# Patient Record
Sex: MALE | Race: BLACK OR AFRICAN AMERICAN | NOT HISPANIC OR LATINO | Employment: FULL TIME | URBAN - METROPOLITAN AREA
[De-identification: names, ages, dates, MRNs, and addresses within clinical notes are randomized per-mention and may not be internally consistent; named-entity substitution may affect disease eponyms.]

---

## 2019-05-26 ENCOUNTER — HOSPITAL ENCOUNTER (EMERGENCY)
Facility: HOSPITAL | Age: 64
Discharge: HOME | End: 2019-05-27
Attending: EMERGENCY MEDICINE
Payer: COMMERCIAL

## 2019-05-26 DIAGNOSIS — N50.819 TESTICULAR PAIN: ICD-10-CM

## 2019-05-26 DIAGNOSIS — R07.9 CHEST PAIN: ICD-10-CM

## 2019-05-26 DIAGNOSIS — R06.02 SOB (SHORTNESS OF BREATH): ICD-10-CM

## 2019-05-26 PROCEDURE — 93010 EKG 12-LEAD: ICD-10-PCS | Mod: ,,, | Performed by: INTERNAL MEDICINE

## 2019-05-26 PROCEDURE — 93010 ELECTROCARDIOGRAM REPORT: CPT | Mod: ,,, | Performed by: INTERNAL MEDICINE

## 2019-05-26 PROCEDURE — 99285 EMERGENCY DEPT VISIT HI MDM: CPT | Mod: 25

## 2019-05-26 PROCEDURE — 93005 ELECTROCARDIOGRAM TRACING: CPT

## 2019-05-26 RX ORDER — ASPIRIN 325 MG
325 TABLET ORAL
Status: COMPLETED | OUTPATIENT
Start: 2019-05-27 | End: 2019-05-27

## 2019-05-27 VITALS
TEMPERATURE: 99 F | WEIGHT: 275.13 LBS | HEIGHT: 73 IN | BODY MASS INDEX: 36.46 KG/M2 | DIASTOLIC BLOOD PRESSURE: 79 MMHG | OXYGEN SATURATION: 96 % | HEART RATE: 67 BPM | SYSTOLIC BLOOD PRESSURE: 137 MMHG | RESPIRATION RATE: 14 BRPM

## 2019-05-27 LAB
ALBUMIN SERPL BCP-MCNC: 3.5 G/DL (ref 3.5–5.2)
ALP SERPL-CCNC: 48 U/L (ref 55–135)
ALT SERPL W/O P-5'-P-CCNC: 30 U/L (ref 10–44)
ANION GAP SERPL CALC-SCNC: 9 MMOL/L (ref 8–16)
AST SERPL-CCNC: 19 U/L (ref 10–40)
BASOPHILS # BLD AUTO: 0.01 K/UL (ref 0–0.2)
BASOPHILS NFR BLD: 0.2 % (ref 0–1.9)
BILIRUB SERPL-MCNC: 0.4 MG/DL (ref 0.1–1)
BILIRUB UR QL STRIP: NEGATIVE
BNP SERPL-MCNC: 47 PG/ML (ref 0–99)
BUN SERPL-MCNC: 17 MG/DL (ref 8–23)
CALCIUM SERPL-MCNC: 9.1 MG/DL (ref 8.7–10.5)
CHLORIDE SERPL-SCNC: 109 MMOL/L (ref 95–110)
CLARITY UR: CLEAR
CO2 SERPL-SCNC: 21 MMOL/L (ref 23–29)
COLOR UR: YELLOW
CREAT SERPL-MCNC: 1.6 MG/DL (ref 0.5–1.4)
DIFFERENTIAL METHOD: ABNORMAL
EOSINOPHIL # BLD AUTO: 0.2 K/UL (ref 0–0.5)
EOSINOPHIL NFR BLD: 2.9 % (ref 0–8)
ERYTHROCYTE [DISTWIDTH] IN BLOOD BY AUTOMATED COUNT: 13 % (ref 11.5–14.5)
EST. GFR  (AFRICAN AMERICAN): 53 ML/MIN/1.73 M^2
EST. GFR  (NON AFRICAN AMERICAN): 46 ML/MIN/1.73 M^2
GLUCOSE SERPL-MCNC: 94 MG/DL (ref 70–110)
GLUCOSE UR QL STRIP: NEGATIVE
HCT VFR BLD AUTO: 41.9 % (ref 40–54)
HGB BLD-MCNC: 13.8 G/DL (ref 14–18)
HGB UR QL STRIP: NEGATIVE
KETONES UR QL STRIP: NEGATIVE
LEUKOCYTE ESTERASE UR QL STRIP: NEGATIVE
LYMPHOCYTES # BLD AUTO: 0.8 K/UL (ref 1–4.8)
LYMPHOCYTES NFR BLD: 12.3 % (ref 18–48)
MCH RBC QN AUTO: 30 PG (ref 27–31)
MCHC RBC AUTO-ENTMCNC: 32.9 G/DL (ref 32–36)
MCV RBC AUTO: 91 FL (ref 82–98)
MONOCYTES # BLD AUTO: 0.8 K/UL (ref 0.3–1)
MONOCYTES NFR BLD: 13.6 % (ref 4–15)
NEUTROPHILS # BLD AUTO: 4.3 K/UL (ref 1.8–7.7)
NEUTROPHILS NFR BLD: 71 % (ref 38–73)
NITRITE UR QL STRIP: NEGATIVE
PH UR STRIP: 6 [PH] (ref 5–8)
PLATELET # BLD AUTO: 182 K/UL (ref 150–350)
PMV BLD AUTO: 10.2 FL (ref 9.2–12.9)
POTASSIUM SERPL-SCNC: 3.7 MMOL/L (ref 3.5–5.1)
PROT SERPL-MCNC: 7.3 G/DL (ref 6–8.4)
PROT UR QL STRIP: NEGATIVE
RBC # BLD AUTO: 4.6 M/UL (ref 4.6–6.2)
SODIUM SERPL-SCNC: 139 MMOL/L (ref 136–145)
SP GR UR STRIP: 1.02 (ref 1–1.03)
TROPONIN I SERPL DL<=0.01 NG/ML-MCNC: 0.01 NG/ML (ref 0–0.03)
URN SPEC COLLECT METH UR: NORMAL
UROBILINOGEN UR STRIP-ACNC: NEGATIVE EU/DL
WBC # BLD AUTO: 6.11 K/UL (ref 3.9–12.7)

## 2019-05-27 PROCEDURE — 25000003 PHARM REV CODE 250: Performed by: EMERGENCY MEDICINE

## 2019-05-27 PROCEDURE — 84484 ASSAY OF TROPONIN QUANT: CPT

## 2019-05-27 PROCEDURE — 36415 COLL VENOUS BLD VENIPUNCTURE: CPT

## 2019-05-27 PROCEDURE — 80053 COMPREHEN METABOLIC PANEL: CPT

## 2019-05-27 PROCEDURE — 83880 ASSAY OF NATRIURETIC PEPTIDE: CPT

## 2019-05-27 PROCEDURE — 81003 URINALYSIS AUTO W/O SCOPE: CPT

## 2019-05-27 PROCEDURE — 85025 COMPLETE CBC W/AUTO DIFF WBC: CPT

## 2019-05-27 RX ADMIN — ASPIRIN 325 MG ORAL TABLET 325 MG: 325 PILL ORAL at 12:05

## 2019-05-27 NOTE — ED PROVIDER NOTES
SCRIBE #1 NOTE: I, Juli Ochoa, am scribing for, and in the presence of, Sylvester Galvez MD. I have scribed the entire note.      History      Chief Complaint   Patient presents with    Shortness of Breath     x 1 wk    Testicle Pain       Review of patient's allergies indicates:  No Known Allergies     HPI   HPI    5/26/2019, 11:46 PM   History obtained from the patient      History of Present Illness: Abdirahman Rodriguez is a 63 y.o. male patient who presents to the Emergency Department for testicle pain which onset gradually a week ago. Symptoms are constant and moderate in severity. Pt also c/o shortness of breath. Denies any hx of blood clots in legs or lungs. No mitigating or exacerbating factors reported. No associated sxs. Patient denies any fever, chills, dysuria, CP, n/v/d, and all other sxs at this time. No prior Tx. No further complaints or concerns at this time.         Arrival mode: Personal vehicle      PCP: [facility]       Past Medical History:  History reviewed. No pertinent past medical history.    Past Surgical History:  Past Surgical History:   Procedure Laterality Date    None           Family History:  Family History   Problem Relation Age of Onset    Heart disease Father     Heart disease Mother        Social History:  Social History     Tobacco Use    Smoking status: Never Smoker    Smokeless tobacco: Never Used   Substance and Sexual Activity    Alcohol use: No    Drug use: No    Sexual activity: Unknown       ROS   Review of Systems   Constitutional: Negative for chills and fever.   HENT: Negative for sore throat.    Respiratory: Positive for shortness of breath.    Cardiovascular: Negative for chest pain.   Gastrointestinal: Negative for diarrhea, nausea and vomiting.   Genitourinary: Positive for testicular pain. Negative for dysuria.   Musculoskeletal: Negative for back pain.   Skin: Negative for rash.   Neurological: Negative for weakness.   Hematological: Does not  "bruise/bleed easily.   All other systems reviewed and are negative.    Physical Exam      Initial Vitals [05/26/19 2325]   BP Pulse Resp Temp SpO2   (!) 155/86 86 20 98.6 °F (37 °C) 95 %      MAP       --          Physical Exam  Nursing Notes and Vital Signs Reviewed.  Constitutional: Patient is in no acute distress. Well-developed and well-nourished.  Head: Atraumatic. Normocephalic.  Eyes: PERRL. EOM intact. Conjunctivae are not pale. No scleral icterus.  ENT: Mucous membranes are moist. Oropharynx is clear and symmetric.    Neck: Supple. Full ROM. No lymphadenopathy.  Cardiovascular: Regular rate. Regular rhythm. No murmurs, rubs, or gallops. Distal pulses are 2+ and symmetric.  Pulmonary/Chest: No respiratory distress. Clear to auscultation bilaterally. No wheezing or rales.  Abdominal: Soft and non-distended.  There is no tenderness.  No rebound, guarding, or rigidity. Good bowel sounds.  Genitourinary: No CVA tenderness.  : External inspection is normal.  Penis is circumcised. No erythema, rash, or lesions. No penile discharge. Normal bilateral testicular lie and position. Scrotum and testes appear normal with no discoloration. No scrotal, testicular, or epididymal tenderness. No masses or hernias around the scrotum, testicles, or inguinal canal.  Musculoskeletal: Moves all extremities. No obvious deformities. No edema. No calf tenderness.  Skin: Warm and dry.  Neurological:  Alert, awake, and appropriate.  Normal speech.  No acute focal neurological deficits are appreciated.  Psychiatric: Normal affect. Good eye contact. Appropriate in content.    ED Course    Procedures  ED Vital Signs:  Vitals:    05/26/19 2325 05/26/19 2359 05/27/19 0144 05/27/19 0145   BP: (!) 155/86   137/79   Pulse: 86 82  67   Resp: 20   14   Temp: 98.6 °F (37 °C)      TempSrc: Oral      SpO2: 95%  96%    Weight: 124.8 kg (275 lb 2.2 oz)      Height: 6' 1" (1.854 m)          Abnormal Lab Results:  Labs Reviewed   CBC W/ AUTO " DIFFERENTIAL - Abnormal; Notable for the following components:       Result Value    Hemoglobin 13.8 (*)     Lymph # 0.8 (*)     Lymph% 12.3 (*)     All other components within normal limits   COMPREHENSIVE METABOLIC PANEL - Abnormal; Notable for the following components:    CO2 21 (*)     Creatinine 1.6 (*)     Alkaline Phosphatase 48 (*)     eGFR if  53 (*)     eGFR if non  46 (*)     All other components within normal limits   TROPONIN I   B-TYPE NATRIURETIC PEPTIDE   URINALYSIS, REFLEX TO URINE CULTURE    Narrative:     Preferred Collection Type->Urine, Clean Catch   TROPONIN I        All Lab Results:  Results for orders placed or performed during the hospital encounter of 05/26/19   CBC auto differential   Result Value Ref Range    WBC 6.11 3.90 - 12.70 K/uL    RBC 4.60 4.60 - 6.20 M/uL    Hemoglobin 13.8 (L) 14.0 - 18.0 g/dL    Hematocrit 41.9 40.0 - 54.0 %    Mean Corpuscular Volume 91 82 - 98 fL    Mean Corpuscular Hemoglobin 30.0 27.0 - 31.0 pg    Mean Corpuscular Hemoglobin Conc 32.9 32.0 - 36.0 g/dL    RDW 13.0 11.5 - 14.5 %    Platelets 182 150 - 350 K/uL    MPV 10.2 9.2 - 12.9 fL    Gran # (ANC) 4.3 1.8 - 7.7 K/uL    Lymph # 0.8 (L) 1.0 - 4.8 K/uL    Mono # 0.8 0.3 - 1.0 K/uL    Eos # 0.2 0.0 - 0.5 K/uL    Baso # 0.01 0.00 - 0.20 K/uL    Gran% 71.0 38.0 - 73.0 %    Lymph% 12.3 (L) 18.0 - 48.0 %    Mono% 13.6 4.0 - 15.0 %    Eosinophil% 2.9 0.0 - 8.0 %    Basophil% 0.2 0.0 - 1.9 %    Differential Method Automated    Comprehensive metabolic panel   Result Value Ref Range    Sodium 139 136 - 145 mmol/L    Potassium 3.7 3.5 - 5.1 mmol/L    Chloride 109 95 - 110 mmol/L    CO2 21 (L) 23 - 29 mmol/L    Glucose 94 70 - 110 mg/dL    BUN, Bld 17 8 - 23 mg/dL    Creatinine 1.6 (H) 0.5 - 1.4 mg/dL    Calcium 9.1 8.7 - 10.5 mg/dL    Total Protein 7.3 6.0 - 8.4 g/dL    Albumin 3.5 3.5 - 5.2 g/dL    Total Bilirubin 0.4 0.1 - 1.0 mg/dL    Alkaline Phosphatase 48 (L) 55 - 135 U/L    AST  19 10 - 40 U/L    ALT 30 10 - 44 U/L    Anion Gap 9 8 - 16 mmol/L    eGFR if African American 53 (A) >60 mL/min/1.73 m^2    eGFR if non African American 46 (A) >60 mL/min/1.73 m^2   Troponin I #1   Result Value Ref Range    Troponin I 0.011 0.000 - 0.026 ng/mL   B-Type natriuretic peptide (BNP)   Result Value Ref Range    BNP 47 0 - 99 pg/mL   Urinalysis, Reflex to Urine Culture Urine, Clean Catch   Result Value Ref Range    Specimen UA Urine, Clean Catch     Color, UA Yellow Yellow, Straw, Mita    Appearance, UA Clear Clear    pH, UA 6.0 5.0 - 8.0    Specific Gravity, UA 1.020 1.005 - 1.030    Protein, UA Negative Negative    Glucose, UA Negative Negative    Ketones, UA Negative Negative    Bilirubin (UA) Negative Negative    Occult Blood UA Negative Negative    Nitrite, UA Negative Negative    Urobilinogen, UA Negative <2.0 EU/dL    Leukocytes, UA Negative Negative         Imaging Results:  Imaging Results          US Scrotum And Testicles (In process)                X-Ray Chest AP Portable (In process)                     The EKG was ordered, reviewed, and independently interpreted by the ED provider.  Interpretation time: 23:40  Rate: 79 BPM  Rhythm: normal sinus rhythm  Interpretation: Nonspecific T wave abnormality. No STEMI.      The Emergency Provider reviewed the vital signs and test results, which are outlined above.    ED Discussion     1:39 AM: Reassessed pt at this time.  Pt states his condition has improved at this time. Discussed with pt all pertinent ED information and results. Discussed pt dx and plan of tx. Gave pt all f/u and return to the ED instructions. All questions and concerns were addressed at this time. Pt expresses understanding of information and instructions, and is comfortable with plan to discharge. Pt is stable for discharge.    I discussed with patient and/or family/caretaker that evaluation in the ED does not suggest any emergent or life threatening medical conditions requiring  immediate intervention beyond what was provided in the ED, and I believe patient is safe for discharge.  Regardless, an unremarkable evaluation in the ED does not preclude the development or presence of a serious of life threatening condition. As such, patient was instructed to return immediately for any worsening or change in current symptoms.      ED Medication(s):  Medications   aspirin tablet 325 mg (325 mg Oral Given 5/27/19 0003)     Current Discharge Medication List            Follow-up Information     [facility]. Schedule an appointment as soon as possible for a visit in 3 days.    Why:  or with your primary care physician to follow-up on today's visit  Contact information:  [address] [facility] [address] [phone]             Go to  [facility].    Specialty:  Emergency Medicine  Why:  As needed, If symptoms worsen  Contact information:  [address] [facility] [address] [phone]                   Medical Decision Making    Medical Decision Making:   Clinical Tests:   Lab Tests: Ordered and Reviewed  Radiological Study: Ordered and Reviewed  Medical Tests: Ordered and Reviewed           Scribe Attestation:   Scribe #1: I performed the above scribed service and the documentation accurately describes the services I performed. I attest to the accuracy of the note.    Attending:   Physician Attestation Statement for Scribe #1: I, Sylvester Galvez MD, personally performed the services described in this documentation, as scribed by Juli Ochoa, in my presence, and it is both accurate and complete.          Clinical Impression       ICD-10-CM ICD-9-CM   1. SOB (shortness of breath) R06.02 786.05   2. Chest pain R07.9 786.50   3. Testicular pain N50.819 608.9       Disposition:   Disposition: Discharged  Condition: Stable         Sylvester Galvez MD  05/27/19 0152

## 2020-06-01 ENCOUNTER — HOSPITAL ENCOUNTER (EMERGENCY)
Facility: HOSPITAL | Age: 65
Discharge: HOME | End: 2020-06-01
Attending: FAMILY MEDICINE
Payer: COMMERCIAL

## 2020-06-01 VITALS
SYSTOLIC BLOOD PRESSURE: 170 MMHG | RESPIRATION RATE: 16 BRPM | HEIGHT: 73 IN | OXYGEN SATURATION: 97 % | WEIGHT: 274.5 LBS | DIASTOLIC BLOOD PRESSURE: 85 MMHG | HEART RATE: 78 BPM | TEMPERATURE: 99 F | BODY MASS INDEX: 36.38 KG/M2

## 2020-06-01 DIAGNOSIS — R03.0 ELEVATED BLOOD PRESSURE READING: ICD-10-CM

## 2020-06-01 DIAGNOSIS — M54.50 ACUTE MIDLINE LOW BACK PAIN WITHOUT SCIATICA: ICD-10-CM
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
## 2020-06-01 DIAGNOSIS — M54.50 ACUTE LEFT-SIDED LOW BACK PAIN WITHOUT SCIATICA: ICD-10-CM

## 2020-06-01 DIAGNOSIS — M47.819 ARTHRITIS OF LOW BACK: ICD-10-CM

## 2020-06-01 PROCEDURE — 99284 EMERGENCY DEPT VISIT MOD MDM: CPT | Mod: 25

## 2020-06-01 RX ORDER — DICLOFENAC SODIUM 50 MG/1
50 TABLET, DELAYED RELEASE ORAL 2 TIMES DAILY
Qty: 10 TABLET | Refills: 0 | Status: SHIPPED | OUTPATIENT
Start: 2020-06-01 | End: 2020-06-06

## 2020-06-01 RX ORDER — TIZANIDINE 4 MG/1
4 TABLET ORAL NIGHTLY PRN
Qty: 10 TABLET | Refills: 0 | Status: SHIPPED | OUTPATIENT
Start: 2020-06-01 | End: 2020-06-11

## 2020-06-02 NOTE — ED NOTES
Patient identifiers verified and correct for Abdirahman Rodriguez.    LOC: The patient is awake, alert and aware of environment with an appropriate affect, the patient is oriented x 3 and speaking appropriately.  APPEARANCE: Patient resting comfortably and in no acute distress, patient is clean and well groomed, patient's clothing is properly fastened.  SKIN: The skin is warm and dry, color consistent with ethnicity, patient has normal skin turgor and moist mucus membranes, skin intact, no breakdown or bruising noted.  MUSCULOSKELETAL: Patient moving all extremities spontaneously. EX back pain.   RESPIRATORY: Airway is open and patent, respirations are spontaneous.  CARDIAC: Patient has a normal rate, no periphreal edema noted, capillary refill < 3 seconds.  ABDOMEN: Soft and non tender to palpation.

## 2020-06-02 NOTE — ED PROVIDER NOTES
History      Chief Complaint   Patient presents with    Fall     fell in the store yesterday, fell backwards onto buttocks and then head, denies loss of consciousness. pain to back and headaches       Review of patient's allergies indicates:  No Known Allergies     HPI   HPI    6/1/2020, 7:44 PM   History obtained from the patient      History of Present Illness: Abdirahman Rodriguez is a 64 y.o. male patient who presents to the Emergency Department for back pain x one day.  Patient states that he fell in store. Associated symptoms include lower back pain.  Patient states that during fall he hit posterior head; denies LOC.  Denies fever, vomiting, diarrhea, chest pain, SOB, headache, dizziness, saddle anesthesia, bowel/bladder incontinence.       Arrival mode: Personal vehicle      PCP: [facility]       Past Medical History:  No past medical history on file.    Past Surgical History:  Past Surgical History:   Procedure Laterality Date    None           Family History:  Family History   Problem Relation Age of Onset    Heart disease Father     Heart disease Mother        Social History:  Social History     Tobacco Use    Smoking status: Never Smoker    Smokeless tobacco: Never Used   Substance and Sexual Activity    Alcohol use: No    Drug use: No    Sexual activity: Not on file       ROS   Review of Systems   Constitutional: Negative for chills and fever.   HENT: Negative for congestion and rhinorrhea.    Eyes: Negative for discharge and redness.   Respiratory: Negative for cough and wheezing.    Cardiovascular: Negative for chest pain and palpitations.   Gastrointestinal: Negative for diarrhea, nausea and vomiting.   Genitourinary: Negative for dysuria and frequency.   Musculoskeletal: Positive for back pain. Negative for neck pain.   Skin: Negative for rash and wound.   Neurological: Positive for headaches ( resolved). Negative for dizziness.       Physical Exam      Initial Vitals [06/01/20 1805]  "  BP Pulse Resp Temp SpO2   (!) 170/85 78 16 99.2 °F (37.3 °C) 97 %      MAP       --          Physical Exam  Nursing Notes and Vital Signs Reviewed.  Constitutional: Patient is in no apparent distress. Awake and alert. Well-developed and well-nourished.  Head: Atraumatic. Normocephalic.  Eyes: PERRL. EOM intact. Conjunctivae are not pale. No scleral icterus.  ENT: Mucous membranes are moist. Oropharynx is clear and symmetric.    Neck: Supple. Full ROM. No lymphadenopathy.  Cardiovascular: Regular rate. Regular rhythm. No murmurs, rubs, or gallops. Distal pulses are 2+ and symmetric.  Pulmonary/Chest: No respiratory distress. Clear to auscultation bilaterally. No wheezing, rales, or rhonchi.  Abdominal: Soft and non-distended.  There is no tenderness.  No rebound, guarding, or rigidity. Good bowel sounds.  Genitourinary: No CVA tenderness  Musculoskeletal: Moves all extremities. No obvious deformities. No edema. No calf tenderness.    Back:  TTP over lumbar spine.  TTP over left paraspinal musculature.  Pain with lumbar flexion and extension.    Skin: Warm and dry.  Neurological:  Alert, awake, and appropriate.  Normal speech.  No acute focal neurological deficits are appreciated.  Equal strength BLE.  Negative SLR bilaterally.  DTR 2+ BLE.  Cranial nerves II-XII intact.  GCS = 15.   Psychiatric: Normal affect. Good eye contact. Appropriate in content.    ED Course    Procedures  ED Vital Signs:  Vitals:    06/01/20 1805   BP: (!) 170/85   Pulse: 78   Resp: 16   Temp: 99.2 °F (37.3 °C)   TempSrc: Oral   SpO2: 97%   Weight: 124.5 kg (274 lb 7.6 oz)   Height: 6' 1" (1.854 m)       Abnormal Lab Results:  Labs Reviewed - No data to display     All Lab Results:  None    Imaging Results:  Imaging Results          X-Ray Lumbar Spine Complete 5 View (Final result)  Result time 06/01/20 19:00:18   Procedure changed from X-Ray Lumbar Spine Ap And Lateral     Final result by Young Benoit MD (06/01/20 19:00:18)              "    Impression:      Progressive arthritic changes.  No acute findings.      Electronically signed by: Young Benoit MD  Date:    06/01/2020  Time:    19:00             Narrative:    EXAMINATION:  XR LUMBAR SPINE COMPLETE 5 VIEW    CLINICAL HISTORY:  Low back pain, minor trauma;    TECHNIQUE:  Routine radiographs obtained.    COMPARISON:  06/10/2011    FINDINGS:  Negative for acute fracture or dislocation.    Multilevel spondylosis with significant disc space narrowing at L4-5 with gas in the disc space.  Moderate to marked disc space narrowing L5-S1.  Mild-to-moderate disc space narrowing L3-4.  Low-grade facet arthropathy lower lumbar spine L4 through S1    Transitional vertebra with partial lumbarization bilateral S1 segments.    No compression deformities.                                        The Emergency Provider reviewed the vital signs and test results, which are outlined above.    ED Discussion     8:34 PM: Reassessed pt at this time.  Pt states his condition has improved at this time. Discussed with pt all pertinent ED information and results. Discussed pt dx and plan of tx. Gave pt all f/u and return to the ED instructions. All questions and concerns were addressed at this time. Pt expresses understanding of information and instructions, and is comfortable with plan to discharge. Pt is stable for discharge.    Pre-hypertension/Hypertension: The pt has been informed that they may have pre-hypertension or hypertension based on a blood pressure reading in the ED. I recommend that the pt call the PCP listed on their discharge instructions or a physician of their choice this week to arrange f/u for further evaluation of possible pre-hypertension or hypertension.     I discussed with patient and/or family/caretaker that evaluation in the ED does not suggest any emergent or life threatening medical conditions requiring immediate intervention beyond what was provided in the ED, and I believe patient is safe for  discharge.  Regardless, an unremarkable evaluation in the ED does not preclude the development or presence of a serious of life threatening condition. As such, patient was instructed to return immediately for any worsening or change in current symptoms.      ED Medication(s):  Medications - No data to display    Discharge Medication List as of 6/1/2020  7:47 PM      START taking these medications    Details   diclofenac (VOLTAREN) 50 MG EC tablet Take 1 tablet (50 mg total) by mouth 2 (two) times daily. for 5 days, Starting Mon 6/1/2020, Until Sat 6/6/2020, Print      tiZANidine (ZANAFLEX) 4 MG tablet Take 1 tablet (4 mg total) by mouth nightly as needed (spasm)., Starting Mon 6/1/2020, Until Thu 6/11/2020, Print                   Medical Decision Making                  Clinical Impression       ICD-10-CM ICD-9-CM   1. Fall, initial encounter W19.XXXA E888.9   2. Acute midline low back pain without sciatica M54.5 724.2   3. Acute left-sided low back pain without sciatica M54.5 724.2   4. Elevated blood pressure reading R03.0 796.2   5. Arthritis of low back M47.819 721.90       Disposition:   Disposition: Discharged  Condition: Stable           Pau Pool PA-C  06/01/20 2035

## 2021-11-05 ENCOUNTER — OFFICE VISIT (OUTPATIENT)
Dept: INTERNAL MEDICINE | Facility: CLINIC | Age: 66
End: 2021-11-05
Payer: COMMERCIAL

## 2021-11-05 VITALS
OXYGEN SATURATION: 95 % | HEIGHT: 74 IN | SYSTOLIC BLOOD PRESSURE: 140 MMHG | DIASTOLIC BLOOD PRESSURE: 82 MMHG | BODY MASS INDEX: 36.36 KG/M2 | WEIGHT: 283.31 LBS | HEART RATE: 83 BPM

## 2021-11-05 DIAGNOSIS — Z12.5 SCREENING FOR PROSTATE CANCER: ICD-10-CM

## 2021-11-05 DIAGNOSIS — Z00.00 ROUTINE GENERAL MEDICAL EXAMINATION AT A HEALTH CARE FACILITY: Primary | ICD-10-CM

## 2021-11-05 DIAGNOSIS — Z12.11 SCREEN FOR COLON CANCER: ICD-10-CM

## 2021-11-05 DIAGNOSIS — I10 BENIGN HYPERTENSION: ICD-10-CM

## 2021-11-05 PROCEDURE — 99999 PR PBB SHADOW E&M-EST. PATIENT-LVL III: CPT | Mod: PBBFAC,,, | Performed by: INTERNAL MEDICINE

## 2021-11-05 PROCEDURE — 3079F PR MOST RECENT DIASTOLIC BLOOD PRESSURE 80-89 MM HG: ICD-10-PCS | Mod: CPTII,S$GLB,, | Performed by: INTERNAL MEDICINE

## 2021-11-05 PROCEDURE — 3079F DIAST BP 80-89 MM HG: CPT | Mod: CPTII,S$GLB,, | Performed by: INTERNAL MEDICINE

## 2021-11-05 PROCEDURE — 3077F PR MOST RECENT SYSTOLIC BLOOD PRESSURE >= 140 MM HG: ICD-10-PCS | Mod: CPTII,S$GLB,, | Performed by: INTERNAL MEDICINE

## 2021-11-05 PROCEDURE — 1160F RVW MEDS BY RX/DR IN RCRD: CPT | Mod: CPTII,S$GLB,, | Performed by: INTERNAL MEDICINE

## 2021-11-05 PROCEDURE — 1159F PR MEDICATION LIST DOCUMENTED IN MEDICAL RECORD: ICD-10-PCS | Mod: CPTII,S$GLB,, | Performed by: INTERNAL MEDICINE

## 2021-11-05 PROCEDURE — 99999 PR PBB SHADOW E&M-EST. PATIENT-LVL III: ICD-10-PCS | Mod: PBBFAC,,, | Performed by: INTERNAL MEDICINE

## 2021-11-05 PROCEDURE — 3077F SYST BP >= 140 MM HG: CPT | Mod: CPTII,S$GLB,, | Performed by: INTERNAL MEDICINE

## 2021-11-05 PROCEDURE — 1160F PR REVIEW ALL MEDS BY PRESCRIBER/CLIN PHARMACIST DOCUMENTED: ICD-10-PCS | Mod: CPTII,S$GLB,, | Performed by: INTERNAL MEDICINE

## 2021-11-05 PROCEDURE — 3008F PR BODY MASS INDEX (BMI) DOCUMENTED: ICD-10-PCS | Mod: CPTII,S$GLB,, | Performed by: INTERNAL MEDICINE

## 2021-11-05 PROCEDURE — 4010F PR ACE/ARB THEARPY RXD/TAKEN: ICD-10-PCS | Mod: CPTII,S$GLB,, | Performed by: INTERNAL MEDICINE

## 2021-11-05 PROCEDURE — 4010F ACE/ARB THERAPY RXD/TAKEN: CPT | Mod: CPTII,S$GLB,, | Performed by: INTERNAL MEDICINE

## 2021-11-05 PROCEDURE — 3008F BODY MASS INDEX DOCD: CPT | Mod: CPTII,S$GLB,, | Performed by: INTERNAL MEDICINE

## 2021-11-05 PROCEDURE — 99386 PR PREVENTIVE VISIT,NEW,40-64: ICD-10-PCS | Mod: S$GLB,,, | Performed by: INTERNAL MEDICINE

## 2021-11-05 PROCEDURE — 99386 PREV VISIT NEW AGE 40-64: CPT | Mod: S$GLB,,, | Performed by: INTERNAL MEDICINE

## 2021-11-05 PROCEDURE — 1159F MED LIST DOCD IN RCRD: CPT | Mod: CPTII,S$GLB,, | Performed by: INTERNAL MEDICINE

## 2021-11-05 RX ORDER — NAPROXEN SODIUM 220 MG/1
81 TABLET, FILM COATED ORAL DAILY
COMMUNITY
End: 2023-01-04

## 2021-11-05 RX ORDER — LISINOPRIL AND HYDROCHLOROTHIAZIDE 10; 12.5 MG/1; MG/1
1 TABLET ORAL DAILY
Qty: 90 TABLET | Refills: 3 | Status: SHIPPED | OUTPATIENT
Start: 2021-11-05 | End: 2021-12-15

## 2021-11-08 ENCOUNTER — LAB VISIT (OUTPATIENT)
Dept: LAB | Facility: HOSPITAL | Age: 66
End: 2021-11-08
Attending: INTERNAL MEDICINE
Payer: COMMERCIAL

## 2021-11-08 ENCOUNTER — CLINICAL SUPPORT (OUTPATIENT)
Dept: INTERNAL MEDICINE | Facility: CLINIC | Age: 66
End: 2021-11-08
Payer: COMMERCIAL

## 2021-11-08 DIAGNOSIS — Z12.5 SCREENING FOR PROSTATE CANCER: ICD-10-CM

## 2021-11-08 DIAGNOSIS — I10 BENIGN HYPERTENSION: ICD-10-CM

## 2021-11-08 DIAGNOSIS — D89.2 HYPERGAMMAGLOBULINEMIA: ICD-10-CM

## 2021-11-08 DIAGNOSIS — Z23 13-POLYVALENT PNEUMOCOCCAL CONJUGATE VACCINE ADMINISTERED: Primary | ICD-10-CM

## 2021-11-08 LAB
ALBUMIN SERPL BCP-MCNC: 3.5 G/DL (ref 3.5–5.2)
ALP SERPL-CCNC: 54 U/L (ref 55–135)
ALT SERPL W/O P-5'-P-CCNC: 44 U/L (ref 10–44)
ANION GAP SERPL CALC-SCNC: 11 MMOL/L (ref 8–16)
AST SERPL-CCNC: 38 U/L (ref 10–40)
BASOPHILS # BLD AUTO: 0.02 K/UL (ref 0–0.2)
BASOPHILS NFR BLD: 0.5 % (ref 0–1.9)
BILIRUB SERPL-MCNC: 0.6 MG/DL (ref 0.1–1)
BUN SERPL-MCNC: 17 MG/DL (ref 8–23)
CALCIUM SERPL-MCNC: 9.4 MG/DL (ref 8.7–10.5)
CHLORIDE SERPL-SCNC: 103 MMOL/L (ref 95–110)
CHOLEST SERPL-MCNC: 135 MG/DL (ref 120–199)
CHOLEST/HDLC SERPL: 4.1 {RATIO} (ref 2–5)
CO2 SERPL-SCNC: 24 MMOL/L (ref 23–29)
COMPLEXED PSA SERPL-MCNC: 0.47 NG/ML (ref 0–4)
CREAT SERPL-MCNC: 1.5 MG/DL (ref 0.5–1.4)
DIFFERENTIAL METHOD: ABNORMAL
EOSINOPHIL # BLD AUTO: 0.2 K/UL (ref 0–0.5)
EOSINOPHIL NFR BLD: 4.3 % (ref 0–8)
ERYTHROCYTE [DISTWIDTH] IN BLOOD BY AUTOMATED COUNT: 14.4 % (ref 11.5–14.5)
EST. GFR  (AFRICAN AMERICAN): 56.1 ML/MIN/1.73 M^2
EST. GFR  (NON AFRICAN AMERICAN): 48.5 ML/MIN/1.73 M^2
GLUCOSE SERPL-MCNC: 98 MG/DL (ref 70–110)
HCT VFR BLD AUTO: 47.5 % (ref 40–54)
HDLC SERPL-MCNC: 33 MG/DL (ref 40–75)
HDLC SERPL: 24.4 % (ref 20–50)
HGB BLD-MCNC: 14.8 G/DL (ref 14–18)
IMM GRANULOCYTES # BLD AUTO: 0.01 K/UL (ref 0–0.04)
IMM GRANULOCYTES NFR BLD AUTO: 0.2 % (ref 0–0.5)
LDLC SERPL CALC-MCNC: 87.8 MG/DL (ref 63–159)
LYMPHOCYTES # BLD AUTO: 0.9 K/UL (ref 1–4.8)
LYMPHOCYTES NFR BLD: 21.4 % (ref 18–48)
MCH RBC QN AUTO: 30.1 PG (ref 27–31)
MCHC RBC AUTO-ENTMCNC: 31.2 G/DL (ref 32–36)
MCV RBC AUTO: 97 FL (ref 82–98)
MONOCYTES # BLD AUTO: 0.5 K/UL (ref 0.3–1)
MONOCYTES NFR BLD: 12.4 % (ref 4–15)
NEUTROPHILS # BLD AUTO: 2.6 K/UL (ref 1.8–7.7)
NEUTROPHILS NFR BLD: 61.2 % (ref 38–73)
NONHDLC SERPL-MCNC: 102 MG/DL
NRBC BLD-RTO: 0 /100 WBC
PLATELET # BLD AUTO: 186 K/UL (ref 150–450)
PMV BLD AUTO: 11.2 FL (ref 9.2–12.9)
POTASSIUM SERPL-SCNC: 4.2 MMOL/L (ref 3.5–5.1)
PROT SERPL-MCNC: 9 G/DL (ref 6–8.4)
RBC # BLD AUTO: 4.92 M/UL (ref 4.6–6.2)
SODIUM SERPL-SCNC: 138 MMOL/L (ref 136–145)
TRIGL SERPL-MCNC: 71 MG/DL (ref 30–150)
TSH SERPL DL<=0.005 MIU/L-ACNC: 3.7 UIU/ML (ref 0.4–4)
WBC # BLD AUTO: 4.21 K/UL (ref 3.9–12.7)

## 2021-11-08 PROCEDURE — 80053 COMPREHEN METABOLIC PANEL: CPT | Performed by: INTERNAL MEDICINE

## 2021-11-08 PROCEDURE — 36415 COLL VENOUS BLD VENIPUNCTURE: CPT | Mod: PO | Performed by: INTERNAL MEDICINE

## 2021-11-08 PROCEDURE — 90471 IMMUNIZATION ADMIN: CPT | Mod: S$GLB,,, | Performed by: INTERNAL MEDICINE

## 2021-11-08 PROCEDURE — 80061 LIPID PANEL: CPT | Performed by: INTERNAL MEDICINE

## 2021-11-08 PROCEDURE — 84165 PROTEIN E-PHORESIS SERUM: CPT | Performed by: INTERNAL MEDICINE

## 2021-11-08 PROCEDURE — 84153 ASSAY OF PSA TOTAL: CPT | Performed by: INTERNAL MEDICINE

## 2021-11-08 PROCEDURE — 85025 COMPLETE CBC W/AUTO DIFF WBC: CPT | Performed by: INTERNAL MEDICINE

## 2021-11-08 PROCEDURE — 84165 PROTEIN E-PHORESIS SERUM: CPT | Mod: 26,,, | Performed by: PATHOLOGY

## 2021-11-08 PROCEDURE — 84443 ASSAY THYROID STIM HORMONE: CPT | Performed by: INTERNAL MEDICINE

## 2021-11-08 PROCEDURE — 84165 PATHOLOGIST INTERPRETATION SPE: ICD-10-PCS | Mod: 26,,, | Performed by: PATHOLOGY

## 2021-11-08 PROCEDURE — 90471 PNEUMOCOCCAL CONJUGATE VACCINE 13-VALENT LESS THAN 5YO & GREATER THAN: ICD-10-PCS | Mod: S$GLB,,, | Performed by: INTERNAL MEDICINE

## 2021-11-08 PROCEDURE — 90670 PNEUMOCOCCAL CONJUGATE VACCINE 13-VALENT LESS THAN 5YO & GREATER THAN: ICD-10-PCS | Mod: S$GLB,,, | Performed by: INTERNAL MEDICINE

## 2021-11-08 PROCEDURE — 90670 PCV13 VACCINE IM: CPT | Mod: S$GLB,,, | Performed by: INTERNAL MEDICINE

## 2021-11-09 ENCOUNTER — TELEPHONE (OUTPATIENT)
Dept: INTERNAL MEDICINE | Facility: CLINIC | Age: 66
End: 2021-11-09
Payer: COMMERCIAL

## 2021-11-09 ENCOUNTER — PATIENT MESSAGE (OUTPATIENT)
Dept: INTERNAL MEDICINE | Facility: CLINIC | Age: 66
End: 2021-11-09
Payer: COMMERCIAL

## 2021-11-09 DIAGNOSIS — D89.2 HYPERGAMMAGLOBULINEMIA: Primary | ICD-10-CM

## 2021-11-10 LAB
ALBUMIN SERPL ELPH-MCNC: 3.74 G/DL (ref 3.35–5.55)
ALPHA1 GLOB SERPL ELPH-MCNC: 0.34 G/DL (ref 0.17–0.41)
ALPHA2 GLOB SERPL ELPH-MCNC: 0.96 G/DL (ref 0.43–0.99)
B-GLOBULIN SERPL ELPH-MCNC: 1.1 G/DL (ref 0.5–1.1)
GAMMA GLOB SERPL ELPH-MCNC: 2.56 G/DL (ref 0.67–1.58)
PROT SERPL-MCNC: 8.7 G/DL (ref 6–8.4)

## 2021-11-11 ENCOUNTER — TELEPHONE (OUTPATIENT)
Dept: INTERNAL MEDICINE | Facility: CLINIC | Age: 66
End: 2021-11-11
Payer: COMMERCIAL

## 2021-11-11 LAB — PATHOLOGIST INTERPRETATION SPE: NORMAL

## 2021-11-16 DIAGNOSIS — Z01.818 PRE-OP TESTING: Primary | ICD-10-CM

## 2021-11-16 RX ORDER — SODIUM, POTASSIUM,MAG SULFATES 17.5-3.13G
1 SOLUTION, RECONSTITUTED, ORAL ORAL DAILY
Qty: 1 KIT | Refills: 0 | Status: SHIPPED | OUTPATIENT
Start: 2021-11-16 | End: 2021-11-18

## 2021-12-02 ENCOUNTER — PATIENT MESSAGE (OUTPATIENT)
Dept: PREADMISSION TESTING | Facility: HOSPITAL | Age: 66
End: 2021-12-02
Payer: COMMERCIAL

## 2021-12-03 ENCOUNTER — LAB VISIT (OUTPATIENT)
Dept: PRIMARY CARE CLINIC | Facility: CLINIC | Age: 66
End: 2021-12-03
Payer: COMMERCIAL

## 2021-12-03 DIAGNOSIS — Z01.818 PRE-OP TESTING: ICD-10-CM

## 2021-12-03 PROCEDURE — U0003 INFECTIOUS AGENT DETECTION BY NUCLEIC ACID (DNA OR RNA); SEVERE ACUTE RESPIRATORY SYNDROME CORONAVIRUS 2 (SARS-COV-2) (CORONAVIRUS DISEASE [COVID-19]), AMPLIFIED PROBE TECHNIQUE, MAKING USE OF HIGH THROUGHPUT TECHNOLOGIES AS DESCRIBED BY CMS-2020-01-R: HCPCS | Performed by: INTERNAL MEDICINE

## 2021-12-03 PROCEDURE — U0005 INFEC AGEN DETEC AMPLI PROBE: HCPCS | Performed by: INTERNAL MEDICINE

## 2021-12-04 LAB — SARS-COV-2 RNA RESP QL NAA+PROBE: NOT DETECTED

## 2021-12-06 ENCOUNTER — ANESTHESIA (OUTPATIENT)
Dept: ENDOSCOPY | Facility: HOSPITAL | Age: 66
End: 2021-12-06
Payer: COMMERCIAL

## 2021-12-06 ENCOUNTER — HOSPITAL ENCOUNTER (OUTPATIENT)
Facility: HOSPITAL | Age: 66
Discharge: HOME | End: 2021-12-06
Attending: INTERNAL MEDICINE | Admitting: INTERNAL MEDICINE
Payer: COMMERCIAL

## 2021-12-06 ENCOUNTER — ANESTHESIA EVENT (OUTPATIENT)
Dept: ENDOSCOPY | Facility: HOSPITAL | Age: 66
End: 2021-12-06
Payer: COMMERCIAL

## 2021-12-06 DIAGNOSIS — Z12.11 ENCOUNTER FOR SCREENING COLONOSCOPY: Primary | ICD-10-CM

## 2021-12-06 PROCEDURE — 45385 COLONOSCOPY W/LESION REMOVAL: CPT | Mod: 33,,, | Performed by: INTERNAL MEDICINE

## 2021-12-06 PROCEDURE — 45380 COLONOSCOPY AND BIOPSY: CPT | Performed by: INTERNAL MEDICINE

## 2021-12-06 PROCEDURE — 37000008 HC ANESTHESIA 1ST 15 MINUTES: Performed by: INTERNAL MEDICINE

## 2021-12-06 PROCEDURE — 88305 TISSUE EXAM BY PATHOLOGIST: CPT | Mod: 26,,, | Performed by: PATHOLOGY

## 2021-12-06 PROCEDURE — 45380 COLONOSCOPY AND BIOPSY: CPT | Mod: 59,,, | Performed by: INTERNAL MEDICINE

## 2021-12-06 PROCEDURE — 27201012 HC FORCEPS, HOT/COLD, DISP: Performed by: INTERNAL MEDICINE

## 2021-12-06 PROCEDURE — 45385 COLONOSCOPY W/LESION REMOVAL: CPT | Performed by: INTERNAL MEDICINE

## 2021-12-06 PROCEDURE — 45380 PR COLONOSCOPY,BIOPSY: ICD-10-PCS | Mod: 59,,, | Performed by: INTERNAL MEDICINE

## 2021-12-06 PROCEDURE — 25000003 PHARM REV CODE 250: Performed by: NURSE ANESTHETIST, CERTIFIED REGISTERED

## 2021-12-06 PROCEDURE — 45385 PR COLONOSCOPY,REMV LESN,SNARE: ICD-10-PCS | Mod: 33,,, | Performed by: INTERNAL MEDICINE

## 2021-12-06 PROCEDURE — 37000009 HC ANESTHESIA EA ADD 15 MINS: Performed by: INTERNAL MEDICINE

## 2021-12-06 PROCEDURE — 88305 TISSUE EXAM BY PATHOLOGIST: CPT | Mod: 59 | Performed by: PATHOLOGY

## 2021-12-06 PROCEDURE — 63600175 PHARM REV CODE 636 W HCPCS: Performed by: NURSE ANESTHETIST, CERTIFIED REGISTERED

## 2021-12-06 PROCEDURE — 88305 TISSUE EXAM BY PATHOLOGIST: ICD-10-PCS | Mod: 26,,, | Performed by: PATHOLOGY

## 2021-12-06 PROCEDURE — 27201089 HC SNARE, DISP (ANY): Performed by: INTERNAL MEDICINE

## 2021-12-06 RX ORDER — PROPOFOL 10 MG/ML
VIAL (ML) INTRAVENOUS
Status: DISCONTINUED | OUTPATIENT
Start: 2021-12-06 | End: 2021-12-06

## 2021-12-06 RX ORDER — SODIUM CHLORIDE, SODIUM LACTATE, POTASSIUM CHLORIDE, CALCIUM CHLORIDE 600; 310; 30; 20 MG/100ML; MG/100ML; MG/100ML; MG/100ML
INJECTION, SOLUTION INTRAVENOUS CONTINUOUS
Status: DISCONTINUED | OUTPATIENT
Start: 2021-12-06 | End: 2021-12-06 | Stop reason: HOSPADM

## 2021-12-06 RX ORDER — LIDOCAINE HYDROCHLORIDE 10 MG/ML
INJECTION, SOLUTION EPIDURAL; INFILTRATION; INTRACAUDAL; PERINEURAL
Status: DISCONTINUED | OUTPATIENT
Start: 2021-12-06 | End: 2021-12-06

## 2021-12-06 RX ADMIN — PROPOFOL 50 MG: 10 INJECTION, EMULSION INTRAVENOUS at 01:12

## 2021-12-06 RX ADMIN — PROPOFOL 100 MG: 10 INJECTION, EMULSION INTRAVENOUS at 01:12

## 2021-12-06 RX ADMIN — PROPOFOL 30 MG: 10 INJECTION, EMULSION INTRAVENOUS at 01:12

## 2021-12-06 RX ADMIN — LIDOCAINE HYDROCHLORIDE 50 MG: 10 INJECTION, SOLUTION EPIDURAL; INFILTRATION; INTRACAUDAL; PERINEURAL at 01:12

## 2021-12-06 RX ADMIN — SODIUM CHLORIDE, SODIUM LACTATE, POTASSIUM CHLORIDE, AND CALCIUM CHLORIDE: .6; .31; .03; .02 INJECTION, SOLUTION INTRAVENOUS at 01:12

## 2021-12-06 RX ADMIN — PROPOFOL 20 MG: 10 INJECTION, EMULSION INTRAVENOUS at 01:12

## 2021-12-07 VITALS
OXYGEN SATURATION: 94 % | WEIGHT: 268 LBS | BODY MASS INDEX: 35.52 KG/M2 | RESPIRATION RATE: 18 BRPM | HEART RATE: 80 BPM | TEMPERATURE: 98 F | HEIGHT: 73 IN | DIASTOLIC BLOOD PRESSURE: 86 MMHG | SYSTOLIC BLOOD PRESSURE: 142 MMHG

## 2021-12-08 LAB
FINAL PATHOLOGIC DIAGNOSIS: NORMAL
GROSS: NORMAL
Lab: NORMAL

## 2021-12-15 ENCOUNTER — OFFICE VISIT (OUTPATIENT)
Dept: INTERNAL MEDICINE | Facility: CLINIC | Age: 66
End: 2021-12-15
Payer: COMMERCIAL

## 2021-12-15 VITALS
HEIGHT: 73 IN | WEIGHT: 277.13 LBS | BODY MASS INDEX: 36.73 KG/M2 | OXYGEN SATURATION: 97 % | SYSTOLIC BLOOD PRESSURE: 130 MMHG | HEART RATE: 86 BPM | DIASTOLIC BLOOD PRESSURE: 80 MMHG

## 2021-12-15 DIAGNOSIS — Z86.010 HISTORY OF COLON POLYPS: ICD-10-CM

## 2021-12-15 DIAGNOSIS — N18.31 HYPERTENSIVE KIDNEY DISEASE WITH STAGE 3A CHRONIC KIDNEY DISEASE: ICD-10-CM

## 2021-12-15 DIAGNOSIS — I10 BENIGN HYPERTENSION: Primary | ICD-10-CM

## 2021-12-15 DIAGNOSIS — I12.9 HYPERTENSIVE KIDNEY DISEASE WITH STAGE 3A CHRONIC KIDNEY DISEASE: ICD-10-CM

## 2021-12-15 PROCEDURE — 90750 ZOSTER RECOMBINANT VACCINE: ICD-10-PCS | Mod: S$GLB,,, | Performed by: INTERNAL MEDICINE

## 2021-12-15 PROCEDURE — 99213 OFFICE O/P EST LOW 20 MIN: CPT | Mod: 25,S$GLB,, | Performed by: INTERNAL MEDICINE

## 2021-12-15 PROCEDURE — 4010F PR ACE/ARB THEARPY RXD/TAKEN: ICD-10-PCS | Mod: CPTII,S$GLB,, | Performed by: INTERNAL MEDICINE

## 2021-12-15 PROCEDURE — 4010F ACE/ARB THERAPY RXD/TAKEN: CPT | Mod: CPTII,S$GLB,, | Performed by: INTERNAL MEDICINE

## 2021-12-15 PROCEDURE — 99999 PR PBB SHADOW E&M-EST. PATIENT-LVL III: CPT | Mod: PBBFAC,,, | Performed by: INTERNAL MEDICINE

## 2021-12-15 PROCEDURE — 90471 IMMUNIZATION ADMIN: CPT | Mod: S$GLB,,, | Performed by: INTERNAL MEDICINE

## 2021-12-15 PROCEDURE — 99999 PR PBB SHADOW E&M-EST. PATIENT-LVL III: ICD-10-PCS | Mod: PBBFAC,,, | Performed by: INTERNAL MEDICINE

## 2021-12-15 PROCEDURE — 99213 PR OFFICE/OUTPT VISIT, EST, LEVL III, 20-29 MIN: ICD-10-PCS | Mod: 25,S$GLB,, | Performed by: INTERNAL MEDICINE

## 2021-12-15 PROCEDURE — 90471 ZOSTER RECOMBINANT VACCINE: ICD-10-PCS | Mod: S$GLB,,, | Performed by: INTERNAL MEDICINE

## 2021-12-15 PROCEDURE — 90750 HZV VACC RECOMBINANT IM: CPT | Mod: S$GLB,,, | Performed by: INTERNAL MEDICINE

## 2021-12-15 RX ORDER — LOSARTAN POTASSIUM AND HYDROCHLOROTHIAZIDE 25; 100 MG/1; MG/1
1 TABLET ORAL DAILY
Qty: 90 TABLET | Refills: 3 | Status: ON HOLD | OUTPATIENT
Start: 2021-12-15 | End: 2022-03-04 | Stop reason: HOSPADM

## 2021-12-16 ENCOUNTER — TELEPHONE (OUTPATIENT)
Dept: INTERNAL MEDICINE | Facility: CLINIC | Age: 66
End: 2021-12-16
Payer: COMMERCIAL

## 2021-12-16 NOTE — TELEPHONE ENCOUNTER
----- Message from Brooke Ramirez LPN sent at 12/15/2021  4:38 PM CST -----  Please place Shingles vaccine order

## 2022-03-03 ENCOUNTER — HOSPITAL ENCOUNTER (INPATIENT)
Facility: HOSPITAL | Age: 67
LOS: 1 days | Discharge: HOME | DRG: 177 | End: 2022-03-04
Attending: SPECIALIST | Admitting: INTERNAL MEDICINE
Payer: COMMERCIAL

## 2022-03-03 DIAGNOSIS — R06.02 SOB (SHORTNESS OF BREATH): ICD-10-CM

## 2022-03-03 DIAGNOSIS — N18.31 STAGE 3A CHRONIC KIDNEY DISEASE: ICD-10-CM

## 2022-03-03 DIAGNOSIS — U07.1 PNEUMONIA DUE TO COVID-19 VIRUS: Primary | ICD-10-CM

## 2022-03-03 DIAGNOSIS — J12.82 PNEUMONIA DUE TO COVID-19 VIRUS: Primary | ICD-10-CM

## 2022-03-03 PROBLEM — R73.9 HYPERGLYCEMIA: Status: ACTIVE | Noted: 2022-03-03

## 2022-03-03 PROBLEM — R41.82 ALTERED MENTAL STATUS: Status: ACTIVE | Noted: 2022-03-03

## 2022-03-03 PROBLEM — R79.89 ELEVATED TROPONIN: Status: ACTIVE | Noted: 2022-03-03

## 2022-03-03 LAB
ALBUMIN SERPL BCP-MCNC: 2.9 G/DL (ref 3.5–5.2)
ALP SERPL-CCNC: 57 U/L (ref 55–135)
ALT SERPL W/O P-5'-P-CCNC: 69 U/L (ref 10–44)
AMPHET+METHAMPHET UR QL: NEGATIVE
ANION GAP SERPL CALC-SCNC: 12 MMOL/L (ref 8–16)
APTT BLDCRRT: 36 SEC (ref 21–32)
AST SERPL-CCNC: 83 U/L (ref 10–40)
BARBITURATES UR QL SCN>200 NG/ML: NEGATIVE
BASOPHILS # BLD AUTO: 0 K/UL (ref 0–0.2)
BASOPHILS NFR BLD: 0 % (ref 0–1.9)
BENZODIAZ UR QL SCN>200 NG/ML: NEGATIVE
BILIRUB SERPL-MCNC: 0.8 MG/DL (ref 0.1–1)
BUN SERPL-MCNC: 64 MG/DL (ref 8–23)
BZE UR QL SCN: NEGATIVE
CALCIUM SERPL-MCNC: 8.5 MG/DL (ref 8.7–10.5)
CANNABINOIDS UR QL SCN: NEGATIVE
CHLORIDE SERPL-SCNC: 102 MMOL/L (ref 95–110)
CK SERPL-CCNC: 772 U/L (ref 20–200)
CO2 SERPL-SCNC: 21 MMOL/L (ref 23–29)
CREAT SERPL-MCNC: 2.9 MG/DL (ref 0.5–1.4)
CREAT UR-MCNC: 151.5 MG/DL (ref 23–375)
CRP SERPL-MCNC: 196.4 MG/L (ref 0–8.2)
CTP QC/QA: YES
D DIMER PPP IA.FEU-MCNC: 1.21 MG/L FEU
DIFFERENTIAL METHOD: ABNORMAL
EOSINOPHIL # BLD AUTO: 0 K/UL (ref 0–0.5)
EOSINOPHIL NFR BLD: 0 % (ref 0–8)
ERYTHROCYTE [DISTWIDTH] IN BLOOD BY AUTOMATED COUNT: 14.1 % (ref 11.5–14.5)
ERYTHROCYTE [SEDIMENTATION RATE] IN BLOOD BY WESTERGREN METHOD: 50 MM/HR (ref 0–10)
EST. GFR  (AFRICAN AMERICAN): 25 ML/MIN/1.73 M^2
EST. GFR  (NON AFRICAN AMERICAN): 22 ML/MIN/1.73 M^2
ESTIMATED AVG GLUCOSE: 134 MG/DL (ref 68–131)
FERRITIN SERPL-MCNC: 2177 NG/ML (ref 20–300)
GLUCOSE SERPL-MCNC: 117 MG/DL (ref 70–110)
HBA1C MFR BLD: 6.3 % (ref 4–5.6)
HCT VFR BLD AUTO: 42.8 % (ref 40–54)
HGB BLD-MCNC: 14.1 G/DL (ref 14–18)
IMM GRANULOCYTES # BLD AUTO: 0.03 K/UL (ref 0–0.04)
IMM GRANULOCYTES NFR BLD AUTO: 0.6 % (ref 0–0.5)
INR PPP: 1 (ref 0.8–1.2)
INR PPP: 1 (ref 0.8–1.2)
LACTATE SERPL-SCNC: 1.3 MMOL/L (ref 0.5–2.2)
LYMPHOCYTES # BLD AUTO: 0.4 K/UL (ref 1–4.8)
LYMPHOCYTES NFR BLD: 8.4 % (ref 18–48)
MCH RBC QN AUTO: 30.1 PG (ref 27–31)
MCHC RBC AUTO-ENTMCNC: 32.9 G/DL (ref 32–36)
MCV RBC AUTO: 92 FL (ref 82–98)
METHADONE UR QL SCN>300 NG/ML: NEGATIVE
MONOCYTES # BLD AUTO: 0.4 K/UL (ref 0.3–1)
MONOCYTES NFR BLD: 6.8 % (ref 4–15)
NEUTROPHILS # BLD AUTO: 4.3 K/UL (ref 1.8–7.7)
NEUTROPHILS NFR BLD: 84.2 % (ref 38–73)
NRBC BLD-RTO: 0 /100 WBC
OPIATES UR QL SCN: NEGATIVE
PCP UR QL SCN>25 NG/ML: NEGATIVE
PLATELET # BLD AUTO: 163 K/UL (ref 150–450)
PMV BLD AUTO: 9.7 FL (ref 9.2–12.9)
POCT GLUCOSE: 125 MG/DL (ref 70–110)
POCT GLUCOSE: 171 MG/DL (ref 70–110)
POTASSIUM SERPL-SCNC: 4.3 MMOL/L (ref 3.5–5.1)
PROT SERPL-MCNC: 8.5 G/DL (ref 6–8.4)
PROTHROMBIN TIME: 10.7 SEC (ref 9–12.5)
PROTHROMBIN TIME: 10.9 SEC (ref 9–12.5)
RBC # BLD AUTO: 4.68 M/UL (ref 4.6–6.2)
SARS-COV-2 RDRP RESP QL NAA+PROBE: POSITIVE
SODIUM SERPL-SCNC: 135 MMOL/L (ref 136–145)
TOXICOLOGY INFORMATION: NORMAL
TROPONIN I SERPL DL<=0.01 NG/ML-MCNC: 0.03 NG/ML (ref 0–0.03)
TROPONIN I SERPL DL<=0.01 NG/ML-MCNC: 0.03 NG/ML (ref 0–0.03)
WBC # BLD AUTO: 5.14 K/UL (ref 3.9–12.7)

## 2022-03-03 PROCEDURE — 93010 EKG 12-LEAD: ICD-10-PCS | Mod: ,,, | Performed by: INTERNAL MEDICINE

## 2022-03-03 PROCEDURE — 25000242 PHARM REV CODE 250 ALT 637 W/ HCPCS: Performed by: NURSE PRACTITIONER

## 2022-03-03 PROCEDURE — 83605 ASSAY OF LACTIC ACID: CPT | Performed by: SPECIALIST

## 2022-03-03 PROCEDURE — 63600175 PHARM REV CODE 636 W HCPCS: Performed by: NURSE PRACTITIONER

## 2022-03-03 PROCEDURE — 36415 COLL VENOUS BLD VENIPUNCTURE: CPT | Performed by: NURSE PRACTITIONER

## 2022-03-03 PROCEDURE — 85379 FIBRIN DEGRADATION QUANT: CPT | Performed by: SPECIALIST

## 2022-03-03 PROCEDURE — 82728 ASSAY OF FERRITIN: CPT | Performed by: SPECIALIST

## 2022-03-03 PROCEDURE — 96365 THER/PROPH/DIAG IV INF INIT: CPT

## 2022-03-03 PROCEDURE — 84484 ASSAY OF TROPONIN QUANT: CPT | Mod: 91 | Performed by: SPECIALIST

## 2022-03-03 PROCEDURE — 84484 ASSAY OF TROPONIN QUANT: CPT | Performed by: NURSE PRACTITIONER

## 2022-03-03 PROCEDURE — U0002 COVID-19 LAB TEST NON-CDC: HCPCS | Performed by: SPECIALIST

## 2022-03-03 PROCEDURE — 94640 AIRWAY INHALATION TREATMENT: CPT

## 2022-03-03 PROCEDURE — 93010 ELECTROCARDIOGRAM REPORT: CPT | Mod: ,,, | Performed by: INTERNAL MEDICINE

## 2022-03-03 PROCEDURE — 85025 COMPLETE CBC W/AUTO DIFF WBC: CPT | Performed by: SPECIALIST

## 2022-03-03 PROCEDURE — 80307 DRUG TEST PRSMV CHEM ANLYZR: CPT | Performed by: NURSE PRACTITIONER

## 2022-03-03 PROCEDURE — 93005 ELECTROCARDIOGRAM TRACING: CPT

## 2022-03-03 PROCEDURE — 27000221 HC OXYGEN, UP TO 24 HOURS

## 2022-03-03 PROCEDURE — 36415 COLL VENOUS BLD VENIPUNCTURE: CPT | Performed by: SPECIALIST

## 2022-03-03 PROCEDURE — 85651 RBC SED RATE NONAUTOMATED: CPT | Performed by: SPECIALIST

## 2022-03-03 PROCEDURE — 99900035 HC TECH TIME PER 15 MIN (STAT)

## 2022-03-03 PROCEDURE — 63600175 PHARM REV CODE 636 W HCPCS: Performed by: SPECIALIST

## 2022-03-03 PROCEDURE — 99285 EMERGENCY DEPT VISIT HI MDM: CPT | Mod: 25

## 2022-03-03 PROCEDURE — 96375 TX/PRO/DX INJ NEW DRUG ADDON: CPT

## 2022-03-03 PROCEDURE — 27000207 HC ISOLATION

## 2022-03-03 PROCEDURE — 86140 C-REACTIVE PROTEIN: CPT | Performed by: SPECIALIST

## 2022-03-03 PROCEDURE — 25000003 PHARM REV CODE 250: Performed by: NURSE PRACTITIONER

## 2022-03-03 PROCEDURE — 21400001 HC TELEMETRY ROOM

## 2022-03-03 PROCEDURE — 85610 PROTHROMBIN TIME: CPT | Mod: 91 | Performed by: NURSE PRACTITIONER

## 2022-03-03 PROCEDURE — 96372 THER/PROPH/DIAG INJ SC/IM: CPT | Performed by: NURSE PRACTITIONER

## 2022-03-03 PROCEDURE — 25000003 PHARM REV CODE 250: Performed by: SPECIALIST

## 2022-03-03 PROCEDURE — 85610 PROTHROMBIN TIME: CPT | Performed by: SPECIALIST

## 2022-03-03 PROCEDURE — 80053 COMPREHEN METABOLIC PANEL: CPT | Performed by: SPECIALIST

## 2022-03-03 PROCEDURE — 96361 HYDRATE IV INFUSION ADD-ON: CPT

## 2022-03-03 PROCEDURE — 83036 HEMOGLOBIN GLYCOSYLATED A1C: CPT | Performed by: NURSE PRACTITIONER

## 2022-03-03 PROCEDURE — 85730 THROMBOPLASTIN TIME PARTIAL: CPT | Performed by: NURSE PRACTITIONER

## 2022-03-03 PROCEDURE — 94761 N-INVAS EAR/PLS OXIMETRY MLT: CPT

## 2022-03-03 PROCEDURE — 82550 ASSAY OF CK (CPK): CPT | Performed by: NURSE PRACTITIONER

## 2022-03-03 PROCEDURE — 82962 GLUCOSE BLOOD TEST: CPT

## 2022-03-03 RX ORDER — LOSARTAN POTASSIUM 50 MG/1
100 TABLET ORAL DAILY
Status: DISCONTINUED | OUTPATIENT
Start: 2022-03-03 | End: 2022-03-04

## 2022-03-03 RX ORDER — LISINOPRIL AND HYDROCHLOROTHIAZIDE 10; 12.5 MG/1; MG/1
1 TABLET ORAL DAILY
COMMUNITY
Start: 2022-02-01 | End: 2022-03-03

## 2022-03-03 RX ORDER — ALBUTEROL SULFATE 90 UG/1
2 AEROSOL, METERED RESPIRATORY (INHALATION) 4 TIMES DAILY PRN
COMMUNITY
Start: 2022-02-23 | End: 2022-03-03

## 2022-03-03 RX ORDER — IBUPROFEN 200 MG
24 TABLET ORAL
Status: DISCONTINUED | OUTPATIENT
Start: 2022-03-03 | End: 2022-03-04 | Stop reason: HOSPADM

## 2022-03-03 RX ORDER — ASCORBIC ACID 500 MG
500 TABLET ORAL 2 TIMES DAILY
Status: DISCONTINUED | OUTPATIENT
Start: 2022-03-03 | End: 2022-03-03

## 2022-03-03 RX ORDER — GLUCAGON 1 MG
1 KIT INJECTION
Status: DISCONTINUED | OUTPATIENT
Start: 2022-03-03 | End: 2022-03-04 | Stop reason: HOSPADM

## 2022-03-03 RX ORDER — ALBUTEROL SULFATE 90 UG/1
2 AEROSOL, METERED RESPIRATORY (INHALATION) EVERY 6 HOURS
Status: DISCONTINUED | OUTPATIENT
Start: 2022-03-03 | End: 2022-03-04 | Stop reason: HOSPADM

## 2022-03-03 RX ORDER — DEXAMETHASONE SODIUM PHOSPHATE 4 MG/ML
8 INJECTION, SOLUTION INTRA-ARTICULAR; INTRALESIONAL; INTRAMUSCULAR; INTRAVENOUS; SOFT TISSUE
Status: COMPLETED | OUTPATIENT
Start: 2022-03-03 | End: 2022-03-03

## 2022-03-03 RX ORDER — SODIUM CHLORIDE 0.9 % (FLUSH) 0.9 %
10 SYRINGE (ML) INJECTION
Status: DISCONTINUED | OUTPATIENT
Start: 2022-03-03 | End: 2022-03-04 | Stop reason: HOSPADM

## 2022-03-03 RX ORDER — LOSARTAN POTASSIUM AND HYDROCHLOROTHIAZIDE 25; 100 MG/1; MG/1
1 TABLET ORAL DAILY
Status: DISCONTINUED | OUTPATIENT
Start: 2022-03-03 | End: 2022-03-03

## 2022-03-03 RX ORDER — IBUPROFEN 200 MG
16 TABLET ORAL
Status: DISCONTINUED | OUTPATIENT
Start: 2022-03-03 | End: 2022-03-04 | Stop reason: HOSPADM

## 2022-03-03 RX ORDER — LOSARTAN POTASSIUM 100 MG/1
100 TABLET ORAL DAILY
COMMUNITY
Start: 2022-01-14 | End: 2022-03-03

## 2022-03-03 RX ORDER — ENOXAPARIN SODIUM 150 MG/ML
1 INJECTION SUBCUTANEOUS 2 TIMES DAILY
Status: DISCONTINUED | OUTPATIENT
Start: 2022-03-03 | End: 2022-03-04 | Stop reason: HOSPADM

## 2022-03-03 RX ORDER — ASCORBIC ACID 500 MG
500 TABLET ORAL DAILY
Status: DISCONTINUED | OUTPATIENT
Start: 2022-03-03 | End: 2022-03-04 | Stop reason: HOSPADM

## 2022-03-03 RX ORDER — HYDROCHLOROTHIAZIDE 25 MG/1
25 TABLET ORAL DAILY
Status: DISCONTINUED | OUTPATIENT
Start: 2022-03-03 | End: 2022-03-04

## 2022-03-03 RX ADMIN — HYDROCHLOROTHIAZIDE 25 MG: 25 TABLET ORAL at 01:03

## 2022-03-03 RX ADMIN — ALBUTEROL SULFATE 2 PUFF: 90 AEROSOL, METERED RESPIRATORY (INHALATION) at 07:03

## 2022-03-03 RX ADMIN — DEXAMETHASONE SODIUM PHOSPHATE 8 MG: 4 INJECTION INTRA-ARTICULAR; INTRALESIONAL; INTRAMUSCULAR; INTRAVENOUS; SOFT TISSUE at 10:03

## 2022-03-03 RX ADMIN — OXYCODONE HYDROCHLORIDE AND ACETAMINOPHEN 500 MG: 500 TABLET ORAL at 01:03

## 2022-03-03 RX ADMIN — THERA TABS 1 TABLET: TAB at 01:03

## 2022-03-03 RX ADMIN — ENOXAPARIN SODIUM 120 MG: 150 INJECTION SUBCUTANEOUS at 09:03

## 2022-03-03 RX ADMIN — REMDESIVIR 200 MG: 100 INJECTION, POWDER, LYOPHILIZED, FOR SOLUTION INTRAVENOUS at 02:03

## 2022-03-03 RX ADMIN — SODIUM CHLORIDE 500 ML: 0.9 INJECTION, SOLUTION INTRAVENOUS at 10:03

## 2022-03-03 RX ADMIN — ENOXAPARIN SODIUM 120 MG: 150 INJECTION SUBCUTANEOUS at 01:03

## 2022-03-03 RX ADMIN — LOSARTAN POTASSIUM 100 MG: 50 TABLET, FILM COATED ORAL at 01:03

## 2022-03-03 NOTE — ASSESSMENT & PLAN NOTE
-acute on chronic CKD stage 3  -BUN 64  -Creatinine 2.9 - Baseline 1.5  -IV Bolus in ED  -Repeat BMP in AM

## 2022-03-03 NOTE — ASSESSMENT & PLAN NOTE
-reported prior to admission but resolved upon assessment  -CT of head negative for acute abnormality   -Urine drug screen pending   -UA results pending   -neuro checks

## 2022-03-03 NOTE — ED PROVIDER NOTES
SCRIBE #1 NOTE: I, Vivien Bray, am scribing for, and in the presence of, Cindy Nieves MD. I have scribed the entire note.      History      Chief Complaint   Patient presents with    Altered Mental Status     AMS began yesterday afternoon.        Review of patient's allergies indicates:  No Known Allergies     HPI   HPI    3/3/2022, 9:39 AM   History obtained from the patient and the pt's wife at bedside  HPI/ROS limited secondary to mental status change      History of Present Illness: Abdirahman Rodriguez is a 66 y.o. male patient with a PMHx of benign HTN and hypertensive kidney disease with chronic kidney disease stage III who presents to the Emergency Department for AMS which onset gradually yesterday, and worsened this morning when the pt's wife found the pt unresponsive and weak. Symptoms are constant and moderate in severity. Associated sxs include SOB, generalized weakness, and slurred speech. The pt's wife reports that the pt's SOB onset yesterday and that his slurred speech onset last night. Patient denies any calf pain, numbness, CP, dizziness, headaches, and unilateral weakness. No prior Tx reported. The pt denies having any recent falls. No further complaints or concerns at this time.           Arrival mode: Personal vehicle      PCP: Mt Noel MD       Past Medical History:  Past Medical History:   Diagnosis Date    Benign hypertension     History of colon polyps     Hypertensive kidney disease with chronic kidney disease stage III        Past Surgical History:  Past Surgical History:   Procedure Laterality Date    COLONOSCOPY N/A 12/6/2021    Procedure: COLONOSCOPY;  Surgeon: Doris Altman MD;  Location[facility];  Service: Endoscopy;  Laterality: N/A;    None           Family History:  Family History   Problem Relation Age of Onset    Heart disease Father     Hypertension Father     Heart disease Mother     Hypertension Mother        Social History:  Social History      Tobacco Use    Smoking status: Never Smoker    Smokeless tobacco: Never Used   Substance and Sexual Activity    Alcohol use: No     Comment: rarely    Drug use: No    Sexual activity: Not on file       ROS   Review of Systems   Unable to perform ROS: Mental status change   Respiratory: Positive for shortness of breath.    Cardiovascular: Negative for chest pain.   Musculoskeletal:        (-) calf pain   Neurological: Positive for speech difficulty (slurred) and weakness (generalized). Negative for dizziness, numbness and headaches.        (+) AMS  (-) unilateral weakness     Physical Exam      Initial Vitals [03/03/22 0904]   BP Pulse Resp Temp SpO2   (!) 140/60 101 (S) (!) 40 99.5 °F (37.5 °C) (S) (!) 72 %      MAP       --          Physical Exam  Nursing Notes and Vital Signs Reviewed.  Constitutional: Patient is in no acute distress. Well-developed and well-nourished.  Head: Atraumatic. Normocephalic.  Eyes: PERRL. EOM intact. Conjunctivae are not pale. No scleral icterus.  ENT: Mucous membranes are moist. Oropharynx is clear and symmetric.    Neck: Supple. Full ROM. No lymphadenopathy.  Cardiovascular: Regular rate. Regular rhythm. No murmurs, rubs, or gallops. Distal pulses are 2+ and symmetric.  Pulmonary/Chest: Tachypneic. Course breath sounds at bases.  Abdominal: Soft and non-distended.  There is no tenderness.  No rebound, guarding, or rigidity.   Musculoskeletal: Moves all extremities. No obvious deformities. No edema.  Skin: Warm and dry.  Neurological: Patient is oriented to person, place and time. Pt is drowsy, but ansers all questions appropriately. Pupils ERRL and EOM normal. Cranial nerves II-XII are intact. Strength is full bilaterally; it is equal and 5/5 in bilateral upper and lower extremities. There is no pronator drift of outstretched arms. Light touch sense is intact. There is questionable intermittent slurred speech.  Psychiatric: Normal affect. Good eye contact. Appropriate in  content.    ED Course    Procedures  ED Vital Signs:  Vitals:    03/03/22 0904 03/03/22 0912 03/03/22 0914 03/03/22 0916   BP: (!) 140/60 (!) 143/79     Pulse: 101 94 92 93   Resp: (S) (!) 40 (!) 44  (!) 40   Temp: 99.5 °F (37.5 °C)      TempSrc: Oral      SpO2: (S) (!) 72% (!) 94%  (!) 93%   Weight: 122 kg (268 lb 13.6 oz)       03/03/22 0920 03/03/22 1015 03/03/22 1032 03/03/22 1104   BP:  (!) 109/58 (!) 113/58 135/66   Pulse:  89 85 83   Resp:  (!) 32 (!) 26 (!) 38   Temp:       TempSrc:       SpO2: (!) 86% 95% 100% 96%   Weight:        03/03/22 1132 03/03/22 1209 03/03/22 1232   BP: (!) 168/67 (!) 108/58 (!) 107/59   Pulse: 75 80 82   Resp: (!) 28 (!) 30 (!) 30   Temp:   99.4 °F (37.4 °C)   TempSrc:   Oral   SpO2: (!) 93% (!) 94% 98%   Weight:          Abnormal Lab Results:  Labs Reviewed   CBC W/ AUTO DIFFERENTIAL - Abnormal; Notable for the following components:       Result Value    Immature Granulocytes 0.6 (*)     Lymph # 0.4 (*)     Gran % 84.2 (*)     Lymph % 8.4 (*)     All other components within normal limits   COMPREHENSIVE METABOLIC PANEL - Abnormal; Notable for the following components:    Sodium 135 (*)     CO2 21 (*)     Glucose 117 (*)     BUN 64 (*)     Creatinine 2.9 (*)     Calcium 8.5 (*)     Total Protein 8.5 (*)     Albumin 2.9 (*)     AST 83 (*)     ALT 69 (*)     eGFR if  25 (*)     eGFR if non  22 (*)     All other components within normal limits   TROPONIN I - Abnormal; Notable for the following components:    Troponin I 0.033 (*)     All other components within normal limits   C-REACTIVE PROTEIN - Abnormal; Notable for the following components:    .4 (*)     All other components within normal limits   FERRITIN - Abnormal; Notable for the following components:    Ferritin 2,177 (*)     All other components within normal limits   D DIMER, QUANTITATIVE - Abnormal; Notable for the following components:    D-Dimer 1.21 (*)     All other components  within normal limits   SEDIMENTATION RATE - Abnormal; Notable for the following components:    Sed Rate 50 (*)     All other components within normal limits   SARS-COV-2 RDRP GENE - Abnormal; Notable for the following components:    POC Rapid COVID Positive (*)     All other components within normal limits    Narrative:     This test utilizes isothermal nucleic acid amplification   technology to detect the SARS-CoV-2 RdRp nucleic acid segment.   The analytical sensitivity (limit of detection) is 125 genome   equivalents/mL.   A POSITIVE result implies infection with the SARS-CoV-2 virus;   the patient is presumed to be contagious.     A NEGATIVE result means that SARS-CoV-2 nucleic acids are not   present above the limit of detection. A NEGATIVE result should be   treated as presumptive. It does not rule out the possibility of   COVID-19 and should not be the sole basis for treatment decisions.   If COVID-19 is strongly suspected based on clinical and exposure   history, re-testing using an alternate molecular assay should be   considered.   This test is only for use under the Food and Drug   Administration s Emergency Use Authorization (EUA).   Commercial kits are provided by [employer].   Performance characteristics of the EUA have been independently   verified by [facility] Department of   Pathology and Laboratory Medicine.   _________________________________________________________________   The authorized Fact Sheet for Healthcare Providers and the authorized Fact   Sheet for Patients of the ID NOW COVID-19 are available on the FDA   website:     https://www.fda.gov/media/282951/download  https://www.fda.gov/media/669176/download           POCT GLUCOSE - Abnormal; Notable for the following components:    POCT Glucose 125 (*)     All other components within normal limits   PROTIME-INR   LACTIC ACID, PLASMA   C-REACTIVE PROTEIN   D DIMER, QUANTITATIVE   FERRITIN   LACTATE DEHYDROGENASE    SEDIMENTATION RATE   DRUG SCREEN PANEL, URINE EMERGENCY    Narrative:     Specimen Source->Urine   B-TYPE NATRIURETIC PEPTIDE   URINALYSIS, REFLEX TO URINE CULTURE   TROPONIN I   HEMOGLOBIN A1C   POCT GLUCOSE MONITORING CONTINUOUS        All Lab Results:  Results for orders placed or performed during the hospital encounter of 03/03/22   CBC auto differential   Result Value Ref Range    WBC 5.14 3.90 - 12.70 K/uL    RBC 4.68 4.60 - 6.20 M/uL    Hemoglobin 14.1 14.0 - 18.0 g/dL    Hematocrit 42.8 40.0 - 54.0 %    MCV 92 82 - 98 fL    MCH 30.1 27.0 - 31.0 pg    MCHC 32.9 32.0 - 36.0 g/dL    RDW 14.1 11.5 - 14.5 %    Platelets 163 150 - 450 K/uL    MPV 9.7 9.2 - 12.9 fL    Immature Granulocytes 0.6 (H) 0.0 - 0.5 %    Gran # (ANC) 4.3 1.8 - 7.7 K/uL    Immature Grans (Abs) 0.03 0.00 - 0.04 K/uL    Lymph # 0.4 (L) 1.0 - 4.8 K/uL    Mono # 0.4 0.3 - 1.0 K/uL    Eos # 0.0 0.0 - 0.5 K/uL    Baso # 0.00 0.00 - 0.20 K/uL    nRBC 0 0 /100 WBC    Gran % 84.2 (H) 38.0 - 73.0 %    Lymph % 8.4 (L) 18.0 - 48.0 %    Mono % 6.8 4.0 - 15.0 %    Eosinophil % 0.0 0.0 - 8.0 %    Basophil % 0.0 0.0 - 1.9 %    Differential Method Automated    Comprehensive metabolic panel   Result Value Ref Range    Sodium 135 (L) 136 - 145 mmol/L    Potassium 4.3 3.5 - 5.1 mmol/L    Chloride 102 95 - 110 mmol/L    CO2 21 (L) 23 - 29 mmol/L    Glucose 117 (H) 70 - 110 mg/dL    BUN 64 (H) 8 - 23 mg/dL    Creatinine 2.9 (H) 0.5 - 1.4 mg/dL    Calcium 8.5 (L) 8.7 - 10.5 mg/dL    Total Protein 8.5 (H) 6.0 - 8.4 g/dL    Albumin 2.9 (L) 3.5 - 5.2 g/dL    Total Bilirubin 0.8 0.1 - 1.0 mg/dL    Alkaline Phosphatase 57 55 - 135 U/L    AST 83 (H) 10 - 40 U/L    ALT 69 (H) 10 - 44 U/L    Anion Gap 12 8 - 16 mmol/L    eGFR if African American 25 (A) >60 mL/min/1.73 m^2    eGFR if non African American 22 (A) >60 mL/min/1.73 m^2   Troponin I   Result Value Ref Range    Troponin I 0.033 (H) 0.000 - 0.026 ng/mL   Protime-INR   Result Value Ref Range    Prothrombin Time  10.7 9.0 - 12.5 sec    INR 1.0 0.8 - 1.2   Lactic acid, plasma   Result Value Ref Range    Lactate (Lactic Acid) 1.3 0.5 - 2.2 mmol/L   C-Reactive Protein   Result Value Ref Range    .4 (H) 0.0 - 8.2 mg/L   Ferritin   Result Value Ref Range    Ferritin 2,177 (H) 20.0 - 300.0 ng/mL   D-Dimer, Quantitative   Result Value Ref Range    D-Dimer 1.21 (H) <0.50 mg/L FEU   Sedimentation rate   Result Value Ref Range    Sed Rate 50 (H) 0 - 10 mm/Hr   Drug screen panel, in-house   Result Value Ref Range    Benzodiazepines Negative Negative    Methadone metabolites Negative Negative    Cocaine (Metab.) Negative Negative    Opiate Scrn, Ur Negative Negative    Barbiturate Screen, Ur Negative Negative    Amphetamine Screen, Ur Negative Negative    THC Negative Negative    Phencyclidine Negative Negative    Creatinine, Urine 151.5 23.0 - 375.0 mg/dL    Toxicology Information SEE COMMENT    POCT COVID-19 Rapid Screening   Result Value Ref Range    POC Rapid COVID Positive (A) Negative     Acceptable Yes    POCT glucose   Result Value Ref Range    POCT Glucose 125 (H) 70 - 110 mg/dL         Imaging Results:  Imaging Results          CT Head Without Contrast (Final result)  Result time 03/03/22 12:07:05    Final result by Deonte Spence MD (03/03/22 12:07:05)                 Impression:      No acute abnormality.    All CT scans at this facility use dose modulation, iterative reconstruction, and/or weight based dosing when appropriate to reduce radiation dose to as low as reasonably achievable.      Electronically signed by: Deonte Spence  Date:    03/03/2022  Time:    12:07             Narrative:    EXAMINATION:  CT HEAD WITHOUT CONTRAST    CLINICAL HISTORY:  Mental status change, unknown cause;    TECHNIQUE:  Low dose axial CT images obtained throughout the head without intravenous contrast. Sagittal and coronal reconstructions were performed.    COMPARISON:  None.    FINDINGS:  Intracranial  compartment:    Ventricles and sulci are normal in size for age without evidence of hydrocephalus. No extra-axial blood or fluid collections.    The brain parenchyma appears normal. No parenchymal mass, hemorrhage, edema or major vascular distribution infarct.    Skull/extracranial contents (limited evaluation): No fracture. Mastoid air cells and paranasal sinuses are essentially clear.                               X-Ray Chest AP Portable (Final result)  Result time 03/03/22 10:10:55    Final result by Deonte Spence MD (03/03/22 10:10:55)                 Impression:      Basilar predominant ground-glass and patchy airspace disease suspicious for moderate to severe COVID-19 pneumonitis. Pulmonary edema could have this appearance although felt less likely. Unchanged mild cardiomegaly. No pneumothorax or pleural effusion.      Electronically signed by: Deonte Spence  Date:    03/03/2022  Time:    10:10             Narrative:    EXAMINATION:  XR CHEST AP PORTABLE    CLINICAL HISTORY:  SOB;.    TECHNIQUE:  Single frontal portable view of the chest was performed.    COMPARISON:  05/27/2019    FINDINGS:  Support devices: None    Basilar predominant ground-glass and patchy airspace disease suspicious for moderate to severe COVID-19 pneumonitis.  Pulmonary edema could have this appearance although felt less likely.  Unchanged mild cardiomegaly.  No pneumothorax or pleural effusion.    Bones are intact.                               The EKG was ordered, reviewed, and independently interpreted by the ED provider.  Interpretation time: 9:08  Rate: 94 BPM  Rhythm: Sinus rhythm with Premature atrial complexes  Interpretation: No acute ST changes. No STEMI.           The Emergency Provider reviewed the vital signs and test results, which are outlined above.    ED Discussion   10:31 AM: Discussed pt's case with Cheyenne Jean Baptiste NP (Hospital Medicine) who recommends a head CT, inflammatory markers, and d-dimer.    10:47 AM: Discussed pt's  case with Cheyenne Jean Baptiste NP (Hospital Medicine) who recommends LDH and ferritin.    12:30 PM: Discussed case with Cheyenne Jean Baptiste NP ([facility]). Dr. Hanna agrees with current care and management of pt and accepts admission.   Admitting Service: Hospital Medicine  Admitting Physician: Dr. Hanna  Admit to: Inpatient Tele    12:31 PM: Re-evaluated pt. I have discussed test results, shared treatment plan, and the need for admission with patient and family at bedside. Pt and family express understanding at this time and agree with all information. All questions answered. Pt and family have no further questions or concerns at this time. Pt is ready for admit.           ED Medication(s):  Medications   dexamethasone injection 8 mg (8 mg Intravenous Given 3/3/22 1030)   sodium chloride 0.9% bolus 500 mL (0 mLs Intravenous Stopped 3/3/22 1130)           New Prescriptions    No medications on file         Medical Decision Making    Medical Decision Making:   Clinical Tests:   Lab Tests: Ordered and Reviewed  Radiological Study: Ordered and Reviewed  Medical Tests: Ordered and Reviewed           Scribe Attestation:   Scribe #1: I performed the above scribed service and the documentation accurately describes the services I performed. I attest to the accuracy of the note.    Attending:   Physician Attestation Statement for Scribe #1: I, Cindy Nieves MD, personally performed the services described in this documentation, as scribed by Vivien Bray, in my presence, and it is both accurate and complete.          Clinical Impression       ICD-10-CM ICD-9-CM   1. SOB (shortness of breath)  R06.02 786.05       Disposition:   Disposition: Admitted  Condition: Fair         Cindy Nieves MD  03/04/22 [id number]

## 2022-03-03 NOTE — PLAN OF CARE
POC reviewed with pt and family. No questions at this time.  Patient is awake and alert but disoriented to place and situation. Patient often needs redirecting when trying to get out of bed.  NSR with PACs on cardiac monitor.  2LNC  IV remdesivir given today.   Pt remains free of falls. Sitter and avasys at the bedside.   No complaints at this time.  Hourly rounding complete.   Safety measures in place. Will continue to monitor.    .

## 2022-03-03 NOTE — H&P
[facility] Emergency Dept.  [facility] Medicine  History & Physical    Patient Name: Abdirahman Rodriguez  MRN: [id number]  Patient Class: OP- Observation  Admission Date: 3/3/2022  Attending Physician: Cindy Nieves MD   Primary Care Provider: Mt Noel MD         Patient information was obtained from relative(s) and ER records.     Subjective:     Principal Problem:<principal problem not specified>    Chief Complaint:   Chief Complaint   Patient presents with    Altered Mental Status     AMS began yesterday afternoon.         HPI: Abdirahman Michelle is a 66 y.o. -American male patient with a PMHx of benign HTN & CKD Stage III who presents to the [facility] Emergency Department with chief complaint of AMS & increased work of breathing. He is asleep, but easily aroused and oriented to self. However, his cognition and response time are delayed, so his wife, Fannie Rodriguez, served as the historian. She reports the symptoms started on yesterday after the pt arrived home from work. Reports since the symptoms continued to worsen overnight and into this morning, she decided to bring him into the ER. Associated symptoms include fatigue, generalized weakness and decreased appetite. Denies fever, H/A, congestion, cough, CP, palpitations, calf pain, numbness, dizziness, unilateral weakness, or nausea, vomiting, diarrhea, or any recent falls. Wife states he did not take any medications today. Pt admitted to hospital medicine. Pt is a FC status and his surrogate decision maker is his wife Fannie Rodriguez.       Past Medical History:   Diagnosis Date    Benign hypertension     History of colon polyps     Hypertensive kidney disease with chronic kidney disease stage III        Past Surgical History:   Procedure Laterality Date    COLONOSCOPY N/A 12/6/2021    Procedure: COLONOSCOPY;  Surgeon: Doris Altman MD;  Location: [facility];  Service: Endoscopy;  Laterality: N/A;    None         Review of patient's allergies indicates:  No  Known Allergies    No current facility-administered medications on file prior to encounter.     Current Outpatient Medications on File Prior to Encounter   Medication Sig    aspirin 81 MG Chew Take 81 mg by mouth once daily.    losartan-hydrochlorothiazide 100-25 mg (HYZAAR) 100-25 mg per tablet Take 1 tablet by mouth once daily.    [DISCONTINUED] albuterol (PROVENTIL/VENTOLIN HFA) 90 mcg/actuation inhaler Inhale 2 puffs into the lungs 4 (four) times daily as needed.    [DISCONTINUED] lisinopriL-hydrochlorothiazide (PRINZIDE,ZESTORETIC) 10-12.5 mg per tablet Take 1 tablet by mouth once daily.    [DISCONTINUED] losartan (COZAAR) 100 MG tablet Take 100 mg by mouth once daily.     Family History       Problem Relation (Age of Onset)    Heart disease Father, Mother    Hypertension Father, Mother          Tobacco Use    Smoking status: Never Smoker    Smokeless tobacco: Never Used   Substance and Sexual Activity    Alcohol use: No     Comment: rarely    Drug use: No    Sexual activity: Not on file     Review of Systems   Constitutional:  Positive for appetite change and fatigue. Negative for chills and fever.   HENT:  Negative for congestion, postnasal drip, rhinorrhea, sinus pressure, sinus pain and sore throat.    Respiratory:  Positive for shortness of breath. Negative for cough.    Cardiovascular:  Negative for chest pain, palpitations and leg swelling.   Gastrointestinal:  Negative for abdominal pain, diarrhea, nausea and vomiting.   Skin: Negative.    Neurological:  Positive for weakness. Negative for light-headedness and headaches.   Psychiatric/Behavioral:  Positive for confusion and decreased concentration.    Objective:     Vital Signs (Most Recent):  Temp: 99.5 °F (37.5 °C) (03/03/22 0904)  Pulse: 89 (03/03/22 1015)  Resp: (!) 32 (03/03/22 1015)  BP: (!) 109/58 (03/03/22 1015)  SpO2: 95 % (03/03/22 1015)   Vital Signs (24h Range):  Temp:  [99.5 °F (37.5 °C)] 99.5 °F (37.5 °C)  Pulse:  []  89  Resp:  [32-44] 32  SpO2:  [72 %-95 %] 95 %  BP: (109-143)/(58-79) 109/58     Weight: 122 kg (268 lb 13.6 oz)  Body mass index is 35.47 kg/m².    Physical Exam  Vitals and nursing note reviewed.   Constitutional:       General: He is sleeping.      Appearance: He is overweight. He is ill-appearing.   HENT:      Head: Normocephalic.   Cardiovascular:      Rate and Rhythm: Normal rate and regular rhythm.      Heart sounds: Normal heart sounds.   Pulmonary:      Effort: Tachypnea and retractions (minimal) present.      Breath sounds: Examination of the right-lower field reveals wheezing and rhonchi. Examination of the left-lower field reveals wheezing and rhonchi. Wheezing and rhonchi present.   Abdominal:      General: There is no distension.      Tenderness: There is no abdominal tenderness.   Genitourinary:     Comments: Deferred   Musculoskeletal:      Cervical back: Normal range of motion and neck supple.   Skin:     General: Skin is warm and dry.      Capillary Refill: Capillary refill takes less than 2 seconds.   Neurological:      Mental Status: He is oriented to person, place, and time and easily aroused. He is confused.      Motor: Weakness present.   Psychiatric:         Mood and Affect: Mood normal. Affect is flat.         Speech: Speech is delayed.         Behavior: Behavior normal. Behavior is cooperative.         Cognition and Memory: Cognition is impaired.           Significant Labs: All pertinent labs within the past 24 hours have been reviewed.  CBC:   Recent Labs   Lab 03/03/22  0924   WBC 5.14   HGB 14.1   HCT 42.8        CMP:   Recent Labs   Lab 03/03/22  0924   *   K 4.3      CO2 21*   *   BUN 64*   CREATININE 2.9*   CALCIUM 8.5*   PROT 8.5*   ALBUMIN 2.9*   BILITOT 0.8   ALKPHOS 57   AST 83*   ALT 69*   ANIONGAP 12   EGFRNONAA 22*     Lactic Acid:   Recent Labs   Lab 03/03/22  0925   LACTATE 1.3     POCT Glucose:   Recent Labs   Lab 03/03/22  0930   POCTGLUCOSE 125*      Troponin:   Recent Labs   Lab 03/03/22  0924   TROPONINI 0.033*       Significant Imaging: I have reviewed all pertinent imaging results/findings within the past 24 hours.    Imaging Results              CT Head Without Contrast (Final result)  Result time 03/03/22 12:07:05      Final result by Deonte Spence MD (03/03/22 12:07:05)                   Impression:      No acute abnormality.    All CT scans at this facility use dose modulation, iterative reconstruction, and/or weight based dosing when appropriate to reduce radiation dose to as low as reasonably achievable.      Electronically signed by: Deonte Spence  Date:    03/03/2022  Time:    12:07               Narrative:    EXAMINATION:  CT HEAD WITHOUT CONTRAST    CLINICAL HISTORY:  Mental status change, unknown cause;    TECHNIQUE:  Low dose axial CT images obtained throughout the head without intravenous contrast. Sagittal and coronal reconstructions were performed.    COMPARISON:  None.    FINDINGS:  Intracranial compartment:    Ventricles and sulci are normal in size for age without evidence of hydrocephalus. No extra-axial blood or fluid collections.    The brain parenchyma appears normal. No parenchymal mass, hemorrhage, edema or major vascular distribution infarct.    Skull/extracranial contents (limited evaluation): No fracture. Mastoid air cells and paranasal sinuses are essentially clear.                                       X-Ray Chest AP Portable (Final result)  Result time 03/03/22 10:10:55      Final result by Deonte Spence MD (03/03/22 10:10:55)                   Impression:      Basilar predominant ground-glass and patchy airspace disease suspicious for moderate to severe COVID-19 pneumonitis. Pulmonary edema could have this appearance although felt less likely. Unchanged mild cardiomegaly. No pneumothorax or pleural effusion.      Electronically signed by: Deonte Spence  Date:    03/03/2022  Time:    10:10               Narrative:     EXAMINATION:  XR CHEST AP PORTABLE    CLINICAL HISTORY:  SOB;.    TECHNIQUE:  Single frontal portable view of the chest was performed.    COMPARISON:  05/27/2019    FINDINGS:  Support devices: None    Basilar predominant ground-glass and patchy airspace disease suspicious for moderate to severe COVID-19 pneumonitis.  Pulmonary edema could have this appearance although felt less likely.  Unchanged mild cardiomegaly.  No pneumothorax or pleural effusion.    Bones are intact.                                       Assessment/Plan:     Pneumonia due to COVID-19 virus  -Remdesivir   -Lovenox   -O2 Support   -AST 83, ALT 69  -Will repeat BMP in AM   - COVID-19 testing Collection Date: 12/3/2021 Collection Time:   2:35 PM   - Infection Control notified     - Isolation:   - Airborne, Contact and Droplet Precautions  - Cohort patients into COVID units  - N95 mask, wear eye protection  - 20 second hand hygiene              - Limit visitors per hospital policy              - Consolidating lab draws, nursing care, provider visits, and interventions    - Diagnostics: (leukopenia, hyponatremia, hyperferritinemia, elevated troponin, elevated d-dimer, age, and comorbidities are significant predictors of poor clinical outcome)  CBC, CMP, Ferritin, CRP, LDH, Troponin, Portable CXR and UA and culture    - Management:  Supplemental O2 to maintain SpO2 >92%  Telemetry  Continuous/intermittent Pulse Ox  Albuterol treatment PRN    Advance Care Planning   Full Code              Hyperglycemia  -glucose mildly elevated   -will initiate steroids in the setting of COVID  -A1c pending   -Accuchecks       Elevated troponin  -in the setting of hypoxia   -troponin 0.033   -will follow serial results   -BNP pending       Altered mental status  -reported prior to admission but resolved upon assessment  -CT of head negative for acute abnormality   -Urine drug screen pending   -UA results pending   -neuro checks       CKD (chronic kidney disease),  stage III  -acute on chronic CKD stage 3  -BUN 64  -Creatinine 2.9 - Baseline 1.5  -IV Bolus in ED  -Repeat BMP in AM       Benign hypertension  -stable   -Continue home BP medications       VTE Risk Mitigation (From admission, onward)         Ordered     enoxaparin injection 120 mg  2 times daily         03/03/22 6392                   Ekaterina Forde NP  Department of Hospital Medicine   '[place] - Emergency Dept.

## 2022-03-03 NOTE — ASSESSMENT & PLAN NOTE
-Remdesivir   -Lovenox   -O2 Support   -AST 83, ALT 69  -Will repeat BMP in AM   - COVID-19 testing Collection Date: 12/3/2021 Collection Time:   2:35 PM   - Infection Control notified     - Isolation:   - Airborne, Contact and Droplet Precautions  - Cohort patients into COVID units  - N95 mask, wear eye protection  - 20 second hand hygiene              - Limit visitors per hospital policy              - Consolidating lab draws, nursing care, provider visits, and interventions    - Diagnostics: (leukopenia, hyponatremia, hyperferritinemia, elevated troponin, elevated d-dimer, age, and comorbidities are significant predictors of poor clinical outcome)  CBC, CMP, Ferritin, CRP, LDH, Troponin, Portable CXR and UA and culture    - Management:  Supplemental O2 to maintain SpO2 >92%  Telemetry  Continuous/intermittent Pulse Ox  Albuterol treatment PRN    Advance Care Planning   Full Code

## 2022-03-03 NOTE — PHARMACY MED REC
"Admission Medication History     The home medication history was taken by Nino Hillman.    You may go to "Admission" then "Reconcile Home Medications" tabs to review and/or act upon these items.      The home medication list has been updated by the Pharmacy department.    Please read ALL comments highlighted in yellow.    Please address this information as you see fit.     Feel free to contact us if you have any questions or require assistance.      The medications listed below were removed from the home medication list. Please reorder if appropriate:  Patient reports no longer taking the following medication(s):   PROVENTIL HFA   ZESTORETIC 10/12.5MG   LOSARTAN 1002      Medications listed below were obtained from: Patient/family: WIFE  (Not in a hospital admission)      Nino Hillman  [id number]    Current Outpatient Medications on File Prior to Encounter   Medication Sig Dispense Refill Last Dose    aspirin 81 MG Chew Take 81 mg by mouth once daily.   3/2/2022 at Unknown time    losartan-hydrochlorothiazide 100-25 mg (HYZAAR) 100-25 mg per tablet Take 1 tablet by mouth once daily. 90 tablet 3 3/2/2022 at Unknown time                             .          "

## 2022-03-03 NOTE — SUBJECTIVE & OBJECTIVE
Past Medical History:   Diagnosis Date    Benign hypertension     History of colon polyps     Hypertensive kidney disease with chronic kidney disease stage III        Past Surgical History:   Procedure Laterality Date    COLONOSCOPY N/A 12/6/2021    Procedure: COLONOSCOPY;  Surgeon: Doris Altman MD;  Location: [facility];  Service: Endoscopy;  Laterality: N/A;    None         Review of patient's allergies indicates:  No Known Allergies    No current facility-administered medications on file prior to encounter.     Current Outpatient Medications on File Prior to Encounter   Medication Sig    aspirin 81 MG Chew Take 81 mg by mouth once daily.    losartan-hydrochlorothiazide 100-25 mg (HYZAAR) 100-25 mg per tablet Take 1 tablet by mouth once daily.    [DISCONTINUED] albuterol (PROVENTIL/VENTOLIN HFA) 90 mcg/actuation inhaler Inhale 2 puffs into the lungs 4 (four) times daily as needed.    [DISCONTINUED] lisinopriL-hydrochlorothiazide (PRINZIDE,ZESTORETIC) 10-12.5 mg per tablet Take 1 tablet by mouth once daily.    [DISCONTINUED] losartan (COZAAR) 100 MG tablet Take 100 mg by mouth once daily.     Family History       Problem Relation (Age of Onset)    Heart disease Father, Mother    Hypertension Father, Mother          Tobacco Use    Smoking status: Never Smoker    Smokeless tobacco: Never Used   Substance and Sexual Activity    Alcohol use: No     Comment: rarely    Drug use: No    Sexual activity: Not on file     Review of Systems   Constitutional:  Positive for appetite change and fatigue. Negative for chills and fever.   HENT:  Negative for congestion, postnasal drip, rhinorrhea, sinus pressure, sinus pain and sore throat.    Respiratory:  Positive for shortness of breath. Negative for cough.    Cardiovascular:  Negative for chest pain, palpitations and leg swelling.   Gastrointestinal:  Negative for abdominal pain, diarrhea, nausea and vomiting.   Skin: Negative.    Neurological:  Positive for weakness.  Negative for light-headedness and headaches.   Psychiatric/Behavioral:  Positive for confusion and decreased concentration.    Objective:     Vital Signs (Most Recent):  Temp: 99.5 °F (37.5 °C) (03/03/22 0904)  Pulse: 89 (03/03/22 1015)  Resp: (!) 32 (03/03/22 1015)  BP: (!) 109/58 (03/03/22 1015)  SpO2: 95 % (03/03/22 1015)   Vital Signs (24h Range):  Temp:  [99.5 °F (37.5 °C)] 99.5 °F (37.5 °C)  Pulse:  [] 89  Resp:  [32-44] 32  SpO2:  [72 %-95 %] 95 %  BP: (109-143)/(58-79) 109/58     Weight: 122 kg (268 lb 13.6 oz)  Body mass index is 35.47 kg/m².    Physical Exam  Vitals and nursing note reviewed.   Constitutional:       General: He is sleeping.      Appearance: He is overweight. He is ill-appearing.   HENT:      Head: Normocephalic.   Cardiovascular:      Rate and Rhythm: Normal rate and regular rhythm.      Heart sounds: Normal heart sounds.   Pulmonary:      Effort: Tachypnea and retractions (minimal) present.      Breath sounds: Examination of the right-lower field reveals wheezing and rhonchi. Examination of the left-lower field reveals wheezing and rhonchi. Wheezing and rhonchi present.   Abdominal:      General: There is no distension.      Tenderness: There is no abdominal tenderness.   Genitourinary:     Comments: Deferred   Musculoskeletal:      Cervical back: Normal range of motion and neck supple.   Skin:     General: Skin is warm and dry.      Capillary Refill: Capillary refill takes less than 2 seconds.   Neurological:      Mental Status: He is oriented to person, place, and time and easily aroused. He is confused.      Motor: Weakness present.   Psychiatric:         Mood and Affect: Mood normal. Affect is flat.         Speech: Speech is delayed.         Behavior: Behavior normal. Behavior is cooperative.         Cognition and Memory: Cognition is impaired.           Significant Labs: All pertinent labs within the past 24 hours have been reviewed.  CBC:   Recent Labs   Lab 03/03/22  0936    WBC 5.14   HGB 14.1   HCT 42.8        CMP:   Recent Labs   Lab 03/03/22 0924   *   K 4.3      CO2 21*   *   BUN 64*   CREATININE 2.9*   CALCIUM 8.5*   PROT 8.5*   ALBUMIN 2.9*   BILITOT 0.8   ALKPHOS 57   AST 83*   ALT 69*   ANIONGAP 12   EGFRNONAA 22*     Lactic Acid:   Recent Labs   Lab 03/03/22  0925   LACTATE 1.3     POCT Glucose:   Recent Labs   Lab 03/03/22  0930   POCTGLUCOSE 125*     Troponin:   Recent Labs   Lab 03/03/22 0924   TROPONINI 0.033*       Significant Imaging: I have reviewed all pertinent imaging results/findings within the past 24 hours.    Imaging Results              CT Head Without Contrast (Final result)  Result time 03/03/22 12:07:05      Final result by Deonte Spence MD (03/03/22 12:07:05)                   Impression:      No acute abnormality.    All CT scans at this facility use dose modulation, iterative reconstruction, and/or weight based dosing when appropriate to reduce radiation dose to as low as reasonably achievable.      Electronically signed by: Deonte Spence  Date:    03/03/2022  Time:    12:07               Narrative:    EXAMINATION:  CT HEAD WITHOUT CONTRAST    CLINICAL HISTORY:  Mental status change, unknown cause;    TECHNIQUE:  Low dose axial CT images obtained throughout the head without intravenous contrast. Sagittal and coronal reconstructions were performed.    COMPARISON:  None.    FINDINGS:  Intracranial compartment:    Ventricles and sulci are normal in size for age without evidence of hydrocephalus. No extra-axial blood or fluid collections.    The brain parenchyma appears normal. No parenchymal mass, hemorrhage, edema or major vascular distribution infarct.    Skull/extracranial contents (limited evaluation): No fracture. Mastoid air cells and paranasal sinuses are essentially clear.                                       X-Ray Chest AP Portable (Final result)  Result time 03/03/22 10:10:55      Final result by Deonte Spence MD  (03/03/22 10:10:55)                   Impression:      Basilar predominant ground-glass and patchy airspace disease suspicious for moderate to severe COVID-19 pneumonitis. Pulmonary edema could have this appearance although felt less likely. Unchanged mild cardiomegaly. No pneumothorax or pleural effusion.      Electronically signed by: Deonte Spence  Date:    03/03/2022  Time:    10:10               Narrative:    EXAMINATION:  XR CHEST AP PORTABLE    CLINICAL HISTORY:  SOB;.    TECHNIQUE:  Single frontal portable view of the chest was performed.    COMPARISON:  05/27/2019    FINDINGS:  Support devices: None    Basilar predominant ground-glass and patchy airspace disease suspicious for moderate to severe COVID-19 pneumonitis.  Pulmonary edema could have this appearance although felt less likely.  Unchanged mild cardiomegaly.  No pneumothorax or pleural effusion.    Bones are intact.

## 2022-03-03 NOTE — HPI
Abdirahman Michelle is a 66 y.o. -American male patient with a PMHx of benign HTN & CKD Stage III who presents to the [facility] Emergency Department with chief complaint of AMS & increased work of breathing. He is asleep, but easily aroused and oriented to self. However, his cognition and response time are delayed, so his wife, Fannie Rodriguez, served as the historian. She reports the symptoms started on yesterday after the pt arrived home from work. Reports since the symptoms continued to worsen overnight and into this morning, she decided to bring him into the ER. Associated symptoms include fatigue, generalized weakness and decreased appetite. Denies fever, H/A, congestion, cough, CP, palpitations, calf pain, numbness, dizziness, unilateral weakness, or nausea, vomiting, diarrhea, or any recent falls. Wife states he did not take any medications today. Pt admitted to hospital medicine. Pt is a FC status and his surrogate decision maker is his wife Fannie Rodriguez.

## 2022-03-03 NOTE — ASSESSMENT & PLAN NOTE
-glucose mildly elevated   -will initiate steroids in the setting of COVID  -A1c pending   -Accuchecks

## 2022-03-04 VITALS
HEIGHT: 73 IN | HEART RATE: 74 BPM | RESPIRATION RATE: 19 BRPM | BODY MASS INDEX: 35.27 KG/M2 | TEMPERATURE: 97 F | SYSTOLIC BLOOD PRESSURE: 107 MMHG | DIASTOLIC BLOOD PRESSURE: 65 MMHG | WEIGHT: 266.13 LBS | OXYGEN SATURATION: 92 %

## 2022-03-04 PROBLEM — R73.9 HYPERGLYCEMIA: Status: RESOLVED | Noted: 2022-03-03 | Resolved: 2022-03-04

## 2022-03-04 PROBLEM — R41.82 ALTERED MENTAL STATUS: Status: RESOLVED | Noted: 2022-03-03 | Resolved: 2022-03-04

## 2022-03-04 PROBLEM — R79.89 ELEVATED TROPONIN: Status: RESOLVED | Noted: 2022-03-03 | Resolved: 2022-03-04

## 2022-03-04 LAB
ANION GAP SERPL CALC-SCNC: 8 MMOL/L (ref 8–16)
BASOPHILS # BLD AUTO: 0.01 K/UL (ref 0–0.2)
BASOPHILS NFR BLD: 0.2 % (ref 0–1.9)
BUN SERPL-MCNC: 56 MG/DL (ref 8–23)
CALCIUM SERPL-MCNC: 8.3 MG/DL (ref 8.7–10.5)
CHLORIDE SERPL-SCNC: 106 MMOL/L (ref 95–110)
CO2 SERPL-SCNC: 22 MMOL/L (ref 23–29)
CREAT SERPL-MCNC: 1.9 MG/DL (ref 0.5–1.4)
DIFFERENTIAL METHOD: ABNORMAL
EOSINOPHIL # BLD AUTO: 0 K/UL (ref 0–0.5)
EOSINOPHIL NFR BLD: 0 % (ref 0–8)
ERYTHROCYTE [DISTWIDTH] IN BLOOD BY AUTOMATED COUNT: 14.2 % (ref 11.5–14.5)
EST. GFR  (AFRICAN AMERICAN): 42 ML/MIN/1.73 M^2
EST. GFR  (NON AFRICAN AMERICAN): 36 ML/MIN/1.73 M^2
GLUCOSE SERPL-MCNC: 96 MG/DL (ref 70–110)
HCT VFR BLD AUTO: 41.3 % (ref 40–54)
HGB BLD-MCNC: 13.5 G/DL (ref 14–18)
IMM GRANULOCYTES # BLD AUTO: 0.02 K/UL (ref 0–0.04)
IMM GRANULOCYTES NFR BLD AUTO: 0.3 % (ref 0–0.5)
LYMPHOCYTES # BLD AUTO: 0.5 K/UL (ref 1–4.8)
LYMPHOCYTES NFR BLD: 8.2 % (ref 18–48)
MCH RBC QN AUTO: 30.5 PG (ref 27–31)
MCHC RBC AUTO-ENTMCNC: 32.7 G/DL (ref 32–36)
MCV RBC AUTO: 93 FL (ref 82–98)
MONOCYTES # BLD AUTO: 0.3 K/UL (ref 0.3–1)
MONOCYTES NFR BLD: 4.1 % (ref 4–15)
NEUTROPHILS # BLD AUTO: 5.5 K/UL (ref 1.8–7.7)
NEUTROPHILS NFR BLD: 87.2 % (ref 38–73)
NRBC BLD-RTO: 0 /100 WBC
PLATELET # BLD AUTO: 165 K/UL (ref 150–450)
PMV BLD AUTO: 9.9 FL (ref 9.2–12.9)
POTASSIUM SERPL-SCNC: 4.9 MMOL/L (ref 3.5–5.1)
RBC # BLD AUTO: 4.43 M/UL (ref 4.6–6.2)
SODIUM SERPL-SCNC: 136 MMOL/L (ref 136–145)
WBC # BLD AUTO: 6.33 K/UL (ref 3.9–12.7)

## 2022-03-04 PROCEDURE — 94640 AIRWAY INHALATION TREATMENT: CPT

## 2022-03-04 PROCEDURE — 63600175 PHARM REV CODE 636 W HCPCS: Performed by: NURSE PRACTITIONER

## 2022-03-04 PROCEDURE — 25000003 PHARM REV CODE 250: Performed by: SPECIALIST

## 2022-03-04 PROCEDURE — 27000221 HC OXYGEN, UP TO 24 HOURS

## 2022-03-04 PROCEDURE — 25000003 PHARM REV CODE 250: Performed by: NURSE PRACTITIONER

## 2022-03-04 PROCEDURE — 36415 COLL VENOUS BLD VENIPUNCTURE: CPT | Performed by: NURSE PRACTITIONER

## 2022-03-04 PROCEDURE — 85025 COMPLETE CBC W/AUTO DIFF WBC: CPT | Performed by: NURSE PRACTITIONER

## 2022-03-04 PROCEDURE — 99900035 HC TECH TIME PER 15 MIN (STAT)

## 2022-03-04 PROCEDURE — 80048 BASIC METABOLIC PNL TOTAL CA: CPT | Performed by: STUDENT IN AN ORGANIZED HEALTH CARE EDUCATION/TRAINING PROGRAM

## 2022-03-04 PROCEDURE — 36415 COLL VENOUS BLD VENIPUNCTURE: CPT | Performed by: STUDENT IN AN ORGANIZED HEALTH CARE EDUCATION/TRAINING PROGRAM

## 2022-03-04 RX ORDER — DEXAMETHASONE 6 MG/1
6 TABLET ORAL DAILY
Qty: 8 TABLET | Refills: 0 | Status: SHIPPED | OUTPATIENT
Start: 2022-03-05 | End: 2022-03-14

## 2022-03-04 RX ADMIN — HYDROCHLOROTHIAZIDE 25 MG: 25 TABLET ORAL at 10:03

## 2022-03-04 RX ADMIN — ENOXAPARIN SODIUM 120 MG: 150 INJECTION SUBCUTANEOUS at 10:03

## 2022-03-04 RX ADMIN — OXYCODONE HYDROCHLORIDE AND ACETAMINOPHEN 500 MG: 500 TABLET ORAL at 10:03

## 2022-03-04 RX ADMIN — LOSARTAN POTASSIUM 100 MG: 50 TABLET, FILM COATED ORAL at 10:03

## 2022-03-04 RX ADMIN — ALBUTEROL SULFATE 2 PUFF: 90 AEROSOL, METERED RESPIRATORY (INHALATION) at 01:03

## 2022-03-04 RX ADMIN — ALBUTEROL SULFATE 2 PUFF: 90 AEROSOL, METERED RESPIRATORY (INHALATION) at 07:03

## 2022-03-04 RX ADMIN — DEXAMETHASONE 6 MG: 4 TABLET ORAL at 10:03

## 2022-03-04 RX ADMIN — THERA TABS 1 TABLET: TAB at 10:03

## 2022-03-04 NOTE — DISCHARGE SUMMARY
[facility] ([facility])  [facility] Medicine  Discharge Summary      Patient Name: Abdirahman Rodriguez  MRN: [id number]  Patient Class: IP- Inpatient  Admission Date: 3/3/2022  Hospital Length of Stay: 1 days  Discharge Date and Time:  03/04/2022 1:42 PM  Attending Physician: Lashaun Phelps MD   Discharging Provider: Lashaun Phelps MD  Primary Care Provider: Mt Noel MD      HPI:   Abdirahman Michelle is a 66 y.o. -American male patient with a PMHx of benign HTN & CKD Stage III who presents to the [facility] Emergency Department with chief complaint of AMS & increased work of breathing. He is asleep, but easily aroused and oriented to self. However, his cognition and response time are delayed, so his wife, Fannie Rodriguez, served as the historian. She reports the symptoms started on yesterday after the pt arrived home from work. Reports since the symptoms continued to worsen overnight and into this morning, she decided to bring him into the ER. Associated symptoms include fatigue, generalized weakness and decreased appetite. Denies fever, H/A, congestion, cough, CP, palpitations, calf pain, numbness, dizziness, unilateral weakness, or nausea, vomiting, diarrhea, or any recent falls. Wife states he did not take any medications today. Pt admitted to hospital medicine. Pt is a FC status and his surrogate decision maker is his wife Fannie Rodriguez.       Hospital Course:   Admitted for COVID-19 PNA w/hypoxia. O2 sats currently stable on 3-4L NC. RA sats 85% at rest and 83% with exertion. Discussed care options with patient and family at bedside and prefers to go home with O2. Stable for discharge, complete steroid course and f/u with PCP in 1 week. Cr improving towards baseline prior to d/c       Goals of Care Treatment Preferences:  Code Status: Full Code      Consults:   Consults (From admission, onward)        Status Ordering Provider     Consult Respiratory Therapy if no hx of CHF  Once        Provider:  (Not yet assigned)     Acknowledged BEE AGEE          * Pneumonia due to COVID-19 virus  -Remdesivir   -Lovenox   -O2 Support   -AST 83, ALT 69  -Will repeat BMP in AM   - COVID-19 testing Collection Date: 12/3/2021 Collection Time:   2:35 PM   - Infection Control notified     - Isolation:   - Airborne, Contact and Droplet Precautions  - Cohort patients into COVID units  - N95 mask, wear eye protection  - 20 second hand hygiene              - Limit visitors per hospital policy              - Consolidating lab draws, nursing care, provider visits, and interventions    - Diagnostics: (leukopenia, hyponatremia, hyperferritinemia, elevated troponin, elevated d-dimer, age, and comorbidities are significant predictors of poor clinical outcome)  CBC, CMP, Ferritin, CRP, LDH, Troponin, Portable CXR and UA and culture    - Management:  Supplemental O2 to maintain SpO2 >92%  Telemetry  Continuous/intermittent Pulse Ox  Albuterol treatment PRN    Advance Care Planning   Full Code              CKD (chronic kidney disease), stage III  Cr improving to baseline      Benign hypertension  Hold ARB for FELIPA  Restart BP meds on f/u with PCP    Final Active Diagnoses:    Diagnosis Date Noted POA    PRINCIPAL PROBLEM:  Pneumonia due to COVID-19 virus [U07.1, J12.82] 03/03/2022 Yes    CKD (chronic kidney disease), stage III [N18.30]  Yes    Benign hypertension [I10]  Yes      Problems Resolved During this Admission:    Diagnosis Date Noted Date Resolved POA    Altered mental status [R41.82] 03/03/2022 03/04/2022 Unknown    Elevated troponin [R77.8] 03/03/2022 03/04/2022 Unknown    Hyperglycemia [R73.9] 03/03/2022 03/04/2022 Unknown       Discharged Condition: stable    Disposition: Home or Self Care    Follow Up:   Follow-up Information     [facility] Follow up.    Why: Please contact [facility] upon leaving the hospital to have your home concentrator delivered.  Contact information:  Phone: [phone]           Mt Noel MD Follow up in  "1 week(s).    Specialty: Internal Medicine  Contact information:  [address]   [address]  [place] [id number] [phone]                       Patient Instructions:      OXYGEN FOR HOME USE     Order Specific Question Answer Comments   Liter Flow 4    Duration Continuous    Qualifying Test Performed at: Rest    Oxygen saturation: 85    Portable mode: continuous    Route nasal cannula    Device: home concentrator with portable tanks    Length of need (in months): 3 mos    Patient condition with qualifying saturation Other - List qualifying diagnosis and code    Select a diagnosis & list the code in the comments COVID-19 [[id number]]    Height: 6' 1" (1.854 m)    Weight: 120.7 kg (266 lb 1.5 oz)    Alternative treatment measures have been tried or considered and deemed clinically ineffective. Yes      Basic metabolic panel   Standing Status: Future Standing Exp. Date: 05/03/23     Ambulatory referral/consult to [facility]   Standing Status: Future   Referral Priority: Routine Referral Type: Consultation   Referral Reason: Specialty Services Required   Number of Visits Requested: 1     Diet Cardiac     COVID-19 Surveillance Program     Order Specific Question Answer Comments   Does patient have a smartphone? Yes    Does patient have the [facility] on their smartphone? No    While in surveillance program, will patient be using home oxygen? Yes      Activity as tolerated       Significant Diagnostic Studies: Labs:   CMP   Recent Labs   Lab 03/03/22  0924 03/04/22  1128   * 136   K 4.3 4.9    106   CO2 21* 22*   * 96   BUN 64* 56*   CREATININE 2.9* 1.9*   CALCIUM 8.5* 8.3*   PROT 8.5*  --    ALBUMIN 2.9*  --    BILITOT 0.8  --    ALKPHOS 57  --    AST 83*  --    ALT 69*  --    ANIONGAP 12 8   ESTGFRAFRICA 25* 42*   EGFRNONAA 22* 36*    and CBC   Recent Labs   Lab 03/03/22  0924 03/04/22  0453   WBC 5.14 6.33   HGB 14.1 13.5*   HCT 42.8 41.3    165       Pending " Diagnostic Studies:     Procedure Component Value Units Date/Time    Brain natriuretic peptide [[id number]] Collected: 03/03/22 0924    Order Status: Sent Lab Status: In process Updated: 03/03/22 0925    Specimen: Blood          Medications:  Reconciled Home Medications:      Medication List      START taking these medications    dexAMETHasone 6 MG tablet  Commonly known as: DECADRON  Take 1 tablet (6 mg total) by mouth once daily. for 8 days  Start taking on: March 5, 2022     pulse oximeter device  Commonly known as: pulse oximeter  Use twice daily at 8 AM and 3 PM and [facility].        CONTINUE taking these medications    aspirin 81 MG Chew  Take 81 mg by mouth once daily.        STOP taking these medications    losartan-hydrochlorothiazide 100-25 mg 100-25 mg per tablet  Commonly known as: HYZAAR            Indwelling Lines/Drains at time of discharge:   Lines/Drains/Airways     None                 Time spent on the discharge of patient: 40 minutes         Lashaun Phelps MD  Department of Hospital Medicine  [facility] Tele[facility] ([facility])

## 2022-03-04 NOTE — RESPIRATORY THERAPY
Did O2 assessment with and without oxygen pt saturation without oxygen was 85% sitting on side of bed, during exercise pt walk to door and back saturation was 83% heart rate was 85. Place pt on 3lnc his saturation increase to 88% increase to 4lnc and saturation increase to 92%

## 2022-03-04 NOTE — PLAN OF CARE
Home oxygen orders faxed to [facility], fax confirmation received. Trudy with [facility] to deliver portable tank, awaiting ETA for delivery time.

## 2022-03-04 NOTE — PLAN OF CARE
Pt AAOx1. POC reviewed with pt and wife. Pt/wife verbalized understanding. Pt remains free of injuries and falls; Fall precautions in place; O2 @ 2LNC; Neuro checks q4  SR w/ PAC's on tele monitor. HR 60's  IV saline locked; flushed; intact  No c/o pain  Bed low; side rails up x2, non-slip socks in use, call light in reach; Telesitter and sitter in room; Reminded to call for assistance  Hourly rounding complete will continue to monitor

## 2022-03-04 NOTE — PLAN OF CARE
[facility] - Tele[facility] (Hospital)  Initial Discharge Assessment       Primary Care Provider: Mt Noel MD    Admission Diagnosis: SOB (shortness of breath) [R06.02]    Admission Date: 3/3/2022  Expected Discharge Date: 3/4/2022    Discharge Barriers Identified: None    Payor: [facility] / Plan: [place] PPO / Product Type: PPO /     Extended Emergency Contact Information  Primary Emergency Contact: Fannie Rodriguez   [facility]  Home Phone: [phone]  Mobile Phone: [phone]  Relation: Spouse    Discharge Plan A: Home with family         [id number] [employer] [facility] [place] [address]  [address] [address]  Phone: [phone] Fax: [phone]    [id number] [place] [place] - [address]  [address] [facility] [address]  Phone: [phone] Fax: [phone]      Initial Assessment (most recent)     Adult Discharge Assessment - 03/04/22 1103        Discharge Assessment    Assessment Type Discharge Planning Assessment     Confirmed/corrected address, phone number and insurance Yes     Confirmed Demographics Correct on Facesheet     Source of Information family     Communicated SIMONE with patient/caregiver Date not available/Unable to determine     Reason For Admission COVID PNA     Lives With spouse     Facility Arrived From: home     Do you expect to return to your current living situation? Yes     Do you have help at home or someone to help you manage your care at home? Yes     Who are your caregiver(s) and their phone number(s)? independent     Prior to hospitilization cognitive status: Alert/Oriented     Current cognitive status: Alert/Oriented     Walking or Climbing Stairs Difficulty none     Dressing/Bathing Difficulty none     Equipment Currently Used at Home none     Readmission within 30 days? No     Patient currently being followed by outpatient case management? No     Do you  Sirena Young,  His podiatrist noticed new atypical mole on his right plantar foot - they didn't want to wait on a biopsy so I went ahead and did it today - he is seeing you next week for a TBSE. Thanks! Sep[facility]h currently have service(s) that help you manage your care at home? No     Do you take prescription medications? Yes     Do you have prescription coverage? Yes     Do you have any problems affording any of your prescribed medications? No     Is the patient taking medications as prescribed? yes     How do you get to doctors appointments? car, drives self     Are you on dialysis? No     Discharge Plan A Home with family     DME Needed Upon Discharge  oxygen     Discharge Plan discussed with: Spouse/sig other     Discharge Barriers Identified None               Anticipated DC dispo: home with spouse  Prior Level of Function: independent  PCP: Dr. Noel  Comments:  CM called into room, spoke with spouse to introduce role and discuss d/c planning. Patient is independent, spouse inquired about home oxygen set up process. CM explained process and answered questions. Awaiting O2 order at this time. CM following.

## 2022-03-04 NOTE — SUBJECTIVE & OBJECTIVE
Review of Systems   Constitutional:  Positive for appetite change and fatigue. Negative for chills and fever.   HENT:  Negative for congestion, postnasal drip, rhinorrhea, sinus pressure, sinus pain and sore throat.    Respiratory:  Positive for shortness of breath. Negative for cough.    Cardiovascular:  Negative for chest pain, palpitations and leg swelling.   Gastrointestinal:  Negative for abdominal pain, diarrhea, nausea and vomiting.   Skin: Negative.    Neurological:  Positive for weakness. Negative for light-headedness and headaches.   Psychiatric/Behavioral:  Negative for confusion and decreased concentration.    Objective:     Vital Signs (Most Recent):  Temp: 97.1 °F (36.2 °C) (03/04/22 0547)  Pulse: 74 (03/04/22 1304)  Resp: 19 (03/04/22 1304)  BP: 107/65 (03/04/22 0547)  SpO2: (!) 92 % (03/04/22 1304)   Vital Signs (24h Range):  Temp:  [97.1 °F (36.2 °C)-98 °F (36.7 °C)] 97.1 °F (36.2 °C)  Pulse:  [65-85] 74  Resp:  [16-21] 19  SpO2:  [88 %-99 %] 92 %  BP: (107-112)/(62-72) 107/65     Weight: 120.7 kg (266 lb 1.5 oz)  Body mass index is 35.11 kg/m².    Intake/Output Summary (Last 24 hours) at 3/4/2022 1430  Last data filed at 3/4/2022 0547  Gross per 24 hour   Intake 248.75 ml   Output 425 ml   Net -176.25 ml      Physical Exam  Constitutional:       General: He is not in acute distress.     Appearance: Normal appearance. He is not ill-appearing.   HENT:      Head: Normocephalic and atraumatic.   Cardiovascular:      Rate and Rhythm: Normal rate and regular rhythm.   Pulmonary:      Effort: Respiratory distress present.      Breath sounds: Normal breath sounds. No wheezing or rhonchi.   Abdominal:      General: Abdomen is flat. Bowel sounds are normal. There is no distension.      Palpations: Abdomen is soft.      Tenderness: There is no abdominal tenderness. There is no guarding.   Musculoskeletal:         General: Normal range of motion.      Right lower leg: No edema.      Left lower leg: No edema.    Skin:     General: Skin is warm and dry.   Neurological:      General: No focal deficit present.      Mental Status: He is alert and oriented to person, place, and time.       Significant Labs: All pertinent labs within the past 24 hours have been reviewed.    Significant Imaging: I have reviewed all pertinent imaging results/findings within the past 24 hours.

## 2022-03-04 NOTE — ASSESSMENT & PLAN NOTE
-Remdesivir   -Lovenox   -O2 Support   -AST 83, ALT 69  -Will repeat BMP in AM   - COVID-19 testing Collection Date: 12/3/2021 Collection Time:   2:35 PM   - Infection Control notified     - Isolation:   - Airborne, Contact and Droplet Precautions  - Cohort patients into COVID units  - N95 mask, wear eye protection  - 20 second hand hygiene              - Limit visitors per hospital policy              - Consolidating lab draws, nursing care, provider visits, and interventions    - Diagnostics: (leukopenia, hyponatremia, hyperferritinemia, elevated troponin, elevated d-dimer, age, and comorbidities are significant predictors of poor clinical outcome)  CBC, CMP, Ferritin, CRP, LDH, Troponin, Portable CXR and UA and culture    - Management:  Supplemental O2 to maintain SpO2 >92%  Telemetry  Continuous/intermittent Pulse Ox  Albuterol treatment PRN   Dexamethasone x 10 days    Advance Care Planning   Full Code

## 2022-03-04 NOTE — HOSPITAL COURSE
3/4: NAEO, currently requiring 4L NC to maintain sats above 92%. While planning for d/c with home O2, pt was unable to read above 87% on pulse ox even at 10L flow-suspect poor extremity circulation.

## 2022-03-04 NOTE — PROGRESS NOTES
Home Oxygen Evaluation    Date Performed: 3/4/2022    1) Patient's Home O2 Sat on room air, while at rest: 85        If O2 sats on room air at rest are 88% or below, patient qualifies. No additional testing needed. Document N/A in steps 2 and 3. If 89% or above, complete steps 2.      2) Patient's O2 Sat on room air while exercising:         If O2 sats on room air while exercising remain 89% or above patient does not qualify, no further testing needed Document N/A in step 3. If O2 sats on room air while exercising are 88% or below, continue to step 3.      3) Patient's O2 Sat while exercising on O2: na at  naLPM         (Must show improvement from #2 for patients to qualify)    If O2 sats improve on oxygen, patient qualifies for portable oxygen. If not, the patient does not qualify.    pt placed on 4lnc and saturation improved to 92%

## 2022-03-04 NOTE — PLAN OF CARE
[facility] - Tele[facility] (Hospital)  Discharge Final Note    Primary Care Provider: Mt Noel MD    Expected Discharge Date: 3/4/2022    Final Discharge Note (most recent)     Final Note - 03/04/22 1254        Final Note    Assessment Type Final Discharge Note     Anticipated Discharge Disposition Home or Self Care     Hospital Resources/Appts/Education Provided Appointments scheduled and added to AVS        Post-Acute Status    Post-Acute Authorization [facility] Status Set-up Complete/Auth obtained                 Important Message from Medicare             Contact Info     [employer]    Phone: [phone]       Next Steps: Follow up    Instructions: Please contact [facility] upon leaving the hospital to have your home concentrator delivered.        DC dispo: home with Home O2 ([facility])  Patient has been referred to [facility] NP visit program, has PCP f/u 3/14 due to COVID pos.  Portable O2 tank delivered to nurses station.

## 2022-03-05 ENCOUNTER — NURSE TRIAGE (OUTPATIENT)
Dept: ADMINISTRATIVE | Facility: CLINIC | Age: 67
End: 2022-03-05
Payer: COMMERCIAL

## 2022-03-06 ENCOUNTER — NURSE TRIAGE (OUTPATIENT)
Dept: ADMINISTRATIVE | Facility: CLINIC | Age: 67
End: 2022-03-06
Payer: COMMERCIAL

## 2022-03-06 NOTE — TELEPHONE ENCOUNTER
2nd enrollment call - unable to make phone contact to complete enrollment process. LM  and my chart previously sent.     Reason for Disposition   Message left on unidentified voice mail.  Phone number verified.    Protocols used: NO CONTACT OR DUPLICATE CONTACT CALL-A-AH

## 2022-03-06 NOTE — TELEPHONE ENCOUNTER
1st enrollment call - unable to make phone contact -  left and my chart message sent.     Reason for Disposition   Message left on unidentified voice mail.  Phone number verified.    Protocols used: NO CONTACT OR DUPLICATE CONTACT CALL-A-SHAHIDA

## 2022-03-07 ENCOUNTER — PATIENT MESSAGE (OUTPATIENT)
Dept: ADMINISTRATIVE | Facility: OTHER | Age: 67
End: 2022-03-07
Payer: COMMERCIAL

## 2022-03-08 ENCOUNTER — NURSE TRIAGE (OUTPATIENT)
Dept: ADMINISTRATIVE | Facility: CLINIC | Age: 67
End: 2022-03-08
Payer: COMMERCIAL

## 2022-03-08 ENCOUNTER — PATIENT MESSAGE (OUTPATIENT)
Dept: ADMINISTRATIVE | Facility: CLINIC | Age: 67
End: 2022-03-08
Payer: COMMERCIAL

## 2022-03-08 NOTE — TELEPHONE ENCOUNTER
Reason for Disposition   Message left on identified voicemail    Protocols used: NO CONTACT OR DUPLICATE CONTACT CALL-A-OH

## 2022-03-08 NOTE — TELEPHONE ENCOUNTER
Called re escalation due to no response, My Chart message left, message left on VM. No answer on second number.

## 2022-03-08 NOTE — TELEPHONE ENCOUNTER
Pt called and he submitted the consent and he never put in vitals and he said that he doesn't want to participate he opted [id number] and order placed and task removed for surveillance.   Reason for Disposition   Information only question and nurse able to answer    Protocols used: INFORMATION ONLY CALL - NO TRIAGE-A-OH

## 2022-03-09 ENCOUNTER — PES CALL (OUTPATIENT)
Dept: ADMINISTRATIVE | Facility: CLINIC | Age: 67
End: 2022-03-09
Payer: COMMERCIAL

## 2022-03-10 ENCOUNTER — PES CALL (OUTPATIENT)
Dept: ADMINISTRATIVE | Facility: CLINIC | Age: 67
End: 2022-03-10
Payer: COMMERCIAL

## 2022-03-10 NOTE — PHYSICIAN QUERY
PT Name: Abdirahman Rodriguez  MR #: [id number]     DOCUMENTATION CLARIFICATION     CDS/: Rebeca Philip RN CDIS              Contact information: [email or web address]    This form is a permanent document in the medical record.     Query Date: March 10, 2022    By submitting this query, we are merely seeking further clarification of documentation.  Please utilize your independent clinical judgment when addressing the question(s) below.  The Medical Record contains the following   Indicators   Supporting Clinical Findings Location in Medical Record   x SOB, FENG, Wheezing, Productive Cough, Use of Accessory Muscles, etc. Respiratory: Positive for shortness of breath.      Pulmonary/Chest: Tachypneic. Course breath sounds at bases.    Pulmonary:      Effort: Tachypnea and retractions (minimal) present.      Breath sounds: Examination of the right-lower field reveals wheezing and rhonchi. Examination of the left-lower field reveals wheezing and rhonchi. Wheezing and rhonchi present. ED provider note       ED provider note       H&P   x RR         ABGs         O2 sat Initial Vitals [03/03/22 0904]   BP Pulse Resp Temp SpO2   (!) 140/60 101 (S) (!) 40 99.5 °F (37.5 °C) (S) (!) 72 %   Vital Signs (24h Range):  Resp:  [32-44] 32  SpO2:  [72 %-95 %] 95 % ED provider note           H&P   x Hypoxia/Hypercapnia Admitted for COVID-19 PNA w/hypoxia. O2 sats currently stable on 3-4L NC. RA sats 85% at rest and 83% with exertion. Discussed care options with patient and family at bedside and prefers to go home with O2 Discharge summary    BiPAP/Intubation/Mechanical Ventilation      Supplemental O2      Home O2, Oxygen Dependence      Respiratory distress or failure     x Radiology findings Basilar predominant ground-glass and patchy airspace disease suspicious for moderate to severe COVID-19 pneumonitis. Pulmonary edema could have this appearance although felt less likely. Unchanged mild cardiomegaly. No pneumothorax or pleural  effusion. CXR - ED provider note    x Acute/Chronic Illness   Pneumonia due to COVID-19 virus   H&P     Treatment      Other         The noted clinical guidelines are only system guidelines and do not replace the providers clinical judgment.    Provider, please specify the diagnosis or diagnoses associated with above clinical findings.   [   X ] Acute Respiratory Failure with Hypoxia - ABG pO2 < 60 mmHg or O2 sat of <91% on room air and respiratory symptoms documented   [    ] Acute Respiratory Distress - Generally describes less severe respiratory symptoms (tachypnea, in respiratory distress, increased work of breathing, unable to speak in complete sentences, labored breathing, use of accessory muscles, RR> 24, cyanosis, dyspnea, wheezing, stridor, lethargy) without sufficient measurements (pO2, SpO2, pH, and pCO2) to meet criteria for respiratory failure   [    ] Hypoxia Only   [    ] Other Respiratory Diagnosis (please specify): _________________   [   ] Clinically Undetermined       Please document in your progress notes daily for the duration of treatment until resolved and include in your discharge summary.     Reference:    TIM New MD. (2020, March 13). Acute respiratory distress syndrome: Clinical features, diagnosis, and complications in adults ([id number] ANITA Wilder MD & [id number] GRACIE Martines MD, Eds.). Retrieved November 13, 2020, from https://www.[employer].com/contents/acute-respiratory-distress-syndrome-clinical-features-diagnosis-and-complications-in-adults?search=ards&source=search_result&selectedTitle=1~150&usage_type=default&display_rank=1  Form No. 40819

## 2022-03-11 ENCOUNTER — PES CALL (OUTPATIENT)
Dept: ADMINISTRATIVE | Facility: CLINIC | Age: 67
End: 2022-03-11
Payer: COMMERCIAL

## 2022-03-14 ENCOUNTER — HOSPITAL ENCOUNTER (OUTPATIENT)
Dept: RADIOLOGY | Facility: HOSPITAL | Age: 67
Discharge: HOME | End: 2022-03-14
Attending: FAMILY MEDICINE
Payer: COMMERCIAL

## 2022-03-14 ENCOUNTER — TELEPHONE (OUTPATIENT)
Dept: INTERNAL MEDICINE | Facility: CLINIC | Age: 67
End: 2022-03-14
Payer: COMMERCIAL

## 2022-03-14 ENCOUNTER — OFFICE VISIT (OUTPATIENT)
Dept: INTERNAL MEDICINE | Facility: CLINIC | Age: 67
End: 2022-03-14
Payer: COMMERCIAL

## 2022-03-14 VITALS
TEMPERATURE: 99 F | DIASTOLIC BLOOD PRESSURE: 68 MMHG | BODY MASS INDEX: 35.15 KG/M2 | WEIGHT: 265.19 LBS | HEART RATE: 105 BPM | SYSTOLIC BLOOD PRESSURE: 118 MMHG | OXYGEN SATURATION: 92 % | HEIGHT: 73 IN

## 2022-03-14 DIAGNOSIS — U07.1 PNEUMONIA DUE TO COVID-19 VIRUS: ICD-10-CM

## 2022-03-14 DIAGNOSIS — J12.82 PNEUMONIA DUE TO COVID-19 VIRUS: ICD-10-CM

## 2022-03-14 DIAGNOSIS — Z09 HOSPITAL DISCHARGE FOLLOW-UP: ICD-10-CM

## 2022-03-14 DIAGNOSIS — U07.1 COVID-19: ICD-10-CM

## 2022-03-14 DIAGNOSIS — Z09 HOSPITAL DISCHARGE FOLLOW-UP: Primary | ICD-10-CM

## 2022-03-14 PROCEDURE — 3074F PR MOST RECENT SYSTOLIC BLOOD PRESSURE < 130 MM HG: ICD-10-PCS | Mod: CPTII,S$GLB,, | Performed by: FAMILY MEDICINE

## 2022-03-14 PROCEDURE — 3078F PR MOST RECENT DIASTOLIC BLOOD PRESSURE < 80 MM HG: ICD-10-PCS | Mod: CPTII,S$GLB,, | Performed by: FAMILY MEDICINE

## 2022-03-14 PROCEDURE — 3008F PR BODY MASS INDEX (BMI) DOCUMENTED: ICD-10-PCS | Mod: CPTII,S$GLB,, | Performed by: FAMILY MEDICINE

## 2022-03-14 PROCEDURE — 3044F HG A1C LEVEL LT 7.0%: CPT | Mod: CPTII,S$GLB,, | Performed by: FAMILY MEDICINE

## 2022-03-14 PROCEDURE — 3074F SYST BP LT 130 MM HG: CPT | Mod: CPTII,S$GLB,, | Performed by: FAMILY MEDICINE

## 2022-03-14 PROCEDURE — 99999 PR PBB SHADOW E&M-EST. PATIENT-LVL IV: ICD-10-PCS | Mod: PBBFAC,,, | Performed by: FAMILY MEDICINE

## 2022-03-14 PROCEDURE — 1159F PR MEDICATION LIST DOCUMENTED IN MEDICAL RECORD: ICD-10-PCS | Mod: CPTII,S$GLB,, | Performed by: FAMILY MEDICINE

## 2022-03-14 PROCEDURE — 3008F BODY MASS INDEX DOCD: CPT | Mod: CPTII,S$GLB,, | Performed by: FAMILY MEDICINE

## 2022-03-14 PROCEDURE — 99214 OFFICE O/P EST MOD 30 MIN: CPT | Mod: S$GLB,,, | Performed by: FAMILY MEDICINE

## 2022-03-14 PROCEDURE — 71046 XR CHEST PA AND LATERAL: ICD-10-PCS | Mod: 26,,, | Performed by: RADIOLOGY

## 2022-03-14 PROCEDURE — 99999 PR PBB SHADOW E&M-EST. PATIENT-LVL IV: CPT | Mod: PBBFAC,,, | Performed by: FAMILY MEDICINE

## 2022-03-14 PROCEDURE — 4010F ACE/ARB THERAPY RXD/TAKEN: CPT | Mod: CPTII,S$GLB,, | Performed by: FAMILY MEDICINE

## 2022-03-14 PROCEDURE — 71046 X-RAY EXAM CHEST 2 VIEWS: CPT | Mod: TC,PO

## 2022-03-14 PROCEDURE — 1159F MED LIST DOCD IN RCRD: CPT | Mod: CPTII,S$GLB,, | Performed by: FAMILY MEDICINE

## 2022-03-14 PROCEDURE — 3044F PR MOST RECENT HEMOGLOBIN A1C LEVEL <7.0%: ICD-10-PCS | Mod: CPTII,S$GLB,, | Performed by: FAMILY MEDICINE

## 2022-03-14 PROCEDURE — 99214 PR OFFICE/OUTPT VISIT, EST, LEVL IV, 30-39 MIN: ICD-10-PCS | Mod: S$GLB,,, | Performed by: FAMILY MEDICINE

## 2022-03-14 PROCEDURE — 4010F PR ACE/ARB THEARPY RXD/TAKEN: ICD-10-PCS | Mod: CPTII,S$GLB,, | Performed by: FAMILY MEDICINE

## 2022-03-14 PROCEDURE — 3078F DIAST BP <80 MM HG: CPT | Mod: CPTII,S$GLB,, | Performed by: FAMILY MEDICINE

## 2022-03-14 PROCEDURE — 71046 X-RAY EXAM CHEST 2 VIEWS: CPT | Mod: 26,,, | Performed by: RADIOLOGY

## 2022-03-14 RX ORDER — LOSARTAN POTASSIUM AND HYDROCHLOROTHIAZIDE 25; 100 MG/1; MG/1
1 TABLET ORAL DAILY
Qty: 90 TABLET | Refills: 3
Start: 2022-03-14 | End: 2022-06-22 | Stop reason: SDUPTHER

## 2022-03-14 NOTE — TELEPHONE ENCOUNTER
Patient notified of results. Appointment has been scheduled for 2 weeks to recheck lungs.  Verbally understands

## 2022-03-14 NOTE — TELEPHONE ENCOUNTER
----- Message from Viridiana Martins MD sent at 3/14/2022  2:11 PM CDT -----  Please notify his chest xray is improved but still showing fluid in both lung bases - we do not need to start any antibiotics it is from the covid virus. I want him to keep doing what he is doing, use his O2 and we need to recheck in about 2 weeks.   If he is feeling up to returning to work we can get him letter but I would give it at least another week.

## 2022-03-14 NOTE — PROGRESS NOTES
"Subjective:      Patient ID: Abdirahman Rodriguez is a 66 y.o. male.    Chief Complaint: Follow-up (Hospital follow up)      Patient here for hospital discharge follow up. Was inpatient from 3/3-3/4 for covid pneumonia. He reports that he is slowly improving, does have home O2 which he is using only when needed.     Review of Systems   Constitutional: Positive for activity change, appetite change and fatigue. Negative for fever.   HENT: Negative for congestion.    Respiratory: Positive for cough and shortness of breath.      Past Medical History:   Diagnosis Date    Benign hypertension     History of colon polyps     Hypertensive kidney disease with chronic kidney disease stage III           Past Surgical History:   Procedure Laterality Date    COLONOSCOPY N/A 12/6/2021    Procedure: COLONOSCOPY;  Surgeon: Doris Altman MD;  Location: [facility];  Service: Endoscopy;  Laterality: N/A;    None       Family History   Problem Relation Age of Onset    Heart disease Father     Hypertension Father     Heart disease Mother     Hypertension Mother      Social History     Socioeconomic History    Marital status:    Tobacco Use    Smoking status: Never Smoker    Smokeless tobacco: Never Used   Substance and Sexual Activity    Alcohol use: No     Comment: rarely    Drug use: No     Review of patient's allergies indicates:  No Known Allergies    Objective:       /68   Pulse 105   Temp 99.1 °F (37.3 °C) (Temporal)   Ht 6' 1" (1.854 m)   Wt 120.3 kg (265 lb 3.4 oz)   SpO2 (!) 92%   BMI 34.99 kg/m²   Physical Exam  Constitutional:       General: He is not in acute distress.     Appearance: Normal appearance. He is well-developed. He is not ill-appearing or diaphoretic.   Cardiovascular:      Rate and Rhythm: Normal rate and regular rhythm.      Heart sounds: Normal heart sounds.   Pulmonary:      Effort: Pulmonary effort is normal.      Breath sounds: Normal breath sounds.      Comments: Decreased " breath sounds both bases  Neurological:      General: No focal deficit present.      Mental Status: He is alert and oriented to person, place, and time.   Psychiatric:         Mood and Affect: Mood normal.         Behavior: Behavior normal.         Thought Content: Thought content normal.         Judgment: Judgment normal.       Assessment:     1. Hospital discharge follow-up    2. COVID-19    3. Pneumonia due to COVID-19 virus      Plan:   Hospital discharge follow-up  -     X-Ray Chest PA And Lateral; Future; Expected date: 03/14/2022  -     CBC Auto Differential; Future; Expected date: 03/14/2022  -     Comprehensive Metabolic Panel; Future; Expected date: 03/14/2022    COVID-19  -     X-Ray Chest PA And Lateral; Future; Expected date: 03/14/2022  -     CBC Auto Differential; Future; Expected date: 03/14/2022  -     Comprehensive Metabolic Panel; Future; Expected date: 03/14/2022    Pneumonia due to COVID-19 virus  -     X-Ray Chest PA And Lateral; Future; Expected date: 03/14/2022  -     CBC Auto Differential; Future; Expected date: 03/14/2022  -     Comprehensive Metabolic Panel; Future; Expected date: 03/14/2022  -     X-Ray Chest PA And Lateral; Future; Expected date: 03/14/2022    Other orders  -     losartan-hydrochlorothiazide 100-25 mg (HYZAAR) 100-25 mg per tablet; Take 1 tablet by mouth once daily.  Dispense: 90 tablet; Refill: 3    chest xray today does show opacities in both lower lungs - will plan to repeat in 2 weeks.   Medication List with Changes/Refills   Current Medications    ASPIRIN 81 MG CHEW    Take 81 mg by mouth once daily.    PULSE OXIMETER (PULSE OXIMETER) DEVICE    Use twice daily at 8 AM and 3 PM and [facility] the [email or web address] as directed.   Changed and/or Refilled Medications    Modified Medication Previous Medication    LOSARTAN-HYDROCHLOROTHIAZIDE 100-25 MG (HYZAAR) 100-25 MG PER TABLET losartan-hydrochlorothiazide 100-25 mg (HYZAAR) 100-25 mg per tablet       Take 1 tablet by  mouth once daily.    Take 1 tablet by mouth once daily.   Discontinued Medications    DEXAMETHASONE (DECADRON) 6 MG TABLET    Take 1 tablet (6 mg total) by mouth once daily. for 8 days

## 2022-03-28 ENCOUNTER — HOSPITAL ENCOUNTER (OUTPATIENT)
Dept: RADIOLOGY | Facility: HOSPITAL | Age: 67
Discharge: HOME | End: 2022-03-28
Attending: FAMILY MEDICINE
Payer: COMMERCIAL

## 2022-03-28 DIAGNOSIS — J12.82 PNEUMONIA DUE TO COVID-19 VIRUS: ICD-10-CM

## 2022-03-28 DIAGNOSIS — U07.1 PNEUMONIA DUE TO COVID-19 VIRUS: ICD-10-CM

## 2022-03-28 DIAGNOSIS — U07.1 COVID-19: Primary | ICD-10-CM

## 2022-03-28 PROCEDURE — 71046 X-RAY EXAM CHEST 2 VIEWS: CPT | Mod: 26,,, | Performed by: RADIOLOGY

## 2022-03-28 PROCEDURE — 71046 X-RAY EXAM CHEST 2 VIEWS: CPT | Mod: TC,PO

## 2022-03-28 PROCEDURE — 71046 XR CHEST PA AND LATERAL: ICD-10-PCS | Mod: 26,,, | Performed by: RADIOLOGY

## 2022-03-29 ENCOUNTER — TELEPHONE (OUTPATIENT)
Dept: INTERNAL MEDICINE | Facility: CLINIC | Age: 67
End: 2022-03-29
Payer: COMMERCIAL

## 2022-03-29 NOTE — TELEPHONE ENCOUNTER
----- Message from Viridiana Martins MD sent at 3/15/2022  7:56 AM CDT -----  Please notify lab results are improved, kidney function almost back to normal

## 2022-04-21 ENCOUNTER — OFFICE VISIT (OUTPATIENT)
Dept: PULMONOLOGY | Facility: CLINIC | Age: 67
End: 2022-04-21
Payer: COMMERCIAL

## 2022-04-21 VITALS
HEIGHT: 73 IN | HEART RATE: 92 BPM | OXYGEN SATURATION: 91 % | DIASTOLIC BLOOD PRESSURE: 70 MMHG | BODY MASS INDEX: 35.58 KG/M2 | SYSTOLIC BLOOD PRESSURE: 100 MMHG | RESPIRATION RATE: 14 BRPM | WEIGHT: 268.44 LBS

## 2022-04-21 DIAGNOSIS — Z01.818 PRE-OP TESTING: ICD-10-CM

## 2022-04-21 DIAGNOSIS — U07.1 COVID-19: ICD-10-CM

## 2022-04-21 DIAGNOSIS — J12.82 PNEUMONIA DUE TO COVID-19 VIRUS: Primary | ICD-10-CM

## 2022-04-21 DIAGNOSIS — I12.9 HYPERTENSIVE KIDNEY DISEASE WITH STAGE 3A CHRONIC KIDNEY DISEASE: ICD-10-CM

## 2022-04-21 DIAGNOSIS — N18.31 HYPERTENSIVE KIDNEY DISEASE WITH STAGE 3A CHRONIC KIDNEY DISEASE: ICD-10-CM

## 2022-04-21 DIAGNOSIS — U07.1 PNEUMONIA DUE TO COVID-19 VIRUS: Primary | ICD-10-CM

## 2022-04-21 DIAGNOSIS — R06.02 SOB (SHORTNESS OF BREATH): ICD-10-CM

## 2022-04-21 PROCEDURE — 3044F HG A1C LEVEL LT 7.0%: CPT | Mod: CPTII,S$GLB,, | Performed by: PHYSICIAN ASSISTANT

## 2022-04-21 PROCEDURE — 3074F PR MOST RECENT SYSTOLIC BLOOD PRESSURE < 130 MM HG: ICD-10-PCS | Mod: CPTII,S$GLB,, | Performed by: PHYSICIAN ASSISTANT

## 2022-04-21 PROCEDURE — 1159F PR MEDICATION LIST DOCUMENTED IN MEDICAL RECORD: ICD-10-PCS | Mod: CPTII,S$GLB,, | Performed by: PHYSICIAN ASSISTANT

## 2022-04-21 PROCEDURE — 3008F PR BODY MASS INDEX (BMI) DOCUMENTED: ICD-10-PCS | Mod: CPTII,S$GLB,, | Performed by: PHYSICIAN ASSISTANT

## 2022-04-21 PROCEDURE — 4010F ACE/ARB THERAPY RXD/TAKEN: CPT | Mod: CPTII,S$GLB,, | Performed by: PHYSICIAN ASSISTANT

## 2022-04-21 PROCEDURE — 3078F PR MOST RECENT DIASTOLIC BLOOD PRESSURE < 80 MM HG: ICD-10-PCS | Mod: CPTII,S$GLB,, | Performed by: PHYSICIAN ASSISTANT

## 2022-04-21 PROCEDURE — 3078F DIAST BP <80 MM HG: CPT | Mod: CPTII,S$GLB,, | Performed by: PHYSICIAN ASSISTANT

## 2022-04-21 PROCEDURE — 99999 PR PBB SHADOW E&M-EST. PATIENT-LVL IV: ICD-10-PCS | Mod: PBBFAC,,, | Performed by: PHYSICIAN ASSISTANT

## 2022-04-21 PROCEDURE — 3008F BODY MASS INDEX DOCD: CPT | Mod: CPTII,S$GLB,, | Performed by: PHYSICIAN ASSISTANT

## 2022-04-21 PROCEDURE — 99204 OFFICE O/P NEW MOD 45 MIN: CPT | Mod: S$GLB,,, | Performed by: PHYSICIAN ASSISTANT

## 2022-04-21 PROCEDURE — 99204 PR OFFICE/OUTPT VISIT, NEW, LEVL IV, 45-59 MIN: ICD-10-PCS | Mod: S$GLB,,, | Performed by: PHYSICIAN ASSISTANT

## 2022-04-21 PROCEDURE — 3044F PR MOST RECENT HEMOGLOBIN A1C LEVEL <7.0%: ICD-10-PCS | Mod: CPTII,S$GLB,, | Performed by: PHYSICIAN ASSISTANT

## 2022-04-21 PROCEDURE — 4010F PR ACE/ARB THEARPY RXD/TAKEN: ICD-10-PCS | Mod: CPTII,S$GLB,, | Performed by: PHYSICIAN ASSISTANT

## 2022-04-21 PROCEDURE — 1160F PR REVIEW ALL MEDS BY PRESCRIBER/CLIN PHARMACIST DOCUMENTED: ICD-10-PCS | Mod: CPTII,S$GLB,, | Performed by: PHYSICIAN ASSISTANT

## 2022-04-21 PROCEDURE — 99999 PR PBB SHADOW E&M-EST. PATIENT-LVL IV: CPT | Mod: PBBFAC,,, | Performed by: PHYSICIAN ASSISTANT

## 2022-04-21 PROCEDURE — 3074F SYST BP LT 130 MM HG: CPT | Mod: CPTII,S$GLB,, | Performed by: PHYSICIAN ASSISTANT

## 2022-04-21 PROCEDURE — 1160F RVW MEDS BY RX/DR IN RCRD: CPT | Mod: CPTII,S$GLB,, | Performed by: PHYSICIAN ASSISTANT

## 2022-04-21 PROCEDURE — 1159F MED LIST DOCD IN RCRD: CPT | Mod: CPTII,S$GLB,, | Performed by: PHYSICIAN ASSISTANT

## 2022-04-21 RX ORDER — LOSARTAN POTASSIUM 100 MG/1
100 TABLET ORAL DAILY
COMMUNITY
Start: 2022-04-18 | End: 2022-06-22

## 2022-04-21 RX ORDER — IPRATROPIUM BROMIDE AND ALBUTEROL SULFATE 2.5; .5 MG/3ML; MG/3ML
3 SOLUTION RESPIRATORY (INHALATION) EVERY 6 HOURS PRN
Qty: 90 ML | Refills: 11 | Status: SHIPPED | OUTPATIENT
Start: 2022-04-21 | End: 2023-01-04

## 2022-04-21 RX ORDER — ALBUTEROL SULFATE 90 UG/1
2 AEROSOL, METERED RESPIRATORY (INHALATION) EVERY 6 HOURS PRN
Qty: 18 G | Refills: 0 | Status: SHIPPED | OUTPATIENT
Start: 2022-04-21 | End: 2023-01-04

## 2022-04-21 NOTE — ASSESSMENT & PLAN NOTE
Gradual improvement per patient  Will repeat Chest X Ray in 8 weeks to be 3 months out from covid diagnosis  PFT and walk in 8 weeks  Duo neb BID  Albuterol prn  Keep oxygen >88% sat

## 2022-04-21 NOTE — PROGRESS NOTES
Subjective:       Patient ID: Abdirahman Rodriguez is a 66 y.o. male.    Chief Complaint: Pneumonia      65yo male referred by Viridiana Martins MD for covid pneumonia  Hospital follow up for covid pneumonia  Admitted 3/3-3/4/2022, given remdesiver, discharged on 4L supplemental oxygen  Has daily SOB on exertion and a dry cough, SOB with walking up stairs, no SOB with walking a few 100ft  Is not using inhalers, stopped using oxygen a few weeks ago, has pulse ox at home and monitors  No chest pain or fever, has some more fatigue than usual  Reports some gradual improvement since hospital stay  History of HTN, chronic kidney disease  No other cardiac history  Never smoker  Worked as a   No prior history of lung disease  No symptoms of SOB or cough prior to COVID    Patient Name: Abdirahman Rodriguez  MRN: [id number]  Patient Class: IP- Inpatient  Admission Date: 3/3/2022  Hospital Length of Stay: 1 days  Discharge Date and Time:  03/04/2022 1:42 PM  Attending Physician: Lashaun Phelps MD   Discharging Provider: Lashaun Phelps MD  Primary Care Provider: Mt Noel MD        HPI:   Abdirahman Michelle is a 66 y.o. -American male patient with a PMHx of benign HTN & CKD Stage III who presents to the [facility] Emergency Department with chief complaint of AMS & increased work of breathing. He is asleep, but easily aroused and oriented to self. However, his cognition and response time are delayed, so his wife, Fannie Rodriguez, served as the historian. She reports the symptoms started on yesterday after the pt arrived home from work. Reports since the symptoms continued to worsen overnight and into this morning, she decided to bring him into the ER. Associated symptoms include fatigue, generalized weakness and decreased appetite. Denies fever, H/A, congestion, cough, CP, palpitations, calf pain, numbness, dizziness, unilateral weakness, or nausea, vomiting, diarrhea, or any recent falls. Wife states he did not take any  medications today. Pt admitted to hospital medicine. Pt is a FC status and his surrogate decision maker is his wife Fannie Rodriguez.         Hospital Course:   Admitted for COVID-19 PNA w/hypoxia. O2 sats currently stable on 3-4L NC. RA sats 85% at rest and 83% with exertion. Discussed care options with patient and family at bedside and prefers to go home with O2. Stable for discharge, complete steroid course and f/u with PCP in 1 week. Cr improving towards baseline prior to d/c       Immunization History   Administered Date(s) Administered    COVID-19, MRNA, LN-S, PF (Pfizer) (Purple Cap) 04/16/2021    Pneumococcal Conjugate - 13 Valent 11/08/2021    Zoster Recombinant 12/15/2021      Tobacco Use: Low Risk     Smoking Tobacco Use: Never Smoker    Smokeless Tobacco Use: Never Used      Past Medical History:   Diagnosis Date    Benign hypertension     History of colon polyps     Hypertensive kidney disease with chronic kidney disease stage III       Current Outpatient Medications on File Prior to Visit   Medication Sig Dispense Refill    aspirin 81 MG Chew Take 81 mg by mouth once daily.      losartan (COZAAR) 100 MG tablet Take 100 mg by mouth once daily.      losartan-hydrochlorothiazide 100-25 mg (HYZAAR) 100-25 mg per tablet Take 1 tablet by mouth once daily. 90 tablet 3    pulse oximeter (PULSE OXIMETER) device Use twice daily at 8 AM and 3 PM and record the value in [email or web address] as directed. 1 each 0     No current facility-administered medications on file prior to visit.        Review of Systems   Constitutional: Positive for fatigue. Negative for fever, weight loss, appetite change and weakness.   HENT: Negative for postnasal drip, rhinorrhea, sinus pressure, trouble swallowing and congestion.    Respiratory: Positive for cough and dyspnea on extertion. Negative for sputum production, choking, chest tightness, shortness of breath and wheezing.    Cardiovascular: Negative for chest pain and leg swelling.  "  Musculoskeletal: Negative for arthralgias, gait problem and joint swelling.   Gastrointestinal: Negative for nausea, vomiting and abdominal pain.   Neurological: Negative for dizziness, weakness and headaches.   All other systems reviewed and are negative.      Objective:       Vitals:    04/21/22 1247   BP: 100/70   Pulse: 92   Resp: 14   SpO2: (!) 91%   Weight: 121.7 kg (268 lb 6.6 oz)   Height: 6' 1" (1.854 m)       Physical Exam   Constitutional: He is oriented to person, place, and time. He appears well-developed and well-nourished. No distress.   HENT:   Head: Normocephalic.   Nose: Nose normal.   Mouth/Throat: Oropharynx is clear and moist.   Cardiovascular: Normal rate and regular rhythm.   Pulmonary/Chest: Effort normal. No respiratory distress. He has no wheezes. He has no rhonchi. He has no rales.   Musculoskeletal:         General: No edema.      Cervical back: Normal range of motion and neck supple.   Lymphadenopathy: No supraclavicular adenopathy is present.     He has no cervical adenopathy.   Neurological: He is alert and oriented to person, place, and time. Gait normal.   Skin: Skin is warm and dry.   Psychiatric: He has a normal mood and affect.   Vitals reviewed.    Personal Diagnostic Review    X-Ray Chest PA And Lateral  Narrative: EXAMINATION:  XR CHEST PA AND LATERAL    CLINICAL HISTORY:  COVID-19    TECHNIQUE:  PA and lateral views of the chest were performed.    COMPARISON:  Prior radiographs    FINDINGS:  Cardiac silhouette and mediastinal contours are unchanged.  Lungs are similar in appearance with persistent bilateral lower lung opacities.  Osseous structures are intact.  Impression: No significant change.    Electronically signed by: Sylvester Durbin MD  Date:    03/28/2022  Time:    13:40        Assessment/Plan:       Problem List Items Addressed This Visit        Pulmonary    Pneumonia due to COVID-19 virus - Primary     Gradual improvement per patient  Will repeat Chest X Ray in 8 " weeks to be 3 months out from covid diagnosis  PFT and walk in 8 weeks  Duo neb BID  Albuterol prn  Keep oxygen >88% sat           Relevant Medications    albuterol-ipratropium (DUO-NEB) 2.5 mg-0.5 mg/3 mL nebulizer solution    albuterol (VENTOLIN HFA) 90 mcg/actuation inhaler    Other Relevant Orders    NEBULIZER KIT (SUPPLIES) FOR HOME USE    NEBULIZER FOR HOME USE    X-Ray Chest PA And Lateral       Renal/    Hypertensive kidney disease with chronic kidney disease stage III     F/u regularly with PCP               Other Visit Diagnoses     COVID-19        SOB (shortness of breath)        Relevant Orders    Complete PFT with bronchodilator    Stress test, pulmonary    X-Ray Chest PA And Lateral        Follow up in 8 weeks with repeat chest xray, PFT and walk. Home nebulizer given today.    Discussed diagnosis, its evaluation, treatment and usual course. All questions answered.    Patient verbalized understanding of plan and left in no acute distress    Thank you for the courtesy of participating in the care of this patient    GARRETT IbarraC

## 2022-06-06 ENCOUNTER — TELEPHONE (OUTPATIENT)
Dept: PULMONOLOGY | Facility: CLINIC | Age: 67
End: 2022-06-06
Payer: COMMERCIAL

## 2022-06-06 NOTE — TELEPHONE ENCOUNTER
Spoke with pt wife and was advised that her  has to do a covid 72 hour prior to testing on 6/16. Pt wife verbally understood.

## 2022-06-08 ENCOUNTER — PATIENT MESSAGE (OUTPATIENT)
Dept: RESEARCH | Facility: HOSPITAL | Age: 67
End: 2022-06-08
Payer: COMMERCIAL

## 2022-06-13 ENCOUNTER — LAB VISIT (OUTPATIENT)
Dept: PRIMARY CARE CLINIC | Facility: CLINIC | Age: 67
End: 2022-06-13
Payer: COMMERCIAL

## 2022-06-13 DIAGNOSIS — Z01.818 PRE-OP TESTING: ICD-10-CM

## 2022-06-13 PROCEDURE — U0003 INFECTIOUS AGENT DETECTION BY NUCLEIC ACID (DNA OR RNA); SEVERE ACUTE RESPIRATORY SYNDROME CORONAVIRUS 2 (SARS-COV-2) (CORONAVIRUS DISEASE [COVID-19]), AMPLIFIED PROBE TECHNIQUE, MAKING USE OF HIGH THROUGHPUT TECHNOLOGIES AS DESCRIBED BY CMS-2020-01-R: HCPCS | Performed by: PHYSICIAN ASSISTANT

## 2022-06-13 PROCEDURE — U0005 INFEC AGEN DETEC AMPLI PROBE: HCPCS | Performed by: PHYSICIAN ASSISTANT

## 2022-06-14 LAB
SARS-COV-2 RNA RESP QL NAA+PROBE: NOT DETECTED
SARS-COV-2- CYCLE NUMBER: NORMAL

## 2022-06-15 ENCOUNTER — LAB VISIT (OUTPATIENT)
Dept: LAB | Facility: HOSPITAL | Age: 67
End: 2022-06-15
Attending: INTERNAL MEDICINE
Payer: COMMERCIAL

## 2022-06-15 DIAGNOSIS — N18.31 HYPERTENSIVE KIDNEY DISEASE WITH STAGE 3A CHRONIC KIDNEY DISEASE: ICD-10-CM

## 2022-06-15 DIAGNOSIS — I12.9 HYPERTENSIVE KIDNEY DISEASE WITH STAGE 3A CHRONIC KIDNEY DISEASE: ICD-10-CM

## 2022-06-15 LAB
ANION GAP SERPL CALC-SCNC: 11 MMOL/L (ref 8–16)
BUN SERPL-MCNC: 22 MG/DL (ref 8–23)
CALCIUM SERPL-MCNC: 8.8 MG/DL (ref 8.7–10.5)
CHLORIDE SERPL-SCNC: 105 MMOL/L (ref 95–110)
CO2 SERPL-SCNC: 24 MMOL/L (ref 23–29)
CREAT SERPL-MCNC: 1.7 MG/DL (ref 0.5–1.4)
EST. GFR  (AFRICAN AMERICAN): 48.2 ML/MIN/1.73 M^2
EST. GFR  (NON AFRICAN AMERICAN): 41.7 ML/MIN/1.73 M^2
GLUCOSE SERPL-MCNC: 106 MG/DL (ref 70–110)
POTASSIUM SERPL-SCNC: 4.4 MMOL/L (ref 3.5–5.1)
SODIUM SERPL-SCNC: 140 MMOL/L (ref 136–145)

## 2022-06-15 PROCEDURE — 36415 COLL VENOUS BLD VENIPUNCTURE: CPT | Mod: PO | Performed by: INTERNAL MEDICINE

## 2022-06-15 PROCEDURE — 80048 BASIC METABOLIC PNL TOTAL CA: CPT | Performed by: INTERNAL MEDICINE

## 2022-06-16 ENCOUNTER — HOSPITAL ENCOUNTER (OUTPATIENT)
Dept: RADIOLOGY | Facility: HOSPITAL | Age: 67
Discharge: HOME | End: 2022-06-16
Attending: PHYSICIAN ASSISTANT
Payer: COMMERCIAL

## 2022-06-16 ENCOUNTER — CLINICAL SUPPORT (OUTPATIENT)
Dept: PULMONOLOGY | Facility: CLINIC | Age: 67
End: 2022-06-16
Payer: COMMERCIAL

## 2022-06-16 ENCOUNTER — OFFICE VISIT (OUTPATIENT)
Dept: PULMONOLOGY | Facility: CLINIC | Age: 67
End: 2022-06-16
Payer: COMMERCIAL

## 2022-06-16 VITALS
WEIGHT: 265 LBS | RESPIRATION RATE: 18 BRPM | HEIGHT: 73 IN | HEIGHT: 73 IN | BODY MASS INDEX: 35.12 KG/M2 | DIASTOLIC BLOOD PRESSURE: 80 MMHG | OXYGEN SATURATION: 95 % | HEART RATE: 86 BPM | SYSTOLIC BLOOD PRESSURE: 124 MMHG | WEIGHT: 265 LBS | BODY MASS INDEX: 35.12 KG/M2

## 2022-06-16 DIAGNOSIS — R06.02 SOB (SHORTNESS OF BREATH): Primary | ICD-10-CM

## 2022-06-16 DIAGNOSIS — N18.31 HYPERTENSIVE KIDNEY DISEASE WITH STAGE 3A CHRONIC KIDNEY DISEASE: ICD-10-CM

## 2022-06-16 DIAGNOSIS — R06.02 SOB (SHORTNESS OF BREATH): ICD-10-CM

## 2022-06-16 DIAGNOSIS — J12.82 PNEUMONIA DUE TO COVID-19 VIRUS: ICD-10-CM

## 2022-06-16 DIAGNOSIS — U07.1 PNEUMONIA DUE TO COVID-19 VIRUS: ICD-10-CM

## 2022-06-16 DIAGNOSIS — J96.11 CHRONIC RESPIRATORY FAILURE WITH HYPOXIA: ICD-10-CM

## 2022-06-16 DIAGNOSIS — I12.9 HYPERTENSIVE KIDNEY DISEASE WITH STAGE 3A CHRONIC KIDNEY DISEASE: ICD-10-CM

## 2022-06-16 LAB
BRPFT: ABNORMAL
DLCO ADJ PRE: 15.25 ML/(MIN*MMHG) (ref 23.83–37.68)
DLCO SINGLE BREATH LLN: 23.83
DLCO SINGLE BREATH PRE REF: 49.6 %
DLCO SINGLE BREATH REF: 30.75
DLCOC SBVA LLN: 2.91
DLCOC SBVA PRE REF: 83.2 %
DLCOC SBVA REF: 3.99
DLCOC SINGLE BREATH LLN: 23.83
DLCOC SINGLE BREATH PRE REF: 49.6 %
DLCOC SINGLE BREATH REF: 30.75
DLCOVA LLN: 2.91
DLCOVA PRE REF: 83.2 %
DLCOVA PRE: 3.32 ML/(MIN*MMHG*L) (ref 2.91–5.08)
DLCOVA REF: 3.99
DLVAADJ PRE: 3.32 ML/(MIN*MMHG*L) (ref 2.91–5.08)
ERV LLN: -16448.79
ERV PRE REF: 60.2 %
ERV REF: 1.21
FEF 25 75 CHG: 4.8 %
FEF 25 75 LLN: 0.99
FEF 25 75 POST REF: 135.1 %
FEF 25 75 PRE REF: 129 %
FEF 25 75 REF: 2.54
FET100 CHG: -6.4 %
FEV1 CHG: -2.2 %
FEV1 FVC CHG: 5.2 %
FEV1 FVC LLN: 65
FEV1 FVC POST REF: 124.8 %
FEV1 FVC PRE REF: 118.7 %
FEV1 FVC REF: 77
FEV1 LLN: 2.25
FEV1 POST REF: 87.5 %
FEV1 PRE REF: 89.5 %
FEV1 REF: 3.18
FRCPLETH LLN: 2.83
FRCPLETH PREREF: 57.1 %
FRCPLETH REF: 3.81
FVC CHG: -7 %
FVC LLN: 3.04
FVC POST REF: 69.9 %
FVC PRE REF: 75.2 %
FVC REF: 4.15
IVC PRE: 3.05 L (ref 3.04–5.25)
IVC SINGLE BREATH LLN: 3.04
IVC SINGLE BREATH PRE REF: 73.7 %
IVC SINGLE BREATH REF: 4.15
MVV LLN: 123
MVV PRE REF: 97 %
MVV REF: 144
PEF CHG: -16.6 %
PEF LLN: 6.09
PEF POST REF: 47.4 %
PEF PRE REF: 56.9 %
PEF REF: 8.97
POST FEF 25 75: 3.43 L/S (ref 0.99–4.08)
POST FET 100: 6.94 SEC
POST FEV1 FVC: 96 % (ref 64.75–89.03)
POST FEV1: 2.78 L (ref 2.25–4.11)
POST FVC: 2.9 L (ref 3.04–5.25)
POST PEF: 4.25 L/S (ref 6.09–11.84)
PRE DLCO: 15.25 ML/(MIN*MMHG) (ref 23.83–37.68)
PRE ERV: 0.73 L (ref -16448.79–16451.21)
PRE FEF 25 75: 3.27 L/S (ref 0.99–4.08)
PRE FET 100: 7.42 SEC
PRE FEV1 FVC: 91.28 % (ref 64.75–89.03)
PRE FEV1: 2.85 L (ref 2.25–4.11)
PRE FRC PL: 2.18 L
PRE FVC: 3.12 L (ref 3.04–5.25)
PRE MVV: 140 L/MIN (ref 122.71–166.01)
PRE PEF: 5.1 L/S (ref 6.09–11.84)
PRE RV: 1.24 L (ref 1.93–3.28)
PRE TLC: 4.65 L (ref 6.55–8.85)
RAW LLN: 3.06
RAW PRE REF: 121.3 %
RAW PRE: 3.71 CMH2O*S/L (ref 3.06–3.06)
RAW REF: 3.06
RV LLN: 1.93
RV PRE REF: 47.5 %
RV REF: 2.6
RVTLC LLN: 30
RVTLC PRE REF: 68.3 %
RVTLC PRE: 26.59 % (ref 29.94–47.9)
RVTLC REF: 39
TLC LLN: 6.55
TLC PRE REF: 60.4 %
TLC REF: 7.7
VA PRE: 4.59 L (ref 7.55–7.55)
VA SINGLE BREATH LLN: 7.55
VA SINGLE BREATH PRE REF: 60.8 %
VA SINGLE BREATH REF: 7.55
VC LLN: 3.04
VC PRE REF: 82.3 %
VC PRE: 3.41 L (ref 3.04–5.25)
VC REF: 4.15
VTGRAWPRE: 2.8 L

## 2022-06-16 PROCEDURE — 99999 PR PBB SHADOW E&M-EST. PATIENT-LVL I: ICD-10-PCS | Mod: PBBFAC,,,

## 2022-06-16 PROCEDURE — 4010F PR ACE/ARB THEARPY RXD/TAKEN: ICD-10-PCS | Mod: CPTII,S$GLB,, | Performed by: PHYSICIAN ASSISTANT

## 2022-06-16 PROCEDURE — 94060 PR EVAL OF BRONCHOSPASM: ICD-10-PCS | Mod: S$GLB,,, | Performed by: INTERNAL MEDICINE

## 2022-06-16 PROCEDURE — 4010F ACE/ARB THERAPY RXD/TAKEN: CPT | Mod: CPTII,S$GLB,, | Performed by: PHYSICIAN ASSISTANT

## 2022-06-16 PROCEDURE — 1160F RVW MEDS BY RX/DR IN RCRD: CPT | Mod: CPTII,S$GLB,, | Performed by: PHYSICIAN ASSISTANT

## 2022-06-16 PROCEDURE — 94618 PULMONARY STRESS TESTING: ICD-10-PCS | Mod: S$GLB,,, | Performed by: INTERNAL MEDICINE

## 2022-06-16 PROCEDURE — 99999 PR PBB SHADOW E&M-EST. PATIENT-LVL IV: CPT | Mod: PBBFAC,,, | Performed by: PHYSICIAN ASSISTANT

## 2022-06-16 PROCEDURE — 71046 X-RAY EXAM CHEST 2 VIEWS: CPT | Mod: 26,,, | Performed by: RADIOLOGY

## 2022-06-16 PROCEDURE — 94618 PULMONARY STRESS TESTING: CPT | Mod: S$GLB,,, | Performed by: INTERNAL MEDICINE

## 2022-06-16 PROCEDURE — 71046 X-RAY EXAM CHEST 2 VIEWS: CPT | Mod: TC

## 2022-06-16 PROCEDURE — 3079F PR MOST RECENT DIASTOLIC BLOOD PRESSURE 80-89 MM HG: ICD-10-PCS | Mod: CPTII,S$GLB,, | Performed by: PHYSICIAN ASSISTANT

## 2022-06-16 PROCEDURE — 99999 PR PBB SHADOW E&M-EST. PATIENT-LVL IV: ICD-10-PCS | Mod: PBBFAC,,, | Performed by: PHYSICIAN ASSISTANT

## 2022-06-16 PROCEDURE — 99214 PR OFFICE/OUTPT VISIT, EST, LEVL IV, 30-39 MIN: ICD-10-PCS | Mod: S$GLB,,, | Performed by: PHYSICIAN ASSISTANT

## 2022-06-16 PROCEDURE — 71046 XR CHEST PA AND LATERAL: ICD-10-PCS | Mod: 26,,, | Performed by: RADIOLOGY

## 2022-06-16 PROCEDURE — 94060 EVALUATION OF WHEEZING: CPT | Mod: S$GLB,,, | Performed by: INTERNAL MEDICINE

## 2022-06-16 PROCEDURE — 3044F PR MOST RECENT HEMOGLOBIN A1C LEVEL <7.0%: ICD-10-PCS | Mod: CPTII,S$GLB,, | Performed by: PHYSICIAN ASSISTANT

## 2022-06-16 PROCEDURE — 94729 PR C02/MEMBANE DIFFUSE CAPACITY: ICD-10-PCS | Mod: S$GLB,,, | Performed by: INTERNAL MEDICINE

## 2022-06-16 PROCEDURE — 94729 DIFFUSING CAPACITY: CPT | Mod: S$GLB,,, | Performed by: INTERNAL MEDICINE

## 2022-06-16 PROCEDURE — 1160F PR REVIEW ALL MEDS BY PRESCRIBER/CLIN PHARMACIST DOCUMENTED: ICD-10-PCS | Mod: CPTII,S$GLB,, | Performed by: PHYSICIAN ASSISTANT

## 2022-06-16 PROCEDURE — 1159F PR MEDICATION LIST DOCUMENTED IN MEDICAL RECORD: ICD-10-PCS | Mod: CPTII,S$GLB,, | Performed by: PHYSICIAN ASSISTANT

## 2022-06-16 PROCEDURE — 3079F DIAST BP 80-89 MM HG: CPT | Mod: CPTII,S$GLB,, | Performed by: PHYSICIAN ASSISTANT

## 2022-06-16 PROCEDURE — 3074F SYST BP LT 130 MM HG: CPT | Mod: CPTII,S$GLB,, | Performed by: PHYSICIAN ASSISTANT

## 2022-06-16 PROCEDURE — 99214 OFFICE O/P EST MOD 30 MIN: CPT | Mod: S$GLB,,, | Performed by: PHYSICIAN ASSISTANT

## 2022-06-16 PROCEDURE — 3044F HG A1C LEVEL LT 7.0%: CPT | Mod: CPTII,S$GLB,, | Performed by: PHYSICIAN ASSISTANT

## 2022-06-16 PROCEDURE — 3008F BODY MASS INDEX DOCD: CPT | Mod: CPTII,S$GLB,, | Performed by: PHYSICIAN ASSISTANT

## 2022-06-16 PROCEDURE — 1159F MED LIST DOCD IN RCRD: CPT | Mod: CPTII,S$GLB,, | Performed by: PHYSICIAN ASSISTANT

## 2022-06-16 PROCEDURE — 94726 PULM FUNCT TST PLETHYSMOGRAP: ICD-10-PCS | Mod: S$GLB,,, | Performed by: INTERNAL MEDICINE

## 2022-06-16 PROCEDURE — 3074F PR MOST RECENT SYSTOLIC BLOOD PRESSURE < 130 MM HG: ICD-10-PCS | Mod: CPTII,S$GLB,, | Performed by: PHYSICIAN ASSISTANT

## 2022-06-16 PROCEDURE — 3008F PR BODY MASS INDEX (BMI) DOCUMENTED: ICD-10-PCS | Mod: CPTII,S$GLB,, | Performed by: PHYSICIAN ASSISTANT

## 2022-06-16 PROCEDURE — 94726 PLETHYSMOGRAPHY LUNG VOLUMES: CPT | Mod: S$GLB,,, | Performed by: INTERNAL MEDICINE

## 2022-06-16 PROCEDURE — 99999 PR PBB SHADOW E&M-EST. PATIENT-LVL I: CPT | Mod: PBBFAC,,,

## 2022-06-16 RX ORDER — FLUTICASONE FUROATE AND VILANTEROL TRIFENATATE 100; 25 UG/1; UG/1
1 POWDER RESPIRATORY (INHALATION) DAILY
Qty: 60 EACH | Refills: 6 | Status: SHIPPED | OUTPATIENT
Start: 2022-06-16 | End: 2022-06-17

## 2022-06-16 RX ORDER — LISINOPRIL AND HYDROCHLOROTHIAZIDE 10; 12.5 MG/1; MG/1
1 TABLET ORAL DAILY
COMMUNITY
Start: 2022-06-06 | End: 2022-06-22

## 2022-06-16 RX ORDER — ACETAMINOPHEN AND CODEINE PHOSPHATE 300; 30 MG/1; MG/1
1 TABLET ORAL
COMMUNITY
Start: 2022-05-26 | End: 2022-06-22

## 2022-06-16 NOTE — ASSESSMENT & PLAN NOTE
SOB post covid has improved  Home oxygen order placed for 3L with activity, qualifies on walk testing today.  Coughing during PFT and unable to improve technique  Will get alicia with DLCO in 6 months  Will add BREO for cough, maybe asthma component, stop nebulizer BID and use prn  Still with exercise induced hypoxia post covid  Follow up in 6 months with alicia, FeNO, and Chest X Ray

## 2022-06-16 NOTE — PROGRESS NOTES
Subjective:       Patient ID: Abdirahman Rodriguez is a 66 y.o. male.    Chief Complaint: No chief complaint on file.      6/16/22  Here for follow up of covid pneumonia  cxr improving  Uses duo neb BID  PFT and walk today  He denies SOB, says SOB has improved  Denies cough, sputum production or wheezing  Wife says he coughs in his sleep and in the mornings  Coughing during PFT today  Requesting cardiology referral for general workup    4/21/22  65yo male referred by Viridiana Martins MD for covid pneumonia  Hospital follow up for covid pneumonia  Admitted 3/3-3/4/2022, given remdesiver, discharged on 4L supplemental oxygen  Has daily SOB on exertion and a dry cough, SOB with walking up stairs, no SOB with walking a few 100ft  Is not using inhalers, stopped using oxygen a few weeks ago, has pulse ox at home and monitors  No chest pain or fever, has some more fatigue than usual  Reports some gradual improvement since hospital stay  History of HTN, chronic kidney disease  No other cardiac history  Never smoker  Worked as a   No prior history of lung disease  No symptoms of SOB or cough prior to COVID    Patient Name: Abdirahman Rodriguez  MRN: [id number]  Patient Class: IP- Inpatient  Admission Date: 3/3/2022  Hospital Length of Stay: 1 days  Discharge Date and Time:  03/04/2022 1:42 PM  Attending Physician: Lashaun Phelps MD   Discharging Provider: Lashaun Phelps MD  Primary Care Provider: Mt Noel MD        HPI:   Abdirahman Michelle is a 66 y.o. -American male patient with a PMHx of benign HTN & CKD Stage III who presents to the [facility] Emergency Department with chief complaint of AMS & increased work of breathing. He is asleep, but easily aroused and oriented to self. However, his cognition and response time are delayed, so his wife, Fannie Rodriguez, served as the historian. She reports the symptoms started on yesterday after the pt arrived home from work. Reports since the symptoms continued to worsen  overnight and into this morning, she decided to bring him into the ER. Associated symptoms include fatigue, generalized weakness and decreased appetite. Denies fever, H/A, congestion, cough, CP, palpitations, calf pain, numbness, dizziness, unilateral weakness, or nausea, vomiting, diarrhea, or any recent falls. Wife states he did not take any medications today. Pt admitted to hospital medicine. Pt is a FC status and his surrogate decision maker is his wife Fannie Rodriguez.         Hospital Course:   Admitted for COVID-19 PNA w/hypoxia. O2 sats currently stable on 3-4L NC. RA sats 85% at rest and 83% with exertion. Discussed care options with patient and family at bedside and prefers to go home with O2. Stable for discharge, complete steroid course and f/u with PCP in 1 week. Cr improving towards baseline prior to d/c       Immunization History   Administered Date(s) Administered    COVID-19, MRNA, LN-S, PF (Pfizer) (Purple Cap) 04/16/2021    Pneumococcal Conjugate - 13 Valent 11/08/2021    Zoster Recombinant 12/15/2021      Tobacco Use: Low Risk     Smoking Tobacco Use: Never Smoker    Smokeless Tobacco Use: Never Used      Past Medical History:   Diagnosis Date    Benign hypertension     History of colon polyps     Hypertensive kidney disease with chronic kidney disease stage III       Current Outpatient Medications on File Prior to Visit   Medication Sig Dispense Refill    acetaminophen-codeine 300-30mg (TYLENOL #3) 300-30 mg Tab Take 1 tablet by mouth.      albuterol (VENTOLIN HFA) 90 mcg/actuation inhaler Inhale 2 puffs into the lungs every 6 (six) hours as needed for Wheezing. Rescue 18 g 0    albuterol-ipratropium (DUO-NEB) 2.5 mg-0.5 mg/3 mL nebulizer solution Take 3 mLs by nebulization every 6 (six) hours as needed for Wheezing or Shortness of Breath. Rescue 90 mL 11    aspirin 81 MG Chew Take 81 mg by mouth once daily.      lisinopriL-hydrochlorothiazide (PRINZIDE,ZESTORETIC) 10-12.5 mg per tablet  "Take 1 tablet by mouth once daily.      losartan (COZAAR) 100 MG tablet Take 100 mg by mouth once daily.      losartan-hydrochlorothiazide 100-25 mg (HYZAAR) 100-25 mg per tablet Take 1 tablet by mouth once daily. 90 tablet 3    pulse oximeter (PULSE OXIMETER) device Use twice daily at 8 AM and 3 PM and record the value in MyChart as directed. 1 each 0     No current facility-administered medications on file prior to visit.        Review of Systems   Constitutional: Negative for fever, weight loss, appetite change, fatigue and weakness.   HENT: Negative for postnasal drip, rhinorrhea, sinus pressure, trouble swallowing and congestion.    Respiratory: Positive for cough and dyspnea on extertion. Negative for sputum production, choking, chest tightness, shortness of breath and wheezing.    Cardiovascular: Negative for chest pain and leg swelling.   Musculoskeletal: Negative for arthralgias, gait problem and joint swelling.   Gastrointestinal: Negative for nausea, vomiting and abdominal pain.   Neurological: Negative for dizziness, weakness and headaches.   All other systems reviewed and are negative.      Objective:       Vitals:    06/16/22 1453   BP: 124/80   Pulse: 86   Resp: 18   SpO2: 95%   Weight: 120.2 kg (264 lb 15.9 oz)   Height: 6' 1" (1.854 m)       Physical Exam   Constitutional: He is oriented to person, place, and time. He appears well-developed and well-nourished. No distress.   HENT:   Head: Normocephalic.   Nose: Nose normal.   Mouth/Throat: Oropharynx is clear and moist.   Cardiovascular: Normal rate and regular rhythm.   Pulmonary/Chest: Effort normal. No respiratory distress. He has wheezes (faint expiratory wheezes). He has no rhonchi. He has no rales.   Musculoskeletal:         General: No edema.      Cervical back: Normal range of motion and neck supple.   Lymphadenopathy: No supraclavicular adenopathy is present.     He has no cervical adenopathy.   Neurological: He is alert and oriented to " person, place, and time. Gait normal.   Skin: Skin is warm and dry.   Psychiatric: He has a normal mood and affect.   Vitals reviewed.    Personal Diagnostic Review    X-Ray Chest PA And Lateral  Narrative: EXAMINATION:  XR CHEST PA AND LATERAL    CLINICAL HISTORY:  COVID-19    TECHNIQUE:  PA and lateral views of the chest were performed.    COMPARISON:  Chest radiographs dating back to March 3, 2022    FINDINGS:  Minimal residual interstitial opacities are seen at the lung bases which have significantly improved in comparison to prior chest radiographs from March 2022.  No acute opacity identified within either lung.  No pneumothorax or pleural effusion.  Heart is at the upper limits of normal for size.  No acute osseous abnormality.  Impression: As above.    Electronically signed by: Enrrique Obrien  Date:    06/16/2022  Time:    13:42          Oxygen Assessment Supplemental O2 Heart   Rate Blood Pressure Davi Dyspnea Scale Rating    Resting 95 % Room Air 86 bpm 124/80 0   Exercise             Minute             1 90 % Room Air 101 bpm       2 87 % (Placed on 2L NC; Spo2 92%) Room Air 96 bpm       3 87 % (Increased to 3L; Spo2 92%) 2 L/M 105 bpm       4 93 % 3 L/M 104 bpm       5 92 % 3 L/M 107 bpm       6  92 % 3 L/M 96 bpm 140/75 1   Recovery             Minute             1 94 % 3 L/M 89 bpm       2 98 % 3 L/M 81 bpm       3 98 % 3 L/M 90 bpm       4 98 % 3 L/M 87 bpm 132/70 0      Six Minute Walk Summary  6MWT Status: completed without stopping  Patient Reported: Dyspnea         Interpretation:  Did the patient stop or pause?: No  Total Time Walked (Calculated): 360 seconds  Final Partial Lap Distance (feet): 50 feet  Total Distance Meters (Calculated): 259.08 meters  Predicted Distance Meters (Calculated): 561.64 meters  Percentage of Predicted (Calculated): 46.13  Peak VO2 (Calculated): 11.75  Mets: 3.36  Has The Patient Had a Previous Six Minute Walk Test?: No     Previous 6MWT Results  Has The Patient Had  a Previous Six Minute Walk Test?: No      Assessment/Plan:       Problem List Items Addressed This Visit        Pulmonary    SOB (shortness of breath) - Primary     SOB post covid has improved  Home oxygen order placed for 3L with activity, qualifies on walk testing today.  Coughing during PFT and unable to improve technique  Will get alicia with DLCO in 6 months  Will add BREO for cough, maybe asthma component, stop nebulizer BID and use prn  Still with exercise induced hypoxia post covid  Follow up in 6 months with alicia, FeNO, and Chest X Ray           Relevant Medications    fluticasone furoate-vilanteroL (BREO ELLIPTA) 100-25 mcg/dose diskus inhaler    Other Relevant Orders    OXYGEN FOR HOME USE    X-Ray Chest PA And Lateral    Spirometry with/without bronchodilator & DLCO    Fraction of  Nitric Oxide    Ambulatory referral/consult to Cardiology    Chronic respiratory failure with hypoxia     Home oxygen order placed for 3L with activity, qualifies on walk testing today.           Relevant Medications    fluticasone furoate-vilanteroL (BREO ELLIPTA) 100-25 mcg/dose diskus inhaler    Other Relevant Orders    OXYGEN FOR HOME USE    X-Ray Chest PA And Lateral       Renal/    Hypertensive kidney disease with chronic kidney disease stage III     F/u regularly with PCP                 Home oxygen order placed for 3L with activity, qualifies on walk testing today.  Coughing during PFT and unable to improve technique  Will get alicia with DLCO in 6 months  Will add BREO for cough, maybe asthma component, stop nebulizer BID and use prn  Still with exercise induced hypoxia post covid  Follow up in 6 months with alicia, FeNO, and Chest X Ray    Discussed diagnosis, its evaluation, treatment and usual course. All questions answered.    Patient verbalized understanding of plan and left in no acute distress    Thank you for the courtesy of participating in the care of this patient    Anneliese Hardy PA-C

## 2022-06-16 NOTE — PROCEDURES
"[facility] - Pulmonary Function  Six Minute Walk     SUMMARY     Ordering Provider: Orlin HARDY   Interpreting Provider:   Performing nurse/tech/RT: Jalyn RRT  Diagnosis: Shortness of Breath  Height: 6' 1" (185.4 cm)  Weight: 120.2 kg (265 lb)  BMI (Calculated): 35   Patient Race:             Phase Oxygen Assessment Supplemental O2 Heart   Rate Blood Pressure Davi Dyspnea Scale Rating   Resting 95 % Room Air 86 bpm 124/80 0   Exercise        Minute        1 90 % Room Air 101 bpm     2 87 % (Placed on 2L NC; Spo2 92%) Room Air 96 bpm     3 87 % (Increased to 3L; Spo2 92%) 2 L/M 105 bpm     4 93 % 3 L/M 104 bpm     5 92 % 3 L/M 107 bpm     6  92 % 3 L/M 96 bpm 140/75 1   Recovery        Minute        1 94 % 3 L/M 89 bpm     2 98 % 3 L/M 81 bpm     3 98 % 3 L/M 90 bpm     4 98 % 3 L/M 87 bpm 132/70 0     Six Minute Walk Summary  6MWT Status: completed without stopping  Patient Reported: Dyspnea     Interpretation:  Did the patient stop or pause?: No                                         Total Time Walked (Calculated): 360 seconds  Final Partial Lap Distance (feet): 50 feet  Total Distance Meters (Calculated): 259.08 meters  Predicted Distance Meters (Calculated): 561.64 meters  Percentage of Predicted (Calculated): 46.13  Peak VO2 (Calculated): 11.75  Mets: 3.36  Has The Patient Had a Previous Six Minute Walk Test?: No       Previous 6MWT Results  Has The Patient Had a Previous Six Minute Walk Test?: No      Interpretation:  Total distance walked in six minutes is moderately reduced indicating an moderate reduction in functional capacity.  There was significant severe oxygen desaturation to 87% with exercise on room air (oxygen saturation less than 89%). Clinical correlation suggested.    Patient met criteria for oxygen prescription.    [] Mild exercise-induced hypoxemia described as an arterial oxygen saturation of 93-95% (or 3-4% less than at rest),  [] Moderate exercise-induced hypoxemia as " 89-93%  [x] Severe exercise induced hypoxemia as < 89% O2 saturation.  Medicare Criteria for oxygen prescription comments:   When arterial oxygen saturation is at or below 88% during exercise on room air (severe exercise induced hypoxemia) then the patient falls under Medicare Group 1 criteria for supplemental oxygen prescription.  Details about Medicare Group Criteria coverage can be found at http://www.cms.[facility].gov/manuals/downloads/       Colin Way MD

## 2022-06-22 ENCOUNTER — OFFICE VISIT (OUTPATIENT)
Dept: INTERNAL MEDICINE | Facility: CLINIC | Age: 67
End: 2022-06-22
Payer: COMMERCIAL

## 2022-06-22 VITALS
HEART RATE: 80 BPM | OXYGEN SATURATION: 95 % | WEIGHT: 275.38 LBS | BODY MASS INDEX: 35.34 KG/M2 | DIASTOLIC BLOOD PRESSURE: 82 MMHG | HEIGHT: 74 IN | SYSTOLIC BLOOD PRESSURE: 126 MMHG

## 2022-06-22 DIAGNOSIS — N18.31 HYPERTENSIVE KIDNEY DISEASE WITH STAGE 3A CHRONIC KIDNEY DISEASE: ICD-10-CM

## 2022-06-22 DIAGNOSIS — I10 BENIGN HYPERTENSION: Primary | ICD-10-CM

## 2022-06-22 DIAGNOSIS — Z12.5 PROSTATE CANCER SCREENING: ICD-10-CM

## 2022-06-22 DIAGNOSIS — J96.11 CHRONIC RESPIRATORY FAILURE WITH HYPOXIA: ICD-10-CM

## 2022-06-22 DIAGNOSIS — N18.31 STAGE 3A CHRONIC KIDNEY DISEASE: ICD-10-CM

## 2022-06-22 DIAGNOSIS — I12.9 HYPERTENSIVE KIDNEY DISEASE WITH STAGE 3A CHRONIC KIDNEY DISEASE: ICD-10-CM

## 2022-06-22 PROCEDURE — 1159F PR MEDICATION LIST DOCUMENTED IN MEDICAL RECORD: ICD-10-PCS | Mod: CPTII,S$GLB,, | Performed by: INTERNAL MEDICINE

## 2022-06-22 PROCEDURE — 99214 OFFICE O/P EST MOD 30 MIN: CPT | Mod: S$GLB,,, | Performed by: INTERNAL MEDICINE

## 2022-06-22 PROCEDURE — 4010F ACE/ARB THERAPY RXD/TAKEN: CPT | Mod: CPTII,S$GLB,, | Performed by: INTERNAL MEDICINE

## 2022-06-22 PROCEDURE — 3079F DIAST BP 80-89 MM HG: CPT | Mod: CPTII,S$GLB,, | Performed by: INTERNAL MEDICINE

## 2022-06-22 PROCEDURE — 3079F PR MOST RECENT DIASTOLIC BLOOD PRESSURE 80-89 MM HG: ICD-10-PCS | Mod: CPTII,S$GLB,, | Performed by: INTERNAL MEDICINE

## 2022-06-22 PROCEDURE — 4010F PR ACE/ARB THEARPY RXD/TAKEN: ICD-10-PCS | Mod: CPTII,S$GLB,, | Performed by: INTERNAL MEDICINE

## 2022-06-22 PROCEDURE — 3074F SYST BP LT 130 MM HG: CPT | Mod: CPTII,S$GLB,, | Performed by: INTERNAL MEDICINE

## 2022-06-22 PROCEDURE — 3074F PR MOST RECENT SYSTOLIC BLOOD PRESSURE < 130 MM HG: ICD-10-PCS | Mod: CPTII,S$GLB,, | Performed by: INTERNAL MEDICINE

## 2022-06-22 PROCEDURE — 99999 PR PBB SHADOW E&M-EST. PATIENT-LVL IV: CPT | Mod: PBBFAC,,, | Performed by: INTERNAL MEDICINE

## 2022-06-22 PROCEDURE — 3008F BODY MASS INDEX DOCD: CPT | Mod: CPTII,S$GLB,, | Performed by: INTERNAL MEDICINE

## 2022-06-22 PROCEDURE — 99999 PR PBB SHADOW E&M-EST. PATIENT-LVL IV: ICD-10-PCS | Mod: PBBFAC,,, | Performed by: INTERNAL MEDICINE

## 2022-06-22 PROCEDURE — 3044F HG A1C LEVEL LT 7.0%: CPT | Mod: CPTII,S$GLB,, | Performed by: INTERNAL MEDICINE

## 2022-06-22 PROCEDURE — 3008F PR BODY MASS INDEX (BMI) DOCUMENTED: ICD-10-PCS | Mod: CPTII,S$GLB,, | Performed by: INTERNAL MEDICINE

## 2022-06-22 PROCEDURE — 1159F MED LIST DOCD IN RCRD: CPT | Mod: CPTII,S$GLB,, | Performed by: INTERNAL MEDICINE

## 2022-06-22 PROCEDURE — 3044F PR MOST RECENT HEMOGLOBIN A1C LEVEL <7.0%: ICD-10-PCS | Mod: CPTII,S$GLB,, | Performed by: INTERNAL MEDICINE

## 2022-06-22 PROCEDURE — 99214 PR OFFICE/OUTPT VISIT, EST, LEVL IV, 30-39 MIN: ICD-10-PCS | Mod: S$GLB,,, | Performed by: INTERNAL MEDICINE

## 2022-06-22 RX ORDER — LOSARTAN POTASSIUM AND HYDROCHLOROTHIAZIDE 25; 100 MG/1; MG/1
1 TABLET ORAL DAILY
Qty: 90 TABLET | Refills: 3 | Status: SHIPPED | OUTPATIENT
Start: 2022-06-22 | End: 2022-10-13

## 2022-06-22 NOTE — PROGRESS NOTES
"HPI:  Patient is a 66-year-old man comes in today for follow-up of his hypertension and chronic kidney disease.  patient has recovered from his COVID-19 pneumonia.  He is back to his baseline level of function.  He is off the oxygen.  Recent chest x-ray shows only minimal residual infiltrate.    Current meds have been verified and updated per the EMR  Exam:/82 (BP Location: Left arm)   Pulse 80   Ht 6' 2" (1.88 m)   Wt 124.9 kg (275 lb 5.7 oz)   SpO2 95%   BMI 35.35 kg/m²   Chest clear  CVS RR&R w/o mgr    Lab Results   Component Value Date    WBC 5.04 03/14/2022    HGB 13.7 (L) 03/14/2022    HCT 43.7 03/14/2022     03/14/2022    CHOL 135 11/08/2021    TRIG 71 11/08/2021    HDL 33 (L) 11/08/2021    ALT 61 (H) 03/14/2022    AST 30 03/14/2022     06/15/2022    K 4.4 06/15/2022     06/15/2022    CREATININE 1.7 (H) 06/15/2022    BUN 22 06/15/2022    CO2 24 06/15/2022    TSH 3.699 11/08/2021    PSA 0.47 11/08/2021    INR 1.0 03/03/2022    HGBA1C 6.3 (H) 03/03/2022       Impression:  Stable problems below  Patient Active Problem List   Diagnosis    Benign hypertension    CKD (chronic kidney disease), stage III    Hypertensive kidney disease with chronic kidney disease stage III    History of colon polyps    Chronic respiratory failure with hypoxia       Plan:  Orders Placed This Encounter    Lipid Panel    Comprehensive Metabolic Panel    TSH    CBC Auto Differential    PSA, Screening    losartan-hydrochlorothiazide 100-25 mg (HYZAAR) 100-25 mg per tablet     meds the same, see me in six mths with above labs    This note is generated with speech recognition software and is subject to transcription error and sound alike phrases that may be missed by proofreading.      "

## 2022-10-12 ENCOUNTER — OFFICE VISIT (OUTPATIENT)
Dept: INTERNAL MEDICINE | Facility: CLINIC | Age: 67
End: 2022-10-12
Payer: COMMERCIAL

## 2022-10-12 VITALS
SYSTOLIC BLOOD PRESSURE: 144 MMHG | HEIGHT: 74 IN | WEIGHT: 276.44 LBS | HEART RATE: 87 BPM | BODY MASS INDEX: 35.48 KG/M2 | TEMPERATURE: 97 F | OXYGEN SATURATION: 94 % | DIASTOLIC BLOOD PRESSURE: 60 MMHG

## 2022-10-12 DIAGNOSIS — R05.9 COUGH, UNSPECIFIED TYPE: Primary | ICD-10-CM

## 2022-10-12 DIAGNOSIS — N52.9 ERECTILE DYSFUNCTION, UNSPECIFIED ERECTILE DYSFUNCTION TYPE: ICD-10-CM

## 2022-10-12 DIAGNOSIS — J06.9 VIRAL URI WITH COUGH: ICD-10-CM

## 2022-10-12 PROCEDURE — 96372 THER/PROPH/DIAG INJ SC/IM: CPT | Mod: S$GLB,,, | Performed by: INTERNAL MEDICINE

## 2022-10-12 PROCEDURE — 3077F SYST BP >= 140 MM HG: CPT | Mod: CPTII,S$GLB,, | Performed by: INTERNAL MEDICINE

## 2022-10-12 PROCEDURE — 3078F DIAST BP <80 MM HG: CPT | Mod: CPTII,S$GLB,, | Performed by: INTERNAL MEDICINE

## 2022-10-12 PROCEDURE — 3044F HG A1C LEVEL LT 7.0%: CPT | Mod: CPTII,S$GLB,, | Performed by: INTERNAL MEDICINE

## 2022-10-12 PROCEDURE — 1159F MED LIST DOCD IN RCRD: CPT | Mod: CPTII,S$GLB,, | Performed by: INTERNAL MEDICINE

## 2022-10-12 PROCEDURE — 99213 PR OFFICE/OUTPT VISIT, EST, LEVL III, 20-29 MIN: ICD-10-PCS | Mod: 25,S$GLB,, | Performed by: INTERNAL MEDICINE

## 2022-10-12 PROCEDURE — 99999 PR PBB SHADOW E&M-EST. PATIENT-LVL III: ICD-10-PCS | Mod: PBBFAC,,, | Performed by: INTERNAL MEDICINE

## 2022-10-12 PROCEDURE — 3078F PR MOST RECENT DIASTOLIC BLOOD PRESSURE < 80 MM HG: ICD-10-PCS | Mod: CPTII,S$GLB,, | Performed by: INTERNAL MEDICINE

## 2022-10-12 PROCEDURE — 4010F ACE/ARB THERAPY RXD/TAKEN: CPT | Mod: CPTII,S$GLB,, | Performed by: INTERNAL MEDICINE

## 2022-10-12 PROCEDURE — 3008F BODY MASS INDEX DOCD: CPT | Mod: CPTII,S$GLB,, | Performed by: INTERNAL MEDICINE

## 2022-10-12 PROCEDURE — 99213 OFFICE O/P EST LOW 20 MIN: CPT | Mod: 25,S$GLB,, | Performed by: INTERNAL MEDICINE

## 2022-10-12 PROCEDURE — 3077F PR MOST RECENT SYSTOLIC BLOOD PRESSURE >= 140 MM HG: ICD-10-PCS | Mod: CPTII,S$GLB,, | Performed by: INTERNAL MEDICINE

## 2022-10-12 PROCEDURE — 3044F PR MOST RECENT HEMOGLOBIN A1C LEVEL <7.0%: ICD-10-PCS | Mod: CPTII,S$GLB,, | Performed by: INTERNAL MEDICINE

## 2022-10-12 PROCEDURE — 3008F PR BODY MASS INDEX (BMI) DOCUMENTED: ICD-10-PCS | Mod: CPTII,S$GLB,, | Performed by: INTERNAL MEDICINE

## 2022-10-12 PROCEDURE — 99999 PR PBB SHADOW E&M-EST. PATIENT-LVL III: CPT | Mod: PBBFAC,,, | Performed by: INTERNAL MEDICINE

## 2022-10-12 PROCEDURE — 1159F PR MEDICATION LIST DOCUMENTED IN MEDICAL RECORD: ICD-10-PCS | Mod: CPTII,S$GLB,, | Performed by: INTERNAL MEDICINE

## 2022-10-12 PROCEDURE — 96372 PR INJECTION,THERAP/PROPH/DIAG2ST, IM OR SUBCUT: ICD-10-PCS | Mod: S$GLB,,, | Performed by: INTERNAL MEDICINE

## 2022-10-12 PROCEDURE — 4010F PR ACE/ARB THEARPY RXD/TAKEN: ICD-10-PCS | Mod: CPTII,S$GLB,, | Performed by: INTERNAL MEDICINE

## 2022-10-12 RX ORDER — METHYLPREDNISOLONE ACETATE 80 MG/ML
80 INJECTION, SUSPENSION INTRA-ARTICULAR; INTRALESIONAL; INTRAMUSCULAR; SOFT TISSUE
Status: COMPLETED | OUTPATIENT
Start: 2022-10-12 | End: 2022-10-12

## 2022-10-12 RX ORDER — CYCLOBENZAPRINE HCL 10 MG
10 TABLET ORAL 3 TIMES DAILY PRN
Qty: 60 TABLET | Refills: 1 | Status: SHIPPED | OUTPATIENT
Start: 2022-10-12 | End: 2022-10-22

## 2022-10-12 RX ORDER — SILDENAFIL CITRATE 20 MG/1
TABLET ORAL
Qty: 50 TABLET | Refills: 11 | Status: SHIPPED | OUTPATIENT
Start: 2022-10-12 | End: 2023-01-04

## 2022-10-12 RX ORDER — PROMETHAZINE HYDROCHLORIDE AND DEXTROMETHORPHAN HYDROBROMIDE 6.25; 15 MG/5ML; MG/5ML
5 SYRUP ORAL EVERY 4 HOURS PRN
Qty: 240 ML | Refills: 1 | Status: SHIPPED | OUTPATIENT
Start: 2022-10-12 | End: 2022-10-22

## 2022-10-12 RX ADMIN — METHYLPREDNISOLONE ACETATE 80 MG: 80 INJECTION, SUSPENSION INTRA-ARTICULAR; INTRALESIONAL; INTRAMUSCULAR; SOFT TISSUE at 03:10

## 2022-10-12 NOTE — PROGRESS NOTES
"HPI:  Patient is a 66-year-old man who comes today with complaints of both head and chest congestion for last 2 days.  He denies any fever.  He has had some intermittent productive cough.  He denies any wheezing.  He has been using his Advair inhaler consistently.    Patient also complaining of erectile dysfunction would like to take something for it.    Current meds have been verified and updated per the EMR  Exam:BP (!) 144/60 (BP Location: Left arm, Patient Position: Sitting, BP Method: Large (Manual))   Pulse 87   Temp 97.1 °F (36.2 °C) (Temporal)   Ht 6' 2" (1.88 m)   Wt 125.4 kg (276 lb 7.3 oz)   SpO2 (!) 94%   BMI 35.49 kg/m²   Throat shows some mild erythema but no exudate.  Neck is supple without adenopathy  Chest clear    Point of care testing for COVID-19 was negative    Lab Results   Component Value Date    WBC 5.04 03/14/2022    HGB 13.7 (L) 03/14/2022    HCT 43.7 03/14/2022     03/14/2022    CHOL 135 11/08/2021    TRIG 71 11/08/2021    HDL 33 (L) 11/08/2021    ALT 61 (H) 03/14/2022    AST 30 03/14/2022     06/15/2022    K 4.4 06/15/2022     06/15/2022    CREATININE 1.7 (H) 06/15/2022    BUN 22 06/15/2022    CO2 24 06/15/2022    TSH 3.699 11/08/2021    PSA 0.47 11/08/2021    INR 1.0 03/03/2022    HGBA1C 6.3 (H) 03/03/2022       Impression:  Viral upper respiratory illness  Erectile dysfunction  Patient Active Problem List   Diagnosis    Benign hypertension    CKD (chronic kidney disease), stage III    Hypertensive kidney disease with chronic kidney disease stage III    History of colon polyps    Chronic respiratory failure with hypoxia       Plan:  Orders Placed This Encounter    POCT COVID-19 Rapid Screening    cyclobenzaprine (FLEXERIL) 10 MG tablet    sildenafil (REVATIO) 20 mg Tab    promethazine-dextromethorphan (PROMETHAZINE-DM) 6.25-15 mg/5 mL Syrp    methylPREDNISolone acetate injection 80 mg   He was given promethazine DM cough syrup and 1 cc Depo-Medrol 80.  He should " force fluids.      This note is generated with speech recognition software and is subject to transcription error and sound alike phrases that may be missed by proofreading.

## 2022-10-12 NOTE — PATIENT INSTRUCTIONS
Administered Methylprednisolone 80mg     into  RVG   . Patient tolerated well, no adverse reaction noted. Advise 15 min wait.

## 2022-12-20 DIAGNOSIS — Z12.31 OTHER SCREENING MAMMOGRAM: ICD-10-CM

## 2022-12-28 ENCOUNTER — LAB VISIT (OUTPATIENT)
Dept: LAB | Facility: HOSPITAL | Age: 67
End: 2022-12-28
Attending: INTERNAL MEDICINE
Payer: COMMERCIAL

## 2022-12-28 DIAGNOSIS — Z12.5 PROSTATE CANCER SCREENING: ICD-10-CM

## 2022-12-28 DIAGNOSIS — I10 BENIGN HYPERTENSION: ICD-10-CM

## 2022-12-28 LAB
ALBUMIN SERPL BCP-MCNC: 3.3 G/DL (ref 3.5–5.2)
ALP SERPL-CCNC: 53 U/L (ref 55–135)
ALT SERPL W/O P-5'-P-CCNC: 24 U/L (ref 10–44)
ANION GAP SERPL CALC-SCNC: 8 MMOL/L (ref 8–16)
AST SERPL-CCNC: 25 U/L (ref 10–40)
BASOPHILS # BLD AUTO: 0.01 K/UL (ref 0–0.2)
BASOPHILS NFR BLD: 0.3 % (ref 0–1.9)
BILIRUB SERPL-MCNC: 0.6 MG/DL (ref 0.1–1)
BUN SERPL-MCNC: 23 MG/DL (ref 8–23)
CALCIUM SERPL-MCNC: 9.1 MG/DL (ref 8.7–10.5)
CHLORIDE SERPL-SCNC: 104 MMOL/L (ref 95–110)
CHOLEST SERPL-MCNC: 147 MG/DL (ref 120–199)
CHOLEST/HDLC SERPL: 4.7 {RATIO} (ref 2–5)
CO2 SERPL-SCNC: 25 MMOL/L (ref 23–29)
COMPLEXED PSA SERPL-MCNC: 0.68 NG/ML (ref 0–4)
CREAT SERPL-MCNC: 1.5 MG/DL (ref 0.5–1.4)
DIFFERENTIAL METHOD: ABNORMAL
EOSINOPHIL # BLD AUTO: 0.2 K/UL (ref 0–0.5)
EOSINOPHIL NFR BLD: 4.8 % (ref 0–8)
ERYTHROCYTE [DISTWIDTH] IN BLOOD BY AUTOMATED COUNT: 13.2 % (ref 11.5–14.5)
EST. GFR  (NO RACE VARIABLE): 51.3 ML/MIN/1.73 M^2
GLUCOSE SERPL-MCNC: 104 MG/DL (ref 70–110)
HCT VFR BLD AUTO: 45.8 % (ref 40–54)
HDLC SERPL-MCNC: 31 MG/DL (ref 40–75)
HDLC SERPL: 21.1 % (ref 20–50)
HGB BLD-MCNC: 14.6 G/DL (ref 14–18)
IMM GRANULOCYTES # BLD AUTO: 0 K/UL (ref 0–0.04)
IMM GRANULOCYTES NFR BLD AUTO: 0 % (ref 0–0.5)
LDLC SERPL CALC-MCNC: 102.2 MG/DL (ref 63–159)
LYMPHOCYTES # BLD AUTO: 0.8 K/UL (ref 1–4.8)
LYMPHOCYTES NFR BLD: 19 % (ref 18–48)
MCH RBC QN AUTO: 30.4 PG (ref 27–31)
MCHC RBC AUTO-ENTMCNC: 31.9 G/DL (ref 32–36)
MCV RBC AUTO: 95 FL (ref 82–98)
MONOCYTES # BLD AUTO: 0.5 K/UL (ref 0.3–1)
MONOCYTES NFR BLD: 12.7 % (ref 4–15)
NEUTROPHILS # BLD AUTO: 2.5 K/UL (ref 1.8–7.7)
NEUTROPHILS NFR BLD: 63.2 % (ref 38–73)
NONHDLC SERPL-MCNC: 116 MG/DL
NRBC BLD-RTO: 0 /100 WBC
PLATELET # BLD AUTO: 178 K/UL (ref 150–450)
PMV BLD AUTO: 10.7 FL (ref 9.2–12.9)
POTASSIUM SERPL-SCNC: 4.3 MMOL/L (ref 3.5–5.1)
PROT SERPL-MCNC: 8.6 G/DL (ref 6–8.4)
RBC # BLD AUTO: 4.81 M/UL (ref 4.6–6.2)
SODIUM SERPL-SCNC: 137 MMOL/L (ref 136–145)
T4 FREE SERPL-MCNC: 0.82 NG/DL (ref 0.71–1.51)
TRIGL SERPL-MCNC: 69 MG/DL (ref 30–150)
TSH SERPL DL<=0.005 MIU/L-ACNC: 4.14 UIU/ML (ref 0.4–4)
WBC # BLD AUTO: 3.95 K/UL (ref 3.9–12.7)

## 2022-12-28 PROCEDURE — 85025 COMPLETE CBC W/AUTO DIFF WBC: CPT | Performed by: INTERNAL MEDICINE

## 2022-12-28 PROCEDURE — 84153 ASSAY OF PSA TOTAL: CPT | Performed by: INTERNAL MEDICINE

## 2022-12-28 PROCEDURE — 80061 LIPID PANEL: CPT | Performed by: INTERNAL MEDICINE

## 2022-12-28 PROCEDURE — 84443 ASSAY THYROID STIM HORMONE: CPT | Performed by: INTERNAL MEDICINE

## 2022-12-28 PROCEDURE — 36415 COLL VENOUS BLD VENIPUNCTURE: CPT | Mod: PO | Performed by: INTERNAL MEDICINE

## 2022-12-28 PROCEDURE — 80053 COMPREHEN METABOLIC PANEL: CPT | Performed by: INTERNAL MEDICINE

## 2022-12-28 PROCEDURE — 84439 ASSAY OF FREE THYROXINE: CPT | Performed by: INTERNAL MEDICINE

## 2022-12-29 ENCOUNTER — PATIENT MESSAGE (OUTPATIENT)
Dept: INTERNAL MEDICINE | Facility: CLINIC | Age: 67
End: 2022-12-29
Payer: COMMERCIAL

## 2023-01-04 ENCOUNTER — OFFICE VISIT (OUTPATIENT)
Dept: INTERNAL MEDICINE | Facility: CLINIC | Age: 68
End: 2023-01-04
Payer: COMMERCIAL

## 2023-01-04 ENCOUNTER — LAB VISIT (OUTPATIENT)
Dept: LAB | Facility: HOSPITAL | Age: 68
End: 2023-01-04
Attending: INTERNAL MEDICINE
Payer: COMMERCIAL

## 2023-01-04 VITALS
BODY MASS INDEX: 35.88 KG/M2 | DIASTOLIC BLOOD PRESSURE: 86 MMHG | WEIGHT: 279.56 LBS | SYSTOLIC BLOOD PRESSURE: 122 MMHG | OXYGEN SATURATION: 95 % | HEIGHT: 74 IN | HEART RATE: 78 BPM

## 2023-01-04 DIAGNOSIS — Z00.00 ROUTINE GENERAL MEDICAL EXAMINATION AT A HEALTH CARE FACILITY: Primary | ICD-10-CM

## 2023-01-04 DIAGNOSIS — D89.2 HYPERGAMMAGLOBULINEMIA: ICD-10-CM

## 2023-01-04 DIAGNOSIS — N18.31 STAGE 3A CHRONIC KIDNEY DISEASE: ICD-10-CM

## 2023-01-04 DIAGNOSIS — I12.9 HYPERTENSIVE KIDNEY DISEASE WITH STAGE 3A CHRONIC KIDNEY DISEASE: ICD-10-CM

## 2023-01-04 DIAGNOSIS — I10 BENIGN HYPERTENSION: ICD-10-CM

## 2023-01-04 DIAGNOSIS — Z86.010 HISTORY OF COLON POLYPS: ICD-10-CM

## 2023-01-04 DIAGNOSIS — J96.11 CHRONIC RESPIRATORY FAILURE WITH HYPOXIA: ICD-10-CM

## 2023-01-04 DIAGNOSIS — N18.31 HYPERTENSIVE KIDNEY DISEASE WITH STAGE 3A CHRONIC KIDNEY DISEASE: ICD-10-CM

## 2023-01-04 LAB
CRP SERPL-MCNC: 11.2 MG/L (ref 0–8.2)
ERYTHROCYTE [SEDIMENTATION RATE] IN BLOOD BY PHOTOMETRIC METHOD: 18 MM/HR (ref 0–23)
IGA SERPL-MCNC: 532 MG/DL (ref 40–350)
IGG SERPL-MCNC: 2648 MG/DL (ref 650–1600)
IGM SERPL-MCNC: 150 MG/DL (ref 50–300)

## 2023-01-04 PROCEDURE — 90694 VACC AIIV4 NO PRSRV 0.5ML IM: CPT | Mod: S$GLB,,, | Performed by: INTERNAL MEDICINE

## 2023-01-04 PROCEDURE — 86140 C-REACTIVE PROTEIN: CPT | Performed by: INTERNAL MEDICINE

## 2023-01-04 PROCEDURE — 99999 PR PBB SHADOW E&M-EST. PATIENT-LVL III: ICD-10-PCS | Mod: PBBFAC,,, | Performed by: INTERNAL MEDICINE

## 2023-01-04 PROCEDURE — 36415 COLL VENOUS BLD VENIPUNCTURE: CPT | Mod: PO | Performed by: INTERNAL MEDICINE

## 2023-01-04 PROCEDURE — 3079F PR MOST RECENT DIASTOLIC BLOOD PRESSURE 80-89 MM HG: ICD-10-PCS | Mod: CPTII,S$GLB,, | Performed by: INTERNAL MEDICINE

## 2023-01-04 PROCEDURE — 99999 PR PBB SHADOW E&M-EST. PATIENT-LVL III: CPT | Mod: PBBFAC,,, | Performed by: INTERNAL MEDICINE

## 2023-01-04 PROCEDURE — 1126F AMNT PAIN NOTED NONE PRSNT: CPT | Mod: CPTII,S$GLB,, | Performed by: INTERNAL MEDICINE

## 2023-01-04 PROCEDURE — 99397 PER PM REEVAL EST PAT 65+ YR: CPT | Mod: 25,S$GLB,, | Performed by: INTERNAL MEDICINE

## 2023-01-04 PROCEDURE — 82784 ASSAY IGA/IGD/IGG/IGM EACH: CPT | Performed by: INTERNAL MEDICINE

## 2023-01-04 PROCEDURE — 3288F FALL RISK ASSESSMENT DOCD: CPT | Mod: CPTII,S$GLB,, | Performed by: INTERNAL MEDICINE

## 2023-01-04 PROCEDURE — 1126F PR PAIN SEVERITY QUANTIFIED, NO PAIN PRESENT: ICD-10-PCS | Mod: CPTII,S$GLB,, | Performed by: INTERNAL MEDICINE

## 2023-01-04 PROCEDURE — 90694 FLU VACCINE - QUADRIVALENT - ADJUVANTED: ICD-10-PCS | Mod: S$GLB,,, | Performed by: INTERNAL MEDICINE

## 2023-01-04 PROCEDURE — 90471 FLU VACCINE - QUADRIVALENT - ADJUVANTED: ICD-10-PCS | Mod: S$GLB,,, | Performed by: INTERNAL MEDICINE

## 2023-01-04 PROCEDURE — 90471 IMMUNIZATION ADMIN: CPT | Mod: S$GLB,,, | Performed by: INTERNAL MEDICINE

## 2023-01-04 PROCEDURE — 3288F PR FALLS RISK ASSESSMENT DOCUMENTED: ICD-10-PCS | Mod: CPTII,S$GLB,, | Performed by: INTERNAL MEDICINE

## 2023-01-04 PROCEDURE — 1159F MED LIST DOCD IN RCRD: CPT | Mod: CPTII,S$GLB,, | Performed by: INTERNAL MEDICINE

## 2023-01-04 PROCEDURE — 3079F DIAST BP 80-89 MM HG: CPT | Mod: CPTII,S$GLB,, | Performed by: INTERNAL MEDICINE

## 2023-01-04 PROCEDURE — 84165 PATHOLOGIST INTERPRETATION SPE: ICD-10-PCS | Mod: 26,,, | Performed by: PATHOLOGY

## 2023-01-04 PROCEDURE — 85652 RBC SED RATE AUTOMATED: CPT | Performed by: INTERNAL MEDICINE

## 2023-01-04 PROCEDURE — 3074F PR MOST RECENT SYSTOLIC BLOOD PRESSURE < 130 MM HG: ICD-10-PCS | Mod: CPTII,S$GLB,, | Performed by: INTERNAL MEDICINE

## 2023-01-04 PROCEDURE — 1101F PT FALLS ASSESS-DOCD LE1/YR: CPT | Mod: CPTII,S$GLB,, | Performed by: INTERNAL MEDICINE

## 2023-01-04 PROCEDURE — 3008F BODY MASS INDEX DOCD: CPT | Mod: CPTII,S$GLB,, | Performed by: INTERNAL MEDICINE

## 2023-01-04 PROCEDURE — 3074F SYST BP LT 130 MM HG: CPT | Mod: CPTII,S$GLB,, | Performed by: INTERNAL MEDICINE

## 2023-01-04 PROCEDURE — 84165 PROTEIN E-PHORESIS SERUM: CPT | Mod: 26,,, | Performed by: PATHOLOGY

## 2023-01-04 PROCEDURE — 1101F PR PT FALLS ASSESS DOC 0-1 FALLS W/OUT INJ PAST YR: ICD-10-PCS | Mod: CPTII,S$GLB,, | Performed by: INTERNAL MEDICINE

## 2023-01-04 PROCEDURE — 1159F PR MEDICATION LIST DOCUMENTED IN MEDICAL RECORD: ICD-10-PCS | Mod: CPTII,S$GLB,, | Performed by: INTERNAL MEDICINE

## 2023-01-04 PROCEDURE — 99397 PR PREVENTIVE VISIT,EST,65 & OVER: ICD-10-PCS | Mod: 25,S$GLB,, | Performed by: INTERNAL MEDICINE

## 2023-01-04 PROCEDURE — 84165 PROTEIN E-PHORESIS SERUM: CPT | Performed by: INTERNAL MEDICINE

## 2023-01-04 PROCEDURE — 3008F PR BODY MASS INDEX (BMI) DOCUMENTED: ICD-10-PCS | Mod: CPTII,S$GLB,, | Performed by: INTERNAL MEDICINE

## 2023-01-04 NOTE — PROGRESS NOTES
"HPI:  Patient is a 67-year-old man who comes today for his yearly physical.  He has been doing well.  He has no problems or complaints.  He is not using his oxygen hardly at all.  His blood pressure has been well controlled at home.      Current MEDS: medcard review, verified and update  Allergies: Per the electronic medical record    Past Medical History:   Diagnosis Date    Benign hypertension     History of colon polyps     Hypergammaglobulinemia     Hypertensive kidney disease with chronic kidney disease stage III        Past Surgical History:   Procedure Laterality Date    COLONOSCOPY N/A 12/6/2021    Procedure: COLONOSCOPY;  Surgeon: Doris Altman MD;  Location: [facility];  Service: Endoscopy;  Laterality: N/A;    None         SHx: per the electronic medical record    FHx: recorded in the electronic medical record    ROS:    denies any chest pains or shortness of breath. Denies any nausea, vomiting or diarrhea. Denies any fever, chills or sweats. Denies any change in weight, voice, stool, skin or hair. Denies any dysuria, dyspepsia or dysphagia. Denies any change in vision, hearing or headaches. Denies any swollen lymph nodes or loss of memory.    PE:  /86 (BP Location: Left arm)   Pulse 78   Ht 6' 2" (1.88 m)   Wt 126.8 kg (279 lb 8.7 oz)   SpO2 95%   BMI 35.89 kg/m²   Gen: Well-developed, well-nourished, male, in no acute distress, oriented x3  HEENT: neck is supple, no adenopathy, carotids 2+ equal without bruits, thyroid exam normal size without nodules.  CHEST: clear to auscultation and percussion  CVS: regular rate and rhythm without significant murmur, gallop, or rubs  ABD: soft, benign, no rebound no guarding, no distention.  Bowel sounds are normal.     nontender.  No palpable masses.  No organomegaly and no audible bruits.  RECTAL:  Deferred.  EXT: no clubbing, cyanosis, or edema  LYMPH: no cervical, inguinal, or axillary adenopathy  FEET: no loss of sensation.  No ulcers or pressure " sores.  NEURO: gait normal.  Cranial nerves II- XII intact. No nystagmus.  Speech normal.   Gross motor and sensory unremarkable.    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  Lab work done 3 years ago was normal  Hypertension with chronic kidney disease, stable  Patient Active Problem List   Diagnosis    Benign hypertension    CKD (chronic kidney disease), stage III    Hypertensive kidney disease with chronic kidney disease stage III    History of colon polyps    Chronic respiratory failure with hypoxia    Hypergammaglobulinemia       Plan:   Orders Placed This Encounter    Influenza - Quadrivalent (Adjuvanted)    PROTEIN ELECTROPHORESIS, SERUM    IMMUNOGLOBULINS (IGG, IGA, IGM) QUANTITATIVE    C-Reactive Protein    Sedimentation rate     Patient will have the above lab work done today.  Patient will see me again in 6 months.  He was given high-dose flu shot today.  Medications remain the same.  This note is generated with speech recognition software and is subject to transcription error and sound alike phrases that may be missed by proofreading.

## 2023-01-05 LAB
ALBUMIN SERPL ELPH-MCNC: 3.72 G/DL (ref 3.35–5.55)
ALPHA1 GLOB SERPL ELPH-MCNC: 0.32 G/DL (ref 0.17–0.41)
ALPHA2 GLOB SERPL ELPH-MCNC: 0.85 G/DL (ref 0.43–0.99)
B-GLOBULIN SERPL ELPH-MCNC: 1.11 G/DL (ref 0.5–1.1)
GAMMA GLOB SERPL ELPH-MCNC: 2.49 G/DL (ref 0.67–1.58)
PROT SERPL-MCNC: 8.5 G/DL (ref 6–8.4)

## 2023-01-06 ENCOUNTER — PATIENT MESSAGE (OUTPATIENT)
Dept: INTERNAL MEDICINE | Facility: CLINIC | Age: 68
End: 2023-01-06
Payer: COMMERCIAL

## 2023-01-06 LAB — PATHOLOGIST INTERPRETATION SPE: NORMAL

## 2023-01-13 ENCOUNTER — TELEPHONE (OUTPATIENT)
Dept: INTERNAL MEDICINE | Facility: CLINIC | Age: 68
End: 2023-01-13
Payer: COMMERCIAL

## 2023-02-27 PROBLEM — R73.03 PREDIABETES: Status: ACTIVE | Noted: 2018-01-31

## 2023-02-27 PROBLEM — E78.2 MIXED HYPERLIPIDEMIA: Status: ACTIVE | Noted: 2018-01-31

## 2023-02-27 PROBLEM — R94.2 DIFFUSION CAPACITY OF LUNG (DL), DECREASED: Status: ACTIVE | Noted: 2023-02-27

## 2023-02-27 PROBLEM — J98.4 RESTRICTIVE LUNG DISEASE: Status: ACTIVE | Noted: 2023-02-27

## 2023-05-19 DIAGNOSIS — Z76.89 ESTABLISHING CARE WITH NEW DOCTOR, ENCOUNTER FOR: Primary | ICD-10-CM

## 2023-05-25 ENCOUNTER — OFFICE VISIT (OUTPATIENT)
Dept: CARDIOLOGY | Facility: CLINIC | Age: 68
End: 2023-05-25
Payer: COMMERCIAL

## 2023-05-25 ENCOUNTER — HOSPITAL ENCOUNTER (OUTPATIENT)
Dept: RADIOLOGY | Facility: HOSPITAL | Age: 68
Discharge: HOME | End: 2023-05-25
Attending: INTERNAL MEDICINE
Payer: COMMERCIAL

## 2023-05-25 ENCOUNTER — TELEPHONE (OUTPATIENT)
Dept: CARDIOLOGY | Facility: CLINIC | Age: 68
End: 2023-05-25

## 2023-05-25 ENCOUNTER — HOSPITAL ENCOUNTER (OUTPATIENT)
Dept: CARDIOLOGY | Facility: HOSPITAL | Age: 68
Discharge: HOME | End: 2023-05-25
Attending: INTERNAL MEDICINE
Payer: COMMERCIAL

## 2023-05-25 VITALS
OXYGEN SATURATION: 91 % | BODY MASS INDEX: 34.32 KG/M2 | HEART RATE: 84 BPM | WEIGHT: 267.44 LBS | HEIGHT: 74 IN | SYSTOLIC BLOOD PRESSURE: 126 MMHG | DIASTOLIC BLOOD PRESSURE: 82 MMHG

## 2023-05-25 DIAGNOSIS — J96.11 CHRONIC RESPIRATORY FAILURE WITH HYPOXIA: ICD-10-CM

## 2023-05-25 DIAGNOSIS — I12.9 HYPERTENSIVE KIDNEY DISEASE WITH STAGE 3A CHRONIC KIDNEY DISEASE: ICD-10-CM

## 2023-05-25 DIAGNOSIS — N18.31 HYPERTENSIVE KIDNEY DISEASE WITH STAGE 3A CHRONIC KIDNEY DISEASE: ICD-10-CM

## 2023-05-25 DIAGNOSIS — R94.2 DIFFUSION CAPACITY OF LUNG (DL), DECREASED: ICD-10-CM

## 2023-05-25 DIAGNOSIS — N18.31 STAGE 3A CHRONIC KIDNEY DISEASE: ICD-10-CM

## 2023-05-25 DIAGNOSIS — R73.03 PREDIABETES: ICD-10-CM

## 2023-05-25 DIAGNOSIS — R06.02 SHORT OF BREATH ON EXERTION: ICD-10-CM

## 2023-05-25 DIAGNOSIS — E66.9 CLASS 1 OBESITY WITH SERIOUS COMORBIDITY AND BODY MASS INDEX (BMI) OF 34.0 TO 34.9 IN ADULT, UNSPECIFIED OBESITY TYPE: ICD-10-CM

## 2023-05-25 DIAGNOSIS — J98.4 RESTRICTIVE LUNG DISEASE: ICD-10-CM

## 2023-05-25 DIAGNOSIS — E78.2 MIXED HYPERLIPIDEMIA: ICD-10-CM

## 2023-05-25 DIAGNOSIS — R06.02 SHORT OF BREATH ON EXERTION: Primary | ICD-10-CM

## 2023-05-25 DIAGNOSIS — Z76.89 ESTABLISHING CARE WITH NEW DOCTOR, ENCOUNTER FOR: ICD-10-CM

## 2023-05-25 DIAGNOSIS — I10 BENIGN HYPERTENSION: ICD-10-CM

## 2023-05-25 DIAGNOSIS — R94.31 ABNORMAL EKG: ICD-10-CM

## 2023-05-25 PROBLEM — E66.811 CLASS 1 OBESITY IN ADULT: Status: ACTIVE | Noted: 2023-05-25

## 2023-05-25 PROCEDURE — 71046 X-RAY EXAM CHEST 2 VIEWS: CPT | Mod: TC

## 2023-05-25 PROCEDURE — 99999 PR PBB SHADOW E&M-EST. PATIENT-LVL IV: CPT | Mod: PBBFAC,,, | Performed by: INTERNAL MEDICINE

## 2023-05-25 PROCEDURE — 93010 EKG 12-LEAD: ICD-10-PCS | Mod: ,,, | Performed by: INTERNAL MEDICINE

## 2023-05-25 PROCEDURE — 3079F PR MOST RECENT DIASTOLIC BLOOD PRESSURE 80-89 MM HG: ICD-10-PCS | Mod: CPTII,S$GLB,, | Performed by: INTERNAL MEDICINE

## 2023-05-25 PROCEDURE — 3074F SYST BP LT 130 MM HG: CPT | Mod: CPTII,S$GLB,, | Performed by: INTERNAL MEDICINE

## 2023-05-25 PROCEDURE — 1126F AMNT PAIN NOTED NONE PRSNT: CPT | Mod: CPTII,S$GLB,, | Performed by: INTERNAL MEDICINE

## 2023-05-25 PROCEDURE — 1101F PR PT FALLS ASSESS DOC 0-1 FALLS W/OUT INJ PAST YR: ICD-10-PCS | Mod: CPTII,S$GLB,, | Performed by: INTERNAL MEDICINE

## 2023-05-25 PROCEDURE — 3288F PR FALLS RISK ASSESSMENT DOCUMENTED: ICD-10-PCS | Mod: CPTII,S$GLB,, | Performed by: INTERNAL MEDICINE

## 2023-05-25 PROCEDURE — 3079F DIAST BP 80-89 MM HG: CPT | Mod: CPTII,S$GLB,, | Performed by: INTERNAL MEDICINE

## 2023-05-25 PROCEDURE — 1159F PR MEDICATION LIST DOCUMENTED IN MEDICAL RECORD: ICD-10-PCS | Mod: CPTII,S$GLB,, | Performed by: INTERNAL MEDICINE

## 2023-05-25 PROCEDURE — 3288F FALL RISK ASSESSMENT DOCD: CPT | Mod: CPTII,S$GLB,, | Performed by: INTERNAL MEDICINE

## 2023-05-25 PROCEDURE — 99205 OFFICE O/P NEW HI 60 MIN: CPT | Mod: S$GLB,,, | Performed by: INTERNAL MEDICINE

## 2023-05-25 PROCEDURE — 1160F PR REVIEW ALL MEDS BY PRESCRIBER/CLIN PHARMACIST DOCUMENTED: ICD-10-PCS | Mod: CPTII,S$GLB,, | Performed by: INTERNAL MEDICINE

## 2023-05-25 PROCEDURE — 71046 XR CHEST PA AND LATERAL: ICD-10-PCS | Mod: 26,,, | Performed by: RADIOLOGY

## 2023-05-25 PROCEDURE — 3008F PR BODY MASS INDEX (BMI) DOCUMENTED: ICD-10-PCS | Mod: CPTII,S$GLB,, | Performed by: INTERNAL MEDICINE

## 2023-05-25 PROCEDURE — 1160F RVW MEDS BY RX/DR IN RCRD: CPT | Mod: CPTII,S$GLB,, | Performed by: INTERNAL MEDICINE

## 2023-05-25 PROCEDURE — 1101F PT FALLS ASSESS-DOCD LE1/YR: CPT | Mod: CPTII,S$GLB,, | Performed by: INTERNAL MEDICINE

## 2023-05-25 PROCEDURE — 1159F MED LIST DOCD IN RCRD: CPT | Mod: CPTII,S$GLB,, | Performed by: INTERNAL MEDICINE

## 2023-05-25 PROCEDURE — 3008F BODY MASS INDEX DOCD: CPT | Mod: CPTII,S$GLB,, | Performed by: INTERNAL MEDICINE

## 2023-05-25 PROCEDURE — 1126F PR PAIN SEVERITY QUANTIFIED, NO PAIN PRESENT: ICD-10-PCS | Mod: CPTII,S$GLB,, | Performed by: INTERNAL MEDICINE

## 2023-05-25 PROCEDURE — 99999 PR PBB SHADOW E&M-EST. PATIENT-LVL IV: ICD-10-PCS | Mod: PBBFAC,,, | Performed by: INTERNAL MEDICINE

## 2023-05-25 PROCEDURE — 99205 PR OFFICE/OUTPT VISIT, NEW, LEVL V, 60-74 MIN: ICD-10-PCS | Mod: S$GLB,,, | Performed by: INTERNAL MEDICINE

## 2023-05-25 PROCEDURE — 93010 ELECTROCARDIOGRAM REPORT: CPT | Mod: ,,, | Performed by: INTERNAL MEDICINE

## 2023-05-25 PROCEDURE — 93005 ELECTROCARDIOGRAM TRACING: CPT

## 2023-05-25 PROCEDURE — 71046 X-RAY EXAM CHEST 2 VIEWS: CPT | Mod: 26,,, | Performed by: RADIOLOGY

## 2023-05-25 PROCEDURE — 3074F PR MOST RECENT SYSTOLIC BLOOD PRESSURE < 130 MM HG: ICD-10-PCS | Mod: CPTII,S$GLB,, | Performed by: INTERNAL MEDICINE

## 2023-05-25 NOTE — TELEPHONE ENCOUNTER
Patient was notified of results. All questions were answered. Pt verbalized understanding. Pt will call back with any other questions or concerns.     ----- Message from Vahid Juarez MD sent at 5/25/2023  1:35 PM CDT -----  CXR STILL ABNORMAL NEEDS TO SEE PULMONARY

## 2023-06-12 ENCOUNTER — TELEPHONE (OUTPATIENT)
Dept: CARDIOLOGY | Facility: CLINIC | Age: 68
End: 2023-06-12
Payer: COMMERCIAL

## 2023-06-12 NOTE — TELEPHONE ENCOUNTER
Patient called to find out if he could eat before his stress test. Informed him no that the tech will let him know during the test when he can eat and drink. Patient verbalized an understanding.----- Message from Sara Hudson sent at 6/12/2023 12:58 PM CDT -----  Regarding: Pt called to speak to the nurse regarding his 6/16/23 appts and pt would like a call back today  Patient Advice:    Pt called to speak to the nurse regarding his 6/16/23 appts and pt would like a call back today    Pt can be reached at [phone]

## 2023-06-15 ENCOUNTER — TELEPHONE (OUTPATIENT)
Dept: CARDIOLOGY | Facility: HOSPITAL | Age: 68
End: 2023-06-15
Payer: COMMERCIAL

## 2023-06-15 NOTE — TELEPHONE ENCOUNTER
Spoke to patient's wife and she wants to r/s nuclear due to cost of testing. Informed her I will send message to  to call her to r/s ----- Message from Kitty Potts sent at 6/15/2023 11:51 AM CDT -----  Patient needs to speak with a nurse regarding stress test and some other appts. Please call back [phone]

## 2023-07-10 ENCOUNTER — OFFICE VISIT (OUTPATIENT)
Dept: INTERNAL MEDICINE | Facility: CLINIC | Age: 68
End: 2023-07-10
Payer: COMMERCIAL

## 2023-07-10 ENCOUNTER — TELEPHONE (OUTPATIENT)
Dept: INTERNAL MEDICINE | Facility: CLINIC | Age: 68
End: 2023-07-10

## 2023-07-10 ENCOUNTER — LAB VISIT (OUTPATIENT)
Dept: LAB | Facility: HOSPITAL | Age: 68
End: 2023-07-10
Attending: INTERNAL MEDICINE
Payer: COMMERCIAL

## 2023-07-10 VITALS
SYSTOLIC BLOOD PRESSURE: 118 MMHG | WEIGHT: 258.63 LBS | HEIGHT: 74 IN | OXYGEN SATURATION: 94 % | DIASTOLIC BLOOD PRESSURE: 74 MMHG | TEMPERATURE: 95 F | BODY MASS INDEX: 33.19 KG/M2 | HEART RATE: 81 BPM

## 2023-07-10 DIAGNOSIS — I10 BENIGN HYPERTENSION: Primary | ICD-10-CM

## 2023-07-10 DIAGNOSIS — R94.2 DIFFUSION CAPACITY OF LUNG (DL), DECREASED: ICD-10-CM

## 2023-07-10 DIAGNOSIS — I10 BENIGN HYPERTENSION: ICD-10-CM

## 2023-07-10 DIAGNOSIS — E78.2 MIXED HYPERLIPIDEMIA: ICD-10-CM

## 2023-07-10 DIAGNOSIS — N18.31 HYPERTENSIVE KIDNEY DISEASE WITH STAGE 3A CHRONIC KIDNEY DISEASE: ICD-10-CM

## 2023-07-10 DIAGNOSIS — J96.11 CHRONIC RESPIRATORY FAILURE WITH HYPOXIA: ICD-10-CM

## 2023-07-10 DIAGNOSIS — Z86.010 HISTORY OF COLON POLYPS: ICD-10-CM

## 2023-07-10 DIAGNOSIS — R73.03 PREDIABETES: ICD-10-CM

## 2023-07-10 DIAGNOSIS — J98.4 RESTRICTIVE LUNG DISEASE: ICD-10-CM

## 2023-07-10 DIAGNOSIS — I12.9 HYPERTENSIVE KIDNEY DISEASE WITH STAGE 3A CHRONIC KIDNEY DISEASE: ICD-10-CM

## 2023-07-10 DIAGNOSIS — B07.0 PLANTAR WART, LEFT FOOT: ICD-10-CM

## 2023-07-10 DIAGNOSIS — N18.31 STAGE 3A CHRONIC KIDNEY DISEASE: ICD-10-CM

## 2023-07-10 DIAGNOSIS — D89.2 HYPERGAMMAGLOBULINEMIA: ICD-10-CM

## 2023-07-10 DIAGNOSIS — N52.9 ERECTILE DYSFUNCTION, UNSPECIFIED ERECTILE DYSFUNCTION TYPE: ICD-10-CM

## 2023-07-10 LAB
ALBUMIN SERPL BCP-MCNC: 3.2 G/DL (ref 3.5–5.2)
ALP SERPL-CCNC: 50 U/L (ref 55–135)
ALT SERPL W/O P-5'-P-CCNC: 27 U/L (ref 10–44)
ANION GAP SERPL CALC-SCNC: 10 MMOL/L (ref 8–16)
AST SERPL-CCNC: 25 U/L (ref 10–40)
BASOPHILS # BLD AUTO: 0.01 K/UL (ref 0–0.2)
BASOPHILS NFR BLD: 0.3 % (ref 0–1.9)
BILIRUB SERPL-MCNC: 0.5 MG/DL (ref 0.1–1)
BUN SERPL-MCNC: 25 MG/DL (ref 8–23)
CALCIUM SERPL-MCNC: 8.8 MG/DL (ref 8.7–10.5)
CHLORIDE SERPL-SCNC: 103 MMOL/L (ref 95–110)
CHOLEST SERPL-MCNC: 124 MG/DL (ref 120–199)
CHOLEST/HDLC SERPL: 4.6 {RATIO} (ref 2–5)
CO2 SERPL-SCNC: 24 MMOL/L (ref 23–29)
CREAT SERPL-MCNC: 1.9 MG/DL (ref 0.5–1.4)
DIFFERENTIAL METHOD: ABNORMAL
EOSINOPHIL # BLD AUTO: 0.2 K/UL (ref 0–0.5)
EOSINOPHIL NFR BLD: 6 % (ref 0–8)
ERYTHROCYTE [DISTWIDTH] IN BLOOD BY AUTOMATED COUNT: 13.6 % (ref 11.5–14.5)
EST. GFR  (NO RACE VARIABLE): 38.7 ML/MIN/1.73 M^2
ESTIMATED AVG GLUCOSE: 126 MG/DL (ref 68–131)
GLUCOSE SERPL-MCNC: 101 MG/DL (ref 70–110)
HBA1C MFR BLD: 6 % (ref 4–5.6)
HCT VFR BLD AUTO: 42.5 % (ref 40–54)
HDLC SERPL-MCNC: 27 MG/DL (ref 40–75)
HDLC SERPL: 21.8 % (ref 20–50)
HGB BLD-MCNC: 13.8 G/DL (ref 14–18)
IMM GRANULOCYTES # BLD AUTO: 0.01 K/UL (ref 0–0.04)
IMM GRANULOCYTES NFR BLD AUTO: 0.3 % (ref 0–0.5)
LDLC SERPL CALC-MCNC: 83.8 MG/DL (ref 63–159)
LYMPHOCYTES # BLD AUTO: 0.7 K/UL (ref 1–4.8)
LYMPHOCYTES NFR BLD: 21.8 % (ref 18–48)
MCH RBC QN AUTO: 30.6 PG (ref 27–31)
MCHC RBC AUTO-ENTMCNC: 32.5 G/DL (ref 32–36)
MCV RBC AUTO: 94 FL (ref 82–98)
MONOCYTES # BLD AUTO: 0.4 K/UL (ref 0.3–1)
MONOCYTES NFR BLD: 12.7 % (ref 4–15)
NEUTROPHILS # BLD AUTO: 2 K/UL (ref 1.8–7.7)
NEUTROPHILS NFR BLD: 58.9 % (ref 38–73)
NONHDLC SERPL-MCNC: 97 MG/DL
NRBC BLD-RTO: 0 /100 WBC
PLATELET # BLD AUTO: 158 K/UL (ref 150–450)
PMV BLD AUTO: 11 FL (ref 9.2–12.9)
POTASSIUM SERPL-SCNC: 4 MMOL/L (ref 3.5–5.1)
PROT SERPL-MCNC: 8.7 G/DL (ref 6–8.4)
RBC # BLD AUTO: 4.51 M/UL (ref 4.6–6.2)
SODIUM SERPL-SCNC: 137 MMOL/L (ref 136–145)
T4 FREE SERPL-MCNC: 0.81 NG/DL (ref 0.71–1.51)
TRIGL SERPL-MCNC: 66 MG/DL (ref 30–150)
TSH SERPL DL<=0.005 MIU/L-ACNC: 6.29 UIU/ML (ref 0.4–4)
WBC # BLD AUTO: 3.31 K/UL (ref 3.9–12.7)

## 2023-07-10 PROCEDURE — 36415 COLL VENOUS BLD VENIPUNCTURE: CPT | Mod: PO | Performed by: INTERNAL MEDICINE

## 2023-07-10 PROCEDURE — 3078F DIAST BP <80 MM HG: CPT | Mod: CPTII,S$GLB,, | Performed by: INTERNAL MEDICINE

## 2023-07-10 PROCEDURE — 1101F PR PT FALLS ASSESS DOC 0-1 FALLS W/OUT INJ PAST YR: ICD-10-PCS | Mod: CPTII,S$GLB,, | Performed by: INTERNAL MEDICINE

## 2023-07-10 PROCEDURE — 3074F PR MOST RECENT SYSTOLIC BLOOD PRESSURE < 130 MM HG: ICD-10-PCS | Mod: CPTII,S$GLB,, | Performed by: INTERNAL MEDICINE

## 2023-07-10 PROCEDURE — 1159F MED LIST DOCD IN RCRD: CPT | Mod: CPTII,S$GLB,, | Performed by: INTERNAL MEDICINE

## 2023-07-10 PROCEDURE — 1101F PT FALLS ASSESS-DOCD LE1/YR: CPT | Mod: CPTII,S$GLB,, | Performed by: INTERNAL MEDICINE

## 2023-07-10 PROCEDURE — 1159F PR MEDICATION LIST DOCUMENTED IN MEDICAL RECORD: ICD-10-PCS | Mod: CPTII,S$GLB,, | Performed by: INTERNAL MEDICINE

## 2023-07-10 PROCEDURE — 84439 ASSAY OF FREE THYROXINE: CPT | Performed by: INTERNAL MEDICINE

## 2023-07-10 PROCEDURE — 3008F PR BODY MASS INDEX (BMI) DOCUMENTED: ICD-10-PCS | Mod: CPTII,S$GLB,, | Performed by: INTERNAL MEDICINE

## 2023-07-10 PROCEDURE — 84443 ASSAY THYROID STIM HORMONE: CPT | Performed by: INTERNAL MEDICINE

## 2023-07-10 PROCEDURE — 1160F PR REVIEW ALL MEDS BY PRESCRIBER/CLIN PHARMACIST DOCUMENTED: ICD-10-PCS | Mod: CPTII,S$GLB,, | Performed by: INTERNAL MEDICINE

## 2023-07-10 PROCEDURE — 80061 LIPID PANEL: CPT | Performed by: INTERNAL MEDICINE

## 2023-07-10 PROCEDURE — 3288F FALL RISK ASSESSMENT DOCD: CPT | Mod: CPTII,S$GLB,, | Performed by: INTERNAL MEDICINE

## 2023-07-10 PROCEDURE — 1160F RVW MEDS BY RX/DR IN RCRD: CPT | Mod: CPTII,S$GLB,, | Performed by: INTERNAL MEDICINE

## 2023-07-10 PROCEDURE — 99999 PR PBB SHADOW E&M-EST. PATIENT-LVL IV: ICD-10-PCS | Mod: PBBFAC,,, | Performed by: INTERNAL MEDICINE

## 2023-07-10 PROCEDURE — 85025 COMPLETE CBC W/AUTO DIFF WBC: CPT | Performed by: INTERNAL MEDICINE

## 2023-07-10 PROCEDURE — 1126F AMNT PAIN NOTED NONE PRSNT: CPT | Mod: CPTII,S$GLB,, | Performed by: INTERNAL MEDICINE

## 2023-07-10 PROCEDURE — 3078F PR MOST RECENT DIASTOLIC BLOOD PRESSURE < 80 MM HG: ICD-10-PCS | Mod: CPTII,S$GLB,, | Performed by: INTERNAL MEDICINE

## 2023-07-10 PROCEDURE — 83036 HEMOGLOBIN GLYCOSYLATED A1C: CPT | Performed by: INTERNAL MEDICINE

## 2023-07-10 PROCEDURE — 3288F PR FALLS RISK ASSESSMENT DOCUMENTED: ICD-10-PCS | Mod: CPTII,S$GLB,, | Performed by: INTERNAL MEDICINE

## 2023-07-10 PROCEDURE — 1126F PR PAIN SEVERITY QUANTIFIED, NO PAIN PRESENT: ICD-10-PCS | Mod: CPTII,S$GLB,, | Performed by: INTERNAL MEDICINE

## 2023-07-10 PROCEDURE — 99214 PR OFFICE/OUTPT VISIT, EST, LEVL IV, 30-39 MIN: ICD-10-PCS | Mod: S$GLB,,, | Performed by: INTERNAL MEDICINE

## 2023-07-10 PROCEDURE — 99214 OFFICE O/P EST MOD 30 MIN: CPT | Mod: S$GLB,,, | Performed by: INTERNAL MEDICINE

## 2023-07-10 PROCEDURE — 3008F BODY MASS INDEX DOCD: CPT | Mod: CPTII,S$GLB,, | Performed by: INTERNAL MEDICINE

## 2023-07-10 PROCEDURE — 99999 PR PBB SHADOW E&M-EST. PATIENT-LVL IV: CPT | Mod: PBBFAC,,, | Performed by: INTERNAL MEDICINE

## 2023-07-10 PROCEDURE — 80053 COMPREHEN METABOLIC PANEL: CPT | Performed by: INTERNAL MEDICINE

## 2023-07-10 PROCEDURE — 3074F SYST BP LT 130 MM HG: CPT | Mod: CPTII,S$GLB,, | Performed by: INTERNAL MEDICINE

## 2023-07-10 RX ORDER — ASPIRIN 81 MG/1
81 TABLET ORAL DAILY
COMMUNITY

## 2023-07-10 RX ORDER — SILDENAFIL CITRATE 20 MG/1
TABLET ORAL
Qty: 90 TABLET | Refills: 5 | Status: SHIPPED | OUTPATIENT
Start: 2023-07-10

## 2023-07-10 NOTE — PROGRESS NOTES
"HPI:  Patient is a 67-year-old man who comes today for follow-up of his hypertension, lipids, and prediabetes.  Patient this time only complains of pain at the distal tip of his left 3rd toe for the last several weeks.  He denies any trauma or injury.  He states he rarely has to use his oxygen anymore.  Only when he is doing some heavy exertional activity does not use it.  He has been able to lose 10 lb of weight in the last 6 months.  He is just cutting back in eating.  There have been no other new problems or complaints.    Current meds have been verified and updated per the EMR  Exam:/74 (BP Location: Left arm, Patient Position: Sitting, BP Method: Large (Manual))   Pulse 81   Temp (!) 95 °F (35 °C) (Tympanic)   Ht 6' 2" (1.88 m)   Wt 117.3 kg (258 lb 9.6 oz)   SpO2 (!) 94%   BMI 33.20 kg/m²   Carotids 2+ equal without bruits  Chest clear  Cardiovascular regular rate and rhythm without murmur gallop or rub  Extremities without edema  He has a plantar wart on the distal 3rd toe.  He has onychomycosis of most of the toenails    Lab Results   Component Value Date    WBC 3.95 12/28/2022    HGB 14.6 12/28/2022    HCT 45.8 12/28/2022     12/28/2022    CHOL 147 12/28/2022    TRIG 69 12/28/2022    HDL 31 (L) 12/28/2022    ALT 24 12/28/2022    AST 25 12/28/2022     12/28/2022    K 4.3 12/28/2022     12/28/2022    CREATININE 1.5 (H) 12/28/2022    BUN 23 12/28/2022    CO2 25 12/28/2022    TSH 4.143 (H) 12/28/2022    PSA 0.68 12/28/2022    INR 1.0 03/03/2022    HGBA1C 6.3 (H) 03/03/2022       Impression:  Plantar wart causing pain in the distal 3rd toe of the left foot  Hypertension, chronic kidney disease, prediabetes, lipids all stable  Chronic respiratory disease secondary to COVID-19, stable  Patient Active Problem List   Diagnosis    Benign hypertension    CKD (chronic kidney disease), stage III    Hypertensive kidney disease with chronic kidney disease stage III    History of colon polyps "    Chronic respiratory failure with hypoxia    Hypergammaglobulinemia    Mixed hyperlipidemia    Prediabetes    Restrictive lung disease    Diffusion capacity of lung (dl), decreased       Plan:  Orders Placed This Encounter    CBC Auto Differential    Comprehensive Metabolic Panel    Lipid Panel    TSH    Hemoglobin A1C    Ambulatory referral/consult to Podiatry    sildenafil (REVATIO) 20 mg Tab   Patient was referred to see Podiatry.  He was also given Viagra for erectile dysfunction.  Patient will be seen again in 6 months.  He will have above lab work done today.      This note is generated with speech recognition software and is subject to transcription error and sound alike phrases that may be missed by proofreading.

## 2023-07-10 NOTE — TELEPHONE ENCOUNTER
Patient questioning podiatry appt , confirmed scheduled patient stated ginger gutierres did not see that

## 2023-07-11 ENCOUNTER — PATIENT MESSAGE (OUTPATIENT)
Dept: INTERNAL MEDICINE | Facility: CLINIC | Age: 68
End: 2023-07-11
Payer: COMMERCIAL

## 2023-07-11 ENCOUNTER — TELEPHONE (OUTPATIENT)
Dept: INTERNAL MEDICINE | Facility: CLINIC | Age: 68
End: 2023-07-11
Payer: COMMERCIAL

## 2023-07-11 DIAGNOSIS — E03.9 ACQUIRED HYPOTHYROIDISM: Primary | ICD-10-CM

## 2023-07-11 DIAGNOSIS — D89.2 HYPERGAMMAGLOBULINEMIA: ICD-10-CM

## 2023-07-11 RX ORDER — LEVOTHYROXINE SODIUM 50 UG/1
50 TABLET ORAL
Qty: 30 TABLET | Refills: 11 | Status: SHIPPED | OUTPATIENT
Start: 2023-07-11 | End: 2024-07-10

## 2023-07-11 NOTE — TELEPHONE ENCOUNTER
----- Message from Fannie Quinones sent at 7/11/2023  8:22 AM CDT -----  Contact: pt  Type:  Patient Returning Call    Who Called:pt  Who Left Message for Patient: nurse  Does the patient know what this is regarding?: results  Would the patient rather a call back or a response via [facility]?  phone  Best Call Back Number: [phone]  Additional Information:        
Called patient educated on thyroid and medication patient stated okay labs scheduled   
[Time Spent: ___ minutes] : I have spent [unfilled] minutes of time on the encounter.

## 2023-07-11 NOTE — TELEPHONE ENCOUNTER
Your lab work was all in acceptable ranges except that your thyroid gland is underactive and enough so that I want to start you on a thyroid supplement to take every day. I have sent in the prescription to your pharmacy. I need to repeat your thyroid levels in two mths and my nurse will be calling you to set up the lab appt.

## 2023-07-18 ENCOUNTER — OFFICE VISIT (OUTPATIENT)
Dept: PODIATRY | Facility: CLINIC | Age: 68
End: 2023-07-18
Payer: COMMERCIAL

## 2023-07-18 VITALS — BODY MASS INDEX: 33.11 KG/M2 | HEIGHT: 74 IN | WEIGHT: 258 LBS

## 2023-07-18 DIAGNOSIS — M20.42 HAMMER TOE OF LEFT FOOT: Primary | ICD-10-CM

## 2023-07-18 DIAGNOSIS — M79.675 PAIN IN LEFT TOE(S): ICD-10-CM

## 2023-07-18 PROCEDURE — 99203 PR OFFICE/OUTPT VISIT, NEW, LEVL III, 30-44 MIN: ICD-10-PCS | Mod: S$GLB,,, | Performed by: PODIATRIST

## 2023-07-18 PROCEDURE — 1101F PT FALLS ASSESS-DOCD LE1/YR: CPT | Mod: CPTII,S$GLB,, | Performed by: PODIATRIST

## 2023-07-18 PROCEDURE — 1125F AMNT PAIN NOTED PAIN PRSNT: CPT | Mod: CPTII,S$GLB,, | Performed by: PODIATRIST

## 2023-07-18 PROCEDURE — 1101F PR PT FALLS ASSESS DOC 0-1 FALLS W/OUT INJ PAST YR: ICD-10-PCS | Mod: CPTII,S$GLB,, | Performed by: PODIATRIST

## 2023-07-18 PROCEDURE — 3008F BODY MASS INDEX DOCD: CPT | Mod: CPTII,S$GLB,, | Performed by: PODIATRIST

## 2023-07-18 PROCEDURE — 3008F PR BODY MASS INDEX (BMI) DOCUMENTED: ICD-10-PCS | Mod: CPTII,S$GLB,, | Performed by: PODIATRIST

## 2023-07-18 PROCEDURE — 3044F PR MOST RECENT HEMOGLOBIN A1C LEVEL <7.0%: ICD-10-PCS | Mod: CPTII,S$GLB,, | Performed by: PODIATRIST

## 2023-07-18 PROCEDURE — 1159F PR MEDICATION LIST DOCUMENTED IN MEDICAL RECORD: ICD-10-PCS | Mod: CPTII,S$GLB,, | Performed by: PODIATRIST

## 2023-07-18 PROCEDURE — 1125F PR PAIN SEVERITY QUANTIFIED, PAIN PRESENT: ICD-10-PCS | Mod: CPTII,S$GLB,, | Performed by: PODIATRIST

## 2023-07-18 PROCEDURE — 1160F RVW MEDS BY RX/DR IN RCRD: CPT | Mod: CPTII,S$GLB,, | Performed by: PODIATRIST

## 2023-07-18 PROCEDURE — 1160F PR REVIEW ALL MEDS BY PRESCRIBER/CLIN PHARMACIST DOCUMENTED: ICD-10-PCS | Mod: CPTII,S$GLB,, | Performed by: PODIATRIST

## 2023-07-18 PROCEDURE — 3288F PR FALLS RISK ASSESSMENT DOCUMENTED: ICD-10-PCS | Mod: CPTII,S$GLB,, | Performed by: PODIATRIST

## 2023-07-18 PROCEDURE — 99999 PR PBB SHADOW E&M-EST. PATIENT-LVL III: ICD-10-PCS | Mod: PBBFAC,,, | Performed by: PODIATRIST

## 2023-07-18 PROCEDURE — 3044F HG A1C LEVEL LT 7.0%: CPT | Mod: CPTII,S$GLB,, | Performed by: PODIATRIST

## 2023-07-18 PROCEDURE — 1159F MED LIST DOCD IN RCRD: CPT | Mod: CPTII,S$GLB,, | Performed by: PODIATRIST

## 2023-07-18 PROCEDURE — 99999 PR PBB SHADOW E&M-EST. PATIENT-LVL III: CPT | Mod: PBBFAC,,, | Performed by: PODIATRIST

## 2023-07-18 PROCEDURE — 99203 OFFICE O/P NEW LOW 30 MIN: CPT | Mod: S$GLB,,, | Performed by: PODIATRIST

## 2023-07-18 PROCEDURE — 3288F FALL RISK ASSESSMENT DOCD: CPT | Mod: CPTII,S$GLB,, | Performed by: PODIATRIST

## 2023-07-18 NOTE — PROGRESS NOTES
Subjective:       Patient ID: Abdirahman Rodriguez is a 67 y.o. male.    Chief Complaint: Plantar Warts (C/o possible wart on left foot, rate pain 9/10, non diabetic, pt is wearing tennis shoes and socks, pt was last seen on 7/10/23 by PCP Mt Noel MD)      HPI: Abdirahman Rodriguez presents to the office today, with complaints of pains to the left foot. The pains are stated as moderate to severe at the 3rd/4th toe. Patient states limping with gait at times due to the pains. Pains are exacerbated by walking and standing. Sitting and limited WB does alleviate and/or decrease the pains. The patient does have hammer toe contractures. States alternation of shoe gear has not been helpful. Patient's Primary Care Provider is Mt Noel MD.     Review of patient's allergies indicates:  No Known Allergies    Past Medical History:   Diagnosis Date    Benign hypertension     Class 1 obesity in adult 05/25/2023    History of colon polyps     Hypergammaglobulinemia     Hypertensive kidney disease with chronic kidney disease stage III     Hypothyroidism, unspecified        Family History   Problem Relation Age of Onset    Heart disease Father     Hypertension Father     Heart disease Mother     Hypertension Mother        Social History     Socioeconomic History    Marital status:    Tobacco Use    Smoking status: Never    Smokeless tobacco: Never   Substance and Sexual Activity    Alcohol use: No     Comment: rarely    Drug use: No       Past Surgical History:   Procedure Laterality Date    COLONOSCOPY N/A 12/6/2021    Procedure: COLONOSCOPY;  Surgeon: Doris Altman MD;  Location: [facility];  Service: Endoscopy;  Laterality: N/A;    None         Review of Systems   Constitutional:  Negative for chills, fatigue and fever.   HENT:  Negative for hearing loss.    Eyes:  Negative for photophobia and visual disturbance.   Respiratory:  Negative for cough, chest tightness, shortness of breath and wheezing.    Cardiovascular:   "Negative for chest pain and palpitations.   Gastrointestinal:  Negative for constipation, diarrhea, nausea and vomiting.   Endocrine: Negative for cold intolerance and heat intolerance.   Genitourinary:  Negative for flank pain.   Musculoskeletal:  Positive for gait problem. Negative for neck pain and neck stiffness.   Neurological:  Negative for light-headedness and headaches.   Psychiatric/Behavioral:  Negative for sleep disturbance.        Objective:   Ht 6' 2" (1.88 m)   Wt 117 kg (258 lb)   BMI 33.13 kg/m²     Physical Exam    LOWER EXTREMITY PHYSICAL EXAMINATION    DERMATOLOGY: Skin is supple, dry and intact. Callus formation, distal LLE 3rd/4th toe.    ORTHOPEDIC: Manual Muscle Testing is 5/5 in all planes on the left foot, without pains, with and without resistance. Gait pattern is antalgic. There is moderate to severe semi-rigid hammer toe contracture to the left foot at the 3rd toe and 4th toe. Associated metatarsalgia is noted.     Assessment:     1. Hammer toe of left foot    2. Pain in left toe(s)          Plan:     Hammer toe of left foot    Pain in left toe(s)  -     Ambulatory referral/consult to Podiatry        This patient does have hammertoe (digital) contractures. I did advise the patient to ambulate with shoe gear that is high in the tox box to allow for extra room and depth in the sagittal plane, in order to alleviate and lessen the potential for dorsal digital break down at the IPJs. I do also recommended shoe gear that is soft and supple in the foot bed as to lessen the potential for plantar distal digital break down at the contracted digits. If the patient does not feel the aforementioned is necessary, he or she may also purchase OTC padding devices to be worn across the MTPJ, at the distal aspects of the digits, and/or at the dorsal aspects of the IPJs. The patient does acknowledge understanding and is said to be amenable to compliance.         Future Appointments   Date Time Provider " Department Center   8/4/2023 10:00 AM [facility] [place]   8/4/2023 11:00 AM [facility] [place]   8/4/2023  1:00 PM [address]   8/4/2023  1:30 PM ECHO, INPATIENT [place] IP ECHO [place]   8/18/2023 10:30 AM Scarlet Mcfadden NP [facility]   9/12/2023  9:00 AM LABORATORY, [facility]   1/10/2024  8:00 AM Mt Noel MD [facility]

## 2023-08-04 ENCOUNTER — HOSPITAL ENCOUNTER (OUTPATIENT)
Dept: RADIOLOGY | Facility: HOSPITAL | Age: 68
Discharge: HOME | End: 2023-08-04
Attending: INTERNAL MEDICINE
Payer: COMMERCIAL

## 2023-08-04 ENCOUNTER — HOSPITAL ENCOUNTER (OUTPATIENT)
Dept: PULMONOLOGY | Facility: HOSPITAL | Age: 68
Discharge: HOME | End: 2023-08-04
Attending: INTERNAL MEDICINE
Payer: COMMERCIAL

## 2023-08-04 ENCOUNTER — HOSPITAL ENCOUNTER (OUTPATIENT)
Dept: CARDIOLOGY | Facility: HOSPITAL | Age: 68
Discharge: HOME | End: 2023-08-04
Attending: INTERNAL MEDICINE
Payer: COMMERCIAL

## 2023-08-04 VITALS
HEIGHT: 74 IN | HEIGHT: 74 IN | BODY MASS INDEX: 33.11 KG/M2 | HEART RATE: 67 BPM | WEIGHT: 258 LBS | BODY MASS INDEX: 33.11 KG/M2 | SYSTOLIC BLOOD PRESSURE: 129 MMHG | DIASTOLIC BLOOD PRESSURE: 89 MMHG | WEIGHT: 258 LBS

## 2023-08-04 DIAGNOSIS — R94.2 DIFFUSION CAPACITY OF LUNG (DL), DECREASED: ICD-10-CM

## 2023-08-04 DIAGNOSIS — I12.9 HYPERTENSIVE KIDNEY DISEASE WITH STAGE 3A CHRONIC KIDNEY DISEASE: ICD-10-CM

## 2023-08-04 DIAGNOSIS — N18.31 HYPERTENSIVE KIDNEY DISEASE WITH STAGE 3A CHRONIC KIDNEY DISEASE: ICD-10-CM

## 2023-08-04 DIAGNOSIS — J98.4 RESTRICTIVE LUNG DISEASE: ICD-10-CM

## 2023-08-04 DIAGNOSIS — R06.02 SHORT OF BREATH ON EXERTION: ICD-10-CM

## 2023-08-04 DIAGNOSIS — R73.03 PREDIABETES: ICD-10-CM

## 2023-08-04 LAB
AORTIC ROOT ANNULUS: 3.44 CM
ASCENDING AORTA: 3.74 CM
AV INDEX (PROSTH): 0.77
AV MEAN GRADIENT: 3 MMHG
AV PEAK GRADIENT: 4 MMHG
AV VALVE AREA BY VELOCITY RATIO: 2.64 CM²
AV VALVE AREA: 2.88 CM²
AV VELOCITY RATIO: 0.7
BSA FOR ECHO PROCEDURE: 2.47 M2
CV ECHO LV RWT: 0.41 CM
CV STRESS BASE HR: 79 BPM
DIASTOLIC BLOOD PRESSURE: 89 MMHG
DOP CALC AO PEAK VEL: 1.04 M/S
DOP CALC AO VTI: 21.8 CM
DOP CALC LVOT AREA: 3.8 CM2
DOP CALC LVOT DIAMETER: 2.19 CM
DOP CALC LVOT PEAK VEL: 0.73 M/S
DOP CALC LVOT STROKE VOLUME: 62.87 CM3
DOP CALC RVOT PEAK VEL: 0.56 M/S
DOP CALC RVOT VTI: 12.8 CM
DOP CALCLVOT PEAK VEL VTI: 16.7 CM
E WAVE DECELERATION TIME: 244.86 MSEC
E/A RATIO: 0.95
E/E' RATIO: 5.89 M/S
ECHO LV POSTERIOR WALL: 1.11 CM (ref 0.6–1.1)
EJECTION FRACTION- HIGH: 73 %
END DIASTOLIC INDEX-HIGH: 165 ML/M2
END DIASTOLIC INDEX-LOW: 101 ML/M2
END SYSTOLIC INDEX-HIGH: 64 ML/M2
END SYSTOLIC INDEX-LOW: 28 ML/M2
FRACTIONAL SHORTENING: 33 % (ref 28–44)
INTERVENTRICULAR SEPTUM: 1.07 CM (ref 0.6–1.1)
IVC DIAMETER: 1.63 CM
IVRT: 95.15 MSEC
LA MAJOR: 5.31 CM
LA MINOR: 4.51 CM
LA WIDTH: 3.6 CM
LEFT ATRIUM SIZE: 3.81 CM
LEFT ATRIUM VOLUME INDEX: 23.5 ML/M2
LEFT ATRIUM VOLUME: 56.86 CM3
LEFT INTERNAL DIMENSION IN SYSTOLE: 3.69 CM (ref 2.1–4)
LEFT VENTRICLE DIASTOLIC VOLUME INDEX: 60.05 ML/M2
LEFT VENTRICLE DIASTOLIC VOLUME: 145.32 ML
LEFT VENTRICLE MASS INDEX: 98 G/M2
LEFT VENTRICLE SYSTOLIC VOLUME INDEX: 23.9 ML/M2
LEFT VENTRICLE SYSTOLIC VOLUME: 57.91 ML
LEFT VENTRICULAR INTERNAL DIMENSION IN DIASTOLE: 5.47 CM (ref 3.5–6)
LEFT VENTRICULAR MASS: 236.91 G
LV LATERAL E/E' RATIO: 6.22 M/S
LV SEPTAL E/E' RATIO: 5.6 M/S
LVOT MG: 1.43 MMHG
LVOT MV: 0.58 CM/S
MV PEAK A VEL: 0.59 M/S
MV PEAK E VEL: 0.56 M/S
NUC REST EJECTION FRACTION: 67
NUC STRESS EJECTION FRACTION: 55 %
OHS CV CPX 85 PERCENT MAX PREDICTED HEART RATE MALE: 132
OHS CV CPX MAX PREDICTED HEART RATE: 155
OHS CV CPX PATIENT IS FEMALE: 0
OHS CV CPX PATIENT IS MALE: 1
OHS CV CPX PEAK DIASTOLIC BLOOD PRESSURE: 72 MMHG
OHS CV CPX PEAK HEAR RATE: 131 BPM
OHS CV CPX PEAK RATE PRESSURE PRODUCT: NORMAL
OHS CV CPX PEAK SYSTOLIC BLOOD PRESSURE: 170 MMHG
OHS CV CPX PERCENT MAX PREDICTED HEART RATE ACHIEVED: 85
OHS CV CPX RATE PRESSURE PRODUCT PRESENTING: NORMAL
PISA TR MAX VEL: 1.44 M/S
PV MEAN GRADIENT: 1 MMHG
RA MAJOR: 4.59 CM
RA PRESSURE ESTIMATED: 3 MMHG
RA WIDTH: 4.1 CM
RETIRED EF AND QEF - SEE NOTES: 59 %
RV TB RVSP: 4 MMHG
STJ: 3.74 CM
STRESS ST DEPRESSION: 1.1 MM
SYSTOLIC BLOOD PRESSURE: 129 MMHG
TDI LATERAL: 0.09 M/S
TDI SEPTAL: 0.1 M/S
TDI: 0.1 M/S
TR MAX PG: 8 MMHG
TRICUSPID ANNULAR PLANE SYSTOLIC EXCURSION: 2.2 CM
TV REST PULMONARY ARTERY PRESSURE: 11 MMHG
Z-SCORE OF LEFT VENTRICULAR DIMENSION IN END DIASTOLE: -7.38
Z-SCORE OF LEFT VENTRICULAR DIMENSION IN END SYSTOLE: -4.88

## 2023-08-04 PROCEDURE — 78452 HT MUSCLE IMAGE SPECT MULT: CPT | Mod: 26,,, | Performed by: INTERNAL MEDICINE

## 2023-08-04 PROCEDURE — 93016 NUCLEAR STRESS - CARDIOLOGY INTERPRETED (CUPID ONLY): ICD-10-PCS | Mod: ,,, | Performed by: INTERNAL MEDICINE

## 2023-08-04 PROCEDURE — 93306 ECHO (CUPID ONLY): ICD-10-PCS | Mod: 26,,, | Performed by: INTERNAL MEDICINE

## 2023-08-04 PROCEDURE — 93018 CV STRESS TEST I&R ONLY: CPT | Mod: ,,, | Performed by: INTERNAL MEDICINE

## 2023-08-04 PROCEDURE — 93306 TTE W/DOPPLER COMPLETE: CPT

## 2023-08-04 PROCEDURE — 93017 CV STRESS TEST TRACING ONLY: CPT

## 2023-08-04 PROCEDURE — 93306 TTE W/DOPPLER COMPLETE: CPT | Mod: 26,,, | Performed by: INTERNAL MEDICINE

## 2023-08-04 PROCEDURE — 93016 CV STRESS TEST SUPVJ ONLY: CPT | Mod: ,,, | Performed by: INTERNAL MEDICINE

## 2023-08-04 PROCEDURE — 78452 NUCLEAR STRESS - CARDIOLOGY INTERPRETED (CUPID ONLY): ICD-10-PCS | Mod: 26,,, | Performed by: INTERNAL MEDICINE

## 2023-08-04 PROCEDURE — A9502 TC99M TETROFOSMIN: HCPCS

## 2023-08-04 PROCEDURE — 93018 NUCLEAR STRESS - CARDIOLOGY INTERPRETED (CUPID ONLY): ICD-10-PCS | Mod: ,,, | Performed by: INTERNAL MEDICINE

## 2023-08-07 ENCOUNTER — TELEPHONE (OUTPATIENT)
Dept: CARDIOLOGY | Facility: CLINIC | Age: 68
End: 2023-08-07
Payer: COMMERCIAL

## 2023-08-07 DIAGNOSIS — R94.39 ABNORMAL NUCLEAR STRESS TEST: Primary | ICD-10-CM

## 2023-08-07 DIAGNOSIS — R06.02 SHORT OF BREATH ON EXERTION: ICD-10-CM

## 2023-08-07 DIAGNOSIS — R07.9 CHEST PAIN, UNSPECIFIED TYPE: ICD-10-CM

## 2023-08-07 NOTE — TELEPHONE ENCOUNTER
Patient was notified of results. All questions were answered. Pt verbalized understanding. Pt will call back with any other questions or concerns.    Will set up and call patient back  ----- Message from Vahid Juarez MD sent at 8/4/2023  5:04 PM CDT -----  Stress test is abnormal he needs a heart cath

## 2023-08-07 NOTE — TELEPHONE ENCOUNTER
.LVM for patent to call the office regarding results.      ---- Message from Vahid Juarez MD sent at 8/4/2023  5:04 PM CDT -----  Stress test is abnormal he needs a heart cath

## 2023-08-07 NOTE — TELEPHONE ENCOUNTER
Patient was notified of results. All questions were answered. Pt verbalized understanding. Pt will call back with any other questions or concerns.         Will set up procedure and call patient back      ----- Message from Vahid Juarez MD sent at 8/7/2023  4:34 PM CDT -----  I am ok if he is ok./  ----- Message -----  From: Luly Jenkins LPN  Sent: 8/7/2023   3:33 PM CDT  To: Vahid Juarez MD    Do you want to do him next week? I just talked to him and he is ok with scheduling  ----- Message -----  From: Vahid Juarez MD  Sent: 8/4/2023   5:04 PM CDT  To: Larry FAJARDO Staff    Stress test is abnormal he needs a heart cath

## 2023-08-08 ENCOUNTER — LAB VISIT (OUTPATIENT)
Dept: LAB | Facility: HOSPITAL | Age: 68
End: 2023-08-08
Attending: INTERNAL MEDICINE
Payer: COMMERCIAL

## 2023-08-08 DIAGNOSIS — R06.02 SHORT OF BREATH ON EXERTION: ICD-10-CM

## 2023-08-08 DIAGNOSIS — R07.9 CHEST PAIN, UNSPECIFIED TYPE: ICD-10-CM

## 2023-08-08 DIAGNOSIS — R94.39 ABNORMAL NUCLEAR STRESS TEST: ICD-10-CM

## 2023-08-08 LAB
ANION GAP SERPL CALC-SCNC: 10 MMOL/L (ref 8–16)
APTT PPP: 30.5 SEC (ref 21–32)
BASOPHILS # BLD AUTO: 0.01 K/UL (ref 0–0.2)
BASOPHILS NFR BLD: 0.3 % (ref 0–1.9)
BUN SERPL-MCNC: 22 MG/DL (ref 8–23)
CALCIUM SERPL-MCNC: 9.3 MG/DL (ref 8.7–10.5)
CHLORIDE SERPL-SCNC: 102 MMOL/L (ref 95–110)
CO2 SERPL-SCNC: 24 MMOL/L (ref 23–29)
CREAT SERPL-MCNC: 1.7 MG/DL (ref 0.5–1.4)
DIFFERENTIAL METHOD: ABNORMAL
EOSINOPHIL # BLD AUTO: 0.2 K/UL (ref 0–0.5)
EOSINOPHIL NFR BLD: 5.8 % (ref 0–8)
ERYTHROCYTE [DISTWIDTH] IN BLOOD BY AUTOMATED COUNT: 13.7 % (ref 11.5–14.5)
EST. GFR  (NO RACE VARIABLE): 44.2 ML/MIN/1.73 M^2
GLUCOSE SERPL-MCNC: 100 MG/DL (ref 70–110)
HCT VFR BLD AUTO: 42 % (ref 40–54)
HGB BLD-MCNC: 13.4 G/DL (ref 14–18)
IMM GRANULOCYTES # BLD AUTO: 0.01 K/UL (ref 0–0.04)
IMM GRANULOCYTES NFR BLD AUTO: 0.3 % (ref 0–0.5)
INR PPP: 1.1 (ref 0.8–1.2)
LYMPHOCYTES # BLD AUTO: 1 K/UL (ref 1–4.8)
LYMPHOCYTES NFR BLD: 25.8 % (ref 18–48)
MCH RBC QN AUTO: 30 PG (ref 27–31)
MCHC RBC AUTO-ENTMCNC: 31.9 G/DL (ref 32–36)
MCV RBC AUTO: 94 FL (ref 82–98)
MONOCYTES # BLD AUTO: 0.6 K/UL (ref 0.3–1)
MONOCYTES NFR BLD: 14.3 % (ref 4–15)
NEUTROPHILS # BLD AUTO: 2.2 K/UL (ref 1.8–7.7)
NEUTROPHILS NFR BLD: 53.5 % (ref 38–73)
NRBC BLD-RTO: 0 /100 WBC
PLATELET # BLD AUTO: 174 K/UL (ref 150–450)
PMV BLD AUTO: 11 FL (ref 9.2–12.9)
POTASSIUM SERPL-SCNC: 4.5 MMOL/L (ref 3.5–5.1)
PROTHROMBIN TIME: 11.3 SEC (ref 9–12.5)
RBC # BLD AUTO: 4.46 M/UL (ref 4.6–6.2)
SODIUM SERPL-SCNC: 136 MMOL/L (ref 136–145)
WBC # BLD AUTO: 4 K/UL (ref 3.9–12.7)

## 2023-08-08 PROCEDURE — 85730 THROMBOPLASTIN TIME PARTIAL: CPT | Performed by: INTERNAL MEDICINE

## 2023-08-08 PROCEDURE — 80048 BASIC METABOLIC PNL TOTAL CA: CPT | Performed by: INTERNAL MEDICINE

## 2023-08-08 PROCEDURE — 36415 COLL VENOUS BLD VENIPUNCTURE: CPT | Performed by: INTERNAL MEDICINE

## 2023-08-08 PROCEDURE — 85610 PROTHROMBIN TIME: CPT | Performed by: INTERNAL MEDICINE

## 2023-08-08 PROCEDURE — 85025 COMPLETE CBC W/AUTO DIFF WBC: CPT | Performed by: INTERNAL MEDICINE

## 2023-08-13 PROBLEM — R94.39 ABNORMAL NUCLEAR STRESS TEST: Status: ACTIVE | Noted: 2023-08-13

## 2023-08-14 ENCOUNTER — HOSPITAL ENCOUNTER (OUTPATIENT)
Facility: HOSPITAL | Age: 68
Discharge: HOME | End: 2023-08-14
Attending: INTERNAL MEDICINE | Admitting: INTERNAL MEDICINE
Payer: COMMERCIAL

## 2023-08-14 VITALS
DIASTOLIC BLOOD PRESSURE: 80 MMHG | BODY MASS INDEX: 32.85 KG/M2 | WEIGHT: 256 LBS | HEIGHT: 74 IN | SYSTOLIC BLOOD PRESSURE: 124 MMHG | TEMPERATURE: 98 F | HEART RATE: 70 BPM | OXYGEN SATURATION: 95 % | RESPIRATION RATE: 20 BRPM

## 2023-08-14 DIAGNOSIS — R94.31 ABNORMAL EKG: ICD-10-CM

## 2023-08-14 DIAGNOSIS — N18.31 HYPERTENSIVE KIDNEY DISEASE WITH STAGE 3A CHRONIC KIDNEY DISEASE: ICD-10-CM

## 2023-08-14 DIAGNOSIS — I20.9 NEW-ONSET ANGINA: ICD-10-CM

## 2023-08-14 DIAGNOSIS — R06.02 SOB (SHORTNESS OF BREATH) ON EXERTION: ICD-10-CM

## 2023-08-14 DIAGNOSIS — I12.9 HYPERTENSIVE KIDNEY DISEASE WITH STAGE 3A CHRONIC KIDNEY DISEASE: ICD-10-CM

## 2023-08-14 DIAGNOSIS — R94.39 ABNORMAL STRESS TEST: ICD-10-CM

## 2023-08-14 DIAGNOSIS — R94.39 ABNORMAL NUCLEAR STRESS TEST: Primary | ICD-10-CM

## 2023-08-14 LAB — CATH EF QUANTITATIVE: 55 %

## 2023-08-14 PROCEDURE — C1894 INTRO/SHEATH, NON-LASER: HCPCS | Performed by: INTERNAL MEDICINE

## 2023-08-14 PROCEDURE — 99152 MOD SED SAME PHYS/QHP 5/>YRS: CPT | Mod: ,,, | Performed by: INTERNAL MEDICINE

## 2023-08-14 PROCEDURE — 93458 L HRT ARTERY/VENTRICLE ANGIO: CPT | Mod: 26,,, | Performed by: INTERNAL MEDICINE

## 2023-08-14 PROCEDURE — 93458 PR CATH PLACE/CORON ANGIO, IMG SUPER/INTERP,W LEFT HEART VENTRICULOGRAPHY: ICD-10-PCS | Mod: 26,,, | Performed by: INTERNAL MEDICINE

## 2023-08-14 PROCEDURE — 99152 PR MOD CONSCIOUS SEDATION, SAME PHYS, 5+ YRS, FIRST 15 MIN: ICD-10-PCS | Mod: ,,, | Performed by: INTERNAL MEDICINE

## 2023-08-14 PROCEDURE — C1769 GUIDE WIRE: HCPCS | Performed by: INTERNAL MEDICINE

## 2023-08-14 PROCEDURE — 99152 MOD SED SAME PHYS/QHP 5/>YRS: CPT | Performed by: INTERNAL MEDICINE

## 2023-08-14 PROCEDURE — 25500020 PHARM REV CODE 255: Performed by: INTERNAL MEDICINE

## 2023-08-14 PROCEDURE — 63600175 PHARM REV CODE 636 W HCPCS: Performed by: INTERNAL MEDICINE

## 2023-08-14 PROCEDURE — 93458 L HRT ARTERY/VENTRICLE ANGIO: CPT | Performed by: INTERNAL MEDICINE

## 2023-08-14 PROCEDURE — 25000003 PHARM REV CODE 250: Performed by: INTERNAL MEDICINE

## 2023-08-14 RX ORDER — ACETAMINOPHEN 325 MG/1
650 TABLET ORAL EVERY 4 HOURS PRN
Status: DISCONTINUED | OUTPATIENT
Start: 2023-08-14 | End: 2023-08-14 | Stop reason: HOSPADM

## 2023-08-14 RX ORDER — NAPROXEN SODIUM 220 MG/1
81 TABLET, FILM COATED ORAL ONCE
Status: COMPLETED | OUTPATIENT
Start: 2023-08-14 | End: 2023-08-14

## 2023-08-14 RX ORDER — ONDANSETRON 8 MG/1
8 TABLET, ORALLY DISINTEGRATING ORAL EVERY 8 HOURS PRN
Status: DISCONTINUED | OUTPATIENT
Start: 2023-08-14 | End: 2023-08-14 | Stop reason: HOSPADM

## 2023-08-14 RX ORDER — DIPHENHYDRAMINE HCL 50 MG
50 CAPSULE ORAL ONCE
Status: COMPLETED | OUTPATIENT
Start: 2023-08-14 | End: 2023-08-14

## 2023-08-14 RX ORDER — HEPARIN SODIUM 1000 [USP'U]/ML
INJECTION, SOLUTION INTRAVENOUS; SUBCUTANEOUS
Status: DISCONTINUED | OUTPATIENT
Start: 2023-08-14 | End: 2023-08-14 | Stop reason: HOSPADM

## 2023-08-14 RX ORDER — NITROGLYCERIN 5 MG/ML
INJECTION, SOLUTION INTRAVENOUS
Status: DISCONTINUED | OUTPATIENT
Start: 2023-08-14 | End: 2023-08-14 | Stop reason: HOSPADM

## 2023-08-14 RX ORDER — MIDAZOLAM HYDROCHLORIDE 1 MG/ML
INJECTION, SOLUTION INTRAMUSCULAR; INTRAVENOUS
Status: DISCONTINUED | OUTPATIENT
Start: 2023-08-14 | End: 2023-08-14 | Stop reason: HOSPADM

## 2023-08-14 RX ORDER — VERAPAMIL HYDROCHLORIDE 2.5 MG/ML
INJECTION, SOLUTION INTRAVENOUS
Status: DISCONTINUED | OUTPATIENT
Start: 2023-08-14 | End: 2023-08-14 | Stop reason: HOSPADM

## 2023-08-14 RX ORDER — FENTANYL CITRATE 50 UG/ML
INJECTION, SOLUTION INTRAMUSCULAR; INTRAVENOUS
Status: DISCONTINUED | OUTPATIENT
Start: 2023-08-14 | End: 2023-08-14 | Stop reason: HOSPADM

## 2023-08-14 RX ORDER — LIDOCAINE HYDROCHLORIDE 20 MG/ML
INJECTION, SOLUTION EPIDURAL; INFILTRATION; INTRACAUDAL; PERINEURAL
Status: DISCONTINUED | OUTPATIENT
Start: 2023-08-14 | End: 2023-08-14 | Stop reason: HOSPADM

## 2023-08-14 RX ORDER — CEFAZOLIN SODIUM 1 G/3ML
INJECTION, POWDER, FOR SOLUTION INTRAMUSCULAR; INTRAVENOUS
Status: DISCONTINUED | OUTPATIENT
Start: 2023-08-14 | End: 2023-08-14 | Stop reason: HOSPADM

## 2023-08-14 RX ORDER — DIAZEPAM 5 MG/1
5 TABLET ORAL
Status: DISCONTINUED | OUTPATIENT
Start: 2023-08-14 | End: 2023-08-14 | Stop reason: HOSPADM

## 2023-08-14 RX ORDER — SODIUM CHLORIDE 9 MG/ML
INJECTION, SOLUTION INTRAVENOUS CONTINUOUS
Status: ACTIVE | OUTPATIENT
Start: 2023-08-14 | End: 2023-08-14

## 2023-08-14 RX ORDER — SODIUM CHLORIDE 0.9 % (FLUSH) 0.9 %
10 SYRINGE (ML) INJECTION
Status: DISCONTINUED | OUTPATIENT
Start: 2023-08-14 | End: 2023-08-14 | Stop reason: HOSPADM

## 2023-08-14 RX ORDER — METOPROLOL TARTRATE 25 MG/1
25 TABLET, FILM COATED ORAL 2 TIMES DAILY
Qty: 60 TABLET | Refills: 6 | Status: SHIPPED | OUTPATIENT
Start: 2023-08-14 | End: 2023-09-19 | Stop reason: SDUPTHER

## 2023-08-14 RX ADMIN — DIPHENHYDRAMINE HYDROCHLORIDE 50 MG: 50 CAPSULE ORAL at 08:08

## 2023-08-14 RX ADMIN — DIAZEPAM 5 MG: 5 TABLET ORAL at 08:08

## 2023-08-14 RX ADMIN — ASPIRIN 81 MG CHEWABLE TABLET 81 MG: 81 TABLET CHEWABLE at 08:08

## 2023-08-14 RX ADMIN — SODIUM CHLORIDE: 9 INJECTION, SOLUTION INTRAVENOUS at 08:08

## 2023-08-14 NOTE — OP NOTE
INPATIENT Operative Note         SUMMARY     Surgery Date: 8/14/2023     Surgeon(s) and Role:     * Vahid Juarez MD - Primary    ASSISTANT:none    Pre-op Diagnosis:  Abnormal stress test [R94.39]  New-onset angina [I20.9]  SOB (shortness of breath) on exertion [R06.02]  Hypertensive kidney disease with stage 3a chronic kidney disease [I12.9, N18.31]  Abnormal EKG [R94.31]      Post-op Diagnosis:  Abnormal stress test [R94.39]  New-onset angina [I20.9]  SOB (shortness of breath) on exertion [R06.02]  Hypertensive kidney disease with stage 3a chronic kidney disease [I12.9, N18.31]  Abnormal EKG [R94.31]    Procedure(s) (LRB):  Left heart cath (Left)    COMPLICATION:none    Anesthesia: RN IV Sedation    Findings/Key Components:  Severe lad disease with duistal timi1 flow with r to lo collaterals.   Non obs rca and lcx disease.  Codominant circulation   Apical hypokinesis    Estimated Blood Loss: < 50 ML.         SPECIMEN: NONE    Devices/Prostetics: None    PLAN: consult cardiac sugery

## 2023-08-14 NOTE — H&P
Admit Note  Cardiology      SUBJECTIVE:     History of Present Illness:  Patient is a 67 y.o. male presents with h/o covid with continued chest pain and shortness of breath that is exertional had echo showing low normal ef and cardiolite positive for ischemia is referred for lhc/ptca    Past Medical History:   Diagnosis Date    Abnormal nuclear stress test 8/13/2023    Benign hypertension     Class 1 obesity in adult 05/25/2023    History of colon polyps     Hypergammaglobulinemia     Hypertensive kidney disease with chronic kidney disease stage III     Hypothyroidism, unspecified      Past Surgical History:   Procedure Laterality Date    COLONOSCOPY N/A 12/6/2021    Procedure: COLONOSCOPY;  Surgeon: Doris Altman MD;  Location: [facility];  Service: Endoscopy;  Laterality: N/A;    None       Family History   Problem Relation Age of Onset    Heart disease Father     Hypertension Father     Heart disease Mother     Hypertension Mother      Social History     Tobacco Use    Smoking status: Never    Smokeless tobacco: Never   Substance Use Topics    Alcohol use: No     Comment: rarely    Drug use: No        Review of patient's allergies indicates:  No Known Allergies    Current Facility-Administered Medications   Medication    0.9%  NaCl infusion    diazePAM tablet 5 mg    sodium chloride 0.9% flush 10 mL     No current facility-administered medications on file prior to encounter.     Current Outpatient Medications on File Prior to Encounter   Medication Sig    aspirin (ECOTRIN) 81 MG EC tablet Take 81 mg by mouth once daily.    levothyroxine (SYNTHROID) 50 MCG tablet Take 1 tablet (50 mcg total) by mouth before breakfast.    losartan-hydrochlorothiazide 100-25 mg (HYZAAR) 100-25 mg per tablet TAKE 1 TABLET BY MOUTH EVERY DAY    sildenafil (REVATIO) 20 mg Tab Take 2-5  tablets one hour prior to intercourse         Review of Systems:  Constitutional: negative  Eyes: negative  Ears, nose, mouth, throat, and face:  negative  Respiratory: shortness of breath   Cardiovascular: dyspnea on exertion chest pain  Gastrointestinal: negative  Genitourinary:negative  Hematologic/lymphatic: negative  Musculoskeletal:negative,   Neurological: negative  Behavioral/Psych: negative  Endocrine: negative  Allergic/Immunologic: negative    OBJECTIVE:     Vital Signs (Most Recent)  Temp: 99 °F (37.2 °C) (08/14/23 0826)  Pulse: 80 (08/14/23 0826)  Resp: 18 (08/14/23 0826)  BP: (!) 151/89 (08/14/23 0826)  SpO2: 97 % (08/14/23 0826)    Vital Signs Range (Last 24H):  Temp:  [99 °F (37.2 °C)]   Pulse:  [80]   Resp:  [18]   BP: (151)/(89)   SpO2:  [97 %]     Physical Exam:  General appearance: alert, appears stated age and cooperative  Head: Normocephalic, without obvious abnormality, atraumatic  Eyes:  conjunctivae/corneas clear. PERRL, EOM's intact. Fundi benign.  Nose: no discharge  Throat: normal findings: tongue midline and normal  Neck: no adenopathy, no carotid bruit, no JVD, supple, symmetrical, trachea midline and thyroid not enlarged, symmetric, no tenderness/mass/nodules  Back:  no skin lesions, erythema, or scars  Lungs:  clear to auscultation bilaterally  Chest wall: no tenderness  Heart: regular rate and rhythm, S1, S2 normal, no murmur, click, rub or gallop  Abdomen: soft, non-tender; bowel sounds normal; no masses,  no organomegaly  Extremities: extremities normal, atraumatic, no cyanosis or edema  Pulses: 2+ and symmetric  Skin: Skin color, texture, turgor normal. No rashes or lesions  Neurologic: Grossly normal    Laboratory:  Chemistry:   Lab Results   Component Value Date     08/08/2023    K 4.5 08/08/2023     08/08/2023    CO2 24 08/08/2023    BUN 22 08/08/2023    CREATININE 1.7 (H) 08/08/2023    CALCIUM 9.3 08/08/2023     Cardiac Markers:   Lab Results   Component Value Date    TROPONINI 0.027 (H) 03/03/2022     Cardiac Markers (Last 3):   Lab Results   Component Value Date    TROPONINI 0.027 (H) 03/03/2022     TROPONINI 0.033 (H) 03/03/2022    TROPONINI 0.011 05/27/2019     CBC:   Lab Results   Component Value Date    WBC 4.00 08/08/2023    HGB 13.4 (L) 08/08/2023    HCT 42.0 08/08/2023    MCV 94 08/08/2023     08/08/2023     Lipids:   Lab Results   Component Value Date    CHOL 124 07/10/2023    TRIG 66 07/10/2023    HDL 27 (L) 07/10/2023     Coagulation:   Lab Results   Component Value Date    INR 1.1 08/08/2023    APTT 30.5 08/08/2023       Diagnostic Results:  Summary         Left Ventricle: The left ventricle is normal in size. Normal wall thickness. Normal wall motion. There is normal systolic function with a visually estimated ejection fraction of 55 - 70%. There is normal diastolic function.    Right Ventricle: Normal right ventricular cavity size. Wall thickness is normal. Right ventricle wall motion  is normal. Systolic function is normal.    Tricuspid Valve: There is mild regurgitation.    IVC/SVC: Normal venous pressure at 3 mmHg.       Conclusion         Abnormal myocardial perfusion scan.    There is a mild to moderate intensity, moderate sized, reversible perfusion abnormality that is consistent with ischemia in the basal to mid inferior wall(s).    There are no other significant perfusion abnormalities.    The gated perfusion images showed an ejection fraction of 67% at rest. The gated perfusion images showed an ejection fraction of 55% post stress. Normal ejection fraction is greater than 59%.    The ECG portion of the study is suspicious for ischemia.  ASSESSMENT/PLAN:     Patient Active Problem List   Diagnosis    Benign hypertension    CKD (chronic kidney disease), stage III    Hypertensive kidney disease with chronic kidney disease stage III    History of colon polyps    Chronic respiratory failure with hypoxia    Hypergammaglobulinemia    Mixed hyperlipidemia    Prediabetes    Restrictive lung disease    Diffusion capacity of lung (dl), decreased    Hypothyroidism, unspecified    Abnormal  nuclear stress test    Abnormal cardiolite with continued symptoms of angina for lhc/ptca    Plan: .    I have explained the risks, benefits , and alternatives of the procedure in detail.the patient voices understanding and all questions have been answered.the patient agrees to proceed as planned.

## 2023-08-14 NOTE — DISCHARGE INSTRUCTIONS
"Post-op Heart Catheterization    1. DIET: It is advisable for you to follow a diet that limits the intake of salt, sugar, saturated fats and cholesterol.     2. DRIVING: Due to sedation you received during your procedure, DO NOT drive or operate machinery for 24 hours. Avoid making critical decisions or signing legal documents until tomorrow.    3. ACTIVITY: AVOID activities that require bending of the affected arm/wrist for 3 days and submerging the site in water for 3 days.   REMOVE the dressing the day after the procedure, and shower.    Wash with soap and water in hand; do not use wash cloth.  Apply a bandaid to site after shower.  But: No ointments, lotions, powders, colognes, ... for 3 days.  WEAR wrist immobilizer until Wednesday morning.  No pushing, pulling, or lifting more than 10 pounds for 3 days  No riding motorcycles, riding lawn mowers, using push mowers or weed eaters... for 5 days.  You may RESUME your normal activities or prescribed exercise program as instructed by your physician after 5 days.                                                                                                       4. WOUND CARE: It is not unusual to have a small amount of bruising to appear at or near the puncture site. It is also common to have a tender "knot" develop beneath the skin at the puncture site of the wrist/arm. This is usually scar tissue and is not a cause for concern or alarm. This tender knot may take several weeks to fully resolve. The bruise will usually spread over several days. However, if the lump gets bigger, call your doctor immediately.    5. DISCOMFORT: For general discomfort at the puncture site, you may take 1 or 2 Acetaminophen (Tylenol) tablets every 4 - 6 hours as needed. (Do not take more than 4000 mg a day)    6. SIGNS AND SYMPTOMS TO REPORT:  Call your physician immediately if any of the following occur:                                            1. Loss of feeling, warmth or color to " "the affected arm/wrist                                                                                                            2. Mild bleeding from the site                 3. Pain that is sudden, sharp or persistent in the affected arm/wrist                 4. Swelling or a change in "lump" size, increased redness or drainage at the puncture site                                                                               5. High fever (101 degrees or higher)    7. GO TO  THE EMERGENCY ROOM OR CALL 911 IF YOU HAVE: Chest pains or discomforts not relieved with 3 nitroglycerin doses (sublingual tablets or spray), numbness or severe pain of limb, if your limb becomes cold or discolored or if you develop uncontrolled bleeding from the puncture site (quickly apply firm, direct pressure above the site).   "

## 2023-08-14 NOTE — Clinical Note
120 ml of contrast were injected throughout the case. 30 mL of contrast was the total wasted during the case. 150 mL was the total amount used during the case.

## 2023-08-14 NOTE — PLAN OF CARE
Right hand saline lock discontinued with canula intact; tolerated well.  Left wrist dressing remains c/d/i and wnl at time of discharge.  Pt able to eat and drink while in CVRU without issue; remains AAOx3 at time of discharge.   Pt ambulated in bay area without issue; site remained c/d/i after ambulation.   Discharge instructions, home medication list, and post discharge follow up appointments discussed with pt and wife.  Discharged to wife, via wheelchair, in no apparent distress. All personal belongings taken with pt. Encouraged continued compliance with MD directives.

## 2023-08-14 NOTE — Clinical Note
The catheter was inserted into the Left subclavian artery. An angiography was performed of the Left subclavian artery.  To visualize LIMA. Catheter removed after angio.

## 2023-08-15 NOTE — DISCHARGE SUMMARY
[facility] - Cath Lab (Hospital)  Discharge Note  Short Stay    Procedure(s) (LRB):  Left heart cath (Left)  ARTERIOGRAM, SUBCLAVIAN (Left)      OUTCOME: Patient tolerated treatment/procedure well without complication and is now ready for discharge.    DISPOSITION: Home or Self Care    FINAL DIAGNOSIS:  Abnormal nuclear stress test    FOLLOWUP: In clinic    DISCHARGE INSTRUCTIONS:    Discharge Procedure Orders   Diet general     Call MD for:  temperature >100.4     Call MD for:  persistent nausea and vomiting     Call MD for:  severe uncontrolled pain     Call MD for:  difficulty breathing, headache or visual disturbances     Call MD for:  redness, tenderness, or signs of infection (pain, swelling, redness, odor or green/yellow discharge around incision site)     Call MD for:  hives     Call MD for:  persistent dizziness or light-headedness     Call MD for:  extreme fatigue        TIME SPENT ON DISCHARGE: 35 minutes

## 2023-08-17 ENCOUNTER — OFFICE VISIT (OUTPATIENT)
Dept: CARDIOTHORACIC SURGERY | Facility: CLINIC | Age: 68
End: 2023-08-17
Payer: COMMERCIAL

## 2023-08-17 ENCOUNTER — TELEPHONE (OUTPATIENT)
Dept: CARDIOTHORACIC SURGERY | Facility: CLINIC | Age: 68
End: 2023-08-17

## 2023-08-17 VITALS
OXYGEN SATURATION: 98 % | DIASTOLIC BLOOD PRESSURE: 84 MMHG | WEIGHT: 258.38 LBS | HEIGHT: 74 IN | HEART RATE: 73 BPM | BODY MASS INDEX: 33.16 KG/M2 | SYSTOLIC BLOOD PRESSURE: 124 MMHG

## 2023-08-17 DIAGNOSIS — E02 SUBCLINICAL IODINE-DEFICIENCY HYPOTHYROIDISM: ICD-10-CM

## 2023-08-17 DIAGNOSIS — J98.4 RESTRICTIVE LUNG DISEASE: ICD-10-CM

## 2023-08-17 DIAGNOSIS — E78.2 MIXED HYPERLIPIDEMIA: ICD-10-CM

## 2023-08-17 DIAGNOSIS — I12.9 HYPERTENSIVE KIDNEY DISEASE WITH STAGE 3 CHRONIC KIDNEY DISEASE, UNSPECIFIED WHETHER STAGE 3A OR 3B CKD: ICD-10-CM

## 2023-08-17 DIAGNOSIS — N18.30 HYPERTENSIVE KIDNEY DISEASE WITH STAGE 3 CHRONIC KIDNEY DISEASE, UNSPECIFIED WHETHER STAGE 3A OR 3B CKD: ICD-10-CM

## 2023-08-17 DIAGNOSIS — N18.30 STAGE 3 CHRONIC KIDNEY DISEASE, UNSPECIFIED WHETHER STAGE 3A OR 3B CKD: ICD-10-CM

## 2023-08-17 DIAGNOSIS — R73.03 PREDIABETES: Primary | ICD-10-CM

## 2023-08-17 DIAGNOSIS — I10 BENIGN HYPERTENSION: ICD-10-CM

## 2023-08-17 PROCEDURE — 3079F PR MOST RECENT DIASTOLIC BLOOD PRESSURE 80-89 MM HG: ICD-10-PCS | Mod: CPTII,S$GLB,, | Performed by: THORACIC SURGERY (CARDIOTHORACIC VASCULAR SURGERY)

## 2023-08-17 PROCEDURE — 3074F PR MOST RECENT SYSTOLIC BLOOD PRESSURE < 130 MM HG: ICD-10-PCS | Mod: CPTII,S$GLB,, | Performed by: THORACIC SURGERY (CARDIOTHORACIC VASCULAR SURGERY)

## 2023-08-17 PROCEDURE — 3288F FALL RISK ASSESSMENT DOCD: CPT | Mod: CPTII,S$GLB,, | Performed by: THORACIC SURGERY (CARDIOTHORACIC VASCULAR SURGERY)

## 2023-08-17 PROCEDURE — 1126F AMNT PAIN NOTED NONE PRSNT: CPT | Mod: CPTII,S$GLB,, | Performed by: THORACIC SURGERY (CARDIOTHORACIC VASCULAR SURGERY)

## 2023-08-17 PROCEDURE — 99205 OFFICE O/P NEW HI 60 MIN: CPT | Mod: S$GLB,,, | Performed by: THORACIC SURGERY (CARDIOTHORACIC VASCULAR SURGERY)

## 2023-08-17 PROCEDURE — 1159F PR MEDICATION LIST DOCUMENTED IN MEDICAL RECORD: ICD-10-PCS | Mod: CPTII,S$GLB,, | Performed by: THORACIC SURGERY (CARDIOTHORACIC VASCULAR SURGERY)

## 2023-08-17 PROCEDURE — 1101F PT FALLS ASSESS-DOCD LE1/YR: CPT | Mod: CPTII,S$GLB,, | Performed by: THORACIC SURGERY (CARDIOTHORACIC VASCULAR SURGERY)

## 2023-08-17 PROCEDURE — 3008F BODY MASS INDEX DOCD: CPT | Mod: CPTII,S$GLB,, | Performed by: THORACIC SURGERY (CARDIOTHORACIC VASCULAR SURGERY)

## 2023-08-17 PROCEDURE — 1126F PR PAIN SEVERITY QUANTIFIED, NO PAIN PRESENT: ICD-10-PCS | Mod: CPTII,S$GLB,, | Performed by: THORACIC SURGERY (CARDIOTHORACIC VASCULAR SURGERY)

## 2023-08-17 PROCEDURE — 99205 PR OFFICE/OUTPT VISIT, NEW, LEVL V, 60-74 MIN: ICD-10-PCS | Mod: S$GLB,,, | Performed by: THORACIC SURGERY (CARDIOTHORACIC VASCULAR SURGERY)

## 2023-08-17 PROCEDURE — 3044F PR MOST RECENT HEMOGLOBIN A1C LEVEL <7.0%: ICD-10-PCS | Mod: CPTII,S$GLB,, | Performed by: THORACIC SURGERY (CARDIOTHORACIC VASCULAR SURGERY)

## 2023-08-17 PROCEDURE — 99999 PR PBB SHADOW E&M-EST. PATIENT-LVL V: CPT | Mod: PBBFAC,,, | Performed by: THORACIC SURGERY (CARDIOTHORACIC VASCULAR SURGERY)

## 2023-08-17 PROCEDURE — 1101F PR PT FALLS ASSESS DOC 0-1 FALLS W/OUT INJ PAST YR: ICD-10-PCS | Mod: CPTII,S$GLB,, | Performed by: THORACIC SURGERY (CARDIOTHORACIC VASCULAR SURGERY)

## 2023-08-17 PROCEDURE — 3079F DIAST BP 80-89 MM HG: CPT | Mod: CPTII,S$GLB,, | Performed by: THORACIC SURGERY (CARDIOTHORACIC VASCULAR SURGERY)

## 2023-08-17 PROCEDURE — 3008F PR BODY MASS INDEX (BMI) DOCUMENTED: ICD-10-PCS | Mod: CPTII,S$GLB,, | Performed by: THORACIC SURGERY (CARDIOTHORACIC VASCULAR SURGERY)

## 2023-08-17 PROCEDURE — 3288F PR FALLS RISK ASSESSMENT DOCUMENTED: ICD-10-PCS | Mod: CPTII,S$GLB,, | Performed by: THORACIC SURGERY (CARDIOTHORACIC VASCULAR SURGERY)

## 2023-08-17 PROCEDURE — 3044F HG A1C LEVEL LT 7.0%: CPT | Mod: CPTII,S$GLB,, | Performed by: THORACIC SURGERY (CARDIOTHORACIC VASCULAR SURGERY)

## 2023-08-17 PROCEDURE — 1159F MED LIST DOCD IN RCRD: CPT | Mod: CPTII,S$GLB,, | Performed by: THORACIC SURGERY (CARDIOTHORACIC VASCULAR SURGERY)

## 2023-08-17 PROCEDURE — 3074F SYST BP LT 130 MM HG: CPT | Mod: CPTII,S$GLB,, | Performed by: THORACIC SURGERY (CARDIOTHORACIC VASCULAR SURGERY)

## 2023-08-17 PROCEDURE — 99999 PR PBB SHADOW E&M-EST. PATIENT-LVL V: ICD-10-PCS | Mod: PBBFAC,,, | Performed by: THORACIC SURGERY (CARDIOTHORACIC VASCULAR SURGERY)

## 2023-08-17 RX ORDER — HYDROCODONE BITARTRATE AND ACETAMINOPHEN 500; 5 MG/1; MG/1
TABLET ORAL
Status: ACTIVE | OUTPATIENT
Start: 2023-08-17

## 2023-08-17 RX ORDER — CHLORHEXIDINE GLUCONATE ORAL RINSE 1.2 MG/ML
10 SOLUTION DENTAL
Status: CANCELLED | OUTPATIENT
Start: 2023-08-17

## 2023-08-17 RX ORDER — AMIODARONE HYDROCHLORIDE 200 MG/1
200 TABLET ORAL ONCE
Status: CANCELLED | OUTPATIENT
Start: 2023-08-17 | End: 2023-08-17

## 2023-08-17 RX ORDER — METOPROLOL TARTRATE 25 MG/1
25 TABLET ORAL
Status: CANCELLED | OUTPATIENT
Start: 2023-08-17

## 2023-08-17 NOTE — PROGRESS NOTES
Subjective:      Patient ID: Abdirahman Rodriguez is a 67 y.o. male.    Chief Complaint: No chief complaint on file.    HPI:  67-year-old male with a history of longstanding hypertension strong family history of coronary artery disease had abnormal stress test and had a left heart catheterization which revealed disease involving the proximal left anterior descending artery as well as diagonal 1 with a critical 80% stenosis.  Patient does not have any active chest pain at this time.  He recovered from COVID last year.    Review of patient's allergies indicates:  No Known Allergies    Past Medical History:   Diagnosis Date    Abnormal nuclear stress test 8/13/2023    Benign hypertension     Class 1 obesity in adult 05/25/2023    History of colon polyps     Hypergammaglobulinemia     Hypertensive kidney disease with chronic kidney disease stage III     Hypothyroidism, unspecified        Family History   Problem Relation Age of Onset    Heart disease Father     Hypertension Father     Heart disease Mother     Hypertension Mother        Social History     Socioeconomic History    Marital status:    Tobacco Use    Smoking status: Never    Smokeless tobacco: Never   Substance and Sexual Activity    Alcohol use: No     Comment: rarely    Drug use: No       Past Surgical History:   Procedure Laterality Date    ARTERIOGRAPHY OF SUBCLAVIAN ARTERY Left 8/14/2023    Procedure: ARTERIOGRAM, SUBCLAVIAN;  Surgeon: Vahid Juarez MD;  Location: [facility] CATH LAB;  Service: Cardiology;  Laterality: Left;    COLONOSCOPY N/A 12/6/2021    Procedure: COLONOSCOPY;  Surgeon: Doris Altman MD;  Location: [facility] ENDO;  Service: Endoscopy;  Laterality: N/A;    LEFT HEART CATHETERIZATION Left 8/14/2023    Procedure: Left heart cath;  Surgeon: Vahid Juarez MD;  Location: [facility] CATH LAB;  Service: Cardiology;  Laterality: Left;  pt instructed 930-10am start/    None         Review of Systems   Constitutional:  Negative for activity change  "and appetite change.   HENT:  Negative for dental problem, nosebleeds and sore throat.    Eyes:  Negative for discharge and visual disturbance.   Respiratory:  Positive for shortness of breath. Negative for cough, chest tightness and stridor.    Cardiovascular:  Negative for leg swelling.   Gastrointestinal:  Negative for abdominal distention and abdominal pain.   Genitourinary:  Negative for difficulty urinating and dysuria.   Musculoskeletal:  Negative for arthralgias, back pain and joint swelling.   Allergic/Immunologic: Negative for environmental allergies.   Neurological:  Negative for dizziness, syncope and headaches.   Hematological:  Does not bruise/bleed easily.   Psychiatric/Behavioral:  Negative for behavioral problems.           Objective:   /84   Pulse 73   Ht 6' 2" (1.88 m)   Wt 117.2 kg (258 lb 6.1 oz)   SpO2 98%   BMI 33.17 kg/m²     Cardiac catheterization    The Prox LAD lesion was 70% stenosed.    The Mid LAD lesion was 99% stenosed.    The ejection fraction was calculated to be 55%.    The pre-procedure left ventricular end diastolic pressure was 10.    The post-procedure left ventricular end diastolic pressure was 13.    The estimated blood loss was none.    There was single vessel coronary artery disease.    The procedure log was documented by Documenter: Jose Martin Tomlinson RN and   verified by Augustin Juarez MD.    Date: 8/14/2023  Time: 10:00 AM         Physical Exam  Vitals and nursing note reviewed.   Constitutional:       Appearance: He is obese.   HENT:      Head: Normocephalic and atraumatic.      Mouth/Throat:      Mouth: Mucous membranes are moist.   Eyes:      Extraocular Movements: Extraocular movements intact.      Pupils: Pupils are equal, round, and reactive to light.   Cardiovascular:      Rate and Rhythm: Normal rate and regular rhythm.      Pulses: Normal pulses.      Heart sounds: Normal heart sounds.   Pulmonary:      Effort: Pulmonary effort is normal.      Breath " sounds: Normal breath sounds.   Abdominal:      Palpations: Abdomen is soft.   Musculoskeletal:         General: Normal range of motion.      Cervical back: Normal range of motion and neck supple.   Skin:     General: Skin is warm.      Capillary Refill: Capillary refill takes less than 2 seconds.   Neurological:      General: No focal deficit present.      Mental Status: He is alert and oriented to person, place, and time.   Psychiatric:         Mood and Affect: Mood normal.     Heart catheterization shows 80% stenosis of the mid LAD and 80% stenosis of the proximal diagonal  Carotid shows 50-69% stenosis of the left internal carotid  Renal functions    Assessment:     1. Benign hypertension    2. Mixed hyperlipidemia    3. Stage 3 chronic kidney disease, unspecified whether stage 3a or 3b CKD    4. Hypertensive kidney disease with stage 3 chronic kidney disease, unspecified whether stage 3a or 3b CKD    5. Prediabetes    6. Restrictive lung disease    7. Subclinical iodine-deficiency hypothyroidism        Plan   I discussed the importance of coronary artery bypass grafting x2 the risk factors including myocardial infarction stroke renal insufficiency need for dialysis with the patient and agreed to proceed.  This surgery has been scheduled for August 23. 2023            Emma Arzola MD,   [facility]  [place]

## 2023-08-17 NOTE — H&P (VIEW-ONLY)
Subjective:      Patient ID: Abdirahman Rodriguez is a 67 y.o. male.    Chief Complaint: No chief complaint on file.    HPI:  67-year-old male with a history of longstanding hypertension strong family history of coronary artery disease had abnormal stress test and had a left heart catheterization which revealed disease involving the proximal left anterior descending artery as well as diagonal 1 with a critical 80% stenosis.  Patient does not have any active chest pain at this time.  He recovered from COVID last year.    Review of patient's allergies indicates:  No Known Allergies    Past Medical History:   Diagnosis Date    Abnormal nuclear stress test 8/13/2023    Benign hypertension     Class 1 obesity in adult 05/25/2023    History of colon polyps     Hypergammaglobulinemia     Hypertensive kidney disease with chronic kidney disease stage III     Hypothyroidism, unspecified        Family History   Problem Relation Age of Onset    Heart disease Father     Hypertension Father     Heart disease Mother     Hypertension Mother        Social History     Socioeconomic History    Marital status:    Tobacco Use    Smoking status: Never    Smokeless tobacco: Never   Substance and Sexual Activity    Alcohol use: No     Comment: rarely    Drug use: No       Past Surgical History:   Procedure Laterality Date    ARTERIOGRAPHY OF SUBCLAVIAN ARTERY Left 8/14/2023    Procedure: ARTERIOGRAM, SUBCLAVIAN;  Surgeon: Vahid Juarez MD;  Location: [facility] CATH LAB;  Service: Cardiology;  Laterality: Left;    COLONOSCOPY N/A 12/6/2021    Procedure: COLONOSCOPY;  Surgeon: Doris Altman MD;  Location: [facility] ENDO;  Service: Endoscopy;  Laterality: N/A;    LEFT HEART CATHETERIZATION Left 8/14/2023    Procedure: Left heart cath;  Surgeon: Vahid Juarez MD;  Location: [facility] CATH LAB;  Service: Cardiology;  Laterality: Left;  pt instructed 930-10am start/    None         Review of Systems   Constitutional:  Negative for activity change  "and appetite change.   HENT:  Negative for dental problem, nosebleeds and sore throat.    Eyes:  Negative for discharge and visual disturbance.   Respiratory:  Positive for shortness of breath. Negative for cough, chest tightness and stridor.    Cardiovascular:  Negative for leg swelling.   Gastrointestinal:  Negative for abdominal distention and abdominal pain.   Genitourinary:  Negative for difficulty urinating and dysuria.   Musculoskeletal:  Negative for arthralgias, back pain and joint swelling.   Allergic/Immunologic: Negative for environmental allergies.   Neurological:  Negative for dizziness, syncope and headaches.   Hematological:  Does not bruise/bleed easily.   Psychiatric/Behavioral:  Negative for behavioral problems.           Objective:   /84   Pulse 73   Ht 6' 2" (1.88 m)   Wt 117.2 kg (258 lb 6.1 oz)   SpO2 98%   BMI 33.17 kg/m²     Cardiac catheterization    The Prox LAD lesion was 70% stenosed.    The Mid LAD lesion was 99% stenosed.    The ejection fraction was calculated to be 55%.    The pre-procedure left ventricular end diastolic pressure was 10.    The post-procedure left ventricular end diastolic pressure was 13.    The estimated blood loss was none.    There was single vessel coronary artery disease.    The procedure log was documented by Documenter: Jose Martin Tomlinson RN and   verified by Augustin Juarez MD.    Date: 8/14/2023  Time: 10:00 AM         Physical Exam  Vitals and nursing note reviewed.   Constitutional:       Appearance: He is obese.   HENT:      Head: Normocephalic and atraumatic.      Mouth/Throat:      Mouth: Mucous membranes are moist.   Eyes:      Extraocular Movements: Extraocular movements intact.      Pupils: Pupils are equal, round, and reactive to light.   Cardiovascular:      Rate and Rhythm: Normal rate and regular rhythm.      Pulses: Normal pulses.      Heart sounds: Normal heart sounds.   Pulmonary:      Effort: Pulmonary effort is normal.      Breath " sounds: Normal breath sounds.   Abdominal:      Palpations: Abdomen is soft.   Musculoskeletal:         General: Normal range of motion.      Cervical back: Normal range of motion and neck supple.   Skin:     General: Skin is warm.      Capillary Refill: Capillary refill takes less than 2 seconds.   Neurological:      General: No focal deficit present.      Mental Status: He is alert and oriented to person, place, and time.   Psychiatric:         Mood and Affect: Mood normal.     Heart catheterization shows 80% stenosis of the mid LAD and 80% stenosis of the proximal diagonal  Carotid shows 50-69% stenosis of the left internal carotid  Renal functions    Assessment:     1. Benign hypertension    2. Mixed hyperlipidemia    3. Stage 3 chronic kidney disease, unspecified whether stage 3a or 3b CKD    4. Hypertensive kidney disease with stage 3 chronic kidney disease, unspecified whether stage 3a or 3b CKD    5. Prediabetes    6. Restrictive lung disease    7. Subclinical iodine-deficiency hypothyroidism        Plan   I discussed the importance of coronary artery bypass grafting x2 the risk factors including myocardial infarction stroke renal insufficiency need for dialysis with the patient and agreed to proceed.  This surgery has been scheduled for August 23. 2023            Emma Arzola MD,   [facility]  [place]

## 2023-08-17 NOTE — TELEPHONE ENCOUNTER
Patient contacted and was advised that he has appointments set up. The patient confirmed the appointment time and location.    Pt would like to verify that he should arrive at 11:00 today. Call the pt back to confirm at [phone]. Thx. EL

## 2023-08-18 ENCOUNTER — HOSPITAL ENCOUNTER (OUTPATIENT)
Dept: RADIOLOGY | Facility: HOSPITAL | Age: 68
Discharge: HOME | End: 2023-08-18
Attending: THORACIC SURGERY (CARDIOTHORACIC VASCULAR SURGERY)
Payer: COMMERCIAL

## 2023-08-18 ENCOUNTER — OFFICE VISIT (OUTPATIENT)
Dept: CARDIOLOGY | Facility: CLINIC | Age: 68
End: 2023-08-18
Payer: COMMERCIAL

## 2023-08-18 ENCOUNTER — HOSPITAL ENCOUNTER (OUTPATIENT)
Dept: CARDIOLOGY | Facility: HOSPITAL | Age: 68
Discharge: HOME | End: 2023-08-18
Attending: THORACIC SURGERY (CARDIOTHORACIC VASCULAR SURGERY)
Payer: COMMERCIAL

## 2023-08-18 VITALS
HEIGHT: 74 IN | DIASTOLIC BLOOD PRESSURE: 70 MMHG | BODY MASS INDEX: 33.11 KG/M2 | SYSTOLIC BLOOD PRESSURE: 135 MMHG | WEIGHT: 258 LBS

## 2023-08-18 VITALS
HEIGHT: 74 IN | BODY MASS INDEX: 33.16 KG/M2 | OXYGEN SATURATION: 97 % | SYSTOLIC BLOOD PRESSURE: 112 MMHG | DIASTOLIC BLOOD PRESSURE: 74 MMHG | WEIGHT: 258.38 LBS | HEART RATE: 74 BPM

## 2023-08-18 DIAGNOSIS — R73.03 PREDIABETES: ICD-10-CM

## 2023-08-18 DIAGNOSIS — I10 BENIGN HYPERTENSION: Primary | ICD-10-CM

## 2023-08-18 DIAGNOSIS — R94.39 ABNORMAL NUCLEAR STRESS TEST: ICD-10-CM

## 2023-08-18 DIAGNOSIS — E78.2 MIXED HYPERLIPIDEMIA: ICD-10-CM

## 2023-08-18 DIAGNOSIS — Z01.818 PRE-OP TESTING: Primary | ICD-10-CM

## 2023-08-18 LAB
LEFT ABI: 1.16
LEFT ARM BP: 111 MMHG
LEFT DORSALIS PEDIS: 149 MMHG
LEFT POSTERIOR TIBIAL: 156 MMHG
LEFT TBI: 0.92
LEFT TOE PRESSURE: 124 MMHG
RIGHT ABI: 1.18
RIGHT ARM BP: 135 MMHG
RIGHT DORSALIS PEDIS: 152 MMHG
RIGHT POSTERIOR TIBIAL: 159 MMHG
RIGHT TBI: 0.79
RIGHT TOE PRESSURE: 107 MMHG

## 2023-08-18 PROCEDURE — 1159F PR MEDICATION LIST DOCUMENTED IN MEDICAL RECORD: ICD-10-PCS | Mod: CPTII,S$GLB,,

## 2023-08-18 PROCEDURE — 99214 OFFICE O/P EST MOD 30 MIN: CPT | Mod: S$GLB,,,

## 2023-08-18 PROCEDURE — 3078F DIAST BP <80 MM HG: CPT | Mod: CPTII,S$GLB,,

## 2023-08-18 PROCEDURE — 71046 X-RAY EXAM CHEST 2 VIEWS: CPT | Mod: TC

## 2023-08-18 PROCEDURE — 93880 EXTRACRANIAL BILAT STUDY: CPT | Mod: TC

## 2023-08-18 PROCEDURE — 93922 ANKLE BRACHIAL INDICES (ABI): ICD-10-PCS | Mod: 26,,, | Performed by: INTERNAL MEDICINE

## 2023-08-18 PROCEDURE — 93922 UPR/L XTREMITY ART 2 LEVELS: CPT

## 2023-08-18 PROCEDURE — 1125F PR PAIN SEVERITY QUANTIFIED, PAIN PRESENT: ICD-10-PCS | Mod: CPTII,S$GLB,,

## 2023-08-18 PROCEDURE — 3044F PR MOST RECENT HEMOGLOBIN A1C LEVEL <7.0%: ICD-10-PCS | Mod: CPTII,S$GLB,,

## 2023-08-18 PROCEDURE — 1101F PR PT FALLS ASSESS DOC 0-1 FALLS W/OUT INJ PAST YR: ICD-10-PCS | Mod: CPTII,S$GLB,,

## 2023-08-18 PROCEDURE — 3074F SYST BP LT 130 MM HG: CPT | Mod: CPTII,S$GLB,,

## 2023-08-18 PROCEDURE — 3008F BODY MASS INDEX DOCD: CPT | Mod: CPTII,S$GLB,,

## 2023-08-18 PROCEDURE — 3288F FALL RISK ASSESSMENT DOCD: CPT | Mod: CPTII,S$GLB,,

## 2023-08-18 PROCEDURE — 71046 X-RAY EXAM CHEST 2 VIEWS: CPT | Mod: 26,,, | Performed by: RADIOLOGY

## 2023-08-18 PROCEDURE — 3078F PR MOST RECENT DIASTOLIC BLOOD PRESSURE < 80 MM HG: ICD-10-PCS | Mod: CPTII,S$GLB,,

## 2023-08-18 PROCEDURE — 93880 EXTRACRANIAL BILAT STUDY: CPT | Mod: 26,,, | Performed by: RADIOLOGY

## 2023-08-18 PROCEDURE — 99999 PR PBB SHADOW E&M-EST. PATIENT-LVL III: CPT | Mod: PBBFAC,,,

## 2023-08-18 PROCEDURE — 93880 US CAROTID BILATERAL: ICD-10-PCS | Mod: 26,,, | Performed by: RADIOLOGY

## 2023-08-18 PROCEDURE — 3288F PR FALLS RISK ASSESSMENT DOCUMENTED: ICD-10-PCS | Mod: CPTII,S$GLB,,

## 2023-08-18 PROCEDURE — 93922 UPR/L XTREMITY ART 2 LEVELS: CPT | Mod: 26,,, | Performed by: INTERNAL MEDICINE

## 2023-08-18 PROCEDURE — 99999 PR PBB SHADOW E&M-EST. PATIENT-LVL III: ICD-10-PCS | Mod: PBBFAC,,,

## 2023-08-18 PROCEDURE — 3044F HG A1C LEVEL LT 7.0%: CPT | Mod: CPTII,S$GLB,,

## 2023-08-18 PROCEDURE — 1160F PR REVIEW ALL MEDS BY PRESCRIBER/CLIN PHARMACIST DOCUMENTED: ICD-10-PCS | Mod: CPTII,S$GLB,,

## 2023-08-18 PROCEDURE — 1160F RVW MEDS BY RX/DR IN RCRD: CPT | Mod: CPTII,S$GLB,,

## 2023-08-18 PROCEDURE — 99214 PR OFFICE/OUTPT VISIT, EST, LEVL IV, 30-39 MIN: ICD-10-PCS | Mod: S$GLB,,,

## 2023-08-18 PROCEDURE — 1125F AMNT PAIN NOTED PAIN PRSNT: CPT | Mod: CPTII,S$GLB,,

## 2023-08-18 PROCEDURE — 1159F MED LIST DOCD IN RCRD: CPT | Mod: CPTII,S$GLB,,

## 2023-08-18 PROCEDURE — 3074F PR MOST RECENT SYSTOLIC BLOOD PRESSURE < 130 MM HG: ICD-10-PCS | Mod: CPTII,S$GLB,,

## 2023-08-18 PROCEDURE — 3008F PR BODY MASS INDEX (BMI) DOCUMENTED: ICD-10-PCS | Mod: CPTII,S$GLB,,

## 2023-08-18 PROCEDURE — 1101F PT FALLS ASSESS-DOCD LE1/YR: CPT | Mod: CPTII,S$GLB,,

## 2023-08-18 PROCEDURE — 71046 XR CHEST PA AND LATERAL: ICD-10-PCS | Mod: 26,,, | Performed by: RADIOLOGY

## 2023-08-18 NOTE — PROGRESS NOTES
Subjective:   Patient ID:  Abdirahman Rodriguez is a 67 y.o. male who presents for evaluation of No chief complaint on file.      HPI67-year-old male with a history of longstanding hypertension strong family history of coronary artery disease had abnormal stress test and had a left heart catheterization which revealed disease involving the proximal left anterior descending artery as well as diagonal 1 with a critical 80% stenosis.  Here today for HFU, s/p C. [facility] site C/D/I. Patient does not have any active chest pain at this time. He does report some dizziness after unloading groceries yesterday    Past Medical History:   Diagnosis Date    Abnormal nuclear stress test 8/13/2023    Benign hypertension     Class 1 obesity in adult 05/25/2023    History of colon polyps     Hypergammaglobulinemia     Hypertensive kidney disease with chronic kidney disease stage III     Hypothyroidism, unspecified        Past Surgical History:   Procedure Laterality Date    ARTERIOGRAPHY OF SUBCLAVIAN ARTERY Left 8/14/2023    Procedure: ARTERIOGRAM, SUBCLAVIAN;  Surgeon: Vahid Juarez MD;  Location: [facility] CATH LAB;  Service: Cardiology;  Laterality: Left;    COLONOSCOPY N/A 12/6/2021    Procedure: COLONOSCOPY;  Surgeon: Doris Altman MD;  Location: [facility] ENDO;  Service: Endoscopy;  Laterality: N/A;    LEFT HEART CATHETERIZATION Left 8/14/2023    Procedure: Left heart cath;  Surgeon: Vahid Juarez MD;  Location: [facility] CATH LAB;  Service: Cardiology;  Laterality: Left;  pt instructed 930-10am start/    None         Social History     Tobacco Use    Smoking status: Never    Smokeless tobacco: Never   Substance Use Topics    Alcohol use: No     Comment: rarely    Drug use: No       Family History   Problem Relation Age of Onset    Heart disease Father     Hypertension Father     Heart disease Mother     Hypertension Mother        Current Outpatient Medications on File Prior to Visit   Medication Sig Dispense Refill    aspirin (ECOTRIN) 81  MG EC tablet Take 81 mg by mouth once daily.      levothyroxine (SYNTHROID) 50 MCG tablet Take 1 tablet (50 mcg total) by mouth before breakfast. 30 tablet 11    losartan-hydrochlorothiazide 100-25 mg (HYZAAR) 100-25 mg per tablet TAKE 1 TABLET BY MOUTH EVERY DAY 90 tablet 3    metoprolol tartrate (LOPRESSOR) 25 MG tablet Take 1 tablet (25 mg total) by mouth 2 (two) times daily. 60 tablet 6    sildenafil (REVATIO) 20 mg Tab Take 2-5  tablets one hour prior to intercourse (Patient not taking: Reported on 8/18/2023) 90 tablet 5     Current Facility-Administered Medications on File Prior to Visit   Medication Dose Route Frequency Provider Last Rate Last Admin    0.9%  NaCl infusion (for blood administration)   Intravenous Q24H PRN Emma Arzola MD          Wt Readings from Last 3 Encounters:   08/18/23 117.2 kg (258 lb 6.1 oz)   08/17/23 117.2 kg (258 lb 6.1 oz)   08/14/23 116.1 kg (256 lb)     Temp Readings from Last 3 Encounters:   08/14/23 98.1 °F (36.7 °C) (Temporal)   07/10/23 (!) 95 °F (35 °C) (Tympanic)   10/12/22 97.1 °F (36.2 °C) (Temporal)     BP Readings from Last 3 Encounters:   08/18/23 112/74   08/17/23 124/84   08/14/23 124/80     Pulse Readings from Last 3 Encounters:   08/18/23 74   08/17/23 73   08/14/23 70        Review of Systems   Constitutional: Negative.   HENT: Negative.     Eyes: Negative.    Cardiovascular: Negative.    Respiratory: Negative.     Skin: Negative.    Musculoskeletal: Negative.    Gastrointestinal: Negative.    Genitourinary: Negative.    Neurological:  Positive for dizziness.   Psychiatric/Behavioral: Negative.         Objective:   Physical Exam  Vitals and nursing note reviewed.   Constitutional:       Appearance: Normal appearance.   HENT:      Head: Normocephalic and atraumatic.   Eyes:      General:         Right eye: No discharge.         Left eye: No discharge.      Pupils: Pupils are equal, round, and reactive to light.   Cardiovascular:      Rate and Rhythm: Normal  rate and regular rhythm.      Heart sounds: S1 normal and S2 normal. No murmur heard.     No friction rub.   Pulmonary:      Effort: Pulmonary effort is normal. No respiratory distress.      Breath sounds: Normal breath sounds. No rales.   Abdominal:      Palpations: Abdomen is soft.      Tenderness: There is no abdominal tenderness.   Musculoskeletal:      Cervical back: Neck supple.      Right lower leg: No edema.      Left lower leg: No edema.   Skin:     General: Skin is warm and dry.   Neurological:      General: No focal deficit present.      Mental Status: He is alert and oriented to person, place, and time.   Psychiatric:         Mood and Affect: Mood normal.         Behavior: Behavior normal.         Thought Content: Thought content normal.         Lab Results   Component Value Date    CHOL 124 07/10/2023    CHOL 147 12/28/2022    CHOL 135 11/08/2021     Lab Results   Component Value Date    HDL 27 (L) 07/10/2023    HDL 31 (L) 12/28/2022    HDL 33 (L) 11/08/2021     Lab Results   Component Value Date    LDLCALC 83.8 07/10/2023    LDLCALC 102.2 12/28/2022    LDLCALC 87.8 11/08/2021     Lab Results   Component Value Date    TRIG 66 07/10/2023    TRIG 69 12/28/2022    TRIG 71 11/08/2021     Lab Results   Component Value Date    CHOLHDL 21.8 07/10/2023    CHOLHDL 21.1 12/28/2022    CHOLHDL 24.4 11/08/2021       Chemistry        Component Value Date/Time     08/08/2023 1033    K 4.5 08/08/2023 1033     08/08/2023 1033    CO2 24 08/08/2023 1033    BUN 22 08/08/2023 1033    CREATININE 1.7 (H) 08/08/2023 1033     08/08/2023 1033        Component Value Date/Time    CALCIUM 9.3 08/08/2023 1033    ALKPHOS 50 (L) 07/10/2023 0808    AST 25 07/10/2023 0808    ALT 27 07/10/2023 0808    BILITOT 0.5 07/10/2023 0808    ESTGFRAFRICA 48.2 (A) 06/15/2022 0749    EGFRNONAA 41.7 (A) 06/15/2022 0749          Lab Results   Component Value Date    TSH 6.295 (H) 07/10/2023     Lab Results   Component Value Date     INR 1.1 08/08/2023    INR 1.0 03/03/2022    INR 1.0 03/03/2022     @RESUFAST(WBC,HGB,HCT,MCV,PLT)  @LABRCNTIP(BNP,BNPTRIAGEBLO)@  CrCl cannot be calculated (Patient's most recent lab result is older than the maximum 7 days allowed.).     Results for orders placed during the hospital encounter of 08/04/23    Echo    Interpretation Summary    Left Ventricle: The left ventricle is normal in size. Normal wall thickness. Normal wall motion. There is normal systolic function with a visually estimated ejection fraction of 55 - 70%. There is normal diastolic function.    Right Ventricle: Normal right ventricular cavity size. Wall thickness is normal. Right ventricle wall motion  is normal. Systolic function is normal.    Tricuspid Valve: There is mild regurgitation.    IVC/SVC: Normal venous pressure at 3 mmHg.     Results for orders placed during the hospital encounter of 08/04/23    Nuclear Stress - Cardiology Interpreted    Interpretation Summary    Abnormal myocardial perfusion scan.    There is a mild to moderate intensity, moderate sized, reversible perfusion abnormality that is consistent with ischemia in the basal to mid inferior wall(s).    There are no other significant perfusion abnormalities.    The gated perfusion images showed an ejection fraction of 67% at rest. The gated perfusion images showed an ejection fraction of 55% post stress. Normal ejection fraction is greater than 59%.    The ECG portion of the study is suspicious for ischemia.     Assessment:      1. Benign hypertension    2. Mixed hyperlipidemia    3. Abnormal nuclear stress test        Plan:   Benign hypertension    Mixed hyperlipidemia    Abnormal nuclear stress test      Cont current medications  Emphasized pt needs to rest, no strenuous activity. Recommended going to ED if having any CP  RF modification, Low Na, Low fat diet    RTC as scheduled    Courtney Guillot, FNP-C [facility], Cardiology

## 2023-08-20 ENCOUNTER — ANESTHESIA EVENT (OUTPATIENT)
Dept: SURGERY | Facility: HOSPITAL | Age: 68
DRG: 236 | End: 2023-08-20
Payer: COMMERCIAL

## 2023-08-21 ENCOUNTER — OFFICE VISIT (OUTPATIENT)
Dept: INTERNAL MEDICINE | Facility: CLINIC | Age: 68
End: 2023-08-21
Payer: COMMERCIAL

## 2023-08-21 ENCOUNTER — TELEPHONE (OUTPATIENT)
Dept: CARDIOTHORACIC SURGERY | Facility: CLINIC | Age: 68
End: 2023-08-21
Payer: COMMERCIAL

## 2023-08-21 VITALS
HEART RATE: 74 BPM | OXYGEN SATURATION: 95 % | DIASTOLIC BLOOD PRESSURE: 83 MMHG | SYSTOLIC BLOOD PRESSURE: 128 MMHG | TEMPERATURE: 98 F

## 2023-08-21 DIAGNOSIS — N18.31 STAGE 3A CHRONIC KIDNEY DISEASE: ICD-10-CM

## 2023-08-21 DIAGNOSIS — I25.10 CORONARY ARTERY DISEASE, UNSPECIFIED VESSEL OR LESION TYPE, UNSPECIFIED WHETHER ANGINA PRESENT, UNSPECIFIED WHETHER NATIVE OR TRANSPLANTED HEART: ICD-10-CM

## 2023-08-21 DIAGNOSIS — E02 SUBCLINICAL IODINE-DEFICIENCY HYPOTHYROIDISM: ICD-10-CM

## 2023-08-21 NOTE — PROGRESS NOTES
Preoperative History and Physical                                                              Hospital Medicine                                                                      Chief Complaint: Preoperative evaluation     History of Present Illness:      Abdirahman Rodriguez is a 67 y.o. male who presents to the office today for a preoperative consultation at the request of Emma Arzola MD who plans on performing CORONARY ARTERY BYPASS GRAFT (CABG) on August 23.     Functional Status:      The patient is able to climb a flight of stairs. The patient is able to ambulate without difficulty. The patient's functional status is not affected by the surgical problem. The patient's functional status is not affected by shortness of breath, chest pain, dyspnea on exertion and fatigue.    MET score greater than 4    Past Medical History:      Past Medical History:   Diagnosis Date    Abnormal nuclear stress test 08/13/2023    Benign hypertension     Class 1 obesity in adult 05/25/2023    Coronary artery disease     History of colon polyps     Hypergammaglobulinemia     Hypertensive kidney disease with chronic kidney disease stage III     Hypothyroidism, unspecified         Past Surgical History:      Past Surgical History:   Procedure Laterality Date    ARTERIOGRAPHY OF SUBCLAVIAN ARTERY Left 8/14/2023    Procedure: ARTERIOGRAM, SUBCLAVIAN;  Surgeon: Vahid Juarez MD;  Location: [facility] CATH LAB;  Service: Cardiology;  Laterality: Left;    COLONOSCOPY N/A 12/6/2021    Procedure: COLONOSCOPY;  Surgeon: Doris Altman MD;  Location: [facility] ENDO;  Service: Endoscopy;  Laterality: N/A;    LEFT HEART CATHETERIZATION Left 8/14/2023    Procedure: Left heart cath;  Surgeon: Vahid Juarez MD;  Location: [facility] CATH LAB;  Service: Cardiology;  Laterality: Left;  pt instructed 930-10am start/    None          Social History:      Social History     Socioeconomic History    Marital status:     Tobacco Use    Smoking status: Never    Smokeless tobacco: Never   Substance and Sexual Activity    Alcohol use: No     Comment: rarely    Drug use: No        Family History:      Family History   Problem Relation Age of Onset    Heart disease Mother     Hypertension Mother     Heart disease Father     Hypertension Father        Allergies:      Review of patient's allergies indicates:  No Known Allergies    Medications:      Current Outpatient Medications   Medication Sig    aspirin (ECOTRIN) 81 MG EC tablet Take 81 mg by mouth once daily.    levothyroxine (SYNTHROID) 50 MCG tablet Take 1 tablet (50 mcg total) by mouth before breakfast.    losartan-hydrochlorothiazide 100-25 mg (HYZAAR) 100-25 mg per tablet TAKE 1 TABLET BY MOUTH EVERY DAY    metoprolol tartrate (LOPRESSOR) 25 MG tablet Take 1 tablet (25 mg total) by mouth 2 (two) times daily.    sildenafil (REVATIO) 20 mg Tab Take 2-5  tablets one hour prior to intercourse (Patient not taking: Reported on 8/18/2023)     Current Facility-Administered Medications   Medication    0.9%  NaCl infusion (for blood administration)       Vitals:      Vitals:    08/21/23 1106   BP: 128/83   Pulse: 74   Temp: 98 °F (36.7 °C)       Review of Systems:        Constitutional: Negative for fever, chills, weight loss, malaise/fatigue and diaphoresis.   HENT: Negative for hearing loss, ear pain, nosebleeds, congestion, sore throat, neck pain, tinnitus and ear discharge.    Eyes: Negative for blurred vision, double vision, photophobia, pain, discharge and redness.   Respiratory: Negative for cough, hemoptysis, sputum production, shortness of breath, wheezing and stridor.    Cardiovascular: Negative for chest pain, palpitations, orthopnea, claudication, leg swelling and PND.   Gastrointestinal: Negative for heartburn, nausea, vomiting, abdominal pain, diarrhea, constipation, blood in stool and melena.   Genitourinary: Negative for dysuria, urgency, frequency, hematuria and  flank pain.   Musculoskeletal: Negative for myalgias, back pain, joint pain and falls.   Skin: Negative for itching and rash.   Neurological: Negative for dizziness, tingling, tremors, sensory change, speech change, focal weakness, seizures, loss of consciousness, weakness and headaches.   Endo/Heme/Allergies: Negative for environmental allergies and polydipsia. Does not bruise/bleed easily.   Psychiatric/Behavioral: Negative for depression, suicidal ideas, hallucinations, memory loss and substance abuse. The patient is not nervous/anxious and does not have insomnia.    All 14 systems reviewed and negative except as noted above.    Physical Exam:      Constitutional: Appears well-developed, well-nourished and in no acute distress.  Patient is oriented to person, place, and time.   Head: Normocephalic and atraumatic. Mucous membranes moist.  Neck: Neck supple no mass.   Cardiovascular: Normal rate and regular rhythm.  S1 S2 appreciated by ascultation.  Pulmonary/Chest: Effort normal and clear to auscultation bilaterally. No respiratory distress.   Abdomen: Soft. Non-tender and non-distended. Bowel sounds are normal.   Neurological: Patient is alert and oriented to person, place and time. Moves all extremities.  Skin: Warm and dry. No lesions.  Extremities: No clubbing, cyanosis or edema.    Laboratory data:      Reviewed and noted in plan where applicable. Please see chart for full laboratory data.    @MJQEQPWCG88(cpk,cpkmb,troponini,mb)@ @YETMHICFP28(poctglucose)@     Lab Results   Component Value Date    INR 1.0 08/18/2023    INR 1.1 08/08/2023    INR 1.0 03/03/2022       Lab Results   Component Value Date    WBC 4.02 08/18/2023    HGB 13.7 (L) 08/18/2023    HCT 42.8 08/18/2023    MCV 94 08/18/2023     08/18/2023       @EAISZFLUH29(GLU,NA,K,Cl,CO2,BUN,Creatinine,Calcium,MG)@    Predictors of intubation difficulty:       Morbid obesity? no   Anatomically abnormal facies? no   Prominent incisors? no   Receding  mandible? no   Short, thick neck? no   Neck range of motion: normal   Dentition:  Top and Bottom Dentures  Based on the Modified Mallampati, patient is a mallampati score: II (hard and soft palate, upper portion of tonsils anduvula visible)    Cardiographics:      ECG: Normal sinus rhythm   Septal infarct ,age undetermined   Abnormal ECG   When compared with ECG of 03-MAR-2022 09:08,   Premature atrial complexes are no longer Present   Septal infarct is now Present   Nonspecific T wave abnormality now evident in Anterior-lateral leads   Confirmed by ANAND ROGERS MD ([id number]) on 5/25/2023 10:52:52 AM     Echocardiogram:  EF 55-70%    Heart Cath: 8/14/23  The Prox LAD lesion was 70% stenosed.  The Mid LAD lesion was 99% stenosed.  The ejection fraction was calculated to be 55%.    Imaging:      Chest x-ray:  Stable chronic findings    Assessment and Plan:      Coronary artery disease  Planned for CABG with Emma Arzola MD on 8/23/23    Known risk factors for perioperative complications: Coronary disease  Renal dysfunction    Difficulty with intubation is not anticipated.    Cardiac Risk Estimation: Based on the Revised Cardiac Risk index, patient is a Class 3 risk with a 10.1% risk of a major cardiac event.    1.) Preoperative workup as follows: ECG, hemoglobin, hematocrit, electrolytes, creatinine, liver function studies.  2.) Change in medication regimen before surgery: discontinue ASA, NSAIDs 7 days before surgery, hold Metformin 24 hours prior to surgery.  3.) Prophylaxis for cardiac events with perioperative beta-blockers: Continue Metoprolol as prescribed.  4.) Invasive hemodynamic monitoring perioperatively: at the discretion of anesthesiologist.  5.) Deep vein thrombosis prophylaxis postoperatively: intermittent pneumatic compression boots and regimen to be chosen by surgical team.  6.) Surveillance for postoperative MI with ECG immediately postoperatively and on postoperati ve days 1 and 2 AND troponin  levels 24 hours postoperatively and on day 4 or hospital discharge (whichever comes first): at the discretion of anesthesiologist.  7.) Current medications which may produce withdrawal symptoms if withheld perioperatively: None  8.) Other measures: None     --Morning of Procedure: metoprolol, levothyroxine  --Resume all medications post-operatively at discretion of surgeon  --Hold NSAIDs and all vitamins/supplements 7 days prior to procedure    CKD (chronic kidney disease), stage III  Chronic, stable  Baseline Creatinine: 1.6-1.9 over past year    --Avoid nephrotoxic medications    Hypothyroidism, unspecified  Last TSH: 6.295, initiated on levothyroxine    --See medication recommendations as above            Electronically signed by Felicitas Lubin NP on 8/22/2023 at 11:14 AM.

## 2023-08-21 NOTE — PRE-PROCEDURE INSTRUCTIONS
Pre op instructions reviewed with patient & spouse during Clinic Visit with Provider.    To confirm, Surgery is scheduled on 8/23/23. Please arrive at 0530 am. We will call you the day before surgery to confirm time.    *Please report to the [facility] Lobby (1st Floor) located off of [facility] (2nd Entrance/Building on the left, in front of the flag pole).  Address: [address] [place] [address] [place]. [id number]      INSTRUCTIONS IMPORTANT!!!  Do Not Eat, Drink, or Smoke after 12 midnight unless instructed otherwise by your Surgeon. OK to brush teeth, no gum, candy or mints!    *MEDICATION INSTRUCTIONS as instructed by Felicitas Lubin NP: Morning of Surgery, please ONLY take:  Metoprolol  Synthroid      Patients should HOLD all vitamins, herbal supplements, aspirin products & NSAIDS 7 days prior to surgery, as these can thin the blood.    ____  Avoid Alcoholic beverages 3 days prior to surgery, as it can thin the blood.  ____  NO Acrylic/fake nails or nail polish worn day of surgery (specifically hand/arm & foot surgeries).  ____  NO powder, lotions, deodorants, oils or cream on body.  ____  Remove all jewelry, piercings, & foreign objects prior to arrival and leave at home.  ____  Remove Dentures, Hearing Aids & Contact Lens prior to surgery.  ____  Bring photo ID and insurance information to hospital (Leave Valuables at Home).  ____  If going home the same day, arrange for a ride home. You will not be able to drive for 24 hrs if Anesthesia was used.   ____  Females (ages 11-60): may need to give a urine sample the morning of surgery; please see Pre op Nurse prior to using the restroom.  ____  Males: Stop ED medications (Viagra, Cialis) 24 hrs prior to surgery.  ____  Wear clean, loose fitting clothing to allow for dressings/ bandages.      Bathing Instructions:    -Shower with anti-bacterial Soap (Hibiclens or Dial) the night before surgery and the morning of.   -Do not use Hibiclens on your face or  genitals.   -Apply clean clothes after shower.  -Do not shave your face or body 2 days prior to surgery unless instructed otherwise by your Surgeon.  -Do not shave pubic hair 7 days prior to surgery (gyn pt's).    [facility] Visitor/Ride Policy:  Only 2 adults allowed in pre op/recovery area during your procedure. You MUST HAVE A RIDE HOME from a responsible adult that you know and trust. Medical Transport, Uber or Lyft can ONLY be used if patient has a responsible adult to accompany them during ride home.    Discharge Instructions: You will receive Post-op/Discharge instructions by your Discharge Nurse prior to going home.   *Prevention of surgical site infections:   -Keep incisions clean and dry.   -Do not soak/submerge incisions in water until completely healed.   -Do not apply lotions, powders, creams, or deodorants to site.   -Always make sure hands are cleaned with antibacterial soap/ alcohol-based  prior to touching the surgical site.        *Signs and symptoms of Infection:               -Redness and pain around the area where you had surgery               -Drainage of cloudy fluid from your surgical wound               -Fever, chills or any flu-like symptoms     >>>Call Surgeon office/on-call Surgeon if you experience any of these signs & symptoms post-surgery @ [phone]<<<       *If you are running late day of surgery, please call the Surgery Dept @ [phone].       *Billing question, please call  [phone]       Thank you,  -[facility] Pre Admit Dept.  [phone] or [phone]  M-F 7:30 am-4:00 pm (Closed Major Holidays)    ICU ORIENTATION today @ [facility]! Check in at  [facility] first.  Lab: 1st floor of Hospital today!

## 2023-08-21 NOTE — TELEPHONE ENCOUNTER
Patient contacted and was advised that he should stop by the ICU to do a tour of the area. The ICU has been contacted and is aware of the patient doing a visit after the Pre-op Nurse visit.

## 2023-08-21 NOTE — DISCHARGE INSTRUCTIONS
Pre op instructions reviewed with patient & spouse during Clinic Visit with Provider.    To confirm, Surgery is scheduled on 8/23/23. Please arrive at 0530 am. We will call you the day before surgery to confirm time.    *Please report to the [facility] (1st Floor) located off of [facility] (2nd Entrance/Building on the left, in front of the flag pole).  Address: [address] [place] [address] [place] [address]      INSTRUCTIONS IMPORTANT!!!  Do Not Eat, Drink, or Smoke after 12 midnight unless instructed otherwise by your Surgeon. OK to brush teeth, no gum, candy or mints!    *MEDICATION INSTRUCTIONS as instructed by Felicitas Lubin NP: Morning of Surgery, please ONLY take:  Metoprolol  Synthroid    Patients should HOLD all vitamins, herbal supplements, aspirin products & NSAIDS 7 days prior to surgery, as these can thin the blood.    ____  Avoid Alcoholic beverages 3 days prior to surgery, as it can thin the blood.  ____  NO Acrylic/fake nails or nail polish worn day of surgery (specifically hand/arm & foot surgeries).  ____  NO powder, lotions, deodorants, oils or cream on body.  ____  Remove all jewelry, piercings, & foreign objects prior to arrival and leave at home.  ____  Remove Dentures, Hearing Aids & Contact Lens prior to surgery.  ____  Bring photo ID and insurance information to hospital (Leave Valuables at Home).  ____  If going home the same day, arrange for a ride home. You will not be able to drive for 24 hrs if Anesthesia was used.   ____  Females (ages 11-60): may need to give a urine sample the morning of surgery; please see Pre op Nurse prior to using the restroom.  ____  Males: Stop ED medications (Viagra, Cialis) 24 hrs prior to surgery.  ____  Wear clean, loose fitting clothing to allow for dressings/ bandages.      Bathing Instructions:    -Shower with anti-bacterial Soap (Hibiclens or Dial) the night before surgery and the morning of.   -Do not use Hibiclens on your face or  genitals.   -Apply clean clothes after shower.  -Do not shave your face or body 2 days prior to surgery unless instructed otherwise by your Surgeon.  -Do not shave pubic hair 7 days prior to surgery (gyn pt's).    [facility]Ride Policy:  Only 2 adults allowed in pre op/recovery area during your procedure. You MUST HAVE A RIDE HOME from a responsible adult that you know and trust. Medical Transport, Uber or Lyft can ONLY be used if patient has a responsible adult to accompany them during ride home.    Discharge Instructions: You will receive Post-op/Discharge instructions by your Discharge Nurse prior to going home.   *Prevention of surgical site infections:   -Keep incisions clean and dry.   -Do not soak/submerge incisions in water until completely healed.   -Do not apply lotions, powders, creams, or deodorants to site.   -Always make sure hands are cleaned with antibacterial soap/ alcohol-based  prior to touching the surgical site.        *Signs and symptoms of Infection:               -Redness and pain around the area where you had surgery               -Drainage of cloudy fluid from your surgical wound               -Fever, chills or any flu-like symptoms     >>>Call Surgeon office/on-call Surgeon if you experience any of these signs & symptoms post-surgery @ [phone]<<<       *If you are running late day of surgery, please call the Surgery Dept @ [phone].       *Billing question, please call  [phone]       Thank you,  -[facility] Pre [facility].  [phone] or [phone]  M-F 7:30 am-4:00 pm (Closed Major Holidays)    ICU ORIENTATION today @ [facility]! Check in at  [facility].  Lab: 1st floor of Hospital today!

## 2023-08-22 ENCOUNTER — TELEPHONE (OUTPATIENT)
Dept: PREADMISSION TESTING | Facility: HOSPITAL | Age: 68
End: 2023-08-22
Payer: COMMERCIAL

## 2023-08-22 NOTE — ANESTHESIA PREPROCEDURE EVALUATION
08/22/2023  Abdirahman Rodriguez is a 67 y.o., male.    Patient Active Problem List   Diagnosis    Benign hypertension    CKD (chronic kidney disease), stage III    Hypertensive kidney disease with chronic kidney disease stage III    History of colon polyps    Chronic respiratory failure with hypoxia    Hypergammaglobulinemia    Mixed hyperlipidemia    Prediabetes    Restrictive lung disease    Diffusion capacity of lung (dl), decreased    Hypothyroidism, unspecified    Abnormal nuclear stress test    Coronary artery disease     Past Medical History:   Diagnosis Date    Abnormal nuclear stress test 08/13/2023    Benign hypertension     Class 1 obesity in adult 05/25/2023    Coronary artery disease     History of colon polyps     Hypergammaglobulinemia     Hypertensive kidney disease with chronic kidney disease stage III     Hypothyroidism, unspecified      Past Surgical History:   Procedure Laterality Date    ARTERIOGRAPHY OF SUBCLAVIAN ARTERY Left 8/14/2023    Procedure: ARTERIOGRAM, SUBCLAVIAN;  Surgeon: Vahid Juarez MD;  Location: [facility] CATH LAB;  Service: Cardiology;  Laterality: Left;    COLONOSCOPY N/A 12/6/2021    Procedure: COLONOSCOPY;  Surgeon: Doris Altman MD;  Location: [facility] ENDO;  Service: Endoscopy;  Laterality: N/A;    LEFT HEART CATHETERIZATION Left 8/14/2023    Procedure: Left heart cath;  Surgeon: Vahid Juarez MD;  Location: [facility] CATH LAB;  Service: Cardiology;  Laterality: Left;  pt instructed 930-10am start/    None         Pre-op Assessment    I have reviewed the Patient Summary Reports.    I have reviewed the NPO Status.   I have reviewed the Medications.     Review of Systems  Anesthesia Hx:  No previous Anesthesia No previous GETA  Neg history of prior surgery. Denies Family Hx of Anesthesia complications.    Social:  Non-Smoker     Hematology/Oncology:  Hematology Normal   Oncology Normal    -- Denies Anemia:    EENT/Dental:EENT/Dental Normal   Cardiovascular:   Hypertension CAD   ECG has been reviewed.    Pulmonary:   Chronic respiratory failure due to restrictive lung ds - patient denies any lung problems; currently on no meds    Renal/:   Chronic Renal Disease, CKD    Hepatic/GI:  Hepatic/GI Normal    Musculoskeletal:  Musculoskeletal Normal    Neurological:  Neurology Normal    Endocrine:   Hypothyroidism BMI 33 Obesity / BMI > 30  Dermatological:  Skin Normal    Psych:  Psychiatric Normal           Chemistry        Component Value Date/Time     08/18/2023 1100    K 4.3 08/18/2023 1100     08/18/2023 1100    CO2 26 08/18/2023 1100    BUN 19 08/18/2023 1100    CREATININE 1.6 (H) 08/18/2023 1100    GLU 90 08/18/2023 1100        Component Value Date/Time    CALCIUM 9.3 08/18/2023 1100    ALKPHOS 53 (L) 08/18/2023 1100    AST 20 08/18/2023 1100    ALT 17 08/18/2023 1100    BILITOT 0.4 08/18/2023 1100    ESTGFRAFRICA 48.2 (A) 06/15/2022 0749    EGFRNONAA 41.7 (A) 06/15/2022 0749        Lab Results   Component Value Date    WBC 4.02 08/18/2023    HGB 13.7 (L) 08/18/2023    HCT 42.8 08/18/2023    MCV 94 08/18/2023     08/18/2023         Physical Exam  General: Alert, Cooperative, Well nourished and Oriented    Airway:  Mallampati: III   Mouth Opening: Normal  TM Distance: Normal  Tongue: Normal  Neck ROM: Normal ROM    Dental:  Partial Dentures  Partials removed; denies loose teeth   Chest/Lungs:  Normal Respiratory Rate    ECHO    Left Ventricle: The left ventricle is normal in size. Normal wall thickness. Normal wall motion. There is normal systolic function with a visually estimated ejection fraction of 55 - 70%. There is normal diastolic function.    Right Ventricle: Normal right ventricular cavity size. Wall thickness is normal. Right ventricle wall motion  is normal. Systolic function is normal.    Tricuspid Valve:  There is mild regurgitation.    IVC/SVC: Normal venous pressure at 3 mmHg.    LHC       The Prox LAD lesion was 70% stenosed.    The Mid LAD lesion was 99% stenosed.    The ejection fraction was calculated to be 55%.    The pre-procedure left ventricular end diastolic pressure was 10.    The post-procedure left ventricular end diastolic pressure was 13.    The estimated blood loss was none.    There was single vessel coronary artery disease.       Anesthesia Plan  Type of Anesthesia, risks & benefits discussed:    Anesthesia Type: Gen ETT  Intra-op Monitoring Plan: Standard ASA Monitors, Art Line, Central Line, JOSE ALFREDO and CO  Post Op Pain Control Plan: multimodal analgesia and IV/PO Opioids PRN  Induction:  IV  Airway Plan: Direct, Post-Induction  Informed Consent: Informed consent signed with the Patient and all parties understand the risks and agree with anesthesia plan.  All questions answered. Patient consented to blood products? Yes  ASA Score: 3  Day of Surgery Review of History & Physical: H&P Update referred to the surgeon/provider.    Ready For Surgery From Anesthesia Perspective.     .

## 2023-08-22 NOTE — TELEPHONE ENCOUNTER
Called and spoke with pt about the following:     Please arrive to [facility] ([facility] at 0530am on 8/23/23 for your scheduled procedure.  Address: [address] [place] [address] [place]. [address] (2nd Building on left, 1st Floor Lobby)  >>>NO eating or drinking after midnight unless instructed otherwise by your Surgeon<<<    Thank you,  -[facility] Dept.  Mon-Fri 7:30 am - 4 pm [phone]

## 2023-08-23 ENCOUNTER — HOSPITAL ENCOUNTER (INPATIENT)
Facility: HOSPITAL | Age: 68
LOS: 5 days | Discharge: HOME HEALTH | DRG: 236 | End: 2023-08-28
Attending: THORACIC SURGERY (CARDIOTHORACIC VASCULAR SURGERY) | Admitting: THORACIC SURGERY (CARDIOTHORACIC VASCULAR SURGERY)
Payer: COMMERCIAL

## 2023-08-23 ENCOUNTER — ANESTHESIA (OUTPATIENT)
Dept: SURGERY | Facility: HOSPITAL | Age: 68
DRG: 236 | End: 2023-08-23
Payer: COMMERCIAL

## 2023-08-23 DIAGNOSIS — Z98.890 POST-OPERATIVE STATE: ICD-10-CM

## 2023-08-23 DIAGNOSIS — I25.10 CORONARY ARTERY DISEASE INVOLVING NATIVE CORONARY ARTERY OF NATIVE HEART WITHOUT ANGINA PECTORIS: ICD-10-CM

## 2023-08-23 DIAGNOSIS — R73.03 PREDIABETES: ICD-10-CM

## 2023-08-23 DIAGNOSIS — I10 BENIGN HYPERTENSION: ICD-10-CM

## 2023-08-23 DIAGNOSIS — Z95.1 S/P CABG X 2: Primary | ICD-10-CM

## 2023-08-23 DIAGNOSIS — R00.0 TACHYCARDIA: ICD-10-CM

## 2023-08-23 PROBLEM — R06.89 RESPIRATORY INSUFFICIENCY: Status: ACTIVE | Noted: 2023-08-23

## 2023-08-23 LAB
ABO + RH BLD: NORMAL
ALLENS TEST: ABNORMAL
ANION GAP SERPL CALC-SCNC: 10 MMOL/L (ref 8–16)
ANION GAP SERPL CALC-SCNC: 6 MMOL/L (ref 8–16)
APTT PPP: 27.6 SEC (ref 21–32)
BASOPHILS # BLD AUTO: 0.01 K/UL (ref 0–0.2)
BASOPHILS # BLD AUTO: 0.01 K/UL (ref 0–0.2)
BASOPHILS NFR BLD: 0.1 % (ref 0–1.9)
BASOPHILS NFR BLD: 0.1 % (ref 0–1.9)
BLD GP AB SCN CELLS X3 SERPL QL: NORMAL
BLD PROD TYP BPU: NORMAL
BLOOD UNIT EXPIRATION DATE: NORMAL
BLOOD UNIT TYPE CODE: 9500
BLOOD UNIT TYPE: NORMAL
BUN SERPL-MCNC: 18 MG/DL (ref 8–23)
BUN SERPL-MCNC: 19 MG/DL (ref 8–23)
CALCIUM SERPL-MCNC: 7.7 MG/DL (ref 8.7–10.5)
CALCIUM SERPL-MCNC: 7.9 MG/DL (ref 8.7–10.5)
CHLORIDE SERPL-SCNC: 108 MMOL/L (ref 95–110)
CHLORIDE SERPL-SCNC: 111 MMOL/L (ref 95–110)
CO2 SERPL-SCNC: 20 MMOL/L (ref 23–29)
CO2 SERPL-SCNC: 20 MMOL/L (ref 23–29)
CODING SYSTEM: NORMAL
CREAT SERPL-MCNC: 1.6 MG/DL (ref 0.5–1.4)
CREAT SERPL-MCNC: 1.6 MG/DL (ref 0.5–1.4)
CROSSMATCH INTERPRETATION: NORMAL
DELSYS: ABNORMAL
DIFFERENTIAL METHOD: ABNORMAL
DIFFERENTIAL METHOD: ABNORMAL
DISPENSE STATUS: NORMAL
EOSINOPHIL # BLD AUTO: 0 K/UL (ref 0–0.5)
EOSINOPHIL # BLD AUTO: 0.1 K/UL (ref 0–0.5)
EOSINOPHIL NFR BLD: 0.1 % (ref 0–8)
EOSINOPHIL NFR BLD: 0.5 % (ref 0–8)
ERYTHROCYTE [DISTWIDTH] IN BLOOD BY AUTOMATED COUNT: 13.2 % (ref 11.5–14.5)
ERYTHROCYTE [DISTWIDTH] IN BLOOD BY AUTOMATED COUNT: 13.2 % (ref 11.5–14.5)
ERYTHROCYTE [SEDIMENTATION RATE] IN BLOOD BY WESTERGREN METHOD: 16 MM/H
EST. GFR  (NO RACE VARIABLE): 48 ML/MIN/1.73 M^2
EST. GFR  (NO RACE VARIABLE): 48 ML/MIN/1.73 M^2
FIBRINOGEN PPP-MCNC: 375 MG/DL (ref 182–400)
FIO2: 100
GLUCOSE SERPL-MCNC: 111 MG/DL (ref 70–110)
GLUCOSE SERPL-MCNC: 140 MG/DL (ref 70–110)
GLUCOSE SERPL-MCNC: 167 MG/DL (ref 70–110)
GLUCOSE SERPL-MCNC: 170 MG/DL (ref 70–110)
GLUCOSE SERPL-MCNC: 178 MG/DL (ref 70–110)
GLUCOSE SERPL-MCNC: 185 MG/DL (ref 70–110)
GLUCOSE SERPL-MCNC: 212 MG/DL (ref 70–110)
HCO3 UR-SCNC: 21.9 MMOL/L (ref 24–28)
HCO3 UR-SCNC: 22 MMOL/L (ref 24–28)
HCO3 UR-SCNC: 22.4 MMOL/L (ref 24–28)
HCO3 UR-SCNC: 23.2 MMOL/L (ref 24–28)
HCO3 UR-SCNC: 24.2 MMOL/L (ref 24–28)
HCT VFR BLD AUTO: 33.1 % (ref 40–54)
HCT VFR BLD AUTO: 36.3 % (ref 40–54)
HCT VFR BLD CALC: 27 %PCV (ref 36–54)
HCT VFR BLD CALC: 27 %PCV (ref 36–54)
HCT VFR BLD CALC: 28 %PCV (ref 36–54)
HCT VFR BLD CALC: 34 %PCV (ref 36–54)
HCT VFR BLD CALC: 36 %PCV (ref 36–54)
HGB BLD-MCNC: 11 G/DL (ref 14–18)
HGB BLD-MCNC: 11.7 G/DL (ref 14–18)
IMM GRANULOCYTES # BLD AUTO: 0.03 K/UL (ref 0–0.04)
IMM GRANULOCYTES # BLD AUTO: 0.08 K/UL (ref 0–0.04)
IMM GRANULOCYTES NFR BLD AUTO: 0.4 % (ref 0–0.5)
IMM GRANULOCYTES NFR BLD AUTO: 0.7 % (ref 0–0.5)
INR PPP: 1.1 (ref 0.8–1.2)
LYMPHOCYTES # BLD AUTO: 0.4 K/UL (ref 1–4.8)
LYMPHOCYTES # BLD AUTO: 0.9 K/UL (ref 1–4.8)
LYMPHOCYTES NFR BLD: 5.2 % (ref 18–48)
LYMPHOCYTES NFR BLD: 7.3 % (ref 18–48)
MAGNESIUM SERPL-MCNC: 2.7 MG/DL (ref 1.6–2.6)
MAGNESIUM SERPL-MCNC: 3 MG/DL (ref 1.6–2.6)
MCH RBC QN AUTO: 30.1 PG (ref 27–31)
MCH RBC QN AUTO: 30.8 PG (ref 27–31)
MCHC RBC AUTO-ENTMCNC: 32.2 G/DL (ref 32–36)
MCHC RBC AUTO-ENTMCNC: 33.2 G/DL (ref 32–36)
MCV RBC AUTO: 93 FL (ref 82–98)
MCV RBC AUTO: 93 FL (ref 82–98)
MIN VOL: 14.6
MODE: ABNORMAL
MONOCYTES # BLD AUTO: 0.6 K/UL (ref 0.3–1)
MONOCYTES # BLD AUTO: 0.7 K/UL (ref 0.3–1)
MONOCYTES NFR BLD: 5.9 % (ref 4–15)
MONOCYTES NFR BLD: 7.7 % (ref 4–15)
NEUTROPHILS # BLD AUTO: 6.5 K/UL (ref 1.8–7.7)
NEUTROPHILS # BLD AUTO: 9.9 K/UL (ref 1.8–7.7)
NEUTROPHILS NFR BLD: 85.5 % (ref 38–73)
NEUTROPHILS NFR BLD: 86.5 % (ref 38–73)
NRBC BLD-RTO: 0 /100 WBC
NRBC BLD-RTO: 0 /100 WBC
NUM UNITS TRANS PACKED RBC: NORMAL
PCO2 BLDA: 42.1 MMHG (ref 35–45)
PCO2 BLDA: 45 MMHG (ref 35–45)
PCO2 BLDA: 45.5 MMHG (ref 35–45)
PCO2 BLDA: 48 MMHG (ref 35–45)
PCO2 BLDA: 49.5 MMHG (ref 35–45)
PEEP: 5
PH SMN: 7.27 [PH] (ref 7.35–7.45)
PH SMN: 7.3 [PH] (ref 7.35–7.45)
PH SMN: 7.31 [PH] (ref 7.35–7.45)
PH SMN: 7.32 [PH] (ref 7.35–7.45)
PH SMN: 7.32 [PH] (ref 7.35–7.45)
PIP: 24
PLATELET # BLD AUTO: 122 K/UL (ref 150–450)
PLATELET # BLD AUTO: 141 K/UL (ref 150–450)
PMV BLD AUTO: 9.4 FL (ref 9.2–12.9)
PMV BLD AUTO: 9.8 FL (ref 9.2–12.9)
PO2 BLDA: 167 MMHG (ref 80–100)
PO2 BLDA: 173 MMHG (ref 80–100)
PO2 BLDA: 238 MMHG (ref 80–100)
PO2 BLDA: 473 MMHG (ref 80–100)
PO2 BLDA: 77 MMHG (ref 80–100)
POC ACTIVATED CLOTTING TIME K: 131 SEC (ref 74–137)
POC ACTIVATED CLOTTING TIME K: 155 SEC (ref 74–137)
POC ACTIVATED CLOTTING TIME K: 462 SEC (ref 74–137)
POC ACTIVATED CLOTTING TIME K: 540 SEC (ref 74–137)
POC ACTIVATED CLOTTING TIME K: 618 SEC (ref 74–137)
POC ACTIVATED CLOTTING TIME K: 696 SEC (ref 74–137)
POC BE: -2 MMOL/L
POC BE: -3 MMOL/L
POC BE: -4 MMOL/L
POC BE: -4 MMOL/L
POC BE: -5 MMOL/L
POC IONIZED CALCIUM: 1.01 MMOL/L (ref 1.06–1.42)
POC IONIZED CALCIUM: 1.03 MMOL/L (ref 1.06–1.42)
POC IONIZED CALCIUM: 1.15 MMOL/L (ref 1.06–1.42)
POC IONIZED CALCIUM: 1.17 MMOL/L (ref 1.06–1.42)
POC IONIZED CALCIUM: 1.18 MMOL/L (ref 1.06–1.42)
POC SATURATED O2: 100 % (ref 95–100)
POC SATURATED O2: 100 % (ref 95–100)
POC SATURATED O2: 94 % (ref 95–100)
POC SATURATED O2: 99 % (ref 95–100)
POC SATURATED O2: 99 % (ref 95–100)
POCT GLUCOSE: 122 MG/DL (ref 70–110)
POCT GLUCOSE: 136 MG/DL (ref 70–110)
POCT GLUCOSE: 136 MG/DL (ref 70–110)
POCT GLUCOSE: 137 MG/DL (ref 70–110)
POCT GLUCOSE: 146 MG/DL (ref 70–110)
POCT GLUCOSE: 159 MG/DL (ref 70–110)
POCT GLUCOSE: 160 MG/DL (ref 70–110)
POCT GLUCOSE: 165 MG/DL (ref 70–110)
POCT GLUCOSE: 167 MG/DL (ref 70–110)
POCT GLUCOSE: 169 MG/DL (ref 70–110)
POCT GLUCOSE: 171 MG/DL (ref 70–110)
POTASSIUM BLD-SCNC: 4.1 MMOL/L (ref 3.5–5.1)
POTASSIUM BLD-SCNC: 4.4 MMOL/L (ref 3.5–5.1)
POTASSIUM BLD-SCNC: 4.8 MMOL/L (ref 3.5–5.1)
POTASSIUM BLD-SCNC: 4.9 MMOL/L (ref 3.5–5.1)
POTASSIUM BLD-SCNC: 5.1 MMOL/L (ref 3.5–5.1)
POTASSIUM SERPL-SCNC: 5 MMOL/L (ref 3.5–5.1)
POTASSIUM SERPL-SCNC: 5 MMOL/L (ref 3.5–5.1)
PROTHROMBIN TIME: 11.7 SEC (ref 9–12.5)
RBC # BLD AUTO: 3.57 M/UL (ref 4.6–6.2)
RBC # BLD AUTO: 3.89 M/UL (ref 4.6–6.2)
SAMPLE: ABNORMAL
SAMPLE: NORMAL
SITE: ABNORMAL
SODIUM BLD-SCNC: 135 MMOL/L (ref 136–145)
SODIUM BLD-SCNC: 136 MMOL/L (ref 136–145)
SODIUM BLD-SCNC: 139 MMOL/L (ref 136–145)
SODIUM BLD-SCNC: 139 MMOL/L (ref 136–145)
SODIUM BLD-SCNC: 140 MMOL/L (ref 136–145)
SODIUM SERPL-SCNC: 137 MMOL/L (ref 136–145)
SODIUM SERPL-SCNC: 138 MMOL/L (ref 136–145)
SP02: 99
SPECIMEN OUTDATE: NORMAL
VT: 500
WBC # BLD AUTO: 11.6 K/UL (ref 3.9–12.7)
WBC # BLD AUTO: 7.54 K/UL (ref 3.9–12.7)

## 2023-08-23 PROCEDURE — 27201423 OPTIME MED/SURG SUP & DEVICES STERILE SUPPLY: Performed by: THORACIC SURGERY (CARDIOTHORACIC VASCULAR SURGERY)

## 2023-08-23 PROCEDURE — 20000000 HC ICU ROOM

## 2023-08-23 PROCEDURE — 37000008 HC ANESTHESIA 1ST 15 MINUTES: Performed by: THORACIC SURGERY (CARDIOTHORACIC VASCULAR SURGERY)

## 2023-08-23 PROCEDURE — C9290 INJ, BUPIVACAINE LIPOSOME: HCPCS | Performed by: THORACIC SURGERY (CARDIOTHORACIC VASCULAR SURGERY)

## 2023-08-23 PROCEDURE — 85025 COMPLETE CBC W/AUTO DIFF WBC: CPT | Performed by: THORACIC SURGERY (CARDIOTHORACIC VASCULAR SURGERY)

## 2023-08-23 PROCEDURE — 94640 AIRWAY INHALATION TREATMENT: CPT

## 2023-08-23 PROCEDURE — 33533 PR CABG, ARTERIAL, SINGLE: ICD-10-PCS | Mod: ,,, | Performed by: THORACIC SURGERY (CARDIOTHORACIC VASCULAR SURGERY)

## 2023-08-23 PROCEDURE — 63600175 PHARM REV CODE 636 W HCPCS

## 2023-08-23 PROCEDURE — 84295 ASSAY OF SERUM SODIUM: CPT

## 2023-08-23 PROCEDURE — 80048 BASIC METABOLIC PNL TOTAL CA: CPT | Mod: 91 | Performed by: THORACIC SURGERY (CARDIOTHORACIC VASCULAR SURGERY)

## 2023-08-23 PROCEDURE — 36000713 HC OR TIME LEV V EA ADD 15 MIN: Performed by: THORACIC SURGERY (CARDIOTHORACIC VASCULAR SURGERY)

## 2023-08-23 PROCEDURE — 63600175 PHARM REV CODE 636 W HCPCS: Performed by: THORACIC SURGERY (CARDIOTHORACIC VASCULAR SURGERY)

## 2023-08-23 PROCEDURE — 85384 FIBRINOGEN ACTIVITY: CPT | Performed by: THORACIC SURGERY (CARDIOTHORACIC VASCULAR SURGERY)

## 2023-08-23 PROCEDURE — 33508 PR ENDOSCOPY W/VIDEO-ASST VEIN HARVEST,CABG: ICD-10-PCS | Mod: 59,,, | Performed by: THORACIC SURGERY (CARDIOTHORACIC VASCULAR SURGERY)

## 2023-08-23 PROCEDURE — 94002 VENT MGMT INPAT INIT DAY: CPT

## 2023-08-23 PROCEDURE — 36000712 HC OR TIME LEV V 1ST 15 MIN: Performed by: THORACIC SURGERY (CARDIOTHORACIC VASCULAR SURGERY)

## 2023-08-23 PROCEDURE — 86920 COMPATIBILITY TEST SPIN: CPT | Performed by: THORACIC SURGERY (CARDIOTHORACIC VASCULAR SURGERY)

## 2023-08-23 PROCEDURE — 37000009 HC ANESTHESIA EA ADD 15 MINS: Performed by: THORACIC SURGERY (CARDIOTHORACIC VASCULAR SURGERY)

## 2023-08-23 PROCEDURE — 99900035 HC TECH TIME PER 15 MIN (STAT)

## 2023-08-23 PROCEDURE — 27200667 HC PACEMAKER, TEMPORARY MONITORING, PER SHIFT

## 2023-08-23 PROCEDURE — 25000003 PHARM REV CODE 250

## 2023-08-23 PROCEDURE — 37799 UNLISTED PX VASCULAR SURGERY: CPT

## 2023-08-23 PROCEDURE — 25000003 PHARM REV CODE 250: Performed by: THORACIC SURGERY (CARDIOTHORACIC VASCULAR SURGERY)

## 2023-08-23 PROCEDURE — C1898 LEAD, PMKR, OTHER THAN TRANS: HCPCS | Performed by: THORACIC SURGERY (CARDIOTHORACIC VASCULAR SURGERY)

## 2023-08-23 PROCEDURE — 82803 BLOOD GASES ANY COMBINATION: CPT

## 2023-08-23 PROCEDURE — 94761 N-INVAS EAR/PLS OXIMETRY MLT: CPT

## 2023-08-23 PROCEDURE — 83735 ASSAY OF MAGNESIUM: CPT | Performed by: THORACIC SURGERY (CARDIOTHORACIC VASCULAR SURGERY)

## 2023-08-23 PROCEDURE — C1729 CATH, DRAINAGE: HCPCS | Performed by: THORACIC SURGERY (CARDIOTHORACIC VASCULAR SURGERY)

## 2023-08-23 PROCEDURE — S0017 INJECTION, AMINOCAPROIC ACID: HCPCS

## 2023-08-23 PROCEDURE — 25000242 PHARM REV CODE 250 ALT 637 W/ HCPCS: Performed by: THORACIC SURGERY (CARDIOTHORACIC VASCULAR SURGERY)

## 2023-08-23 PROCEDURE — 85610 PROTHROMBIN TIME: CPT | Performed by: THORACIC SURGERY (CARDIOTHORACIC VASCULAR SURGERY)

## 2023-08-23 PROCEDURE — C1713 ANCHOR/SCREW BN/BN,TIS/BN: HCPCS | Performed by: THORACIC SURGERY (CARDIOTHORACIC VASCULAR SURGERY)

## 2023-08-23 PROCEDURE — 27100171 HC OXYGEN HIGH FLOW UP TO 24 HOURS

## 2023-08-23 PROCEDURE — 33508 ENDOSCOPIC VEIN HARVEST: CPT | Mod: 59,,, | Performed by: THORACIC SURGERY (CARDIOTHORACIC VASCULAR SURGERY)

## 2023-08-23 PROCEDURE — 33517 CABG ARTERY-VEIN SINGLE: CPT | Mod: ,,, | Performed by: THORACIC SURGERY (CARDIOTHORACIC VASCULAR SURGERY)

## 2023-08-23 PROCEDURE — 86900 BLOOD TYPING SEROLOGIC ABO: CPT | Performed by: THORACIC SURGERY (CARDIOTHORACIC VASCULAR SURGERY)

## 2023-08-23 PROCEDURE — 85014 HEMATOCRIT: CPT

## 2023-08-23 PROCEDURE — 33533 CABG ARTERIAL SINGLE: CPT | Mod: ,,, | Performed by: THORACIC SURGERY (CARDIOTHORACIC VASCULAR SURGERY)

## 2023-08-23 PROCEDURE — 27800903 OPTIME MED/SURG SUP & DEVICES OTHER IMPLANTS: Performed by: THORACIC SURGERY (CARDIOTHORACIC VASCULAR SURGERY)

## 2023-08-23 PROCEDURE — 27000513 HC SENSOR FLOTRAC

## 2023-08-23 PROCEDURE — 85730 THROMBOPLASTIN TIME PARTIAL: CPT | Performed by: THORACIC SURGERY (CARDIOTHORACIC VASCULAR SURGERY)

## 2023-08-23 PROCEDURE — P9045 ALBUMIN (HUMAN), 5%, 250 ML: HCPCS | Mod: JZ,JG | Performed by: THORACIC SURGERY (CARDIOTHORACIC VASCULAR SURGERY)

## 2023-08-23 PROCEDURE — 84132 ASSAY OF SERUM POTASSIUM: CPT

## 2023-08-23 PROCEDURE — C9399 UNCLASSIFIED DRUGS OR BIOLOG: HCPCS

## 2023-08-23 PROCEDURE — 82330 ASSAY OF CALCIUM: CPT

## 2023-08-23 PROCEDURE — 33517 PR CABG, ARTERY-VEIN, SINGLE: ICD-10-PCS | Mod: ,,, | Performed by: THORACIC SURGERY (CARDIOTHORACIC VASCULAR SURGERY)

## 2023-08-23 DEVICE — WIRE 6495F TEMP PACE BIP MYO 6PK 26L
Type: IMPLANTABLE DEVICE | Site: HEART | Status: FUNCTIONAL
Brand: STREAMLINE™

## 2023-08-23 DEVICE — BONE HEMOSTASIS MATERIAL - 2.5G
Type: IMPLANTABLE DEVICE | Site: STERNUM | Status: FUNCTIONAL
Brand: OSTENE

## 2023-08-23 RX ORDER — IPRATROPIUM BROMIDE AND ALBUTEROL SULFATE 2.5; .5 MG/3ML; MG/3ML
3 SOLUTION RESPIRATORY (INHALATION) EVERY 4 HOURS
Status: COMPLETED | OUTPATIENT
Start: 2023-08-23 | End: 2023-08-24

## 2023-08-23 RX ORDER — POTASSIUM CHLORIDE 20 MEQ/1
20 TABLET, EXTENDED RELEASE ORAL EVERY 12 HOURS
Status: DISCONTINUED | OUTPATIENT
Start: 2023-08-24 | End: 2023-08-23

## 2023-08-23 RX ORDER — LEVALBUTEROL 1.25 MG/.5ML
1.25 SOLUTION, CONCENTRATE RESPIRATORY (INHALATION) EVERY 8 HOURS
Status: DISCONTINUED | OUTPATIENT
Start: 2023-08-23 | End: 2023-08-23

## 2023-08-23 RX ORDER — KETAMINE HCL IN 0.9 % NACL 50 MG/5 ML
SYRINGE (ML) INTRAVENOUS
Status: DISCONTINUED | OUTPATIENT
Start: 2023-08-23 | End: 2023-08-23

## 2023-08-23 RX ORDER — DEXMEDETOMIDINE HYDROCHLORIDE 4 UG/ML
0-1.4 INJECTION INTRAVENOUS CONTINUOUS
Status: DISCONTINUED | OUTPATIENT
Start: 2023-08-23 | End: 2023-08-25

## 2023-08-23 RX ORDER — FENTANYL CITRATE 50 UG/ML
INJECTION, SOLUTION INTRAMUSCULAR; INTRAVENOUS
Status: DISCONTINUED | OUTPATIENT
Start: 2023-08-23 | End: 2023-08-23

## 2023-08-23 RX ORDER — ONDANSETRON 2 MG/ML
INJECTION INTRAMUSCULAR; INTRAVENOUS
Status: DISCONTINUED | OUTPATIENT
Start: 2023-08-23 | End: 2023-08-23

## 2023-08-23 RX ORDER — DEXMEDETOMIDINE HYDROCHLORIDE 100 UG/ML
INJECTION, SOLUTION INTRAVENOUS CONTINUOUS PRN
Status: DISCONTINUED | OUTPATIENT
Start: 2023-08-23 | End: 2023-08-23

## 2023-08-23 RX ORDER — ONDANSETRON 2 MG/ML
4 INJECTION INTRAMUSCULAR; INTRAVENOUS EVERY 12 HOURS PRN
Status: DISCONTINUED | OUTPATIENT
Start: 2023-08-23 | End: 2023-08-28 | Stop reason: HOSPADM

## 2023-08-23 RX ORDER — POTASSIUM CHLORIDE 14.9 MG/ML
20 INJECTION INTRAVENOUS
Status: DISCONTINUED | OUTPATIENT
Start: 2023-08-23 | End: 2023-08-25

## 2023-08-23 RX ORDER — CEFAZOLIN SODIUM 1 G/3ML
INJECTION, POWDER, FOR SOLUTION INTRAMUSCULAR; INTRAVENOUS
Status: DISCONTINUED | OUTPATIENT
Start: 2023-08-23 | End: 2023-08-23 | Stop reason: HOSPADM

## 2023-08-23 RX ORDER — ASPIRIN 81 MG/1
81 TABLET ORAL DAILY
Status: DISCONTINUED | OUTPATIENT
Start: 2023-08-24 | End: 2023-08-28 | Stop reason: HOSPADM

## 2023-08-23 RX ORDER — NOREPINEPHRINE BITARTRATE/D5W 4MG/250ML
0-3 PLASTIC BAG, INJECTION (ML) INTRAVENOUS CONTINUOUS
Status: DISCONTINUED | OUTPATIENT
Start: 2023-08-23 | End: 2023-08-25

## 2023-08-23 RX ORDER — CHLORHEXIDINE GLUCONATE ORAL RINSE 1.2 MG/ML
10 SOLUTION DENTAL
Status: DISCONTINUED | OUTPATIENT
Start: 2023-08-23 | End: 2023-08-23 | Stop reason: HOSPADM

## 2023-08-23 RX ORDER — FOLIC ACID 1 MG/1
1 TABLET ORAL DAILY
Status: DISCONTINUED | OUTPATIENT
Start: 2023-08-25 | End: 2023-08-28 | Stop reason: HOSPADM

## 2023-08-23 RX ORDER — NEOMYCIN AND POLYMYXIN B SULFATES 40; 200000 MG/ML; [USP'U]/ML
SOLUTION IRRIGATION
Status: DISCONTINUED | OUTPATIENT
Start: 2023-08-23 | End: 2023-08-23 | Stop reason: HOSPADM

## 2023-08-23 RX ORDER — FUROSEMIDE 10 MG/ML
40 INJECTION INTRAMUSCULAR; INTRAVENOUS 2 TIMES DAILY
Status: DISCONTINUED | OUTPATIENT
Start: 2023-08-24 | End: 2023-08-27

## 2023-08-23 RX ORDER — HEPARIN SODIUM 1000 [USP'U]/ML
INJECTION, SOLUTION INTRAVENOUS; SUBCUTANEOUS
Status: DISCONTINUED | OUTPATIENT
Start: 2023-08-23 | End: 2023-08-23

## 2023-08-23 RX ORDER — LIDOCAINE HYDROCHLORIDE ANHYDROUS AND DEXTROSE MONOHYDRATE .8; 5 G/100ML; G/100ML
INJECTION, SOLUTION INTRAVENOUS CONTINUOUS PRN
Status: DISCONTINUED | OUTPATIENT
Start: 2023-08-23 | End: 2023-08-23

## 2023-08-23 RX ORDER — LANOLIN ALCOHOL/MO/W.PET/CERES
1 CREAM (GRAM) TOPICAL DAILY
Status: DISCONTINUED | OUTPATIENT
Start: 2023-08-24 | End: 2023-08-28 | Stop reason: HOSPADM

## 2023-08-23 RX ORDER — FENTANYL CITRATE 50 UG/ML
50 INJECTION, SOLUTION INTRAMUSCULAR; INTRAVENOUS
Status: DISCONTINUED | OUTPATIENT
Start: 2023-08-23 | End: 2023-08-27

## 2023-08-23 RX ORDER — CALCIUM GLUCONATE 20 MG/ML
3 INJECTION, SOLUTION INTRAVENOUS
Status: DISCONTINUED | OUTPATIENT
Start: 2023-08-23 | End: 2023-08-25

## 2023-08-23 RX ORDER — LIDOCAINE HYDROCHLORIDE 20 MG/ML
INJECTION INTRAVENOUS
Status: DISCONTINUED | OUTPATIENT
Start: 2023-08-23 | End: 2023-08-23

## 2023-08-23 RX ORDER — CHLORHEXIDINE GLUCONATE ORAL RINSE 1.2 MG/ML
10 SOLUTION DENTAL 2 TIMES DAILY
Status: COMPLETED | OUTPATIENT
Start: 2023-08-23 | End: 2023-08-28

## 2023-08-23 RX ORDER — BUPIVACAINE HYDROCHLORIDE 2.5 MG/ML
INJECTION, SOLUTION EPIDURAL; INFILTRATION; INTRACAUDAL
Status: DISCONTINUED | OUTPATIENT
Start: 2023-08-23 | End: 2023-08-23 | Stop reason: HOSPADM

## 2023-08-23 RX ORDER — MIDAZOLAM HYDROCHLORIDE 1 MG/ML
INJECTION, SOLUTION INTRAMUSCULAR; INTRAVENOUS
Status: DISCONTINUED | OUTPATIENT
Start: 2023-08-23 | End: 2023-08-23

## 2023-08-23 RX ORDER — IPRATROPIUM BROMIDE 0.5 MG/2.5ML
0.5 SOLUTION RESPIRATORY (INHALATION) EVERY 8 HOURS
Status: DISCONTINUED | OUTPATIENT
Start: 2023-08-23 | End: 2023-08-23

## 2023-08-23 RX ORDER — CLOPIDOGREL BISULFATE 75 MG/1
75 TABLET ORAL DAILY
Status: DISCONTINUED | OUTPATIENT
Start: 2023-08-24 | End: 2023-08-28 | Stop reason: HOSPADM

## 2023-08-23 RX ORDER — ROCURONIUM BROMIDE 10 MG/ML
INJECTION, SOLUTION INTRAVENOUS
Status: DISCONTINUED | OUTPATIENT
Start: 2023-08-23 | End: 2023-08-23

## 2023-08-23 RX ORDER — CEFAZOLIN SODIUM 2 G/50ML
2 SOLUTION INTRAVENOUS
Status: COMPLETED | OUTPATIENT
Start: 2023-08-23 | End: 2023-08-24

## 2023-08-23 RX ORDER — MIDAZOLAM HYDROCHLORIDE 1 MG/ML
1 INJECTION INTRAMUSCULAR; INTRAVENOUS
Status: ACTIVE | OUTPATIENT
Start: 2023-08-23 | End: 2023-08-24

## 2023-08-23 RX ORDER — OXYCODONE HYDROCHLORIDE 5 MG/1
5 TABLET ORAL EVERY 4 HOURS PRN
Status: DISCONTINUED | OUTPATIENT
Start: 2023-08-23 | End: 2023-08-28 | Stop reason: HOSPADM

## 2023-08-23 RX ORDER — METOPROLOL TARTRATE 25 MG/1
25 TABLET, FILM COATED ORAL 2 TIMES DAILY
Status: DISCONTINUED | OUTPATIENT
Start: 2023-08-24 | End: 2023-08-28 | Stop reason: HOSPADM

## 2023-08-23 RX ORDER — MAGNESIUM SULFATE HEPTAHYDRATE 40 MG/ML
4 INJECTION, SOLUTION INTRAVENOUS
Status: DISCONTINUED | OUTPATIENT
Start: 2023-08-23 | End: 2023-08-28 | Stop reason: HOSPADM

## 2023-08-23 RX ORDER — ACETAMINOPHEN 325 MG/1
650 TABLET ORAL EVERY 6 HOURS PRN
Status: DISCONTINUED | OUTPATIENT
Start: 2023-08-23 | End: 2023-08-28 | Stop reason: HOSPADM

## 2023-08-23 RX ORDER — HEPARIN SODIUM 1000 [USP'U]/ML
INJECTION, SOLUTION INTRAVENOUS; SUBCUTANEOUS
Status: DISCONTINUED | OUTPATIENT
Start: 2023-08-23 | End: 2023-08-23 | Stop reason: HOSPADM

## 2023-08-23 RX ORDER — ACETAMINOPHEN 10 MG/ML
INJECTION, SOLUTION INTRAVENOUS
Status: DISCONTINUED | OUTPATIENT
Start: 2023-08-23 | End: 2023-08-23

## 2023-08-23 RX ORDER — ACETAMINOPHEN 650 MG/20.3ML
650 LIQUID ORAL EVERY 6 HOURS PRN
Status: DISCONTINUED | OUTPATIENT
Start: 2023-08-23 | End: 2023-08-23 | Stop reason: SDUPTHER

## 2023-08-23 RX ORDER — PROPOFOL 10 MG/ML
VIAL (ML) INTRAVENOUS
Status: DISCONTINUED | OUTPATIENT
Start: 2023-08-23 | End: 2023-08-23

## 2023-08-23 RX ORDER — AMINOCAPROIC ACID 250 MG/ML
INJECTION, SOLUTION INTRAVENOUS
Status: DISCONTINUED | OUTPATIENT
Start: 2023-08-23 | End: 2023-08-23

## 2023-08-23 RX ORDER — VANCOMYCIN HYDROCHLORIDE 1 G/20ML
INJECTION, POWDER, LYOPHILIZED, FOR SOLUTION INTRAVENOUS
Status: DISCONTINUED | OUTPATIENT
Start: 2023-08-23 | End: 2023-08-23 | Stop reason: HOSPADM

## 2023-08-23 RX ORDER — FENTANYL CITRATE 50 UG/ML
25 INJECTION, SOLUTION INTRAMUSCULAR; INTRAVENOUS
Status: DISCONTINUED | OUTPATIENT
Start: 2023-08-23 | End: 2023-08-27

## 2023-08-23 RX ORDER — SODIUM CHLORIDE, SODIUM LACTATE, POTASSIUM CHLORIDE, CALCIUM CHLORIDE 600; 310; 30; 20 MG/100ML; MG/100ML; MG/100ML; MG/100ML
1000 INJECTION, SOLUTION INTRAVENOUS
Status: DISCONTINUED | OUTPATIENT
Start: 2023-08-23 | End: 2023-08-28 | Stop reason: HOSPADM

## 2023-08-23 RX ORDER — CALCIUM GLUCONATE 20 MG/ML
1 INJECTION, SOLUTION INTRAVENOUS
Status: DISCONTINUED | OUTPATIENT
Start: 2023-08-23 | End: 2023-08-25

## 2023-08-23 RX ORDER — POLYETHYLENE GLYCOL 3350 17 G/17G
17 POWDER, FOR SOLUTION ORAL DAILY
Status: DISCONTINUED | OUTPATIENT
Start: 2023-08-24 | End: 2023-08-28 | Stop reason: HOSPADM

## 2023-08-23 RX ORDER — CEFAZOLIN SODIUM 2 G/50ML
2 SOLUTION INTRAVENOUS
Status: COMPLETED | OUTPATIENT
Start: 2023-08-23 | End: 2023-08-23

## 2023-08-23 RX ORDER — PAPAVERINE HYDROCHLORIDE 30 MG/ML
INJECTION INTRAMUSCULAR; INTRAVENOUS
Status: DISCONTINUED | OUTPATIENT
Start: 2023-08-23 | End: 2023-08-23 | Stop reason: HOSPADM

## 2023-08-23 RX ORDER — DEXTROSE, SODIUM CHLORIDE, SODIUM LACTATE, POTASSIUM CHLORIDE, AND CALCIUM CHLORIDE 5; .6; .31; .03; .02 G/100ML; G/100ML; G/100ML; G/100ML; G/100ML
INJECTION, SOLUTION INTRAVENOUS CONTINUOUS
Status: ACTIVE | OUTPATIENT
Start: 2023-08-23 | End: 2023-08-24

## 2023-08-23 RX ORDER — POTASSIUM CHLORIDE 29.8 MG/ML
40 INJECTION INTRAVENOUS
Status: DISCONTINUED | OUTPATIENT
Start: 2023-08-23 | End: 2023-08-25

## 2023-08-23 RX ORDER — NOREPINEPHRINE BITARTRATE 1 MG/ML
INJECTION, SOLUTION INTRAVENOUS
Status: DISCONTINUED | OUTPATIENT
Start: 2023-08-23 | End: 2023-08-23

## 2023-08-23 RX ORDER — METOCLOPRAMIDE HYDROCHLORIDE 5 MG/ML
5 INJECTION INTRAMUSCULAR; INTRAVENOUS EVERY 6 HOURS PRN
Status: DISCONTINUED | OUTPATIENT
Start: 2023-08-23 | End: 2023-08-28 | Stop reason: HOSPADM

## 2023-08-23 RX ORDER — VASOPRESSIN IN DEXTROSE 5 % 25/250 ML
0.01 PLASTIC BAG, INJECTION (ML) INTRAVENOUS CONTINUOUS
Status: DISCONTINUED | OUTPATIENT
Start: 2023-08-23 | End: 2023-08-23

## 2023-08-23 RX ORDER — NICARDIPINE HYDROCHLORIDE 0.2 MG/ML
0-15 INJECTION INTRAVENOUS CONTINUOUS
Status: DISCONTINUED | OUTPATIENT
Start: 2023-08-23 | End: 2023-08-25

## 2023-08-23 RX ORDER — ALBUMIN HUMAN 50 G/1000ML
25 SOLUTION INTRAVENOUS
Status: DISCONTINUED | OUTPATIENT
Start: 2023-08-23 | End: 2023-08-28 | Stop reason: HOSPADM

## 2023-08-23 RX ORDER — ADHESIVE BANDAGE
30 BANDAGE TOPICAL DAILY
Status: DISCONTINUED | OUTPATIENT
Start: 2023-08-24 | End: 2023-08-28 | Stop reason: HOSPADM

## 2023-08-23 RX ORDER — POTASSIUM CHLORIDE 14.9 MG/ML
60 INJECTION INTRAVENOUS
Status: DISCONTINUED | OUTPATIENT
Start: 2023-08-23 | End: 2023-08-25

## 2023-08-23 RX ORDER — ACETAMINOPHEN 10 MG/ML
1000 INJECTION, SOLUTION INTRAVENOUS EVERY 8 HOURS
Status: COMPLETED | OUTPATIENT
Start: 2023-08-23 | End: 2023-08-23

## 2023-08-23 RX ORDER — FAMOTIDINE 10 MG/ML
20 INJECTION INTRAVENOUS 2 TIMES DAILY
Status: DISCONTINUED | OUTPATIENT
Start: 2023-08-23 | End: 2023-08-24

## 2023-08-23 RX ORDER — CALCIUM GLUCONATE 20 MG/ML
2 INJECTION, SOLUTION INTRAVENOUS
Status: DISCONTINUED | OUTPATIENT
Start: 2023-08-23 | End: 2023-08-25

## 2023-08-23 RX ORDER — AMIODARONE HYDROCHLORIDE 200 MG/1
200 TABLET ORAL ONCE
Status: COMPLETED | OUTPATIENT
Start: 2023-08-23 | End: 2023-08-23

## 2023-08-23 RX ORDER — METOPROLOL TARTRATE 25 MG/1
25 TABLET, FILM COATED ORAL
Status: DISCONTINUED | OUTPATIENT
Start: 2023-08-23 | End: 2023-08-23 | Stop reason: HOSPADM

## 2023-08-23 RX ORDER — ASCORBIC ACID 500 MG
500 TABLET ORAL 2 TIMES DAILY
Status: DISCONTINUED | OUTPATIENT
Start: 2023-08-24 | End: 2023-08-28 | Stop reason: HOSPADM

## 2023-08-23 RX ORDER — PROTAMINE SULFATE 10 MG/ML
INJECTION, SOLUTION INTRAVENOUS
Status: DISCONTINUED | OUTPATIENT
Start: 2023-08-23 | End: 2023-08-23

## 2023-08-23 RX ORDER — CYANOCOBALAMIN (VITAMIN B-12) 250 MCG
1000 TABLET ORAL DAILY
Status: DISCONTINUED | OUTPATIENT
Start: 2023-08-25 | End: 2023-08-28 | Stop reason: HOSPADM

## 2023-08-23 RX ADMIN — PROPOFOL 150 MG: 10 INJECTION, EMULSION INTRAVENOUS at 07:08

## 2023-08-23 RX ADMIN — DEXMEDETOMIDINE HYDROCHLORIDE 0.3 MCG/KG/HR: 100 INJECTION, SOLUTION INTRAVENOUS at 10:08

## 2023-08-23 RX ADMIN — IPRATROPIUM BROMIDE AND ALBUTEROL SULFATE 3 ML: 2.5; .5 SOLUTION RESPIRATORY (INHALATION) at 01:08

## 2023-08-23 RX ADMIN — FENTANYL CITRATE 50 MCG: 50 INJECTION INTRAMUSCULAR; INTRAVENOUS at 04:08

## 2023-08-23 RX ADMIN — PROPOFOL 50 MG: 10 INJECTION, EMULSION INTRAVENOUS at 07:08

## 2023-08-23 RX ADMIN — FAMOTIDINE 20 MG: 10 INJECTION, SOLUTION INTRAVENOUS at 08:08

## 2023-08-23 RX ADMIN — PROPOFOL 30 MG: 10 INJECTION, EMULSION INTRAVENOUS at 07:08

## 2023-08-23 RX ADMIN — VANCOMYCIN HYDROCHLORIDE 1000 MG: 1 INJECTION, POWDER, LYOPHILIZED, FOR SOLUTION INTRAVENOUS at 08:08

## 2023-08-23 RX ADMIN — Medication 30 MG: at 08:08

## 2023-08-23 RX ADMIN — CEFAZOLIN SODIUM 2 G: 2 SOLUTION INTRAVENOUS at 07:08

## 2023-08-23 RX ADMIN — IPRATROPIUM BROMIDE AND ALBUTEROL SULFATE 3 ML: 2.5; .5 SOLUTION RESPIRATORY (INHALATION) at 04:08

## 2023-08-23 RX ADMIN — FENTANYL CITRATE 100 MCG: 50 INJECTION, SOLUTION INTRAMUSCULAR; INTRAVENOUS at 08:08

## 2023-08-23 RX ADMIN — SODIUM CHLORIDE: 9 INJECTION, SOLUTION INTRAVENOUS at 10:08

## 2023-08-23 RX ADMIN — ROCURONIUM BROMIDE 30 MG: 10 INJECTION, SOLUTION INTRAVENOUS at 08:08

## 2023-08-23 RX ADMIN — Medication 20 MG: at 07:08

## 2023-08-23 RX ADMIN — MIDAZOLAM 2 MG: 1 INJECTION INTRAMUSCULAR; INTRAVENOUS at 06:08

## 2023-08-23 RX ADMIN — FENTANYL CITRATE 25 MCG: 50 INJECTION INTRAMUSCULAR; INTRAVENOUS at 07:08

## 2023-08-23 RX ADMIN — ACETAMINOPHEN 1000 MG: 10 INJECTION INTRAVENOUS at 11:08

## 2023-08-23 RX ADMIN — SODIUM CHLORIDE, SODIUM LACTATE, POTASSIUM CHLORIDE, CALCIUM CHLORIDE AND DEXTROSE MONOHYDRATE: 5; 600; 310; 30; 20 INJECTION, SOLUTION INTRAVENOUS at 12:08

## 2023-08-23 RX ADMIN — NOREPINEPHRINE BITARTRATE 16 MCG: 1 INJECTION, SOLUTION, CONCENTRATE INTRAVENOUS at 09:08

## 2023-08-23 RX ADMIN — HEPARIN SODIUM 45000 UNITS: 1000 INJECTION, SOLUTION INTRAVENOUS; SUBCUTANEOUS at 09:08

## 2023-08-23 RX ADMIN — CALCIUM CHLORIDE 1 G: 100 INJECTION, SOLUTION INTRAVENOUS at 11:08

## 2023-08-23 RX ADMIN — NOREPINEPHRINE BITARTRATE 8 MCG: 1 INJECTION, SOLUTION, CONCENTRATE INTRAVENOUS at 09:08

## 2023-08-23 RX ADMIN — ALBUMIN (HUMAN) 25 G: 2.5 SOLUTION INTRAVENOUS at 10:08

## 2023-08-23 RX ADMIN — PROTAMINE SULFATE 450 MG: 10 INJECTION, SOLUTION INTRAVENOUS at 11:08

## 2023-08-23 RX ADMIN — ALBUMIN (HUMAN) 25 G: 2.5 SOLUTION INTRAVENOUS at 04:08

## 2023-08-23 RX ADMIN — VANCOMYCIN HYDROCHLORIDE 1700 MG: 1 INJECTION, POWDER, LYOPHILIZED, FOR SOLUTION INTRAVENOUS at 07:08

## 2023-08-23 RX ADMIN — ROCURONIUM BROMIDE 20 MG: 10 INJECTION, SOLUTION INTRAVENOUS at 08:08

## 2023-08-23 RX ADMIN — IPRATROPIUM BROMIDE AND ALBUTEROL SULFATE 3 ML: 2.5; .5 SOLUTION RESPIRATORY (INHALATION) at 07:08

## 2023-08-23 RX ADMIN — SODIUM CHLORIDE: 9 INJECTION, SOLUTION INTRAVENOUS at 06:08

## 2023-08-23 RX ADMIN — CEFAZOLIN 2 G: 330 INJECTION, POWDER, FOR SOLUTION INTRAMUSCULAR; INTRAVENOUS at 11:08

## 2023-08-23 RX ADMIN — SODIUM CHLORIDE 1 UNITS/HR: 9 INJECTION, SOLUTION INTRAVENOUS at 02:08

## 2023-08-23 RX ADMIN — ACETAMINOPHEN 1000 MG: 10 INJECTION INTRAVENOUS at 07:08

## 2023-08-23 RX ADMIN — AMINOCAPROIC ACID 5 G: 250 INJECTION, SOLUTION INTRAVENOUS at 11:08

## 2023-08-23 RX ADMIN — SUGAMMADEX 200 MG: 100 INJECTION, SOLUTION INTRAVENOUS at 12:08

## 2023-08-23 RX ADMIN — NOREPINEPHRINE BITARTRATE 0.01 MCG/KG/MIN: 1 INJECTION, SOLUTION, CONCENTRATE INTRAVENOUS at 10:08

## 2023-08-23 RX ADMIN — CHLORHEXIDINE GLUCONATE 0.12% ORAL RINSE 10 ML: 1.2 LIQUID ORAL at 08:08

## 2023-08-23 RX ADMIN — CHLORHEXIDINE GLUCONATE 0.12% ORAL RINSE 10 ML: 1.2 LIQUID ORAL at 06:08

## 2023-08-23 RX ADMIN — FENTANYL CITRATE 50 MCG: 50 INJECTION, SOLUTION INTRAMUSCULAR; INTRAVENOUS at 07:08

## 2023-08-23 RX ADMIN — AMIODARONE HYDROCHLORIDE 200 MG: 200 TABLET ORAL at 06:08

## 2023-08-23 RX ADMIN — ALBUMIN (HUMAN) 25 G: 2.5 SOLUTION INTRAVENOUS at 02:08

## 2023-08-23 RX ADMIN — ROCURONIUM BROMIDE 100 MG: 10 INJECTION, SOLUTION INTRAVENOUS at 07:08

## 2023-08-23 RX ADMIN — ROCURONIUM BROMIDE 20 MG: 10 INJECTION, SOLUTION INTRAVENOUS at 09:08

## 2023-08-23 RX ADMIN — LIDOCAINE HYDROCHLORIDE 80 MG: 20 INJECTION INTRAVENOUS at 07:08

## 2023-08-23 RX ADMIN — LIDOCAINE HYDROCHLORIDE 2 MG/MIN: 8 INJECTION, SOLUTION INTRAVENOUS at 07:08

## 2023-08-23 RX ADMIN — ACETAMINOPHEN 1000 MG: 10 INJECTION INTRAVENOUS at 02:08

## 2023-08-23 RX ADMIN — ROCURONIUM BROMIDE 30 MG: 10 INJECTION, SOLUTION INTRAVENOUS at 10:08

## 2023-08-23 RX ADMIN — AMINOCAPROIC ACID 5 G: 250 INJECTION, SOLUTION INTRAVENOUS at 08:08

## 2023-08-23 NOTE — ASSESSMENT & PLAN NOTE
- came to the ICU intubated post-operatively  - doing well on PSV currently and plan to extubate shortly to NC  - wean supplemental oxygen with goal SpO2 > 92%  - PT/OT, encourage IS, up OOB as much as possible

## 2023-08-23 NOTE — HPI
Mr Rodriguez is a 66 y/o AAM with HTN, hypothyroidism, CKDIII, and CAD who presents to the ICU following CABG x 2 with Dr Arzola.  He had an abnormal stress test and then LHC that showed LAD and D1 stenosis.  Came in today for scheduled procedure.    I examined him following arrival to the ICU.  Currently on insulin drip and low dose Epi.  He is on minimal vent settings, is awake and following commands, and doing well on PSV.

## 2023-08-23 NOTE — OP NOTE
[facility] -   Cardiothoracic Surgery  Operative Note      DATE OF PROCEDURE: 08/23/2023    ATTENDING SURGEON:  Emma Arzola M.D.     ASSISTANT:      ANESTHESIOLOGIST: Dr Castro    Pre-op Diagnosis: Prediabetes [R73.03]  Coronary artery disease multivessel  Hypertension  Hyperlipidemia  Type 2 diabetes mellitus  Renal failure  Moderate COPD    Post-op Diagnosis:  Same     Procedure(s):  CORONARY ARTERY BYPASS GRAFT (CABG)  SURGICAL PROCUREMENT, VEIN, ENDOSCOPIC  ECHOCARDIOGRAM,TRANSESOPHAGEAL  BLOCK, NERVE, INTERCOSTAL, 2 OR MORE      PROCEDURAL SUMMARY:   Coronary artery bypass graft, x 2  1) Pedicled LIMA to LAD  2) Saphenous vein graft to diagonal   Endoscopic vein harvesting from the left leg   Multiple intercostal nerve blocks with 0.25% Marcaine   Transesophageal echocardiogram findings:   1) Estimated ejection fraction 55%   2) Ascending aorta without significant calcifications        ANESTHESIA:  General endotracheal    PATIENT POSITION: Supine     ESTIMATED BLOOD LOSS:  500 mL     DESCRIPTION OF OPERATIVE FINDINGS AND OUTCOME: Successful coronary revascularization without apparent complications    IMPLANTS:   Implant Name Type Inv. Item Serial No.  Lot No. LRB No. Used Action   LEAD PACING BIPOLAR TEMPORARY - SN/A  LEAD PACING BIPOLAR TEMPORARY N/A [id number]  1 Implanted   STERNAL ZIPFIX WITH NEEDLE STERILE / 5 PACK   N/A  [id number]  1 Implanted   ERNESTO RADIOMARK GRAFT MARKER   N/A  [id number]  1 Implanted   HEMOSTASIS OSTENE MAT 2.5GM - SN/A  HEMOSTASIS OSTENE MAT 2.5GM N/A [employer] [address] [id number]  2 Implanted       SPECIMENS:   Specimen (24h ago, onward)      None             DISPOSITION: ICU - intubated and critically ill.    ATTESTATION: I was present and scrubbed for the entire procedure.       BRIEF HISTORY:  Abdirahman Rodriguez is a 67 y.o. male that presented electively and was found to have stable angina. A cardiac cath was done and the patient was  found to have 2 vessel disease.  The patients echocardiogram showed  55%. The carotid ultrasound showed nonocclusive coronary carotid artery disease. Cardiac risk factors include HTN, HLD, Diabetes mellitus, Obesity, Tobacco use, PAD, Kidney disease, Liver disease, and FHx of early heart disease. The patient's NYHA Functional Classification score is NYHA I: cardiac disease, but without resulting limitations of physical activity.   The patient was consented for coronary artery bypass grafting after explaining the risks (ncluding but not limited to stroke, bleeding, kidney injury, and death), benefits and alternatives. All the patients questions were answered and the patient agrees with the plan to proceed with surgery.     PROCEDURE IN DETAIL:  The patient was brought to the operating room and placed on the operating room table in the supine position. A barillas catheter placed by the surgical team. After adequate induction of general endotracheal anesthesia and placement of an arterial line, a central venous line, and a Twilight-Samantha catheter by the anesthesia team, the patient's chest, abdomen, pelvis, and bilateral lower extremities were prepped and draped in the usual sterile fashion.  Surgical time-out was then done.     Skin incisions were made over the left lower extremity. The saphenous vein was harvested using an endoscopic technique, and the vein was checked for its quality. Once the vein was harvested, the leg was closed in multiple layers of absorbable suture. A SANG drain was left in the leg, and then the leg was wrapped and dressed. The vein was kept in a vein preservation solution.    A standard median sternotomy was performed. Hemostasis was achieved to the sternal edges. Rultract retractor was placed. The left internal mammary artery was dissected as a pedicle with Bovie electrocautery. A pleural drain was placed. Full heparin was given, and the ACT was checked to ensure that the level was greater than 500  seconds. The left internal mammary artery was then divided and checked to ensure that it had good flow. The pedicle was sprayed with dilute papaverine.    A chest retractor was placed. Next, the thymic remnants were divided and the pericardium was opened along the midline. A pericardial well was then created by placing stay sutures.     Cannulation sutures were placed first in the aorta and then in the right atrial appendage. The aortic cannula was inserted, followed by the venous cannula. Antegrade and retrograde cardioplegia catheters were then placed.  Cardiopulmonary bypass was then begun.  Once on bypass, the aortic crossclamp was applied and the heart was arrested using cold blood enhanced antegrade cardioplegia .  A prompt electromechanical arrest was achieved. Retrograde cardioplegia was administered every 15 minutes while the aorta was crossclamped.     We first addressed the anastomosis between the Saphenous vein graft to diagonal. A site suitable for grafting on the heart was first located, and an arteriotomy was then made. The graft was then brought up into the surgical field, and its end was spatulated. The distal end to side anastomosis was performed using a running 7-0 Prolene suture.  After completion of the anastomosis, it was tested to ensure hemostasis and suitable flow. A dose of cardioplegia was given antegrade down this graft.       Next, we turned our attention to the anterior wall of the heart. We identified a site along the Mid LAD that was suitable for grafting. An arteriotomy was made, and the internal mammary artery was checked to ensure appropriate length and suitable flow. The end-to-side anastomosis was performed using a running 7-0 Prolene suture.  After completion, it was tested and found to be hemostatic. The mammary pedicle was tacked to the heart using two 6-0 Prolene sutures.    The aortic cross-clamp was released, and we checked to ensure that the patient returned to sinus  rhythm. We then applied a side-biting clamp the aorta and performed 1 aortotomies for the proximal anastomoses of the remaining grafts.  These were completed in a similar fashion, using an #11 blade to incise the aorta and a 4.4 mm aortic punch device to enlarge the aortotomy. The grafts were checked to ensure that there was no kinking or torsion, and then they were cut to an appropriate length. Their ends were spatulated, and a running 6 0 Prolene suture was used to complete the proximal end to side anastomoses.  Afterwards, the aortic side-biting clamp was released. The grafts were de-aired before reestablishing the blood flow. The patient was rewarmed to 37° centigrade  The patient was subsequently weaned from cardiopulmonary bypass.    The patient did wean from bypass without any issues. Once off bypass, all surgical sites were inspected to ensure suitable hemostasis. A test dose of protamine was administered, and this was well tolerated. The total dose was then given. FPC through the total dose, all cannulas were removed. All surgical sites were again inspected and checked for good hemostasis. Ventricular pacing wires were placed. Mediastinal drains were placed. After checking for adequate hemostasis again, the patient's chest was closed using 3 #6 stainless steel wires in a simple, interrupted fashion to close the manubrium and 4 zip ties to close the body of the sternum and the xiphoid process. Multiple intercostal nerve blocks with 0.25% Marcaine was administered parasternally for postoperative pain control.  The overlying soft tissues were re-approximated in multiple layers using vicryl and monocryl sutures. The patient's chest was washed and dried, and a sterile dressing was applied. At the conclusion of the case, sponge and instrument counts were correct. The patient tolerated the procedure without apparent complications and was promptly moved to the ICU, and I was present for the handoff.      12:25  PM   08/23/2023    Emma Arzola MD  Cardiothoracic Surgery  [facility]

## 2023-08-23 NOTE — CONSULTS
[facility] - [facility] ([facility])  Critical Care Medicine  Consult Note    Patient Name: Abdirahman Rodriguez  MRN: [id number]  Admission Date: 8/23/2023  Hospital Length of Stay: 0 days  Code Status: Full Code  Attending Physician: Emma Arzola MD   Primary Care Provider: Mt Noel MD   Principal Problem: S/P CABG x 2    [unfilled]  Subjective:     HPI:  Mr Rodriguez is a 66 y/o AAM with HTN, hypothyroidism, CKDIII, and CAD who presents to the ICU following CABG x 2 with Dr Arzola.  He had an abnormal stress test and then LHC that showed LAD and D1 stenosis.  Came in today for scheduled procedure.    I examined him following arrival to the ICU.  Currently on insulin drip and low dose Epi.  He is on minimal vent settings, is awake and following commands, and doing well on PSV.        Hospital/ICU Course:  No notes on file    Past Medical History:   Diagnosis Date    Abnormal nuclear stress test 08/13/2023    Benign hypertension     Class 1 obesity in adult 05/25/2023    Coronary artery disease     History of colon polyps     Hypergammaglobulinemia     Hypertensive kidney disease with chronic kidney disease stage III     Hypothyroidism, unspecified        Past Surgical History:   Procedure Laterality Date    ARTERIOGRAPHY OF SUBCLAVIAN ARTERY Left 8/14/2023    Procedure: ARTERIOGRAM, SUBCLAVIAN;  Surgeon: Vahid Juarez MD;  Location: [facility] CATH LAB;  Service: Cardiology;  Laterality: Left;    COLONOSCOPY N/A 12/6/2021    Procedure: COLONOSCOPY;  Surgeon: Doris Altman MD;  Location: [facility] ENDO;  Service: Endoscopy;  Laterality: N/A;    LEFT HEART CATHETERIZATION Left 8/14/2023    Procedure: Left heart cath;  Surgeon: Vahid Juarez MD;  Location: [facility] CATH LAB;  Service: Cardiology;  Laterality: Left;  pt instructed 930-10am start/    None         Review of patient's allergies indicates:  No Known Allergies    Family History       Problem Relation (Age of Onset)    Heart disease Mother, Father     Hypertension Mother, Father          Tobacco Use    Smoking status: Never    Smokeless tobacco: Never   Substance and Sexual Activity    Alcohol use: No     Comment: rarely    Drug use: No    Sexual activity: Not on file         Review of Systems   Unable to perform ROS: Intubated     Objective:     Vital Signs (Most Recent):  Temp: 99.3 °F (37.4 °C) (08/23/23 1530)  Pulse: 62 (08/23/23 1530)  Resp: (!) 23 (08/23/23 1530)  BP: (!) 87/55 (08/23/23 1500)  SpO2: 95 % (08/23/23 1530) Vital Signs (24h Range):  Temp:  [98.1 °F (36.7 °C)-99.3 °F (37.4 °C)] 99.3 °F (37.4 °C)  Pulse:  [60-88] 62  Resp:  [18-30] 23  SpO2:  [94 %-100 %] 95 %  BP: ()/(51-76) 87/55  Arterial Line BP: ()/(52-80) 100/57     Weight: 117.4 kg (258 lb 13.1 oz)  Body mass index is 33.23 kg/m².      Intake/Output Summary (Last 24 hours) at 8/23/2023 1530  Last data filed at 8/23/2023 1500  Gross per 24 hour   Intake 2610.17 ml   Output 1902 ml   Net 708.17 ml        Physical Exam  Vitals and nursing note reviewed.   Constitutional:       General: He is not in acute distress.     Comments: Intubated, resting, arousable to voice   HENT:      Head: Normocephalic and atraumatic.   Eyes:      General: No scleral icterus.     Extraocular Movements: Extraocular movements intact.      Conjunctiva/sclera: Conjunctivae normal.      Pupils: Pupils are equal, round, and reactive to light.   Cardiovascular:      Rate and Rhythm: Normal rate and regular rhythm.      Pulses: Normal pulses.      Heart sounds: No murmur heard.     Comments: Midline sternotomy is clean and dressed  Pulmonary:      Effort: Pulmonary effort is normal. No respiratory distress.      Breath sounds: No wheezing, rhonchi or rales.      Comments: Chest tubes in place  Abdominal:      General: Abdomen is flat. There is no distension.      Palpations: Abdomen is soft.      Tenderness: There is no abdominal tenderness.   Musculoskeletal:      Right lower leg: No edema.       Left lower leg: No edema.   Neurological:      Comments: Follows commands and nods his head appropriately          Vents:  Vent Mode: (S) Spont (08/23/23 1414)  Set Rate: 16 BPM (08/23/23 1301)  Vt Set: 500 mL (08/23/23 1301)  Pressure Support: 8 cmH20 (08/23/23 1414)  PEEP/CPAP: 5 cmH20 (08/23/23 1414)  Oxygen Concentration (%): 50 (08/23/23 1514)  Peak Airway Pressure: 14 cmH20 (08/23/23 1414)  Plateau Pressure: 0 cmH20 (08/23/23 1414)  Total Ve: 13.5 L/m (08/23/23 1414)  F/VT Ratio<105 (RSBI): (!) 38.34 (08/23/23 1414)    Lines/Drains/Airways       Central Venous Catheter Line  Duration             Percutaneous Central Line Insertion/Assessment - Triple Lumen  08/23/23  Internal Jugular Right <1 day              Drain  Duration                  Chest Tube 08/23/23 1034 Tube - 3 Left Pleural 24 Fr. <1 day         Closed/Suction Drain 08/23/23 1033 Leg Bulb 19 Fr. <1 day         Urethral Catheter 08/23/23 0720 Silicone;Temperature probe;Straight-tip 16 Fr. <1 day         Y Chest Tube 1 and 2 08/23/23 1034 1 Right Mediastinal 24 Fr. 2 Left Mediastinal 24 Fr. <1 day              Arterial Line  Duration             Arterial Line 08/23/23 0709 Right Radial <1 day              Line  Duration                  Pacer Wires 08/23/23  <1 day              Peripheral Intravenous Line  Duration                  Peripheral IV - Single Lumen 08/23/23 0550 20 G Anterior;Left Hand <1 day                    Significant Labs:    CBC/Anemia Profile:  Recent Labs   Lab 08/23/23  1127 08/23/23  1237 08/23/23  1239   WBC  --  11.60  --    HGB  --  11.7*  --    HCT 27* 36.3* 34*   PLT  --  141*  --    MCV  --  93  --    RDW  --  13.2  --         Chemistries:  Recent Labs   Lab 08/23/23  1237      K 5.0      CO2 20*   BUN 18   CREATININE 1.6*   CALCIUM 7.7*   MG 3.0*       All pertinent labs within the past 24 hours have been reviewed.    Significant Imaging:   I have reviewed all pertinent imaging results/findings within the  past 24 hours.      ABG  Recent Labs   Lab 08/23/23  1239   PH 7.306*   PO2 77*   PCO2 45.0   HCO3 22.4*   BE -4     Assessment/Plan:     Pulmonary  Respiratory insufficiency  - came to the ICU intubated post-operatively  - doing well on PSV currently and plan to extubate shortly to NC  - wean supplemental oxygen with goal SpO2 > 92%  - PT/OT, encourage IS, up OOB as much as possible    Cardiac/Vascular  * S/P CABG x 2  - CABG x 2 8/23 with Dr Arzola  - post-op per Dr Arzola  - chest tubes in place    Coronary artery disease  - now s/p CABG x 2  - ASA/Plavix  - Cardiology following    Mixed hyperlipidemia  - checking lipid panel    Benign hypertension  - currently on Levo  - restart home meds, as necessary    Renal/  Hypertensive kidney disease with chronic kidney disease stage III  - baseline around 1.6  - Cr at baseline  - monitor lytes and UOP  - renally dose meds and avoid nephrtoxins    Endocrine  Hypothyroidism, unspecified  - continue Synthroid    Prediabetes  - currently on insulin gtt per protocol  - monitor glucose trends       Critical Care Time: 40 minutes  Critical secondary to respiratory failure, infusion of high risk medications     Critical care was time spent personally by me on the following activities: development of treatment plan with patient or surrogate and bedside caregivers, discussions with consultants, evaluation of patient's response to treatment, examination of patient, ordering and performing treatments and interventions, ordering and review of laboratory studies, ordering and review of radiographic studies, pulse oximetry, re-evaluation of patient's condition. This critical care time did not overlap with that of any other provider or involve time for any procedures.    Thank you for your consult. I will follow-up with patient. Please contact us if you have any additional questions.     Jose R Hansen MD  Critical Care Medicine  [facility] - Intensive [facility] ([facility])

## 2023-08-23 NOTE — SUBJECTIVE & OBJECTIVE
Past Medical History:   Diagnosis Date    Abnormal nuclear stress test 08/13/2023    Benign hypertension     Class 1 obesity in adult 05/25/2023    Coronary artery disease     History of colon polyps     Hypergammaglobulinemia     Hypertensive kidney disease with chronic kidney disease stage III     Hypothyroidism, unspecified        Past Surgical History:   Procedure Laterality Date    ARTERIOGRAPHY OF SUBCLAVIAN ARTERY Left 8/14/2023    Procedure: ARTERIOGRAM, SUBCLAVIAN;  Surgeon: Vahid Juarez MD;  Location: [facility] CATH LAB;  Service: Cardiology;  Laterality: Left;    COLONOSCOPY N/A 12/6/2021    Procedure: COLONOSCOPY;  Surgeon: Doris Altman MD;  Location: [facility] ENDO;  Service: Endoscopy;  Laterality: N/A;    LEFT HEART CATHETERIZATION Left 8/14/2023    Procedure: Left heart cath;  Surgeon: Vahid Juarez MD;  Location: [facility] CATH LAB;  Service: Cardiology;  Laterality: Left;  pt instructed 930-10am start/    None         Review of patient's allergies indicates:  No Known Allergies    Family History       Problem Relation (Age of Onset)    Heart disease Mother, Father    Hypertension Mother, Father          Tobacco Use    Smoking status: Never    Smokeless tobacco: Never   Substance and Sexual Activity    Alcohol use: No     Comment: rarely    Drug use: No    Sexual activity: Not on file         Review of Systems   Unable to perform ROS: Intubated     Objective:     Vital Signs (Most Recent):  Temp: 99.3 °F (37.4 °C) (08/23/23 1530)  Pulse: 62 (08/23/23 1530)  Resp: (!) 23 (08/23/23 1530)  BP: (!) 87/55 (08/23/23 1500)  SpO2: 95 % (08/23/23 1530) Vital Signs (24h Range):  Temp:  [98.1 °F (36.7 °C)-99.3 °F (37.4 °C)] 99.3 °F (37.4 °C)  Pulse:  [60-88] 62  Resp:  [18-30] 23  SpO2:  [94 %-100 %] 95 %  BP: ()/(51-76) 87/55  Arterial Line BP: ()/(52-80) 100/57     Weight: 117.4 kg (258 lb 13.1 oz)  Body mass index is 33.23 kg/m².      Intake/Output Summary (Last 24 hours) at 8/23/2023 1530  Last  data filed at 8/23/2023 1500  Gross per 24 hour   Intake 2610.17 ml   Output 1902 ml   Net 708.17 ml        Physical Exam  Vitals and nursing note reviewed.   Constitutional:       General: He is not in acute distress.     Comments: Intubated, resting, arousable to voice   HENT:      Head: Normocephalic and atraumatic.   Eyes:      General: No scleral icterus.     Extraocular Movements: Extraocular movements intact.      Conjunctiva/sclera: Conjunctivae normal.      Pupils: Pupils are equal, round, and reactive to light.   Cardiovascular:      Rate and Rhythm: Normal rate and regular rhythm.      Pulses: Normal pulses.      Heart sounds: No murmur heard.     Comments: Midline sternotomy is clean and dressed  Pulmonary:      Effort: Pulmonary effort is normal. No respiratory distress.      Breath sounds: No wheezing, rhonchi or rales.      Comments: Chest tubes in place  Abdominal:      General: Abdomen is flat. There is no distension.      Palpations: Abdomen is soft.      Tenderness: There is no abdominal tenderness.   Musculoskeletal:      Right lower leg: No edema.      Left lower leg: No edema.   Neurological:      Comments: Follows commands and nods his head appropriately          Vents:  Vent Mode: (S) Spont (08/23/23 1414)  Set Rate: 16 BPM (08/23/23 1301)  Vt Set: 500 mL (08/23/23 1301)  Pressure Support: 8 cmH20 (08/23/23 1414)  PEEP/CPAP: 5 cmH20 (08/23/23 1414)  Oxygen Concentration (%): 50 (08/23/23 1514)  Peak Airway Pressure: 14 cmH20 (08/23/23 1414)  Plateau Pressure: 0 cmH20 (08/23/23 1414)  Total Ve: 13.5 L/m (08/23/23 1414)  F/VT Ratio<105 (RSBI): (!) 38.34 (08/23/23 1414)    Lines/Drains/Airways       Central Venous Catheter Line  Duration             Percutaneous Central Line Insertion/Assessment - Triple Lumen  08/23/23  Internal Jugular Right <1 day              Drain  Duration                  Chest Tube 08/23/23 1034 Tube - 3 Left Pleural 24 Fr. <1 day         Closed/Suction Drain 08/23/23  1033 Leg Bulb 19 Fr. <1 day         Urethral Catheter 08/23/23 0720 Silicone;Temperature probe;Straight-tip 16 Fr. <1 day         Y Chest Tube 1 and 2 08/23/23 1034 1 Right Mediastinal 24 Fr. 2 Left Mediastinal 24 Fr. <1 day              Arterial Line  Duration             Arterial Line 08/23/23 0709 Right Radial <1 day              Line  Duration                  Pacer Wires 08/23/23  <1 day              Peripheral Intravenous Line  Duration                  Peripheral IV - Single Lumen 08/23/23 0550 20 G Anterior;Left Hand <1 day                    Significant Labs:    CBC/Anemia Profile:  Recent Labs   Lab 08/23/23  1127 08/23/23  1237 08/23/23  1239   WBC  --  11.60  --    HGB  --  11.7*  --    HCT 27* 36.3* 34*   PLT  --  141*  --    MCV  --  93  --    RDW  --  13.2  --         Chemistries:  Recent Labs   Lab 08/23/23  1237      K 5.0      CO2 20*   BUN 18   CREATININE 1.6*   CALCIUM 7.7*   MG 3.0*       All pertinent labs within the past 24 hours have been reviewed.    Significant Imaging:   I have reviewed all pertinent imaging results/findings within the past 24 hours.   Statement Selected

## 2023-08-23 NOTE — ANESTHESIA PROCEDURE NOTES
Arterial    Diagnosis: CAD    Patient location during procedure: done in OR  Procedure start time: 8/23/2023 7:09 AM  Timeout: 8/23/2023 7:09 AM  Procedure end time: 8/23/2023 7:09 AM    Staffing  Authorizing Provider: Nba Husain MD  Performing Provider: Nba Husain MD    Staffing  Performed by: Nba Husain MD  Authorized by: Nba Husain MD    Anesthesiologist was present at the time of the procedure.    Preanesthetic Checklist  Completed: patient identified, IV checked, site marked, risks and benefits discussed, surgical consent, monitors and equipment checked, pre-op evaluation, timeout performed and anesthesia consent givenArterial  Skin Prep: chlorhexidine gluconate  Local Infiltration: lidocaine  Orientation: right  Location: radial    Catheter Size: 20 G  Catheter placement by Anatomical landmarks. Heme positive aspiration all ports. Insertion Attempts: 1  Assessment  Dressing: secured with tape and tegaderm and sutured in place and taped  Patient: Tolerated well

## 2023-08-23 NOTE — ANESTHESIA PROCEDURE NOTES
Central Line    Diagnosis: CAD  Patient location during procedure: done in OR  Timeout: 8/23/2023 7:30 AM  Procedure end time: 8/23/2023 7:40 AM    Staffing  Authorizing Provider: Nba Husain MD  Performing Provider: Nba Husain MD    Staffing  Performed: anesthesiologist   Anesthesiologist: Nba Husain MD  Performed by: Nba Husain MD  Authorized by: Nba Husain MD    Anesthesiologist was present at the time of the procedure.  Preanesthetic Checklist  Completed: patient identified, IV checked, site marked, risks and benefits discussed, surgical consent, monitors and equipment checked, pre-op evaluation, timeout performed and anesthesia consent given  Indication   Indication: hemodynamic monitoring, vascular access, med administration     Anesthesia   general anesthesia    Central Line   Skin Prep: skin prepped with ChloraPrep, skin prep agent completely dried prior to procedure  Sterile Barriers Followed: Yes    All five maximal barriers used- gloves, gown, cap, mask, and large sterile sheet    hand hygiene performed prior to central venous catheter insertion  Location: right internal jugular.   Catheter type: triple lumen  Catheter Size: 7 Fr  Inserted Catheter Length: 18 cm  Ultrasound: vascular probe with ultrasound   Vessel Caliber: large, patent  Vascular Doppler:  not done, compressibility normal  Needle advanced into vessel with real time Ultrasound guidance.  Guidewire confirmed in vessel.  sterile gel and probe cover used in ultrasound-guided central venous catheter insertion  Manometry: none  Insertion Attempts: 1   Securement:line sutured, chlorhexidine patch, sterile dressing applied and blood return through all ports    Post-Procedure    Adverse Events:none      Guidewire Guidewire removed intact.

## 2023-08-23 NOTE — ADDENDUM NOTE
Addendum  created 08/23/23 1511 by Nba Husain MD    Child order released for a procedure order, Clinical Note Signed, Intraprocedure Blocks edited, LDA created via procedure documentation, SmartForm saved

## 2023-08-23 NOTE — ANESTHESIA POSTPROCEDURE EVALUATION
Anesthesia Post Evaluation    Patient: Abdirahman Rodriguez    Procedure(s) Performed: Procedure(s) (LRB):  CORONARY ARTERY BYPASS GRAFT (CABG) (N/A)  SURGICAL PROCUREMENT, VEIN, ENDOSCOPIC (Left)  ECHOCARDIOGRAM,TRANSESOPHAGEAL (N/A)  BLOCK, NERVE, INTERCOSTAL, 2 OR MORE (N/A)    Final Anesthesia Type: general      Patient location during evaluation: ICU  Patient participation: No - Unable to Participate, Intubation  Level of consciousness: sedated and responds to stimulation  Post-procedure vital signs: reviewed and stable  Pain management: adequate  Airway patency: patent  ADAL mitigation strategies: Multimodal analgesia  PONV status at discharge: No PONV  Anesthetic complications: no      Cardiovascular status: blood pressure returned to baseline and hemodynamically stable  Respiratory status: ETT, intubated and ventilator  Hydration status: euvolemic  Follow-up not needed.          Vitals Value Taken Time   BP 87/55 08/23/23 1501   Temp 37.4 °C (99.32 °F) 08/23/23 1503   Pulse 61 08/23/23 1503   Resp 18 08/23/23 1503   SpO2 99 % 08/23/23 1503   Vitals shown include unvalidated device data.      No case tracking events are documented in the log.      Pain/Miri Score: Pain Rating Prior to Med Admin: 0 (8/23/2023  2:33 PM)

## 2023-08-23 NOTE — TRANSFER OF CARE
"Anesthesia Transfer of Care Note    Patient: Abdirahman Rodriguez    Procedure(s) Performed: Procedure(s) (LRB):  CORONARY ARTERY BYPASS GRAFT (CABG) (N/A)  SURGICAL PROCUREMENT, VEIN, ENDOSCOPIC (Left)  ECHOCARDIOGRAM,TRANSESOPHAGEAL (N/A)  BLOCK, NERVE, INTERCOSTAL, 2 OR MORE (N/A)    Patient location: ICU    Anesthesia Type: general    Transport from OR: Upon arrival to PACU/ICU, patient attached to ventilator and auscultated to confirm bilateral breath sounds and adequate TV    Post pain: adequate analgesia    Post assessment: no apparent anesthetic complications    Post vital signs: stable    Level of consciousness: sedated    Nausea/Vomiting: no nausea/vomiting    Complications: none    Transfer of care protocol was followedComments: Patient to ICU on transport vent, portable monitor, VSS      Last vitals:   Visit Vitals  BP (!) 141/76 (BP Location: Right arm, Patient Position: Sitting)   Pulse 71   Temp 36.7 °C (98.1 °F) (Temporal)   Resp 20   Ht 6' 2" (1.88 m)   Wt 117.4 kg (258 lb 13.1 oz)   SpO2 95%   BMI 33.23 kg/m²     "

## 2023-08-23 NOTE — ANESTHESIA PROCEDURE NOTES
Monitor JOSE ALFREDO    Diagnosis: CAD  Patient location during procedure: OR  Procedure start time: 8/23/2023 7:50 AM  Exam type: Monitor Only    Staffing  Authorizing Provider: Nba Husain MD  Performing Provider: Nba Husain MD    Staffing  Anesthesiologist Present  Yes  Setup & Induction  Patient preparation: bite block inserted  Probe Insertion: easy      Findings    Aortic valve without significant stenosis or AI  Mitral valve without significant stenosis; trace MR    Tricuspid valve without significant stenosis; no noted TR  No vegetations or pericardial effusions noted    EF approx 55%  Intervent septum thickness WNL and without defect  Inreratrial septum thickness WNL and without defect  Images saved to disk   Impression    Other Findings    Probe Removal

## 2023-08-23 NOTE — PLAN OF CARE
Plan of care reviewed with pt and pt's family at bedside.  Pt underwent CABG x2.  Remains on low dose levophed gtt.   CCO, CCI, and CVP monitoring continued post op.  Pt remains extubated this shift to NC by RT without complications.  Insulin gtt initiated and titrated per nomogram, hourly accuchecks.  CT x3, SANG x1, and barillas cath remain in use.  Pain well managed with IV prn meds.

## 2023-08-23 NOTE — ASSESSMENT & PLAN NOTE
- baseline around 1.6  - Cr at baseline  - monitor lytes and UOP  - renally dose meds and avoid nephrtoxins

## 2023-08-24 ENCOUNTER — TELEPHONE (OUTPATIENT)
Dept: CARDIOLOGY | Facility: CLINIC | Age: 68
End: 2023-08-24
Payer: COMMERCIAL

## 2023-08-24 LAB
ANION GAP SERPL CALC-SCNC: 6 MMOL/L (ref 8–16)
ANION GAP SERPL CALC-SCNC: 7 MMOL/L (ref 8–16)
APTT PPP: 28.2 SEC (ref 21–32)
BASOPHILS # BLD AUTO: 0 K/UL (ref 0–0.2)
BASOPHILS # BLD AUTO: 0.01 K/UL (ref 0–0.2)
BASOPHILS NFR BLD: 0 % (ref 0–1.9)
BASOPHILS NFR BLD: 0.1 % (ref 0–1.9)
BUN SERPL-MCNC: 16 MG/DL (ref 8–23)
BUN SERPL-MCNC: 16 MG/DL (ref 8–23)
CALCIUM SERPL-MCNC: 8.1 MG/DL (ref 8.7–10.5)
CALCIUM SERPL-MCNC: 8.5 MG/DL (ref 8.7–10.5)
CHLORIDE SERPL-SCNC: 107 MMOL/L (ref 95–110)
CHLORIDE SERPL-SCNC: 111 MMOL/L (ref 95–110)
CHOLEST SERPL-MCNC: 92 MG/DL (ref 120–199)
CHOLEST/HDLC SERPL: 5.4 {RATIO} (ref 2–5)
CO2 SERPL-SCNC: 19 MMOL/L (ref 23–29)
CO2 SERPL-SCNC: 21 MMOL/L (ref 23–29)
CREAT SERPL-MCNC: 1.4 MG/DL (ref 0.5–1.4)
CREAT SERPL-MCNC: 1.4 MG/DL (ref 0.5–1.4)
DIFFERENTIAL METHOD: ABNORMAL
DIFFERENTIAL METHOD: ABNORMAL
EOSINOPHIL # BLD AUTO: 0 K/UL (ref 0–0.5)
EOSINOPHIL # BLD AUTO: 0 K/UL (ref 0–0.5)
EOSINOPHIL NFR BLD: 0 % (ref 0–8)
EOSINOPHIL NFR BLD: 0 % (ref 0–8)
ERYTHROCYTE [DISTWIDTH] IN BLOOD BY AUTOMATED COUNT: 13.2 % (ref 11.5–14.5)
ERYTHROCYTE [DISTWIDTH] IN BLOOD BY AUTOMATED COUNT: 13.6 % (ref 11.5–14.5)
EST. GFR  (NO RACE VARIABLE): 56 ML/MIN/1.73 M^2
EST. GFR  (NO RACE VARIABLE): 56 ML/MIN/1.73 M^2
GLUCOSE SERPL-MCNC: 138 MG/DL (ref 70–110)
GLUCOSE SERPL-MCNC: 153 MG/DL (ref 70–110)
HCT VFR BLD AUTO: 29.9 % (ref 40–54)
HCT VFR BLD AUTO: 33.8 % (ref 40–54)
HDLC SERPL-MCNC: 17 MG/DL (ref 40–75)
HDLC SERPL: 18.5 % (ref 20–50)
HGB BLD-MCNC: 11 G/DL (ref 14–18)
HGB BLD-MCNC: 9.8 G/DL (ref 14–18)
IMM GRANULOCYTES # BLD AUTO: 0.02 K/UL (ref 0–0.04)
IMM GRANULOCYTES # BLD AUTO: 0.04 K/UL (ref 0–0.04)
IMM GRANULOCYTES NFR BLD AUTO: 0.3 % (ref 0–0.5)
IMM GRANULOCYTES NFR BLD AUTO: 0.4 % (ref 0–0.5)
INR PPP: 1.1 (ref 0.8–1.2)
LDLC SERPL CALC-MCNC: 62.4 MG/DL (ref 63–159)
LYMPHOCYTES # BLD AUTO: 0.6 K/UL (ref 1–4.8)
LYMPHOCYTES # BLD AUTO: 0.6 K/UL (ref 1–4.8)
LYMPHOCYTES NFR BLD: 5.7 % (ref 18–48)
LYMPHOCYTES NFR BLD: 8.3 % (ref 18–48)
MAGNESIUM SERPL-MCNC: 2.2 MG/DL (ref 1.6–2.6)
MAGNESIUM SERPL-MCNC: 2.5 MG/DL (ref 1.6–2.6)
MCH RBC QN AUTO: 30.3 PG (ref 27–31)
MCH RBC QN AUTO: 30.5 PG (ref 27–31)
MCHC RBC AUTO-ENTMCNC: 32.5 G/DL (ref 32–36)
MCHC RBC AUTO-ENTMCNC: 32.8 G/DL (ref 32–36)
MCV RBC AUTO: 93 FL (ref 82–98)
MCV RBC AUTO: 94 FL (ref 82–98)
MONOCYTES # BLD AUTO: 0.6 K/UL (ref 0.3–1)
MONOCYTES # BLD AUTO: 0.7 K/UL (ref 0.3–1)
MONOCYTES NFR BLD: 6.6 % (ref 4–15)
MONOCYTES NFR BLD: 9.2 % (ref 4–15)
NEUTROPHILS # BLD AUTO: 5.5 K/UL (ref 1.8–7.7)
NEUTROPHILS # BLD AUTO: 8.8 K/UL (ref 1.8–7.7)
NEUTROPHILS NFR BLD: 82.2 % (ref 38–73)
NEUTROPHILS NFR BLD: 87.2 % (ref 38–73)
NONHDLC SERPL-MCNC: 75 MG/DL
NRBC BLD-RTO: 0 /100 WBC
NRBC BLD-RTO: 0 /100 WBC
PLATELET # BLD AUTO: 133 K/UL (ref 150–450)
PLATELET # BLD AUTO: 135 K/UL (ref 150–450)
PMV BLD AUTO: 9.5 FL (ref 9.2–12.9)
PMV BLD AUTO: 9.8 FL (ref 9.2–12.9)
POCT GLUCOSE: 116 MG/DL (ref 70–110)
POCT GLUCOSE: 132 MG/DL (ref 70–110)
POCT GLUCOSE: 134 MG/DL (ref 70–110)
POCT GLUCOSE: 138 MG/DL (ref 70–110)
POCT GLUCOSE: 139 MG/DL (ref 70–110)
POCT GLUCOSE: 141 MG/DL (ref 70–110)
POCT GLUCOSE: 144 MG/DL (ref 70–110)
POCT GLUCOSE: 146 MG/DL (ref 70–110)
POCT GLUCOSE: 151 MG/DL (ref 70–110)
POCT GLUCOSE: 170 MG/DL (ref 70–110)
POTASSIUM SERPL-SCNC: 4.2 MMOL/L (ref 3.5–5.1)
POTASSIUM SERPL-SCNC: 4.6 MMOL/L (ref 3.5–5.1)
PROTHROMBIN TIME: 11.9 SEC (ref 9–12.5)
RBC # BLD AUTO: 3.23 M/UL (ref 4.6–6.2)
RBC # BLD AUTO: 3.61 M/UL (ref 4.6–6.2)
SODIUM SERPL-SCNC: 135 MMOL/L (ref 136–145)
SODIUM SERPL-SCNC: 136 MMOL/L (ref 136–145)
TRIGL SERPL-MCNC: 63 MG/DL (ref 30–150)
WBC # BLD AUTO: 10.04 K/UL (ref 3.9–12.7)
WBC # BLD AUTO: 6.72 K/UL (ref 3.9–12.7)

## 2023-08-24 PROCEDURE — 99900035 HC TECH TIME PER 15 MIN (STAT)

## 2023-08-24 PROCEDURE — 97166 OT EVAL MOD COMPLEX 45 MIN: CPT

## 2023-08-24 PROCEDURE — 97530 THERAPEUTIC ACTIVITIES: CPT

## 2023-08-24 PROCEDURE — 99291 PR CRITICAL CARE, E/M 30-74 MINUTES: ICD-10-PCS | Mod: ,,, | Performed by: INTERNAL MEDICINE

## 2023-08-24 PROCEDURE — 94640 AIRWAY INHALATION TREATMENT: CPT

## 2023-08-24 PROCEDURE — 85730 THROMBOPLASTIN TIME PARTIAL: CPT | Performed by: THORACIC SURGERY (CARDIOTHORACIC VASCULAR SURGERY)

## 2023-08-24 PROCEDURE — 25000003 PHARM REV CODE 250: Performed by: THORACIC SURGERY (CARDIOTHORACIC VASCULAR SURGERY)

## 2023-08-24 PROCEDURE — 80061 LIPID PANEL: CPT | Performed by: INTERNAL MEDICINE

## 2023-08-24 PROCEDURE — 25000242 PHARM REV CODE 250 ALT 637 W/ HCPCS: Performed by: THORACIC SURGERY (CARDIOTHORACIC VASCULAR SURGERY)

## 2023-08-24 PROCEDURE — 85610 PROTHROMBIN TIME: CPT | Performed by: THORACIC SURGERY (CARDIOTHORACIC VASCULAR SURGERY)

## 2023-08-24 PROCEDURE — 97162 PT EVAL MOD COMPLEX 30 MIN: CPT

## 2023-08-24 PROCEDURE — 83735 ASSAY OF MAGNESIUM: CPT | Performed by: THORACIC SURGERY (CARDIOTHORACIC VASCULAR SURGERY)

## 2023-08-24 PROCEDURE — 20000000 HC ICU ROOM

## 2023-08-24 PROCEDURE — 94761 N-INVAS EAR/PLS OXIMETRY MLT: CPT

## 2023-08-24 PROCEDURE — 27000221 HC OXYGEN, UP TO 24 HOURS

## 2023-08-24 PROCEDURE — 85025 COMPLETE CBC W/AUTO DIFF WBC: CPT | Performed by: THORACIC SURGERY (CARDIOTHORACIC VASCULAR SURGERY)

## 2023-08-24 PROCEDURE — 80048 BASIC METABOLIC PNL TOTAL CA: CPT | Mod: 91 | Performed by: THORACIC SURGERY (CARDIOTHORACIC VASCULAR SURGERY)

## 2023-08-24 PROCEDURE — 94799 UNLISTED PULMONARY SVC/PX: CPT

## 2023-08-24 PROCEDURE — 63600175 PHARM REV CODE 636 W HCPCS: Performed by: THORACIC SURGERY (CARDIOTHORACIC VASCULAR SURGERY)

## 2023-08-24 PROCEDURE — 99291 CRITICAL CARE FIRST HOUR: CPT | Mod: ,,, | Performed by: INTERNAL MEDICINE

## 2023-08-24 RX ORDER — IBUPROFEN 200 MG
16 TABLET ORAL
Status: DISCONTINUED | OUTPATIENT
Start: 2023-08-24 | End: 2023-08-28 | Stop reason: HOSPADM

## 2023-08-24 RX ORDER — INSULIN ASPART 100 [IU]/ML
0-10 INJECTION, SOLUTION INTRAVENOUS; SUBCUTANEOUS
Status: DISCONTINUED | OUTPATIENT
Start: 2023-08-24 | End: 2023-08-28 | Stop reason: HOSPADM

## 2023-08-24 RX ORDER — POTASSIUM CHLORIDE 20 MEQ/1
20 TABLET, EXTENDED RELEASE ORAL EVERY 12 HOURS
Status: DISCONTINUED | OUTPATIENT
Start: 2023-08-24 | End: 2023-08-27

## 2023-08-24 RX ORDER — IBUPROFEN 200 MG
24 TABLET ORAL
Status: DISCONTINUED | OUTPATIENT
Start: 2023-08-24 | End: 2023-08-28 | Stop reason: HOSPADM

## 2023-08-24 RX ORDER — GLUCAGON 1 MG
1 KIT INJECTION
Status: DISCONTINUED | OUTPATIENT
Start: 2023-08-24 | End: 2023-08-28 | Stop reason: HOSPADM

## 2023-08-24 RX ORDER — FAMOTIDINE 20 MG/1
20 TABLET, FILM COATED ORAL 2 TIMES DAILY
Status: DISCONTINUED | OUTPATIENT
Start: 2023-08-24 | End: 2023-08-28 | Stop reason: HOSPADM

## 2023-08-24 RX ADMIN — OXYCODONE HYDROCHLORIDE AND ACETAMINOPHEN 500 MG: 500 TABLET ORAL at 09:08

## 2023-08-24 RX ADMIN — IPRATROPIUM BROMIDE AND ALBUTEROL SULFATE 3 ML: 2.5; .5 SOLUTION RESPIRATORY (INHALATION) at 04:08

## 2023-08-24 RX ADMIN — FERROUS SULFATE TAB 325 MG (65 MG ELEMENTAL FE) 1 EACH: 325 (65 FE) TAB at 09:08

## 2023-08-24 RX ADMIN — POLYETHYLENE GLYCOL 3350 17 G: 17 POWDER, FOR SOLUTION ORAL at 09:08

## 2023-08-24 RX ADMIN — OXYCODONE HYDROCHLORIDE 5 MG: 5 TABLET ORAL at 03:08

## 2023-08-24 RX ADMIN — CEFAZOLIN SODIUM 2 G: 2 SOLUTION INTRAVENOUS at 03:08

## 2023-08-24 RX ADMIN — POTASSIUM CHLORIDE 20 MEQ: 1500 TABLET, EXTENDED RELEASE ORAL at 09:08

## 2023-08-24 RX ADMIN — ASPIRIN 81 MG: 81 TABLET, COATED ORAL at 09:08

## 2023-08-24 RX ADMIN — CHLORHEXIDINE GLUCONATE 0.12% ORAL RINSE 10 ML: 1.2 LIQUID ORAL at 08:08

## 2023-08-24 RX ADMIN — OXYCODONE HYDROCHLORIDE 5 MG: 5 TABLET ORAL at 11:08

## 2023-08-24 RX ADMIN — FAMOTIDINE 20 MG: 20 TABLET, FILM COATED ORAL at 08:08

## 2023-08-24 RX ADMIN — NOREPINEPHRINE BITARTRATE 0.02 MCG/KG/MIN: 4 INJECTION, SOLUTION INTRAVENOUS at 04:08

## 2023-08-24 RX ADMIN — CHLORHEXIDINE GLUCONATE 0.12% ORAL RINSE 10 ML: 1.2 LIQUID ORAL at 09:08

## 2023-08-24 RX ADMIN — CLOPIDOGREL BISULFATE 75 MG: 75 TABLET ORAL at 09:08

## 2023-08-24 RX ADMIN — FAMOTIDINE 20 MG: 20 TABLET, FILM COATED ORAL at 09:08

## 2023-08-24 RX ADMIN — FUROSEMIDE 40 MG: 10 INJECTION, SOLUTION INTRAMUSCULAR; INTRAVENOUS at 08:08

## 2023-08-24 RX ADMIN — ACETAMINOPHEN 650 MG: 325 TABLET ORAL at 07:08

## 2023-08-24 RX ADMIN — FENTANYL CITRATE 25 MCG: 50 INJECTION INTRAMUSCULAR; INTRAVENOUS at 10:08

## 2023-08-24 RX ADMIN — IPRATROPIUM BROMIDE AND ALBUTEROL SULFATE 3 ML: 2.5; .5 SOLUTION RESPIRATORY (INHALATION) at 07:08

## 2023-08-24 RX ADMIN — FUROSEMIDE 40 MG: 10 INJECTION, SOLUTION INTRAMUSCULAR; INTRAVENOUS at 09:08

## 2023-08-24 RX ADMIN — OXYCODONE HYDROCHLORIDE 5 MG: 5 TABLET ORAL at 07:08

## 2023-08-24 RX ADMIN — INSULIN ASPART 2 UNITS: 100 INJECTION, SOLUTION INTRAVENOUS; SUBCUTANEOUS at 05:08

## 2023-08-24 RX ADMIN — METOPROLOL TARTRATE 25 MG: 25 TABLET, FILM COATED ORAL at 08:08

## 2023-08-24 RX ADMIN — OXYCODONE HYDROCHLORIDE AND ACETAMINOPHEN 500 MG: 500 TABLET ORAL at 08:08

## 2023-08-24 RX ADMIN — MAGNESIUM HYDROXIDE 2400 MG: 400 SUSPENSION ORAL at 09:08

## 2023-08-24 RX ADMIN — IPRATROPIUM BROMIDE AND ALBUTEROL SULFATE 3 ML: 2.5; .5 SOLUTION RESPIRATORY (INHALATION) at 01:08

## 2023-08-24 RX ADMIN — METOPROLOL TARTRATE 25 MG: 25 TABLET, FILM COATED ORAL at 09:08

## 2023-08-24 RX ADMIN — POTASSIUM CHLORIDE 20 MEQ: 1500 TABLET, EXTENDED RELEASE ORAL at 08:08

## 2023-08-24 RX ADMIN — FENTANYL CITRATE 50 MCG: 50 INJECTION INTRAMUSCULAR; INTRAVENOUS at 01:08

## 2023-08-24 NOTE — CONSULTS
[facility] ([facility])  Cardiology  Consult Note    Patient Name: Abdirahman Rodriguez  MRN: [id number]  Admission Date: 8/23/2023  Hospital Length of Stay: 1 days  Code Status: Full Code   Attending Provider: Emma Arzola MD   Consulting Provider: Scarlet Mcfadden NP  Primary Care Physician: Mt Noel MD  Principal Problem:S/P CABG x 2    Patient information was obtained from patient and ER records.     Consult to Cardiology  Consult performed by: Scarlet Mcfadden NP  Consult ordered by: Emma Arzola MD        Subjective:     Chief Complaint:  CABG     HPI:   Mr Rodriguez is a 66 y/o AAM with HTN, hypothyroidism, CKDIII, and CAD who presents to the ICU following CABG x 2 with Dr Arzola. Cardiology consulted to assist with management. Pt seen and examined today, sitting up in bedside chair, reports some soreness but feels ok. Labs reviewed, chart reviewed      Past Medical History:   Diagnosis Date    Abnormal nuclear stress test 08/13/2023    Benign hypertension     Class 1 obesity in adult 05/25/2023    Coronary artery disease     History of colon polyps     Hypergammaglobulinemia     Hypertensive kidney disease with chronic kidney disease stage III     Hypothyroidism, unspecified        Past Surgical History:   Procedure Laterality Date    ARTERIOGRAPHY OF SUBCLAVIAN ARTERY Left 8/14/2023    Procedure: ARTERIOGRAM, SUBCLAVIAN;  Surgeon: Vahid Juarez MD;  Location: [facility] CATH LAB;  Service: Cardiology;  Laterality: Left;    COLONOSCOPY N/A 12/6/2021    Procedure: COLONOSCOPY;  Surgeon: Doris Altman MD;  Location: [facility] ENDO;  Service: Endoscopy;  Laterality: N/A;    LEFT HEART CATHETERIZATION Left 8/14/2023    Procedure: Left heart cath;  Surgeon: Vahid Juarez MD;  Location: [facility] CATH LAB;  Service: Cardiology;  Laterality: Left;  pt instructed 930-10am start/    None         Review of patient's allergies indicates:  No Known Allergies    No current  facility-administered medications on file prior to encounter.     Current Outpatient Medications on File Prior to Encounter   Medication Sig    levothyroxine (SYNTHROID) 50 MCG tablet Take 1 tablet (50 mcg total) by mouth before breakfast.    losartan-hydrochlorothiazide 100-25 mg (HYZAAR) 100-25 mg per tablet TAKE 1 TABLET BY MOUTH EVERY DAY    metoprolol tartrate (LOPRESSOR) 25 MG tablet Take 1 tablet (25 mg total) by mouth 2 (two) times daily.    aspirin (ECOTRIN) 81 MG EC tablet Take 81 mg by mouth once daily.    sildenafil (REVATIO) 20 mg Tab Take 2-5  tablets one hour prior to intercourse (Patient not taking: Reported on 8/18/2023)     Family History       Problem Relation (Age of Onset)    Heart disease Mother, Father    Hypertension Mother, Father          Tobacco Use    Smoking status: Never    Smokeless tobacco: Never   Substance and Sexual Activity    Alcohol use: No     Comment: rarely    Drug use: No    Sexual activity: Not on file     Review of Systems   Constitutional: Negative.   HENT: Negative.     Eyes: Negative.    Cardiovascular:  Positive for chest pain.   Respiratory:  Positive for shortness of breath.    Skin: Negative.    Musculoskeletal: Negative.    Gastrointestinal: Negative.    Genitourinary: Negative.    Neurological:  Positive for weakness.   Psychiatric/Behavioral: Negative.       Objective:     Vital Signs (Most Recent):  Temp: (!) 100.6 °F (38.1 °C) (08/24/23 1212)  Pulse: 74 (08/24/23 1212)  Resp: (!) 37 (08/24/23 1212)  BP: 116/67 (08/24/23 1212)  SpO2: (!) 94 % (08/24/23 1212) Vital Signs (24h Range):  Temp:  [98.6 °F (37 °C)-100.6 °F (38.1 °C)] 100.6 °F (38.1 °C)  Pulse:  [60-88] 74  Resp:  [15-43] 37  SpO2:  [84 %-100 %] 94 %  BP: ()/(51-80) 116/67  Arterial Line BP: ()/(44-68) 126/53     Weight: 117.4 kg (258 lb 13.1 oz)  Body mass index is 33.23 kg/m².    SpO2: (!) 94 %         Intake/Output Summary (Last 24 hours) at 8/24/2023 4437  Last data filed at  8/24/2023 1119  Gross per 24 hour   Intake 2004.21 ml   Output 3466 ml   Net -1461.79 ml       Lines/Drains/Airways       Central Venous Catheter Line  Duration             Percutaneous Central Line Insertion/Assessment - Triple Lumen  08/23/23  Internal Jugular Right 1 day              Drain  Duration                  Chest Tube 08/23/23 1034 Tube - 3 Left Pleural 24 Fr. 1 day         Urethral Catheter 08/23/23 0720 Silicone;Temperature probe;Straight-tip 16 Fr. 1 day         Y Chest Tube 1 and 2 08/23/23 1034 1 Right Mediastinal 24 Fr. 2 Left Mediastinal 24 Fr. 1 day              Line  Duration                  Pacer Wires 08/23/23  1 day              Peripheral Intravenous Line  Duration                  Peripheral IV - Single Lumen 08/23/23 0550 20 G Anterior;Left Hand 1 day                     Physical Exam  Vitals and nursing note reviewed.   Constitutional:       Appearance: Normal appearance.   HENT:      Head: Normocephalic and atraumatic.   Eyes:      General:         Right eye: No discharge.         Left eye: No discharge.      Pupils: Pupils are equal, round, and reactive to light.   Cardiovascular:      Rate and Rhythm: Normal rate and regular rhythm.      Heart sounds: S1 normal and S2 normal. No murmur heard.     No friction rub.      Comments: Chest tubes in place  Pulmonary:      Effort: Pulmonary effort is normal. No respiratory distress.      Breath sounds: Normal breath sounds. No rales.   Abdominal:      Palpations: Abdomen is soft.      Tenderness: There is no abdominal tenderness.   Musculoskeletal:      Cervical back: Neck supple.      Right lower leg: No edema.      Left lower leg: No edema.   Skin:     General: Skin is warm and dry.      Comments: Sternotomy site C/D/I   Neurological:      General: No focal deficit present.      Mental Status: He is alert and oriented to person, place, and time.   Psychiatric:         Mood and Affect: Mood normal.         Behavior: Behavior normal.          "Thought Content: Thought content normal.          Significant Labs: BMP:   Recent Labs   Lab 08/23/23  1237 08/23/23  1658 08/24/23  0357   * 140* 138*    137 136   K 5.0 5.0 4.2    111* 111*   CO2 20* 20* 19*   BUN 18 19 16   CREATININE 1.6* 1.6* 1.4   CALCIUM 7.7* 7.9* 8.1*   MG 3.0* 2.7* 2.5   , CMP   Recent Labs   Lab 08/23/23  1237 08/23/23  1658 08/24/23  0357    137 136   K 5.0 5.0 4.2    111* 111*   CO2 20* 20* 19*   * 140* 138*   BUN 18 19 16   CREATININE 1.6* 1.6* 1.4   CALCIUM 7.7* 7.9* 8.1*   ANIONGAP 10 6* 6*   , CBC   Recent Labs   Lab 08/23/23  1237 08/23/23  1239 08/23/23  1658 08/24/23  0357   WBC 11.60  --  7.54 6.72   HGB 11.7*  --  11.0* 9.8*   HCT 36.3*   < > 33.1* 29.9*   *  --  122* 133*    < > = values in this interval not displayed.   , INR   Recent Labs   Lab 08/23/23  1237 08/24/23  0357   INR 1.1 1.1   , Lipid Panel   Recent Labs   Lab 08/24/23  0357   CHOL 92*   HDL 17*   LDLCALC 62.4*   TRIG 63   CHOLHDL 18.5*   , Troponin No results for input(s): "TROPONINI" in the last 48 hours., and All pertinent lab results from the last 24 hours have been reviewed.    Significant Imaging: Cardiac Cath: reviewed, Echocardiogram: Transthoracic echo (TTE) complete (Cupid Only):   Results for orders placed or performed during the hospital encounter of 08/04/23   Echo   Result Value Ref Range    BSA 2.47 m2    LVOT stroke volume 62.87 cm3    LVIDd 5.47 3.5 - 6.0 cm    LV Systolic Volume 57.91 mL    LV Systolic Volume Index 23.9 mL/m2    LVIDs 3.69 2.1 - 4.0 cm    LV Diastolic Volume 145.32 mL    LV Diastolic Volume Index 60.05 mL/m2    IVS 1.07 0.6 - 1.1 cm    LVOT diameter 2.19 cm    LVOT area 3.8 cm2    FS 33 28 - 44 %    Left Ventricle Relative Wall Thickness 0.41 cm    Posterior Wall 1.11 (A) 0.6 - 1.1 cm    LV mass 236.91 g    LV Mass Index 98 g/m2    MV Peak E Anam 0.56 m/s    TDI LATERAL 0.09 m/s    TDI SEPTAL 0.10 m/s    E/E' ratio 5.89 m/s    MV Peak " A Anam 0.59 m/s    TR Max Anam 1.44 m/s    E/A ratio 0.95     IVRT 95.15 msec    E wave deceleration time 244.86 msec    LV SEPTAL E/E' RATIO 5.60 m/s    LV LATERAL E/E' RATIO 6.22 m/s    LVOT peak anam 0.73 m/s    Left Ventricular Outflow Tract Mean Velocity 0.58 cm/s    Left Ventricular Outflow Tract Mean Gradient 1.43 mmHg    LA size 3.81 cm    Left Atrium Major Axis 5.31 cm    Left Atrium Minor Axis 4.51 cm    RVOT peak VTI 12.8 cm    RA Major Axis 4.59 cm    AV mean gradient 3 mmHg    AV peak gradient 4 mmHg    Ao peak anam 1.04 m/s    Ao VTI 21.80 cm    LVOT peak VTI 16.70 cm    AV valve area 2.88 cm²    AV Velocity Ratio 0.70     AV index (prosthetic) 0.77     SANCHO by Velocity Ratio 2.64 cm²    Triscuspid Valve Regurgitation Peak Gradient 8 mmHg    PV mean gradient 1 mmHg    RVOT peak anam 0.56 m/s    Ao root annulus 3.44 cm    STJ 3.74 cm    Ascending aorta 3.74 cm    IVC diameter 1.63 cm    Mean e' 0.10 m/s    ZLVIDS -4.88     ZLVIDD -7.38     LA Volume Index 23.5 mL/m2    LA volume 56.86 cm3    LA WIDTH 3.6 cm    TAPSE 2.20 cm    RA Width 4.1 cm    TV resting pulmonary artery pressure 11 mmHg    RV TB RVSP 4 mmHg    Est. RA pres 3 mmHg    Narrative      Left Ventricle: The left ventricle is normal in size. Normal wall   thickness. Normal wall motion. There is normal systolic function with a   visually estimated ejection fraction of 55 - 70%. There is normal   diastolic function.    Right Ventricle: Normal right ventricular cavity size. Wall thickness   is normal. Right ventricle wall motion  is normal. Systolic function is   normal.    Tricuspid Valve: There is mild regurgitation.    IVC/SVC: Normal venous pressure at 3 mmHg.     , EKG: reviewed, Stress Test: reviewed, and X-Ray: CXR: X-Ray Chest 1 View (CXR): No results found for this visit on 08/23/23.    Assessment and Plan:     * S/P CABG x 2  Recovering well, chest tubes in place, likely remove today  Cont management per CTS    Coronary artery  disease  Cont ASA, statin, plavix, BB    Mixed hyperlipidemia  statin        VTE Risk Mitigation (From admission, onward)         Ordered     Place sequential compression device  Until discontinued         08/23/23 0641                Thank you for your consult. I will follow-up with patient. Please contact us if you have any additional questions.    Scarlet Mcfadden NP  Cardiology   [facility] - [facility] ([facility])

## 2023-08-24 NOTE — ASSESSMENT & PLAN NOTE
- came to the ICU intubated post-operatively  - extubated shortly after arrival  - wean supplemental oxygen with goal SpO2 > 92%  - PT/OT, encourage IS, up OOB as much as possible

## 2023-08-24 NOTE — PLAN OF CARE
POC reviewed with pt. Pt remains aaox4 and vss. Have been able to wean levo after one dose of prn albumin. Insulin protocol followed. Pain controlled with minimal pain medicine. Urine output adequate. Chest tube output minimal. Plan to have pt up in chair for breakfast. Report to be given to day shift RN who will assume care.

## 2023-08-24 NOTE — CONSULTS
[facility] - [facility] (Hospital)  Initial Discharge Assessment       Primary Care Provider: Mt Noel MD    Admission Diagnosis: Prediabetes [R73.03]    Admission Date: 8/23/2023  Expected Discharge Date:     Transition of Care Barriers: None    Payor: BLUE CROSS BLUE SHIELD / Plan: [place] [facility] EPO / Product Type: Commercial /     Extended Emergency Contact Information  Primary Emergency Contact: Fannie Rodriguez   [facility]  Home Phone: [phone]  Mobile Phone: [phone]  Relation: Spouse    Discharge Plan A: Home Health         [id number] - Cl[facility] - [place] - [address] [facility]  [address]  [place] [address]  Phone: [phone] Fax: [phone]    [id number] - [place] - [address] AT [facility]  [address]  [place] [address]  Phone: [phone] Fax: [phone]      Initial Assessment (most recent)       Adult Discharge Assessment - 08/24/23 0943          Discharge Assessment    Assessment Type Discharge Planning Assessment     Confirmed/corrected address, phone number and insurance Yes     Confirmed Demographics Correct on Facesheet     Source of Information family;patient     When was your last doctors appointment? 08/21/23     Communicated SIMONE with patient/caregiver Yes     Reason For Admission CABG x 2     People in Home spouse;child(chaim), adult     Facility Arrived From: home     Do you expect to return to your current living situation? Yes     Do you have help at home or someone to help you manage your care at home? Yes     Who are your caregiver(s) and their phone number(s)? SpouseFannie     Prior to hospitilization cognitive status: Alert/Oriented     Current cognitive status: Alert/Oriented     Home Accessibility wheelchair accessible     Home Layout Able to live on 1st floor     Equipment Currently Used at Home none     Readmission  within 30 days? No     Patient currently being followed by outpatient case management? No     Do you currently have service(s) that help you manage your care at home? No     Do you take prescription medications? Yes     Do you have prescription coverage? Yes     Coverage Commercial     Do you have any problems affording any of your prescribed medications? No     Is the patient taking medications as prescribed? yes     Who is going to help you get home at discharge? Spouse, Fannie     How do you get to doctors appointments? car, drives self     Are you on dialysis? No     Do you take coumadin? No     DME Needed Upon Discharge  none     Discharge Plan discussed with: Patient;Spouse/sig other     Transition of Care Barriers None     Discharge Plan A Home Health                   Anticipated DC dispo: home health   Prior Level of Function: independent with ADLs  People in home: spouse and adult daughter     Comments:  CM met with patient and family at bedside to introduce role and discuss discharge planning. Spouse and family will be help at home and can provide transport at time of discharge. Patient does not use any DME at home. Confirmed demographics, insurance, and emergency contacts. CM discharge needs depends on hospital progress. CM will continue following to assist with other needs.

## 2023-08-24 NOTE — PT/OT/SLP EVAL
"Occupational Therapy Evaluation and Treatment    Name: Abdirahman Rodriguez  MRN: [id number]  Admitting Diagnosis: S/P CABG x 2  Recent Surgery: Procedure(s) (LRB):  CORONARY ARTERY BYPASS GRAFT (CABG) (N/A)  SURGICAL PROCUREMENT, VEIN, ENDOSCOPIC (Left)  ECHOCARDIOGRAM,TRANSESOPHAGEAL (N/A)  BLOCK, NERVE, INTERCOSTAL, 2 OR MORE (N/A) 1 Day Post-Op    Recommendations:     Discharge Recommendations: home health OT (24/7 SPV and A)  Level of Assistance Recommended: 24 hours light assistance  Discharge Equipment Recommendations: shower chair  Barriers to discharge: None    Assessment:     Abdirahman Rodriguez is a 67 y.o. male with a medical diagnosis of S/P CABG x 2. He presents with performance deficits affecting function including weakness, impaired endurance, impaired self care skills, impaired functional mobility, impaired balance, impaired cardiopulmonary response to activity, decreased safety awareness, pain, edema, decreased lower extremity function (sternal precautions).    Rehab Prognosis: Good; patient would benefit from acute OT services to address these deficits and reach maximum level of function.    Plan:     Patient to be seen 2 x/week to address the above listed problems via self-care/home management, therapeutic exercises, therapeutic activities  Plan of Care Expires: 09/07/23  Plan of Care Reviewed with: patient    Subjective     Chief Complaint: Reported "I can do it."  Patient Comments/Goals: get stronger  Pain/Comfort:  Pain Rating 1: 2/10  Location 1: chest  Pain Addressed 1:  (activity pacing)  Pain Rating Post-Intervention 1:  (increased at end of session. did not rate. nurse present and aware)    Social History:  Living Environment: Patient lives with their spouse in a 2 story home with BR/BA on 1st floor, no steps to enter.  Prior Level of Function: Prior to admission, patient was independent with ADLs and community distance ambulation.  Roles and Routines: Patient was driving and working prior to " admission.  Equipment Used at Home: none  DME owned (not currently used): none  Assistance Upon Discharge: family    Objective:     Communicated with nurseManoj, prior to session. Patient found up in chair with oxygen, blood pressure cuff, pulse ox (continuous), telemetry, wound vac, peripheral IV, external pacer, chest tube, barillas catheter upon OT entry to room.    General Precautions: Standard, fall, sternal, respiratory   Orthopedic Precautions: N/A   Braces: N/A    Respiratory Status: Nasal cannula, flow 5 L/min. Desat to 83-84% after return to supine. Short shallow breathing due to pain at chest tubes. Nurse present and aware. Provided with cueing for improved breathing techniques throughout session.    Occupational Performance    Bed Mobility:   Sit to Supine with moderate assistance and of 2 persons on right side of bed    Functional Mobility/Transfers:  Sit <> Stand Transfer with minimum assistance and 2 persons with hand-held assist  Bed <> Chair Transfer using Step Transfer technique with minimum assistance and 2 persons with hand-held assist  Functional Mobility: completed marching in place x2 minutes prior to transfer to chair with min HHA of 2. Due to multiple ICU lines and pain held forward ambulation. Will continue to progress.    Activities of Daily Living:  Upper Body Dressing: minimum assistance  Lower Body Dressing: maximal assistance    Cognitive/Visual Perceptual:  Cognitive/Psychosocial Skills:    -     Oriented to: Person, Place, Time, Situation  -     Follows Commands/attention: Follows one-step commands    Physical Exam:  Balance:    -     Sitting: stand by assistance  -     Standing: minimum assistance and of 2 persons   Strength:    -       Right Upper Extremity: Deficits: fair  -       Left Upper Extremity: Deficits: fair  Limited ROM and strength assessments completed due to ICU lines and sternal precautions  ROM grossly WFL and strength elbows distally grossly 4-/5    [facility]  Click ADL:  AMPAC Total Score: 15    Treatment & Education:  Therapist provided facilitation and instruction of proper body mechanics, energy conservation, and fall prevention strategies during tasks listed above  Patient educated on role of OT, POC, and goals for therapy  Patient educated on importance of OOB activities with staff member assistance and sitting OOB majority of the day  Educated on sternal precautions and their functional implications. Provided with v/c for technique with transfers to ensure appropriate compliance. Will require reinforcement.    Patient left HOB elevated with all lines intact, call button in reach, bed alarm on, and RN present.    GOALS:   Multidisciplinary Problems       Occupational Therapy Goals          Problem: Occupational Therapy    Goal Priority Disciplines Outcome Interventions   Occupational Therapy Goal     OT, PT/OT     Description: Goals to be met by: 9/7/23     Patient will increase functional independence with ADLs by performing:    Toileting from toilet with Contact Guard Assistance for hygiene and clothing management.   Toilet transfer to toilet with Contact Guard Assistance.  Demonstrates 100% functional compliance with sternal precautions.                         History:     Past Medical History:   Diagnosis Date    Abnormal nuclear stress test 08/13/2023    Benign hypertension     Class 1 obesity in adult 05/25/2023    Coronary artery disease     History of colon polyps     Hypergammaglobulinemia     Hypertensive kidney disease with chronic kidney disease stage III     Hypothyroidism, unspecified          Past Surgical History:   Procedure Laterality Date    ARTERIOGRAPHY OF SUBCLAVIAN ARTERY Left 8/14/2023    Procedure: ARTERIOGRAM, SUBCLAVIAN;  Surgeon: Vahid Juarez MD;  Location: [facility] CATH LAB;  Service: Cardiology;  Laterality: Left;    COLONOSCOPY N/A 12/6/2021    Procedure: COLONOSCOPY;  Surgeon: Doris Altman MD;  Location: [facility] ENDO;  Service:  Endoscopy;  Laterality: N/A;    CORONARY ARTERY BYPASS GRAFT (CABG) N/A 8/23/2023    Procedure: CORONARY ARTERY BYPASS GRAFT (CABG);  Surgeon: Emma Arzola MD;  Location: [facility] OR;  Service: Cardiothoracic;  Laterality: N/A;  2-VESSEL    ECHOCARDIOGRAM,TRANSESOPHAGEAL N/A 8/23/2023    Procedure: ECHOCARDIOGRAM,TRANSESOPHAGEAL;  Surgeon: Emma Arzola MD;  Location: [facility] OR;  Service: Cardiothoracic;  Laterality: N/A;    ENDOSCOPIC HARVEST OF VEIN Left 8/23/2023    Procedure: SURGICAL PROCUREMENT, VEIN, ENDOSCOPIC;  Surgeon: Emma Arzola MD;  Location: [facility] OR;  Service: Cardiothoracic;  Laterality: Left;    INJECTION OF ANESTHETIC AGENT AROUND MULTIPLE INTERCOSTAL NERVES N/A 8/23/2023    Procedure: BLOCK, NERVE, INTERCOSTAL, 2 OR MORE;  Surgeon: Emma Arzola MD;  Location: [facility] OR;  Service: Cardiothoracic;  Laterality: N/A;    LEFT HEART CATHETERIZATION Left 8/14/2023    Procedure: Left heart cath;  Surgeon: Vahid Juarez MD;  Location: [facility] CATH LAB;  Service: Cardiology;  Laterality: Left;  pt instructed 930-10am start/    None         Time Tracking:     OT Date of Treatment: 08/24/23  OT Start Time: 1305  OT Stop Time: 1330  OT Total Time (min): 25 min    Billable Minutes: Evaluation 15 and Therapeutic Activity 10    Gali Cheney, OT  8/24/2023

## 2023-08-24 NOTE — HPI
Mr Rodriguez is a 68 y/o AAM with HTN, hypothyroidism, CKDIII, and CAD who presents to the ICU following CABG x 2 with Dr Arzola. Cardiology consulted to assist with management. Pt seen and examined today, sitting up in bedside chair, reports some soreness but feels ok. Labs reviewed, chart reviewed

## 2023-08-24 NOTE — TELEPHONE ENCOUNTER
Spoke to patient's wife and informed her we will locate the papers as the nurse is off and all her when she can pick it up----- Message from Fannie Barrettchanell sent at 8/24/2023  8:57 AM CDT -----  Contact: pt's wife/Fannie Greco is calling in regard to having the nurse (Sailaja) give her a call about the pt's forms that was filled out and she needs to know when can she pick these up.  Please call her back at [phone] thanks/mpd

## 2023-08-24 NOTE — PROGRESS NOTES
[facility])  Critical Care Medicine  Progress Note    Patient Name: Abdirahman Rodriguez  MRN: [id number]  Admission Date: 8/23/2023  Hospital Length of Stay: 1 days  Code Status: Full Code  Attending Provider: Emma Arzola MD  Primary Care Provider: Mt Noel MD   Principal Problem: S/P CABG x 2    Subjective:     HPI:  Mr Rodriguez is a 68 y/o AAM with HTN, hypothyroidism, CKDIII, and CAD who presents to the ICU following CABG x 2 with Dr Arzola.  He had an abnormal stress test and then LHC that showed LAD and D1 stenosis.  Came in today for scheduled procedure.    I examined him following arrival to the ICU.  Currently on insulin drip and low dose Epi.  He is on minimal vent settings, is awake and following commands, and doing well on PSV.        Hospital/ICU Course:  8/23 - to ICU following CABG x 2,  extubated in the ICU, weaned off vasopressors  8/24 - stable on NC, lines/tubes coming out later today      Interval History:   No acute events.  Up  in the bedside chair.  Hemodynamics stable.  O2 from 2 to 5L getting up to the chair.  No complaints.      Objective:     Vital Signs (Most Recent):  Temp: 99.9 °F (37.7 °C) (08/24/23 0725)  Pulse: 75 (08/24/23 0725)  Resp: (!) 29 (08/24/23 0725)  BP: 139/64 (08/24/23 0715)  SpO2: 98 % (08/24/23 0725) Vital Signs (24h Range):  Temp:  [98.6 °F (37 °C)-99.9 °F (37.7 °C)] 99.9 °F (37.7 °C)  Pulse:  [60-88] 75  Resp:  [15-40] 29  SpO2:  [84 %-100 %] 98 %  BP: ()/(51-65) 139/64  Arterial Line BP: ()/(44-80) 120/50     Weight: 117.4 kg (258 lb 13.1 oz)  Body mass index is 33.23 kg/m².      Intake/Output Summary (Last 24 hours) at 8/24/2023 0791  Last data filed at 8/24/2023 0722  Gross per 24 hour   Intake 4164.66 ml   Output 4009 ml   Net 155.66 ml        Physical Exam  Vitals and nursing note reviewed.   Constitutional:       General: He is not in acute distress.     Comments: Up in bedside chair   Cardiovascular:      Rate and Rhythm:  Normal rate and regular rhythm.      Pulses: Normal pulses.      Heart sounds: No murmur heard.  Pulmonary:      Effort: Pulmonary effort is normal. No respiratory distress.      Breath sounds: No wheezing, rhonchi or rales.   Abdominal:      General: Abdomen is flat. There is no distension.      Palpations: Abdomen is soft.      Tenderness: There is no abdominal tenderness.   Musculoskeletal:      Right lower leg: Edema present.      Left lower leg: Edema present.   Neurological:      General: No focal deficit present.      Mental Status: He is alert and oriented to person, place, and time. Mental status is at baseline.           Review of Systems    Vents:  Vent Mode: (S) Spont (08/23/23 1414)  Set Rate: 16 BPM (08/23/23 1301)  Vt Set: 500 mL (08/23/23 1301)  Pressure Support: 8 cmH20 (08/23/23 1414)  PEEP/CPAP: 5 cmH20 (08/23/23 1414)  Oxygen Concentration (%): 50 (08/24/23 0615)  Peak Airway Pressure: 14 cmH20 (08/23/23 1414)  Plateau Pressure: 0 cmH20 (08/23/23 1414)  Total Ve: 13.5 L/m (08/23/23 1414)  F/VT Ratio<105 (RSBI): (!) 38.34 (08/23/23 1414)    Lines/Drains/Airways       Central Venous Catheter Line  Duration             Percutaneous Central Line Insertion/Assessment - Triple Lumen  08/23/23  Internal Jugular Right 1 day              Drain  Duration                  Urethral Catheter 08/23/23 0720 Silicone;Temperature probe;Straight-tip 16 Fr. 1 day         Chest Tube 08/23/23 1034 Tube - 3 Left Pleural 24 Fr. <1 day         Y Chest Tube 1 and 2 08/23/23 1034 1 Right Mediastinal 24 Fr. 2 Left Mediastinal 24 Fr. <1 day              Arterial Line  Duration             Arterial Line 08/23/23 0709 Right Radial 1 day              Line  Duration                  Pacer Wires 08/23/23  1 day              Peripheral Intravenous Line  Duration                  Peripheral IV - Single Lumen 08/23/23 0550 20 G Anterior;Left Hand 1 day                    Significant Labs:    CBC/Anemia Profile:  Recent Labs   Lab  08/23/23  1237 08/23/23  1239 08/23/23  1658 08/24/23  0357   WBC 11.60  --  7.54 6.72   HGB 11.7*  --  11.0* 9.8*   HCT 36.3* 34* 33.1* 29.9*   *  --  122* 133*   MCV 93  --  93 93   RDW 13.2  --  13.2 13.2        Chemistries:  Recent Labs   Lab 08/23/23  1237 08/23/23  1658 08/24/23  0357    137 136   K 5.0 5.0 4.2    111* 111*   CO2 20* 20* 19*   BUN 18 19 16   CREATININE 1.6* 1.6* 1.4   CALCIUM 7.7* 7.9* 8.1*   MG 3.0* 2.7* 2.5       All pertinent labs within the past 24 hours have been reviewed.    Significant Imaging:  I have reviewed all pertinent imaging results/findings within the past 24 hours.      ABG  Recent Labs   Lab 08/23/23  1239   PH 7.306*   PO2 77*   PCO2 45.0   HCO3 22.4*   BE -4     Assessment/Plan:     Pulmonary  Respiratory insufficiency  - came to the ICU intubated post-operatively  - extubated shortly after arrival  - wean supplemental oxygen with goal SpO2 > 92%  - PT/OT, encourage IS, up OOB as much as possible    Cardiac/Vascular  * S/P CABG x 2  - CABG x 2 8/23 with Dr Arzola  - post-op per Dr Arzola  - chest tubes in place    Coronary artery disease  - now s/p CABG x 2  - ASA/Plavix  - Cardiology following    Mixed hyperlipidemia  - checking lipid panel    Benign hypertension  - off vasopressors  - getting metoprolol    Renal/  Hypertensive kidney disease with chronic kidney disease stage III  - baseline around 1.6  - Cr essentially at baseline  - monitor lytes and UOP  - renally dose meds and avoid nephrtoxins    Endocrine  Hypothyroidism, unspecified  - continue Synthroid    Prediabetes  - transition to SSI today  - monitor glucose trends       Jose R Hansen MD  Critical Care Medicine  [facility] - Intensive Care ([facility])

## 2023-08-24 NOTE — PLAN OF CARE
P.T. EVAL COMPLETE.  PT CURRENTLY REQUIRES ASHER X 2 FOR SIT<>STAND TF, MODA X 2 FOR SIT>SUP TF.  P.T. RECOMMENDS HHPT AT D/C

## 2023-08-24 NOTE — PLAN OF CARE
Pt's VS stable throughout shift. Off drips Dc'd. Chest tubes out per MD.4-5L nasal cannula; SpO2>=92%; pt educated on deep breathing. PRN pain meds given for incisional pain, see MAR. Pt and pt's family updated on status and plan of care.

## 2023-08-24 NOTE — SUBJECTIVE & OBJECTIVE
Past Medical History:   Diagnosis Date    Abnormal nuclear stress test 08/13/2023    Benign hypertension     Class 1 obesity in adult 05/25/2023    Coronary artery disease     History of colon polyps     Hypergammaglobulinemia     Hypertensive kidney disease with chronic kidney disease stage III     Hypothyroidism, unspecified        Past Surgical History:   Procedure Laterality Date    ARTERIOGRAPHY OF SUBCLAVIAN ARTERY Left 8/14/2023    Procedure: ARTERIOGRAM, SUBCLAVIAN;  Surgeon: Vahid Juarez MD;  Location: [facility] CATH LAB;  Service: Cardiology;  Laterality: Left;    COLONOSCOPY N/A 12/6/2021    Procedure: COLONOSCOPY;  Surgeon: Doris Altman MD;  Location: [facility] ENDO;  Service: Endoscopy;  Laterality: N/A;    LEFT HEART CATHETERIZATION Left 8/14/2023    Procedure: Left heart cath;  Surgeon: Vahid Juarez MD;  Location: [facility] CATH LAB;  Service: Cardiology;  Laterality: Left;  pt instructed 930-10am start/    None         Review of patient's allergies indicates:  No Known Allergies    No current facility-administered medications on file prior to encounter.     Current Outpatient Medications on File Prior to Encounter   Medication Sig    levothyroxine (SYNTHROID) 50 MCG tablet Take 1 tablet (50 mcg total) by mouth before breakfast.    losartan-hydrochlorothiazide 100-25 mg (HYZAAR) 100-25 mg per tablet TAKE 1 TABLET BY MOUTH EVERY DAY    metoprolol tartrate (LOPRESSOR) 25 MG tablet Take 1 tablet (25 mg total) by mouth 2 (two) times daily.    aspirin (ECOTRIN) 81 MG EC tablet Take 81 mg by mouth once daily.    sildenafil (REVATIO) 20 mg Tab Take 2-5  tablets one hour prior to intercourse (Patient not taking: Reported on 8/18/2023)     Family History       Problem Relation (Age of Onset)    Heart disease Mother, Father    Hypertension Mother, Father          Tobacco Use    Smoking status: Never    Smokeless tobacco: Never   Substance and Sexual Activity    Alcohol use: No     Comment: rarely    Drug use: No     Sexual activity: Not on file     Review of Systems   Constitutional: Negative.   HENT: Negative.     Eyes: Negative.    Cardiovascular:  Positive for chest pain.   Respiratory:  Positive for shortness of breath.    Skin: Negative.    Musculoskeletal: Negative.    Gastrointestinal: Negative.    Genitourinary: Negative.    Neurological:  Positive for weakness.   Psychiatric/Behavioral: Negative.       Objective:     Vital Signs (Most Recent):  Temp: (!) 100.6 °F (38.1 °C) (08/24/23 1212)  Pulse: 74 (08/24/23 1212)  Resp: (!) 37 (08/24/23 1212)  BP: 116/67 (08/24/23 1212)  SpO2: (!) 94 % (08/24/23 1212) Vital Signs (24h Range):  Temp:  [98.6 °F (37 °C)-100.6 °F (38.1 °C)] 100.6 °F (38.1 °C)  Pulse:  [60-88] 74  Resp:  [15-43] 37  SpO2:  [84 %-100 %] 94 %  BP: ()/(51-80) 116/67  Arterial Line BP: ()/(44-68) 126/53     Weight: 117.4 kg (258 lb 13.1 oz)  Body mass index is 33.23 kg/m².    SpO2: (!) 94 %         Intake/Output Summary (Last 24 hours) at 8/24/2023 1256  Last data filed at 8/24/2023 1119  Gross per 24 hour   Intake 2004.21 ml   Output 3466 ml   Net -1461.79 ml       Lines/Drains/Airways       Central Venous Catheter Line  Duration             Percutaneous Central Line Insertion/Assessment - Triple Lumen  08/23/23  Internal Jugular Right 1 day              Drain  Duration                  Chest Tube 08/23/23 1034 Tube - 3 Left Pleural 24 Fr. 1 day         Urethral Catheter 08/23/23 0720 Silicone;Temperature probe;Straight-tip 16 Fr. 1 day         Y Chest Tube 1 and 2 08/23/23 1034 1 Right Mediastinal 24 Fr. 2 Left Mediastinal 24 Fr. 1 day              Line  Duration                  Pacer Wires 08/23/23  1 day              Peripheral Intravenous Line  Duration                  Peripheral IV - Single Lumen 08/23/23 0550 20 G Anterior;Left Hand 1 day                     Physical Exam  Vitals and nursing note reviewed.   Constitutional:       Appearance: Normal appearance.   HENT:      Head:  Normocephalic and atraumatic.   Eyes:      General:         Right eye: No discharge.         Left eye: No discharge.      Pupils: Pupils are equal, round, and reactive to light.   Cardiovascular:      Rate and Rhythm: Normal rate and regular rhythm.      Heart sounds: S1 normal and S2 normal. No murmur heard.     No friction rub.      Comments: Chest tubes in place  Pulmonary:      Effort: Pulmonary effort is normal. No respiratory distress.      Breath sounds: Normal breath sounds. No rales.   Abdominal:      Palpations: Abdomen is soft.      Tenderness: There is no abdominal tenderness.   Musculoskeletal:      Cervical back: Neck supple.      Right lower leg: No edema.      Left lower leg: No edema.   Skin:     General: Skin is warm and dry.      Comments: Sternotomy site C/D/I   Neurological:      General: No focal deficit present.      Mental Status: He is alert and oriented to person, place, and time.   Psychiatric:         Mood and Affect: Mood normal.         Behavior: Behavior normal.         Thought Content: Thought content normal.          Significant Labs: BMP:   Recent Labs   Lab 08/23/23 1237 08/23/23 1658 08/24/23  0357   * 140* 138*    137 136   K 5.0 5.0 4.2    111* 111*   CO2 20* 20* 19*   BUN 18 19 16   CREATININE 1.6* 1.6* 1.4   CALCIUM 7.7* 7.9* 8.1*   MG 3.0* 2.7* 2.5   , CMP   Recent Labs   Lab 08/23/23 1237 08/23/23 1658 08/24/23  0357    137 136   K 5.0 5.0 4.2    111* 111*   CO2 20* 20* 19*   * 140* 138*   BUN 18 19 16   CREATININE 1.6* 1.6* 1.4   CALCIUM 7.7* 7.9* 8.1*   ANIONGAP 10 6* 6*   , CBC   Recent Labs   Lab 08/23/23  1237 08/23/23  1239 08/23/23 1658 08/24/23  0357   WBC 11.60  --  7.54 6.72   HGB 11.7*  --  11.0* 9.8*   HCT 36.3*   < > 33.1* 29.9*   *  --  122* 133*    < > = values in this interval not displayed.   , INR   Recent Labs   Lab 08/23/23  1237 08/24/23  0357   INR 1.1 1.1   , Lipid Panel   Recent Labs   Lab  "08/24/23  0357   CHOL 92*   HDL 17*   LDLCALC 62.4*   TRIG 63   CHOLHDL 18.5*   , Troponin No results for input(s): "TROPONINI" in the last 48 hours., and All pertinent lab results from the last 24 hours have been reviewed.    Significant Imaging: Cardiac Cath: reviewed, Echocardiogram: Transthoracic echo (TTE) complete (Cupid Only):   Results for orders placed or performed during the hospital encounter of 08/04/23   Echo   Result Value Ref Range    BSA 2.47 m2    LVOT stroke volume 62.87 cm3    LVIDd 5.47 3.5 - 6.0 cm    LV Systolic Volume 57.91 mL    LV Systolic Volume Index 23.9 mL/m2    LVIDs 3.69 2.1 - 4.0 cm    LV Diastolic Volume 145.32 mL    LV Diastolic Volume Index 60.05 mL/m2    IVS 1.07 0.6 - 1.1 cm    LVOT diameter 2.19 cm    LVOT area 3.8 cm2    FS 33 28 - 44 %    Left Ventricle Relative Wall Thickness 0.41 cm    Posterior Wall 1.11 (A) 0.6 - 1.1 cm    LV mass 236.91 g    LV Mass Index 98 g/m2    MV Peak E Anam 0.56 m/s    TDI LATERAL 0.09 m/s    TDI SEPTAL 0.10 m/s    E/E' ratio 5.89 m/s    MV Peak A Anam 0.59 m/s    TR Max Anam 1.44 m/s    E/A ratio 0.95     IVRT 95.15 msec    E wave deceleration time 244.86 msec    LV SEPTAL E/E' RATIO 5.60 m/s    LV LATERAL E/E' RATIO 6.22 m/s    LVOT peak anam 0.73 m/s    Left Ventricular Outflow Tract Mean Velocity 0.58 cm/s    Left Ventricular Outflow Tract Mean Gradient 1.43 mmHg    LA size 3.81 cm    Left Atrium Major Axis 5.31 cm    Left Atrium Minor Axis 4.51 cm    RVOT peak VTI 12.8 cm    RA Major Axis 4.59 cm    AV mean gradient 3 mmHg    AV peak gradient 4 mmHg    Ao peak anam 1.04 m/s    Ao VTI 21.80 cm    LVOT peak VTI 16.70 cm    AV valve area 2.88 cm²    AV Velocity Ratio 0.70     AV index (prosthetic) 0.77     SANCHO by Velocity Ratio 2.64 cm²    Triscuspid Valve Regurgitation Peak Gradient 8 mmHg    PV mean gradient 1 mmHg    RVOT peak anam 0.56 m/s    Ao root annulus 3.44 cm    STJ 3.74 cm    Ascending aorta 3.74 cm    IVC diameter 1.63 cm    Mean e' 0.10 " m/s    ZLVIDS -4.88     ZLVIDD -7.38     LA Volume Index 23.5 mL/m2    LA volume 56.86 cm3    LA WIDTH 3.6 cm    TAPSE 2.20 cm    RA Width 4.1 cm    TV resting pulmonary artery pressure 11 mmHg    RV TB RVSP 4 mmHg    Est. RA pres 3 mmHg    Narrative      Left Ventricle: The left ventricle is normal in size. Normal wall   thickness. Normal wall motion. There is normal systolic function with a   visually estimated ejection fraction of 55 - 70%. There is normal   diastolic function.    Right Ventricle: Normal right ventricular cavity size. Wall thickness   is normal. Right ventricle wall motion  is normal. Systolic function is   normal.    Tricuspid Valve: There is mild regurgitation.    IVC/SVC: Normal venous pressure at 3 mmHg.     , EKG: reviewed, Stress Test: reviewed, and X-Ray: CXR: X-Ray Chest 1 View (CXR): No results found for this visit on 08/23/23.

## 2023-08-24 NOTE — CONSULTS
[facility] - Intensive Care (Hospital)  Adult Nutrition  Consult Note    SUMMARY     Recommendations    Recommendation/Intervention:   1. Recommend pt continues on Diabetic 2000 carbohydrate, Cardiac diet   2. Recommend Suplena BID to assist filling nutritional gaps   3. Recommend Nicola BID x 2 weekd for surgical wound healing   4. Cardiac diet education will be provided   5. Encourage PO and supplement intake   6. Daily weights    Goals:   1. Pt will tolerate and consume > 75% EEN and EPN prior to RD follow up   2. Pt will consume Nicola BID prior to RD follow up   3. Cardiac diet education will be provided at RD follow up  Nutrition Goal Status: new  Communication of RD Recs: other (comment); (POC, sticky note)    Assessment and Plan    Nutrition Problem  Inadequate energy intake   Increased protein needs     Related to (etiology):   Increased demand for nutrition     Signs and Symptoms (as evidenced by):   Estimated intake of food less than estimated needs: 25% PO intake of meals   Surgery     Interventions(treatment strategy):  1. Carbohydrate, Sodium and Fat modified diet   2. Commercial beverage  3. Wound healing medical food supplement therapy  4. Collaboration of care with medical providers     Nutrition Diagnosis Status:   New       Malnutrition Assessment     Skin (Micronutrient): bruised, dry, wounds unhealed (Aryan score = 16 (mild risk)                                 Reason for Assessment    Reason For Assessment: consult (Post Op)  Diagnosis:  (S/P CABG x 2)  Relevant Medical History: HTN, CKD 3, HLD, Prediabetes, Hypothyroidism, CAD, Respiratory insufficiency  Hx: Restrictive lung disease, Colon polyps, Obesity, Hyperammagloblinemia  General Information Comments:   8/24/23: 67 y.o. Male admitted for S/P CABG x 2. Pt currently in the ICU on Diabetic 2000 calorie, Cardiac diet, consulted for post op. H&P noted that the pt presented to the hospital for CABG x 2 on 8/23/23. Cardiology NP noted on 8/24 that  "the pt reported that he has some soreness but feels ok and that he is recovering well, still has chest tubes in place but will likely removed today. Visited pt at bedside, pt was visiting with family and eating lunch, s/p CABG post op x 1 day, will provide Cardiac diet education at follow up. Reviewed chart: 25% PO intake of meals charted; LBM 8/23; Skin: dry, bruised, incision L leg/ chest WDL; Aryan score: 16 (mild risk); Edema: None. Labs, meds, weight reviewed. Labs 8/24: Calcium (L), Anion gap (L), Cl (H), Glu (H), eGFR 56. Weight charted 5/25 267 lbs, 8/23 258 lbs (BMI 33.23, Obese), -9 lb wt loss (3% wt change) x 3 months. NFPE to be performed at follow up if warranted. RD will continue to monitor.    Nutrition Discharge Planning: Consistent carbohydrate, Cardiac diet + Nicola BID x 2 weeks + Suplena if warranted    Nutrition Risk Screen    Nutrition Risk Screen: no indicators present    Nutrition/Diet History    Spiritual, Cultural Beliefs, Yazidi Practices, Values that Affect Care: no  Food Allergies: NKFA  Factors Affecting Nutritional Intake: pain    Anthropometrics    Temp: (!) 100.6 °F (38.1 °C)  Height Method: Stated  Height: 6' 2" (188 cm)  Height (inches): 74 in  Weight Method: Standard Scale  Weight: 117.4 kg (258 lb 13.1 oz)  Weight (lb): 258.82 lb  Ideal Body Weight (IBW), Male: 190 lb  % Ideal Body Weight, Male (lb): 136.22 %  BMI (Calculated): 33.2  BMI Grade: 30 - 34.9- obesity - grade I     Wt Readings from Last 15 Encounters:   08/24/23 117.4 kg (258 lb 13.1 oz)   08/18/23 117 kg (258 lb)   08/18/23 117.2 kg (258 lb 6.1 oz)   08/17/23 117.2 kg (258 lb 6.1 oz)   08/14/23 116.1 kg (256 lb)   08/04/23 117 kg (258 lb)   08/04/23 117 kg (258 lb)   07/18/23 117 kg (258 lb)   07/10/23 117.3 kg (258 lb 9.6 oz)   05/25/23 121.3 kg (267 lb 6.7 oz)   01/04/23 126.8 kg (279 lb 8.7 oz)   10/12/22 125.4 kg (276 lb 7.3 oz)   06/22/22 124.9 kg (275 lb 5.7 oz)   06/16/22 120.2 kg (264 lb 15.9 oz) "   06/16/22 120.2 kg (265 lb)     Lab/Procedures/Meds    Pertinent Labs Reviewed: reviewed  Pertinent Labs Comments: Calcium (L), Anion gap (L), Cl (H), Glu (H), eGFR 56  Pertinent Medications Reviewed: reviewed  Pertinent Medications Comments: potassium chloride, polyethylene glycol, Lopressor, magnesium hydroxide, furosemide, ferrous sulfate, famotidine, clopidogrel, chlorhexidine, asprin, vitamin C  BMP  Lab Results   Component Value Date     08/24/2023    K 4.2 08/24/2023     (H) 08/24/2023    CO2 19 (L) 08/24/2023    BUN 16 08/24/2023    CREATININE 1.4 08/24/2023    CALCIUM 8.1 (L) 08/24/2023    ANIONGAP 6 (L) 08/24/2023    EGFRNORACEVR 56 (A) 08/24/2023     Recent Labs   Lab 08/24/23  1117   POCTGLUCOSE 134*     Lab Results   Component Value Date    HGBA1C 5.9 (H) 08/18/2023     Scheduled Meds:   ascorbic acid (vitamin C)  500 mg Oral BID    aspirin  81 mg Oral Daily    chlorhexidine  10 mL Mouth/Throat BID    clopidogreL  75 mg Oral Daily    [START ON 8/25/2023] cyanocobalamin  1,000 mcg Oral Daily    famotidine  20 mg Oral BID    ferrous sulfate  1 tablet Oral Daily    [START ON 8/25/2023] folic acid  1 mg Oral Daily    furosemide (LASIX) injection  40 mg Intravenous BID    magnesium hydroxide 400 mg/5 ml  30 mL Oral Daily    metoprolol tartrate  25 mg Oral BID    polyethylene glycol  17 g Oral Daily    potassium chloride  20 mEq Oral Q12H     Continuous Infusions:   dexmedeTOMIDine (Precedex) infusion (titrating) Stopped (08/23/23 1430)    EPINEPHrine      niCARdipine      NORepinephrine bitartrate-D5W Stopped (08/24/23 0710)     PRN Meds:.acetaminophen, albumin human 5%, calcium gluconate IVPB, calcium gluconate IVPB, calcium gluconate IVPB, dextrose 10%, dextrose 10%, fentaNYL, fentaNYL, glucagon (human recombinant), glucose, glucose, insulin aspart U-100, lactated ringers, magnesium sulfate IVPB, metoclopramide HCl, ondansetron, oxyCODONE, pneumoc 20-michael conj-dip cr(PF), potassium chloride in  water, potassium chloride in water, potassium chloride in water'  Physical Findings/Assessment         Estimated/Assessed Needs    Weight Used For Calorie Calculations: 117.4 kg (258 lb 13.1 oz)  Energy Calorie Requirements (kcal): 2029 kcals (MSJ x no AF (CABG, Obese BMI 33.23)  Energy Need Method: [place]-St Jeor  Protein Requirements: 56-75 g (0.6-0.8 g/kg Adj BW (CKD 3, non dialysis, Prediabetes, Older adult)  Weight Used For Protein Calculations: 94 kg (207 lb 3.7 oz) (Adj BW)  Fluid Requirements (mL): 2029 mL (1 mL/kcal)  Estimated Fluid Requirement Method: RDA Method  RDA Method (mL): 2029  CHO Requirement: 254 g (2029 kcals/8)      Nutrition Prescription Ordered    Current Diet Order: Diabetic 2000 calories, Cardiac diet    Evaluation of Received Nutrient/Fluid Intake  I/O: (Net since admit)  8/24: -762.9 mL    Energy Calories Required: not meeting needs  Protein Required: not meeting needs  Fluid Required: not meeting needs  Total Fluid Intake (mL): 286.7  Tolerance: tolerating  % Intake of Estimated Energy Needs: 0 - 25 %  % Meal Intake: 25%     Nutrition Risk    Level of Risk/Frequency of Follow-up: high (F/u x 2 weekly)       Monitor and Evaluation    Food and Nutrient Intake: energy intake, food and beverage intake  Food and Nutrient Adminstration: diet order  Knowledge/Beliefs/Attitudes: food and nutrition knowledge/skill, beliefs and attitudes  Anthropometric Measurements: weight, weight change, body mass index  Biochemical Data, Medical Tests and Procedures: electrolyte and renal panel, glucose/endocrine profile       Nutrition Follow-Up    RD Follow-up?: Yes  Mickie Omalley, Registration Eligible, Provisional LDN

## 2023-08-24 NOTE — SUBJECTIVE & OBJECTIVE
Interval History:   No acute events.  Up  in the bedside chair.  Hemodynamics stable.  O2 from 2 to 5L getting up to the chair.  No complaints.      Objective:     Vital Signs (Most Recent):  Temp: 99.9 °F (37.7 °C) (08/24/23 0725)  Pulse: 75 (08/24/23 0725)  Resp: (!) 29 (08/24/23 0725)  BP: 139/64 (08/24/23 0715)  SpO2: 98 % (08/24/23 0725) Vital Signs (24h Range):  Temp:  [98.6 °F (37 °C)-99.9 °F (37.7 °C)] 99.9 °F (37.7 °C)  Pulse:  [60-88] 75  Resp:  [15-40] 29  SpO2:  [84 %-100 %] 98 %  BP: ()/(51-65) 139/64  Arterial Line BP: ()/(44-80) 120/50     Weight: 117.4 kg (258 lb 13.1 oz)  Body mass index is 33.23 kg/m².      Intake/Output Summary (Last 24 hours) at 8/24/2023 0735  Last data filed at 8/24/2023 0722  Gross per 24 hour   Intake 4164.66 ml   Output 4009 ml   Net 155.66 ml        Physical Exam  Vitals and nursing note reviewed.   Constitutional:       General: He is not in acute distress.     Comments: Up in bedside chair   Cardiovascular:      Rate and Rhythm: Normal rate and regular rhythm.      Pulses: Normal pulses.      Heart sounds: No murmur heard.  Pulmonary:      Effort: Pulmonary effort is normal. No respiratory distress.      Breath sounds: No wheezing, rhonchi or rales.   Abdominal:      General: Abdomen is flat. There is no distension.      Palpations: Abdomen is soft.      Tenderness: There is no abdominal tenderness.   Musculoskeletal:      Right lower leg: Edema present.      Left lower leg: Edema present.   Neurological:      General: No focal deficit present.      Mental Status: He is alert and oriented to person, place, and time. Mental status is at baseline.           Review of Systems    Vents:  Vent Mode: (S) Spont (08/23/23 1414)  Set Rate: 16 BPM (08/23/23 1301)  Vt Set: 500 mL (08/23/23 1301)  Pressure Support: 8 cmH20 (08/23/23 1414)  PEEP/CPAP: 5 cmH20 (08/23/23 1414)  Oxygen Concentration (%): 50 (08/24/23 0615)  Peak Airway Pressure: 14 cmH20 (08/23/23  1414)  Plateau Pressure: 0 cmH20 (08/23/23 1414)  Total Ve: 13.5 L/m (08/23/23 1414)  F/VT Ratio<105 (RSBI): (!) 38.34 (08/23/23 1414)    Lines/Drains/Airways       Central Venous Catheter Line  Duration             Percutaneous Central Line Insertion/Assessment - Triple Lumen  08/23/23  Internal Jugular Right 1 day              Drain  Duration                  Urethral Catheter 08/23/23 0720 Silicone;Temperature probe;Straight-tip 16 Fr. 1 day         Chest Tube 08/23/23 1034 Tube - 3 Left Pleural 24 Fr. <1 day         Y Chest Tube 1 and 2 08/23/23 1034 1 Right Mediastinal 24 Fr. 2 Left Mediastinal 24 Fr. <1 day              Arterial Line  Duration             Arterial Line 08/23/23 0709 Right Radial 1 day              Line  Duration                  Pacer Wires 08/23/23  1 day              Peripheral Intravenous Line  Duration                  Peripheral IV - Single Lumen 08/23/23 0550 20 G Anterior;Left Hand 1 day                    Significant Labs:    CBC/Anemia Profile:  Recent Labs   Lab 08/23/23  1237 08/23/23  1239 08/23/23  1658 08/24/23  0357   WBC 11.60  --  7.54 6.72   HGB 11.7*  --  11.0* 9.8*   HCT 36.3* 34* 33.1* 29.9*   *  --  122* 133*   MCV 93  --  93 93   RDW 13.2  --  13.2 13.2        Chemistries:  Recent Labs   Lab 08/23/23  1237 08/23/23  1658 08/24/23  0357    137 136   K 5.0 5.0 4.2    111* 111*   CO2 20* 20* 19*   BUN 18 19 16   CREATININE 1.6* 1.6* 1.4   CALCIUM 7.7* 7.9* 8.1*   MG 3.0* 2.7* 2.5       All pertinent labs within the past 24 hours have been reviewed.    Significant Imaging:  I have reviewed all pertinent imaging results/findings within the past 24 hours.

## 2023-08-24 NOTE — PROGRESS NOTES
Subjective:      Patient ID: Abdirahman Rodriguez is a 67 y.o. male.    Chief Complaint: CABG (CABG/)    HPI:  67-year-old male with a history of longstanding hypertension strong family history of coronary artery disease had abnormal stress test and had a left heart catheterization which revealed disease involving the proximal left anterior descending artery as well as diagonal 1 with a critical 80% stenosis.  Patient does not have any active chest pain at this time.  He recovered from COVID last year.    8/23/2024  Procedure(s):  CORONARY ARTERY BYPASS GRAFT (CABG)  SURGICAL PROCUREMENT, VEIN, ENDOSCOPIC  ECHOCARDIOGRAM,TRANSESOPHAGEAL  BLOCK, NERVE, INTERCOSTAL, 2 OR MORE      PROCEDURAL SUMMARY:   Coronary artery bypass graft, x 2  1) Pedicled LIMA to LAD  2) Saphenous vein graft to diagonal   Endoscopic vein harvesting from the left leg    Patient was extubated within the 1st 4 hour window he was hemodynamically stable on minimal dose of Levophed making good urine chest tube output minimal sitting up in a chair this morning pain well controlled in sinus rhythm has 3 chest tubes 2 mediastinal 1 left pleural chest tube right IJ double-lumen catheter.  Minimal drainage from the SANG drain      Review of patient's allergies indicates:  No Known Allergies    Past Medical History:   Diagnosis Date    Abnormal nuclear stress test 08/13/2023    Benign hypertension     Class 1 obesity in adult 05/25/2023    Coronary artery disease     History of colon polyps     Hypergammaglobulinemia     Hypertensive kidney disease with chronic kidney disease stage III     Hypothyroidism, unspecified        Family History   Problem Relation Age of Onset    Heart disease Mother     Hypertension Mother     Heart disease Father     Hypertension Father        Social History     Socioeconomic History    Marital status:    Tobacco Use    Smoking status: Never    Smokeless tobacco: Never   Substance and Sexual Activity    Alcohol use: No      "Comment: rarely    Drug use: No       Past Surgical History:   Procedure Laterality Date    ARTERIOGRAPHY OF SUBCLAVIAN ARTERY Left 8/14/2023    Procedure: ARTERIOGRAM, SUBCLAVIAN;  Surgeon: Vahid Juarez MD;  Location: [facility] CATH LAB;  Service: Cardiology;  Laterality: Left;    COLONOSCOPY N/A 12/6/2021    Procedure: COLONOSCOPY;  Surgeon: Doris Altman MD;  Location: [facility] ENDO;  Service: Endoscopy;  Laterality: N/A;    LEFT HEART CATHETERIZATION Left 8/14/2023    Procedure: Left heart cath;  Surgeon: Vahid Juarez MD;  Location: [facility] CATH LAB;  Service: Cardiology;  Laterality: Left;  pt instructed 930-10am start/    None         Review of Systems   Constitutional:  Negative for activity change and appetite change.   HENT:  Negative for dental problem, nosebleeds and sore throat.    Eyes:  Negative for discharge and visual disturbance.   Respiratory:  Negative for cough, chest tightness and stridor.    Cardiovascular:  Negative for leg swelling.   Gastrointestinal:  Negative for abdominal distention and abdominal pain.   Genitourinary:  Negative for difficulty urinating and dysuria.   Musculoskeletal:  Negative for arthralgias, back pain and joint swelling.   Allergic/Immunologic: Negative for environmental allergies.   Neurological:  Negative for dizziness, syncope and headaches.   Hematological:  Does not bruise/bleed easily.   Psychiatric/Behavioral:  Negative for behavioral problems.           Objective:   /65   Pulse 80   Temp 99.9 °F (37.7 °C)   Resp (!) 35   Ht 6' 2" (1.88 m)   Wt 117.4 kg (258 lb 13.1 oz)   SpO2 (!) 94%   BMI 33.23 kg/m²     X-Ray Chest AP Portable  Narrative: EXAMINATION:  XR CHEST AP PORTABLE    CLINICAL HISTORY:  Post-op; Atherosclerotic heart disease of native coronary artery without angina pectoris    TECHNIQUE:  Single frontal view of the chest was performed.    COMPARISON:  08/23/2023    FINDINGS:  Tip right central line terminates in the right atrium.  No " pneumothorax.  Left chest tube and mediastinal drainage tube.  Heart size is stable.  Focal opacification at the lung bases bilaterally could represent atelectasis.  Can not exclude aspiration or airspace disease.  No pneumothorax. In comparison to the prior study, there is no adverse interval changes  Impression: In comparison to the prior study, there is no adverse interval changes    Electronically signed by: Jose R Ramsay MD  Date:    08/24/2023  Time:    06:12         Physical Exam  Vitals and nursing note reviewed.   Constitutional:       Appearance: He is obese.      Comments: Sitting up in a chair oriented x3 right IJ double-lumen 3 chest tubes Irvin catheter in place incision looks clean and dry Prevena on SANG drains in the left leg minimal drainage.   HENT:      Head: Normocephalic.      Mouth/Throat:      Mouth: Mucous membranes are moist.   Eyes:      Extraocular Movements: Extraocular movements intact.      Pupils: Pupils are equal, round, and reactive to light.   Cardiovascular:      Rate and Rhythm: Normal rate and regular rhythm.      Pulses: Normal pulses.      Heart sounds:      Friction rub present.   Pulmonary:      Effort: Pulmonary effort is normal.      Breath sounds: Wheezing, rhonchi and rales present.   Abdominal:      Palpations: Abdomen is soft.   Musculoskeletal:         General: Normal range of motion.      Cervical back: Normal range of motion and neck supple.      Left lower leg: Edema present.   Skin:     General: Skin is warm.      Capillary Refill: Capillary refill takes less than 2 seconds.   Neurological:      General: No focal deficit present.      Mental Status: He is alert and oriented to person, place, and time.   Psychiatric:         Mood and Affect: Mood normal.       Chest x-ray shows postsurgical changes with bilateral atelectasis.  Assessment:     1. Coronary artery disease involving native coronary artery of native heart without angina pectoris    2. Prediabetes    3.  Post-operative state    4. S/P CABG x 2          Plan   Postop day 1 status post CABG x2 with EVH.  Neuro awake alert pain control as needed  Cardiovascular wean the Levophed beta-blockers this morning  Hyperlipidemia on statins  Aspirin Plavix on board  Respiratory aggressive pulmonary toilet incentive spirometry bronchodilators as needed  GI diabetic cardiac diet  Renal creatinine back to basilar baseline we will keep an eye on the urine output Irvin in place  Anemia multifactorial continuous monitoring with serial H and H's  Hyperglycemia on insulin sliding scale  Electrolyte replacement protocol  DVT and GI prophylaxis with Pepcid and bilateral SCDs  PTOT PTOT  We will DC the leg drain and the chest tubes today  DC the arterial line and flow trach  I rounded with the ICU patient is and made the plan with the ICU team today.          Emma Arzola MD  [facility]  [place]

## 2023-08-24 NOTE — PT/OT/SLP EVAL
Physical Therapy Evaluation    Patient Name:  Abdirahman Rodriguez   MRN:  [id number]    Recommendations:     Discharge Recommendations: home health PT (24/7 ASSIST)   Discharge Equipment Recommendations: shower chair   Barriers to discharge: None    Assessment:     Abdirahman Rodriguez is a 67 y.o. male admitted with a medical diagnosis of S/P CABG x 2.  He presents with the following impairments/functional limitations: weakness, impaired endurance, impaired functional mobility, gait instability, impaired balance, pain, decreased safety awareness, decreased lower extremity function, decreased coordination.    Rehab Prognosis: Good; patient would benefit from acute skilled PT services to address these deficits and reach maximum level of function.    Recent Surgery: Procedure(s) (LRB):  CORONARY ARTERY BYPASS GRAFT (CABG) (N/A)  SURGICAL PROCUREMENT, VEIN, ENDOSCOPIC (Left)  ECHOCARDIOGRAM,TRANSESOPHAGEAL (N/A)  BLOCK, NERVE, INTERCOSTAL, 2 OR MORE (N/A) 1 Day Post-Op    Plan:     During this hospitalization, patient to be seen 3 x/week to address the identified rehab impairments via gait training, therapeutic exercises, therapeutic activities and progress toward the following goals:    Plan of Care Expires:  09/07/23    Subjective     Chief Complaint: DISCOMFORT FROM CHEST TUBE  Patient/Family Comments/goals:   Pain/Comfort:  Pain Rating 1: 2/10  Location 1: chest  Pain Addressed 1: Distraction, Reposition    Patients cultural, spiritual, Shinto conflicts given the current situation:      Living Environment:  PT LIVES WITH WIFE 2 STORY HOUSE, BR ON 1ST FLOOR, NO STEPS TO ENTER HOME, AMB INDEP COMMUNITY DISTANCES, DRIVES, WORKS, INDEP WITH ADL'S.  NO O2 USED AT HOME PTA  Prior to admission, patients level of function was INDEP.  Equipment used at home: none.  DME owned (not currently used): none.  Upon discharge, patient will have assistance from WIFE.    Objective:     Communicated with NURSE ALFARO prior to session.  Patient  "found supine with telemetry, pulse ox (continuous), external pacer, wound vac, chest tube, peripheral IV, oxygen, barillas catheter, blood pressure cuff  upon PT entry to room.    General Precautions: Standard, fall, sternal, respiratory  Orthopedic Precautions:N/A   Braces: N/A  Respiratory Status: Nasal cannula, flow 5 L/min    Exams:  Cognitive Exam:  Patient is oriented to Person, Place, Time, and Situation  Postural Exam:  Patient presented with the following abnormalities:    -       Rounded shoulders  Sensation:    -       Intact  RLE ROM: WFL  RLE Strength: WFL  LLE ROM: WFL  LLE Strength: WFL    Functional Mobility:  Bed Mobility:     Rolling Left:  minimum assistance and of 2 persons  Scooting: minimum assistance and of 2 persons  Sit to Supine: moderate assistance and of 2 persons  Transfers:     Sit to Stand:  minimum assistance and of 2 persons with no AD  Bed to Chair: minimum assistance and of 2 persons with  no AD  using  Step Transfer  Gait: PT TOOK APPROX. 5 STEPS WITH ASHER X 2 TO TF FROM CHAIR TO BED, PT TOLERATED PRE-GAIT ACTIVITY: MARCHING IN PLACE FOR 2 MINUTES WITH ASHER X 2  Balance: FAIR SITTING BALANCE, POOR+ DYNAMIC BALANCE DURING TF    AM-PAC 6 CLICK MOBILITY  Total Score:11     Treatment & Education:  PT EDUCATION:  - ROLE OF P.T. AND POC IN ACUTE CARE HOSPITAL SETTING  - STERNAL PRECAUTIONS-IMPORTANCE OF GOOD TECHNIQUE WITH MOBILITY IN COMPLIANCE WITH STERNAL PRECAUTIONS  - ENCOURAGED TO INCREASE TIME OOB IN CHAIR TO TOLERANCE   - EDUCATED IN AND ENCOURAGED TO CONTINUE THERAPUETIC EXERCISES THROUGHOUT THE DAY TO INCREASE ACTIVITY TOLERANCE AND DECREASE RISK FOR PNEUMONIA AND BLOOD CLOTS: HIP FLEX/EXT, HIP ABD/ADD, QUAD SET, HEEL SLIDE, AP  - RISK FOR FALLS DUE TO GENERALIZED WEAKNESS, EDUCATED ON "CALL DON'T FALL", ENCOURAGED TO CALL FOR ASSISTANCE WITH ALL NEEDS SUCH AS BED<>CHAIR TRANSFERS OR TRIPS TO BATHROOM, PT AGREEABLE TO SAFETY PRECAUTIONS    Patient left HOB elevated with all lines " intact, call button in reach, bed alarm on, NURSE notified, and NURSE present.    GOALS:   Multidisciplinary Problems       Physical Therapy Goals          Problem: Physical Therapy    Goal Priority Disciplines Outcome Goal Variances Interventions   Physical Therapy Goal     PT, PT/OT      Description: LTG'S TO BE MET IN 14 DAYS (9-7-23)  PT WILL REQUIRE SBA FOR BED MOBILITY  PT WILL REQUIRE SPV FOR BED<>CHAIR TF'S  PT WILL  FEET NO AD WITH SPV  PT WILL INC AMPAC SCORE BY 2 POINTS TO PROGRESS GROSS FUNC MOBILITY                         History:     Past Medical History:   Diagnosis Date    Abnormal nuclear stress test 08/13/2023    Benign hypertension     Class 1 obesity in adult 05/25/2023    Coronary artery disease     History of colon polyps     Hypergammaglobulinemia     Hypertensive kidney disease with chronic kidney disease stage III     Hypothyroidism, unspecified        Past Surgical History:   Procedure Laterality Date    ARTERIOGRAPHY OF SUBCLAVIAN ARTERY Left 8/14/2023    Procedure: ARTERIOGRAM, SUBCLAVIAN;  Surgeon: Vahid Juarez MD;  Location: [facility] CATH LAB;  Service: Cardiology;  Laterality: Left;    COLONOSCOPY N/A 12/6/2021    Procedure: COLONOSCOPY;  Surgeon: Doris Altman MD;  Location: [facility] ENDO;  Service: Endoscopy;  Laterality: N/A;    CORONARY ARTERY BYPASS GRAFT (CABG) N/A 8/23/2023    Procedure: CORONARY ARTERY BYPASS GRAFT (CABG);  Surgeon: Emma Arzola MD;  Location: [facility] OR;  Service: Cardiothoracic;  Laterality: N/A;  2-VESSEL    ECHOCARDIOGRAM,TRANSESOPHAGEAL N/A 8/23/2023    Procedure: ECHOCARDIOGRAM,TRANSESOPHAGEAL;  Surgeon: Emma Arzola MD;  Location: [facility] OR;  Service: Cardiothoracic;  Laterality: N/A;    ENDOSCOPIC HARVEST OF VEIN Left 8/23/2023    Procedure: SURGICAL PROCUREMENT, VEIN, ENDOSCOPIC;  Surgeon: Emma Arzola MD;  Location: [facility] OR;  Service: Cardiothoracic;  Laterality: Left;    INJECTION OF ANESTHETIC AGENT AROUND MULTIPLE INTERCOSTAL  NERVES N/A 8/23/2023    Procedure: BLOCK, NERVE, INTERCOSTAL, 2 OR MORE;  Surgeon: Emma Arzola MD;  Location: [facility] OR;  Service: Cardiothoracic;  Laterality: N/A;    LEFT HEART CATHETERIZATION Left 8/14/2023    Procedure: Left heart cath;  Surgeon: Vahid Juarez MD;  Location: [facility] CATH LAB;  Service: Cardiology;  Laterality: Left;  pt instructed 930-10am start/    None         Time Tracking:     PT Received On: 08/24/23  PT Start Time: 1310     PT Stop Time: 1335  PT Total Time (min): 25 min     Billable Minutes: Evaluation 15 and Therapeutic Activity 10    08/24/2023

## 2023-08-24 NOTE — PLAN OF CARE
OT rhonda completed. Sit>stand min A of 2, step>pivot to bed with min HHA of 2, sit>sup with mod A of 2. Recommending HHOT and shower chair at d/c.

## 2023-08-24 NOTE — ASSESSMENT & PLAN NOTE
- baseline around 1.6  - Cr essentially at baseline  - monitor lytes and UOP  - renally dose meds and avoid nephrtoxins

## 2023-08-24 NOTE — PLAN OF CARE
Nutrition Recommendations 8/24:  1. Recommend pt continues on Diabetic 2000 carbohydrate, Cardiac diet   2. Recommend Suplena BID to assist filling nutritional gaps   3. Recommend Nicola BID x 2 weekd for surgical wound healing   4. Cardiac diet education will be provided   5. Encourage PO and supplement intake   6. Daily weights  7. Collaboration of care with medical providers     Goals:   1. Pt will tolerate and consume > 75% EEN and EPN prior to RD follow up   2. Pt will consume Nicola BID prior to RD follow up   3. Cardiac diet education will be provided at RD follow up    Mickie Omalley, Registration Eligible, Provisional LDN

## 2023-08-24 NOTE — HOSPITAL COURSE
8/23 - to ICU following CABG x 2,  extubated in the ICU, weaned off vasopressors  8/24 - stable on NC, lines/tubes coming out later today  8/25 - oxygenation and hemodynamics are stable, up in the bedside chair and ambulating with PT

## 2023-08-25 ENCOUNTER — TELEPHONE (OUTPATIENT)
Dept: CARDIOLOGY | Facility: CLINIC | Age: 68
End: 2023-08-25
Payer: COMMERCIAL

## 2023-08-25 LAB
ALBUMIN SERPL BCP-MCNC: 3.1 G/DL (ref 3.5–5.2)
ALP SERPL-CCNC: 42 U/L (ref 55–135)
ALT SERPL W/O P-5'-P-CCNC: 22 U/L (ref 10–44)
ANION GAP SERPL CALC-SCNC: 8 MMOL/L (ref 8–16)
ANION GAP SERPL CALC-SCNC: 9 MMOL/L (ref 8–16)
AST SERPL-CCNC: 66 U/L (ref 10–40)
BASOPHILS # BLD AUTO: 0.01 K/UL (ref 0–0.2)
BASOPHILS # BLD AUTO: 0.01 K/UL (ref 0–0.2)
BASOPHILS NFR BLD: 0.1 % (ref 0–1.9)
BASOPHILS NFR BLD: 0.1 % (ref 0–1.9)
BILIRUB DIRECT SERPL-MCNC: 0.5 MG/DL (ref 0.1–0.3)
BILIRUB SERPL-MCNC: 0.9 MG/DL (ref 0.1–1)
BUN SERPL-MCNC: 17 MG/DL (ref 8–23)
BUN SERPL-MCNC: 21 MG/DL (ref 8–23)
CALCIUM SERPL-MCNC: 8.4 MG/DL (ref 8.7–10.5)
CALCIUM SERPL-MCNC: 8.4 MG/DL (ref 8.7–10.5)
CHLORIDE SERPL-SCNC: 101 MMOL/L (ref 95–110)
CHLORIDE SERPL-SCNC: 103 MMOL/L (ref 95–110)
CO2 SERPL-SCNC: 23 MMOL/L (ref 23–29)
CO2 SERPL-SCNC: 23 MMOL/L (ref 23–29)
CREAT SERPL-MCNC: 1.4 MG/DL (ref 0.5–1.4)
CREAT SERPL-MCNC: 1.5 MG/DL (ref 0.5–1.4)
DIFFERENTIAL METHOD: ABNORMAL
DIFFERENTIAL METHOD: ABNORMAL
EOSINOPHIL # BLD AUTO: 0 K/UL (ref 0–0.5)
EOSINOPHIL # BLD AUTO: 0.1 K/UL (ref 0–0.5)
EOSINOPHIL NFR BLD: 0.2 % (ref 0–8)
EOSINOPHIL NFR BLD: 0.5 % (ref 0–8)
ERYTHROCYTE [DISTWIDTH] IN BLOOD BY AUTOMATED COUNT: 13.6 % (ref 11.5–14.5)
ERYTHROCYTE [DISTWIDTH] IN BLOOD BY AUTOMATED COUNT: 13.6 % (ref 11.5–14.5)
EST. GFR  (NO RACE VARIABLE): 51 ML/MIN/1.73 M^2
EST. GFR  (NO RACE VARIABLE): 56 ML/MIN/1.73 M^2
GLUCOSE SERPL-MCNC: 117 MG/DL (ref 70–110)
GLUCOSE SERPL-MCNC: 124 MG/DL (ref 70–110)
HCT VFR BLD AUTO: 33.9 % (ref 40–54)
HCT VFR BLD AUTO: 34.5 % (ref 40–54)
HGB BLD-MCNC: 11 G/DL (ref 14–18)
HGB BLD-MCNC: 11.1 G/DL (ref 14–18)
IMM GRANULOCYTES # BLD AUTO: 0.03 K/UL (ref 0–0.04)
IMM GRANULOCYTES # BLD AUTO: 0.06 K/UL (ref 0–0.04)
IMM GRANULOCYTES NFR BLD AUTO: 0.3 % (ref 0–0.5)
IMM GRANULOCYTES NFR BLD AUTO: 0.6 % (ref 0–0.5)
LYMPHOCYTES # BLD AUTO: 0.6 K/UL (ref 1–4.8)
LYMPHOCYTES # BLD AUTO: 0.9 K/UL (ref 1–4.8)
LYMPHOCYTES NFR BLD: 6 % (ref 18–48)
LYMPHOCYTES NFR BLD: 8.3 % (ref 18–48)
MAGNESIUM SERPL-MCNC: 2.1 MG/DL (ref 1.6–2.6)
MAGNESIUM SERPL-MCNC: 2.1 MG/DL (ref 1.6–2.6)
MCH RBC QN AUTO: 30.1 PG (ref 27–31)
MCH RBC QN AUTO: 30.5 PG (ref 27–31)
MCHC RBC AUTO-ENTMCNC: 32.2 G/DL (ref 32–36)
MCHC RBC AUTO-ENTMCNC: 32.4 G/DL (ref 32–36)
MCV RBC AUTO: 93 FL (ref 82–98)
MCV RBC AUTO: 95 FL (ref 82–98)
MONOCYTES # BLD AUTO: 0.8 K/UL (ref 0.3–1)
MONOCYTES # BLD AUTO: 0.9 K/UL (ref 0.3–1)
MONOCYTES NFR BLD: 8.2 % (ref 4–15)
MONOCYTES NFR BLD: 8.8 % (ref 4–15)
NEUTROPHILS # BLD AUTO: 8.5 K/UL (ref 1.8–7.7)
NEUTROPHILS # BLD AUTO: 8.7 K/UL (ref 1.8–7.7)
NEUTROPHILS NFR BLD: 81.7 % (ref 38–73)
NEUTROPHILS NFR BLD: 85.2 % (ref 38–73)
NRBC BLD-RTO: 0 /100 WBC
NRBC BLD-RTO: 0 /100 WBC
PLATELET # BLD AUTO: 125 K/UL (ref 150–450)
PLATELET # BLD AUTO: 145 K/UL (ref 150–450)
PMV BLD AUTO: 10.1 FL (ref 9.2–12.9)
PMV BLD AUTO: 9.7 FL (ref 9.2–12.9)
POCT GLUCOSE: 113 MG/DL (ref 70–110)
POTASSIUM SERPL-SCNC: 4.4 MMOL/L (ref 3.5–5.1)
POTASSIUM SERPL-SCNC: 4.7 MMOL/L (ref 3.5–5.1)
PROT SERPL-MCNC: 7.5 G/DL (ref 6–8.4)
RBC # BLD AUTO: 3.64 M/UL (ref 4.6–6.2)
RBC # BLD AUTO: 3.65 M/UL (ref 4.6–6.2)
SODIUM SERPL-SCNC: 133 MMOL/L (ref 136–145)
SODIUM SERPL-SCNC: 134 MMOL/L (ref 136–145)
WBC # BLD AUTO: 10.21 K/UL (ref 3.9–12.7)
WBC # BLD AUTO: 10.44 K/UL (ref 3.9–12.7)

## 2023-08-25 PROCEDURE — 94799 UNLISTED PULMONARY SVC/PX: CPT

## 2023-08-25 PROCEDURE — 27000221 HC OXYGEN, UP TO 24 HOURS

## 2023-08-25 PROCEDURE — 85025 COMPLETE CBC W/AUTO DIFF WBC: CPT | Mod: 91 | Performed by: THORACIC SURGERY (CARDIOTHORACIC VASCULAR SURGERY)

## 2023-08-25 PROCEDURE — 97116 GAIT TRAINING THERAPY: CPT

## 2023-08-25 PROCEDURE — 83735 ASSAY OF MAGNESIUM: CPT | Mod: 91 | Performed by: THORACIC SURGERY (CARDIOTHORACIC VASCULAR SURGERY)

## 2023-08-25 PROCEDURE — 99291 PR CRITICAL CARE, E/M 30-74 MINUTES: ICD-10-PCS | Mod: ,,, | Performed by: INTERNAL MEDICINE

## 2023-08-25 PROCEDURE — 36415 COLL VENOUS BLD VENIPUNCTURE: CPT | Performed by: THORACIC SURGERY (CARDIOTHORACIC VASCULAR SURGERY)

## 2023-08-25 PROCEDURE — 25000003 PHARM REV CODE 250: Performed by: THORACIC SURGERY (CARDIOTHORACIC VASCULAR SURGERY)

## 2023-08-25 PROCEDURE — 97530 THERAPEUTIC ACTIVITIES: CPT

## 2023-08-25 PROCEDURE — 80048 BASIC METABOLIC PNL TOTAL CA: CPT | Performed by: THORACIC SURGERY (CARDIOTHORACIC VASCULAR SURGERY)

## 2023-08-25 PROCEDURE — 99900035 HC TECH TIME PER 15 MIN (STAT)

## 2023-08-25 PROCEDURE — 99291 CRITICAL CARE FIRST HOUR: CPT | Mod: ,,, | Performed by: INTERNAL MEDICINE

## 2023-08-25 PROCEDURE — 21400001 HC TELEMETRY ROOM

## 2023-08-25 PROCEDURE — 63600175 PHARM REV CODE 636 W HCPCS: Performed by: THORACIC SURGERY (CARDIOTHORACIC VASCULAR SURGERY)

## 2023-08-25 PROCEDURE — 80076 HEPATIC FUNCTION PANEL: CPT | Performed by: THORACIC SURGERY (CARDIOTHORACIC VASCULAR SURGERY)

## 2023-08-25 RX ORDER — ENOXAPARIN SODIUM 100 MG/ML
40 INJECTION SUBCUTANEOUS EVERY 24 HOURS
Status: DISCONTINUED | OUTPATIENT
Start: 2023-08-25 | End: 2023-08-28 | Stop reason: HOSPADM

## 2023-08-25 RX ORDER — ATORVASTATIN CALCIUM 40 MG/1
40 TABLET, FILM COATED ORAL NIGHTLY
Status: DISCONTINUED | OUTPATIENT
Start: 2023-08-25 | End: 2023-08-28 | Stop reason: HOSPADM

## 2023-08-25 RX ORDER — ACETAMINOPHEN 325 MG/1
650 TABLET ORAL EVERY 6 HOURS
Status: DISPENSED | OUTPATIENT
Start: 2023-08-25 | End: 2023-08-28

## 2023-08-25 RX ADMIN — ASPIRIN 81 MG: 81 TABLET, COATED ORAL at 08:08

## 2023-08-25 RX ADMIN — CYANOCOBALAMIN TAB 1000 MCG 1000 MCG: 1000 TAB at 08:08

## 2023-08-25 RX ADMIN — OXYCODONE HYDROCHLORIDE AND ACETAMINOPHEN 500 MG: 500 TABLET ORAL at 09:08

## 2023-08-25 RX ADMIN — POTASSIUM CHLORIDE 20 MEQ: 1500 TABLET, EXTENDED RELEASE ORAL at 09:08

## 2023-08-25 RX ADMIN — POTASSIUM CHLORIDE 20 MEQ: 1500 TABLET, EXTENDED RELEASE ORAL at 08:08

## 2023-08-25 RX ADMIN — FENTANYL CITRATE 50 MCG: 50 INJECTION INTRAMUSCULAR; INTRAVENOUS at 12:08

## 2023-08-25 RX ADMIN — ACETAMINOPHEN 650 MG: 325 TABLET ORAL at 11:08

## 2023-08-25 RX ADMIN — METOPROLOL TARTRATE 25 MG: 25 TABLET, FILM COATED ORAL at 09:08

## 2023-08-25 RX ADMIN — ENOXAPARIN SODIUM 40 MG: 40 INJECTION SUBCUTANEOUS at 05:08

## 2023-08-25 RX ADMIN — ACETAMINOPHEN 650 MG: 325 TABLET ORAL at 08:08

## 2023-08-25 RX ADMIN — OXYCODONE HYDROCHLORIDE AND ACETAMINOPHEN 500 MG: 500 TABLET ORAL at 08:08

## 2023-08-25 RX ADMIN — OXYCODONE HYDROCHLORIDE 5 MG: 5 TABLET ORAL at 10:08

## 2023-08-25 RX ADMIN — CLOPIDOGREL BISULFATE 75 MG: 75 TABLET ORAL at 08:08

## 2023-08-25 RX ADMIN — OXYCODONE HYDROCHLORIDE 5 MG: 5 TABLET ORAL at 08:08

## 2023-08-25 RX ADMIN — CHLORHEXIDINE GLUCONATE 0.12% ORAL RINSE 10 ML: 1.2 LIQUID ORAL at 09:08

## 2023-08-25 RX ADMIN — ACETAMINOPHEN 650 MG: 325 TABLET ORAL at 05:08

## 2023-08-25 RX ADMIN — FENTANYL CITRATE 50 MCG: 50 INJECTION INTRAMUSCULAR; INTRAVENOUS at 08:08

## 2023-08-25 RX ADMIN — ATORVASTATIN CALCIUM 40 MG: 40 TABLET, FILM COATED ORAL at 09:08

## 2023-08-25 RX ADMIN — FAMOTIDINE 20 MG: 20 TABLET, FILM COATED ORAL at 09:08

## 2023-08-25 RX ADMIN — METOPROLOL TARTRATE 25 MG: 25 TABLET, FILM COATED ORAL at 08:08

## 2023-08-25 RX ADMIN — FUROSEMIDE 40 MG: 10 INJECTION, SOLUTION INTRAMUSCULAR; INTRAVENOUS at 09:08

## 2023-08-25 RX ADMIN — FERROUS SULFATE TAB 325 MG (65 MG ELEMENTAL FE) 1 EACH: 325 (65 FE) TAB at 08:08

## 2023-08-25 RX ADMIN — POLYETHYLENE GLYCOL 3350 17 G: 17 POWDER, FOR SOLUTION ORAL at 08:08

## 2023-08-25 RX ADMIN — ACETAMINOPHEN 650 MG: 325 TABLET ORAL at 02:08

## 2023-08-25 RX ADMIN — FOLIC ACID 1 MG: 1 TABLET ORAL at 08:08

## 2023-08-25 RX ADMIN — FENTANYL CITRATE 25 MCG: 50 INJECTION INTRAMUSCULAR; INTRAVENOUS at 02:08

## 2023-08-25 RX ADMIN — OXYCODONE HYDROCHLORIDE 5 MG: 5 TABLET ORAL at 05:08

## 2023-08-25 RX ADMIN — CHLORHEXIDINE GLUCONATE 0.12% ORAL RINSE 10 ML: 1.2 LIQUID ORAL at 08:08

## 2023-08-25 RX ADMIN — FUROSEMIDE 40 MG: 10 INJECTION, SOLUTION INTRAMUSCULAR; INTRAVENOUS at 08:08

## 2023-08-25 RX ADMIN — MAGNESIUM HYDROXIDE 2400 MG: 400 SUSPENSION ORAL at 08:08

## 2023-08-25 RX ADMIN — FAMOTIDINE 20 MG: 20 TABLET, FILM COATED ORAL at 08:08

## 2023-08-25 NOTE — SUBJECTIVE & OBJECTIVE
Review of Systems   Constitutional: Negative.   HENT: Negative.     Eyes: Negative.    Cardiovascular: Negative.    Respiratory: Negative.     Skin: Negative.    Musculoskeletal: Negative.    Gastrointestinal: Negative.    Genitourinary: Negative.    Neurological: Negative.    Psychiatric/Behavioral: Negative.       Objective:     Vital Signs (Most Recent):  Temp: (!) 100.4 °F (38 °C) (08/25/23 0839)  Pulse: 72 (08/25/23 1052)  Resp: 20 (08/25/23 1052)  BP: 125/68 (08/25/23 1002)  SpO2: 97 % (08/25/23 1052) Vital Signs (24h Range):  Temp:  [100.2 °F (37.9 °C)-101.3 °F (38.5 °C)] 100.4 °F (38 °C)  Pulse:  [71-92] 72  Resp:  [19-50] 20  SpO2:  [85 %-98 %] 97 %  BP: (116-162)/(60-88) 125/68     Weight: 117.4 kg (258 lb 13.1 oz)  Body mass index is 33.23 kg/m².     SpO2: 97 %         Intake/Output Summary (Last 24 hours) at 8/25/2023 1107  Last data filed at 8/25/2023 1030  Gross per 24 hour   Intake 993.85 ml   Output 3504 ml   Net -2510.15 ml       Lines/Drains/Airways       Peripheral Intravenous Line  Duration                  Peripheral IV - Single Lumen 08/23/23 0550 20 G Anterior;Left Hand 2 days                       Physical Exam  Vitals and nursing note reviewed.   Constitutional:       Appearance: Normal appearance.   HENT:      Head: Normocephalic and atraumatic.   Eyes:      General:         Right eye: No discharge.         Left eye: No discharge.      Pupils: Pupils are equal, round, and reactive to light.   Cardiovascular:      Rate and Rhythm: Normal rate and regular rhythm.      Heart sounds: S1 normal and S2 normal. No murmur heard.     No friction rub.   Pulmonary:      Effort: Pulmonary effort is normal. No respiratory distress.      Breath sounds: Normal breath sounds. No rales.   Abdominal:      Palpations: Abdomen is soft.      Tenderness: There is no abdominal tenderness.   Musculoskeletal:      Cervical back: Neck supple.      Right lower leg: No edema.      Left lower leg: No edema.  "  Skin:     General: Skin is warm and dry.      Comments: Sternotomy site C/D/I   Neurological:      General: No focal deficit present.      Mental Status: He is alert and oriented to person, place, and time.      Motor: Weakness present.   Psychiatric:         Mood and Affect: Mood normal.         Behavior: Behavior normal.         Thought Content: Thought content normal.            Significant Labs: BMP:   Recent Labs   Lab 08/24/23  0357 08/24/23  1658 08/25/23  0454   * 153* 124*    135* 134*   K 4.2 4.6 4.7   * 107 103   CO2 19* 21* 23   BUN 16 16 17   CREATININE 1.4 1.4 1.4   CALCIUM 8.1* 8.5* 8.4*   MG 2.5 2.2 2.1   , CMP   Recent Labs   Lab 08/24/23 0357 08/24/23 1658 08/25/23  0454    135* 134*   K 4.2 4.6 4.7   * 107 103   CO2 19* 21* 23   * 153* 124*   BUN 16 16 17   CREATININE 1.4 1.4 1.4   CALCIUM 8.1* 8.5* 8.4*   PROT  --   --  7.5   ALBUMIN  --   --  3.1*   BILITOT  --   --  0.9   ALKPHOS  --   --  42*   AST  --   --  66*   ALT  --   --  22   ANIONGAP 6* 7* 8   , CBC   Recent Labs   Lab 08/24/23  0357 08/24/23  1658 08/25/23  0454   WBC 6.72 10.04 10.21   HGB 9.8* 11.0* 11.0*   HCT 29.9* 33.8* 33.9*   * 135* 125*   , INR   Recent Labs   Lab 08/23/23  1237 08/24/23  0357   INR 1.1 1.1   , Lipid Panel   Recent Labs   Lab 08/24/23 0357   CHOL 92*   HDL 17*   LDLCALC 62.4*   TRIG 63   CHOLHDL 18.5*   , Troponin No results for input(s): "TROPONINI" in the last 48 hours., and All pertinent lab results from the last 24 hours have been reviewed.    Significant Imaging: Echocardiogram: Transthoracic echo (TTE) complete (Cupid Only):   Results for orders placed or performed during the hospital encounter of 08/04/23   Echo   Result Value Ref Range    BSA 2.47 m2    LVOT stroke volume 62.87 cm3    LVIDd 5.47 3.5 - 6.0 cm    LV Systolic Volume 57.91 mL    LV Systolic Volume Index 23.9 mL/m2    LVIDs 3.69 2.1 - 4.0 cm    LV Diastolic Volume 145.32 mL    LV Diastolic " Volume Index 60.05 mL/m2    IVS 1.07 0.6 - 1.1 cm    LVOT diameter 2.19 cm    LVOT area 3.8 cm2    FS 33 28 - 44 %    Left Ventricle Relative Wall Thickness 0.41 cm    Posterior Wall 1.11 (A) 0.6 - 1.1 cm    LV mass 236.91 g    LV Mass Index 98 g/m2    MV Peak E Anam 0.56 m/s    TDI LATERAL 0.09 m/s    TDI SEPTAL 0.10 m/s    E/E' ratio 5.89 m/s    MV Peak A Anam 0.59 m/s    TR Max Anam 1.44 m/s    E/A ratio 0.95     IVRT 95.15 msec    E wave deceleration time 244.86 msec    LV SEPTAL E/E' RATIO 5.60 m/s    LV LATERAL E/E' RATIO 6.22 m/s    LVOT peak anam 0.73 m/s    Left Ventricular Outflow Tract Mean Velocity 0.58 cm/s    Left Ventricular Outflow Tract Mean Gradient 1.43 mmHg    LA size 3.81 cm    Left Atrium Major Axis 5.31 cm    Left Atrium Minor Axis 4.51 cm    RVOT peak VTI 12.8 cm    RA Major Axis 4.59 cm    AV mean gradient 3 mmHg    AV peak gradient 4 mmHg    Ao peak anam 1.04 m/s    Ao VTI 21.80 cm    LVOT peak VTI 16.70 cm    AV valve area 2.88 cm²    AV Velocity Ratio 0.70     AV index (prosthetic) 0.77     SANCHO by Velocity Ratio 2.64 cm²    Triscuspid Valve Regurgitation Peak Gradient 8 mmHg    PV mean gradient 1 mmHg    RVOT peak anam 0.56 m/s    Ao root annulus 3.44 cm    STJ 3.74 cm    Ascending aorta 3.74 cm    IVC diameter 1.63 cm    Mean e' 0.10 m/s    ZLVIDS -4.88     ZLVIDD -7.38     LA Volume Index 23.5 mL/m2    LA volume 56.86 cm3    LA WIDTH 3.6 cm    TAPSE 2.20 cm    RA Width 4.1 cm    TV resting pulmonary artery pressure 11 mmHg    RV TB RVSP 4 mmHg    Est. RA pres 3 mmHg    Narrative      Left Ventricle: The left ventricle is normal in size. Normal wall   thickness. Normal wall motion. There is normal systolic function with a   visually estimated ejection fraction of 55 - 70%. There is normal   diastolic function.    Right Ventricle: Normal right ventricular cavity size. Wall thickness   is normal. Right ventricle wall motion  is normal. Systolic function is   normal.    Tricuspid Valve: There is  mild regurgitation.    IVC/SVC: Normal venous pressure at 3 mmHg.     , EKG: reviewed, Stress Test: reviewed, and X-Ray: CXR: X-Ray Chest 1 View (CXR): No results found for this visit on 08/23/23.

## 2023-08-25 NOTE — CONSULTS
Discussed home health with patient and family at bedside. Referral sent to [facility] who accepted patient.

## 2023-08-25 NOTE — SUBJECTIVE & OBJECTIVE
Interval History:   No acute events.  Low grade temp overnight.  Up in the bedside chair and ambulating with PT/OT.  Chest discomfort improving.      Objective:     Vital Signs (Most Recent):  Temp: (!) 100.4 °F (38 °C) (08/25/23 0839)  Pulse: 75 (08/25/23 1107)  Resp: (!) 27 (08/25/23 1107)  BP: 135/74 (08/25/23 1102)  SpO2: 96 % (08/25/23 1107) Vital Signs (24h Range):  Temp:  [100.2 °F (37.9 °C)-101.3 °F (38.5 °C)] 100.4 °F (38 °C)  Pulse:  [71-92] 75  Resp:  [19-50] 27  SpO2:  [85 %-98 %] 96 %  BP: (116-162)/(60-88) 135/74     Weight: 117.4 kg (258 lb 13.1 oz)  Body mass index is 33.23 kg/m².      Intake/Output Summary (Last 24 hours) at 8/25/2023 1110  Last data filed at 8/25/2023 1030  Gross per 24 hour   Intake 993.85 ml   Output 3504 ml   Net -2510.15 ml        Physical Exam  Vitals and nursing note reviewed.   Constitutional:       General: He is not in acute distress.     Comments: Bedside chair   Cardiovascular:      Rate and Rhythm: Normal rate and regular rhythm.      Pulses: Normal pulses.      Heart sounds: No murmur heard.  Pulmonary:      Effort: Pulmonary effort is normal. No respiratory distress.      Breath sounds: No wheezing, rhonchi or rales.   Abdominal:      General: Abdomen is flat. There is no distension.      Palpations: Abdomen is soft.      Tenderness: There is no abdominal tenderness.   Musculoskeletal:      Right lower leg: No edema.      Left lower leg: No edema.   Neurological:      General: No focal deficit present.      Mental Status: He is alert and oriented to person, place, and time. Mental status is at baseline.           Review of Systems    Vents:  Vent Mode: (S) Spont (08/23/23 1414)  Set Rate: 16 BPM (08/23/23 1301)  Vt Set: 500 mL (08/23/23 1301)  Pressure Support: 8 cmH20 (08/23/23 1414)  PEEP/CPAP: 5 cmH20 (08/23/23 1414)  Oxygen Concentration (%): (S) 32 (08/25/23 0708)  Peak Airway Pressure: 14 cmH20 (08/23/23 1414)  Plateau Pressure: 0 cmH20 (08/23/23 1414)  Total  Ve: 13.5 L/m (08/23/23 1414)  F/VT Ratio<105 (RSBI): (!) 38.34 (08/23/23 1414)    Lines/Drains/Airways       Peripheral Intravenous Line  Duration                  Peripheral IV - Single Lumen 08/23/23 0550 20 G Anterior;Left Hand 2 days                    Significant Labs:    CBC/Anemia Profile:  Recent Labs   Lab 08/24/23 0357 08/24/23 1658 08/25/23 0454   WBC 6.72 10.04 10.21   HGB 9.8* 11.0* 11.0*   HCT 29.9* 33.8* 33.9*   * 135* 125*   MCV 93 94 93   RDW 13.2 13.6 13.6        Chemistries:  Recent Labs   Lab 08/24/23 0357 08/24/23 1658 08/25/23 0454    135* 134*   K 4.2 4.6 4.7   * 107 103   CO2 19* 21* 23   BUN 16 16 17   CREATININE 1.4 1.4 1.4   CALCIUM 8.1* 8.5* 8.4*   ALBUMIN  --   --  3.1*   PROT  --   --  7.5   BILITOT  --   --  0.9   ALKPHOS  --   --  42*   ALT  --   --  22   AST  --   --  66*   MG 2.5 2.2 2.1       All pertinent labs within the past 24 hours have been reviewed.    Significant Imaging:  I have reviewed all pertinent imaging results/findings within the past 24 hours.

## 2023-08-25 NOTE — PROGRESS NOTES
Subjective:      Patient ID: Abdirahman Rodriguez is a 67 y.o. male.    Chief Complaint: CABG (CABG/)    HPI:  67-year-old male with a history of longstanding hypertension strong family history of coronary artery disease had abnormal stress test and had a left heart catheterization which revealed disease involving the proximal left anterior descending artery as well as diagonal 1 with a critical 80% stenosis.  Patient does not have any active chest pain at this time.  He recovered from COVID last year.    8/23/2024  Procedure(s):  CORONARY ARTERY BYPASS GRAFT (CABG)  SURGICAL PROCUREMENT, VEIN, ENDOSCOPIC  ECHOCARDIOGRAM,TRANSESOPHAGEAL  BLOCK, NERVE, INTERCOSTAL, 2 OR MORE      PROCEDURAL SUMMARY:   Coronary artery bypass graft, x 2  1) Pedicled LIMA to LAD  2) Saphenous vein graft to diagonal   Endoscopic vein harvesting from the left leg    Patient was extubated within the 1st 4 hour window he was hemodynamically stable on minimal dose of Levophed making good urine chest tube output minimal sitting up in a chair this morning pain well controlled in sinus rhythm has 3 chest tubes 2 mediastinal 1 left pleural chest tube right IJ double-lumen catheter.  Minimal drainage from the SANG drain    8/25 /2023 the patient is sitting up in a chair chest tubes discontinued pain well controlled Irvin catheter draining clear urine sinus rhythm hemodynamically stable overnight no issues      Review of patient's allergies indicates:  No Known Allergies    Past Medical History:   Diagnosis Date    Abnormal nuclear stress test 08/13/2023    Benign hypertension     Class 1 obesity in adult 05/25/2023    Coronary artery disease     History of colon polyps     Hypergammaglobulinemia     Hypertensive kidney disease with chronic kidney disease stage III     Hypothyroidism, unspecified        Family History   Problem Relation Age of Onset    Heart disease Mother     Hypertension Mother     Heart disease Father     Hypertension Father         Social History     Socioeconomic History    Marital status:    Tobacco Use    Smoking status: Never    Smokeless tobacco: Never   Substance and Sexual Activity    Alcohol use: No     Comment: rarely    Drug use: No       Past Surgical History:   Procedure Laterality Date    ARTERIOGRAPHY OF SUBCLAVIAN ARTERY Left 8/14/2023    Procedure: ARTERIOGRAM, SUBCLAVIAN;  Surgeon: Vahid Juarez MD;  Location: [facility] CATH LAB;  Service: Cardiology;  Laterality: Left;    COLONOSCOPY N/A 12/6/2021    Procedure: COLONOSCOPY;  Surgeon: Doris Altman MD;  Location: [facility] ENDO;  Service: Endoscopy;  Laterality: N/A;    CORONARY ARTERY BYPASS GRAFT (CABG) N/A 8/23/2023    Procedure: CORONARY ARTERY BYPASS GRAFT (CABG);  Surgeon: Emma Arzola MD;  Location: [facility] OR;  Service: Cardiothoracic;  Laterality: N/A;  2-VESSEL    ECHOCARDIOGRAM,TRANSESOPHAGEAL N/A 8/23/2023    Procedure: ECHOCARDIOGRAM,TRANSESOPHAGEAL;  Surgeon: Emma Arzola MD;  Location: [facility] OR;  Service: Cardiothoracic;  Laterality: N/A;    ENDOSCOPIC HARVEST OF VEIN Left 8/23/2023    Procedure: SURGICAL PROCUREMENT, VEIN, ENDOSCOPIC;  Surgeon: Emma Arzola MD;  Location: [facility] OR;  Service: Cardiothoracic;  Laterality: Left;    INJECTION OF ANESTHETIC AGENT AROUND MULTIPLE INTERCOSTAL NERVES N/A 8/23/2023    Procedure: BLOCK, NERVE, INTERCOSTAL, 2 OR MORE;  Surgeon: Emma Arzola MD;  Location: [facility] OR;  Service: Cardiothoracic;  Laterality: N/A;    LEFT HEART CATHETERIZATION Left 8/14/2023    Procedure: Left heart cath;  Surgeon: Vahid Juarez MD;  Location: [facility] CATH LAB;  Service: Cardiology;  Laterality: Left;  pt instructed 930-10am start/    None         Review of Systems   Constitutional:  Negative for activity change and appetite change.   HENT:  Negative for dental problem, nosebleeds and sore throat.    Eyes:  Negative for discharge and visual disturbance.   Respiratory:  Negative for cough, chest tightness and  "stridor.    Cardiovascular:  Negative for leg swelling.   Gastrointestinal:  Negative for abdominal distention and abdominal pain.   Genitourinary:  Negative for difficulty urinating and dysuria.   Musculoskeletal:  Negative for arthralgias, back pain and joint swelling.   Allergic/Immunologic: Negative for environmental allergies.   Neurological:  Negative for dizziness, syncope and headaches.   Hematological:  Does not bruise/bleed easily.   Psychiatric/Behavioral:  Negative for behavioral problems.           Objective:   /68   Pulse 71   Temp (!) 100.4 °F (38 °C)   Resp (!) 27   Ht 6' 2" (1.88 m)   Wt 117.4 kg (258 lb 13.1 oz)   SpO2 95%   BMI 33.23 kg/m²     X-Ray Chest AP Portable  Narrative: EXAMINATION:  XR CHEST AP PORTABLE    CLINICAL HISTORY:  Post-op; Atherosclerotic heart disease of native coronary artery without angina pectoris    TECHNIQUE:  Single frontal view of the chest was performed.    COMPARISON:  08/24/2023    FINDINGS:  Tip right central line terminates in the right atrium.  No pneumothorax.  Left chest tube and mediastinal drainage tube.  Heart size is stable.  Focal opacification at the lung bases bilaterally could represent atelectasis.  Can not exclude aspiration or airspace disease.  No pneumothorax. In comparison to the prior study, there is no adverse interval changes  Impression: In comparison to the prior study, there is no adverse interval changes    Electronically signed by: Jose R Ramsay MD  Date:    08/25/2023  Time:    07:37         Physical Exam  Vitals and nursing note reviewed.   Constitutional:       Appearance: He is obese.      Comments: Sitting up in a chair oriented x3 right IJ double-lumen    HENT:      Head: Normocephalic.      Mouth/Throat:      Mouth: Mucous membranes are moist.   Eyes:      Extraocular Movements: Extraocular movements intact.      Pupils: Pupils are equal, round, and reactive to light.   Cardiovascular:      Rate and Rhythm: Normal rate " and regular rhythm.      Pulses: Normal pulses.      Heart sounds:      Friction rub present.   Pulmonary:      Effort: Pulmonary effort is normal.      Breath sounds: Wheezing, rhonchi and rales present.   Abdominal:      Palpations: Abdomen is soft.   Musculoskeletal:         General: Normal range of motion.      Cervical back: Normal range of motion and neck supple.      Left lower leg: Edema present.   Skin:     General: Skin is warm.      Capillary Refill: Capillary refill takes less than 2 seconds.   Neurological:      General: No focal deficit present.      Mental Status: He is alert and oriented to person, place, and time.   Psychiatric:         Mood and Affect: Mood normal.       Chest x-ray shows postsurgical changes with bilateral atelectasis.  Assessment:     1. Coronary artery disease involving native coronary artery of native heart without angina pectoris    2. Prediabetes    3. Post-operative state    4. S/P CABG x 2          Plan   Postop day 2 status post CABG x2 with EVH.  Neuro awake alert pain control as needed  Cardiovascular beta-blockers this morning  Hyperlipidemia on statins  Aspirin Plavix on board  Respiratory aggressive pulmonary toilet incentive spirometry bronchodilators as needed  GI diabetic cardiac diet  Renal creatinine back to basilar baseline we will keep an eye on the urine output   Anemia multifactorial continuous monitoring with serial H and H's  Hyperglycemia on insulin sliding scale  Electrolyte replacement protocol  DVT and GI prophylaxis with Pepcid and bilateral SCDs  PTOT PTOT    I rounded with the ICU patient is and made the plan with the ICU team today.          Emma Arzola MD  [facility]  [place]

## 2023-08-25 NOTE — PROGRESS NOTES
[facility] ([facility])  Cardiology  Progress Note    Patient Name: Abdirahman Rodriguez  MRN: [id number]  Admission Date: 8/23/2023  Hospital Length of Stay: 2 days  Code Status: Full Code   Attending Physician: Emma Arzola MD   Primary Care Physician: Mt Noel MD  Expected Discharge Date:   Principal Problem:S/P CABG x 2    Subjective:     Hospital Course:   8/25/23 Pt seen and examined today sitting up in bedside chair, walked with PT/OT this mroning, feels good. Labs reviewed, chart reviewed          Review of Systems   Constitutional: Negative.   HENT: Negative.     Eyes: Negative.    Cardiovascular: Negative.    Respiratory: Negative.     Skin: Negative.    Musculoskeletal: Negative.    Gastrointestinal: Negative.    Genitourinary: Negative.    Neurological: Negative.    Psychiatric/Behavioral: Negative.       Objective:     Vital Signs (Most Recent):  Temp: (!) 100.4 °F (38 °C) (08/25/23 0839)  Pulse: 72 (08/25/23 1052)  Resp: 20 (08/25/23 1052)  BP: 125/68 (08/25/23 1002)  SpO2: 97 % (08/25/23 1052) Vital Signs (24h Range):  Temp:  [100.2 °F (37.9 °C)-101.3 °F (38.5 °C)] 100.4 °F (38 °C)  Pulse:  [71-92] 72  Resp:  [19-50] 20  SpO2:  [85 %-98 %] 97 %  BP: (116-162)/(60-88) 125/68     Weight: 117.4 kg (258 lb 13.1 oz)  Body mass index is 33.23 kg/m².     SpO2: 97 %         Intake/Output Summary (Last 24 hours) at 8/25/2023 1107  Last data filed at 8/25/2023 1030  Gross per 24 hour   Intake 993.85 ml   Output 3504 ml   Net -2510.15 ml       Lines/Drains/Airways       Peripheral Intravenous Line  Duration                  Peripheral IV - Single Lumen 08/23/23 0550 20 G Anterior;Left Hand 2 days                       Physical Exam  Vitals and nursing note reviewed.   Constitutional:       Appearance: Normal appearance.   HENT:      Head: Normocephalic and atraumatic.   Eyes:      General:         Right eye: No discharge.         Left eye: No discharge.      Pupils: Pupils are equal, round, and  "reactive to light.   Cardiovascular:      Rate and Rhythm: Normal rate and regular rhythm.      Heart sounds: S1 normal and S2 normal. No murmur heard.     No friction rub.   Pulmonary:      Effort: Pulmonary effort is normal. No respiratory distress.      Breath sounds: Normal breath sounds. No rales.   Abdominal:      Palpations: Abdomen is soft.      Tenderness: There is no abdominal tenderness.   Musculoskeletal:      Cervical back: Neck supple.      Right lower leg: No edema.      Left lower leg: No edema.   Skin:     General: Skin is warm and dry.      Comments: Sternotomy site C/D/I   Neurological:      General: No focal deficit present.      Mental Status: He is alert and oriented to person, place, and time.      Motor: Weakness present.   Psychiatric:         Mood and Affect: Mood normal.         Behavior: Behavior normal.         Thought Content: Thought content normal.            Significant Labs: BMP:   Recent Labs   Lab 08/24/23 0357 08/24/23 1658 08/25/23 0454   * 153* 124*    135* 134*   K 4.2 4.6 4.7   * 107 103   CO2 19* 21* 23   BUN 16 16 17   CREATININE 1.4 1.4 1.4   CALCIUM 8.1* 8.5* 8.4*   MG 2.5 2.2 2.1   , CMP   Recent Labs   Lab 08/24/23 0357 08/24/23 1658 08/25/23  0454    135* 134*   K 4.2 4.6 4.7   * 107 103   CO2 19* 21* 23   * 153* 124*   BUN 16 16 17   CREATININE 1.4 1.4 1.4   CALCIUM 8.1* 8.5* 8.4*   PROT  --   --  7.5   ALBUMIN  --   --  3.1*   BILITOT  --   --  0.9   ALKPHOS  --   --  42*   AST  --   --  66*   ALT  --   --  22   ANIONGAP 6* 7* 8   , CBC   Recent Labs   Lab 08/24/23 0357 08/24/23 1658 08/25/23  0454   WBC 6.72 10.04 10.21   HGB 9.8* 11.0* 11.0*   HCT 29.9* 33.8* 33.9*   * 135* 125*   , INR   Recent Labs   Lab 08/23/23  1237 08/24/23  0357   INR 1.1 1.1   , Lipid Panel   Recent Labs   Lab 08/24/23  0357   CHOL 92*   HDL 17*   LDLCALC 62.4*   TRIG 63   CHOLHDL 18.5*   , Troponin No results for input(s): "TROPONINI" in " the last 48 hours., and All pertinent lab results from the last 24 hours have been reviewed.    Significant Imaging: Echocardiogram: Transthoracic echo (TTE) complete ([facility] Only):   Results for orders placed or performed during the hospital encounter of 08/04/23   Echo   Result Value Ref Range    BSA 2.47 m2    LVOT stroke volume 62.87 cm3    LVIDd 5.47 3.5 - 6.0 cm    LV Systolic Volume 57.91 mL    LV Systolic Volume Index 23.9 mL/m2    LVIDs 3.69 2.1 - 4.0 cm    LV Diastolic Volume 145.32 mL    LV Diastolic Volume Index 60.05 mL/m2    IVS 1.07 0.6 - 1.1 cm    LVOT diameter 2.19 cm    LVOT area 3.8 cm2    FS 33 28 - 44 %    Left Ventricle Relative Wall Thickness 0.41 cm    Posterior Wall 1.11 (A) 0.6 - 1.1 cm    LV mass 236.91 g    LV Mass Index 98 g/m2    MV Peak E Anam 0.56 m/s    TDI LATERAL 0.09 m/s    TDI SEPTAL 0.10 m/s    E/E' ratio 5.89 m/s    MV Peak A Anam 0.59 m/s    TR Max Anam 1.44 m/s    E/A ratio 0.95     IVRT 95.15 msec    E wave deceleration time 244.86 msec    LV SEPTAL E/E' RATIO 5.60 m/s    LV LATERAL E/E' RATIO 6.22 m/s    LVOT peak anam 0.73 m/s    Left Ventricular Outflow Tract Mean Velocity 0.58 cm/s    Left Ventricular Outflow Tract Mean Gradient 1.43 mmHg    LA size 3.81 cm    Left Atrium Major Axis 5.31 cm    Left Atrium Minor Axis 4.51 cm    RVOT peak VTI 12.8 cm    RA Major Axis 4.59 cm    AV mean gradient 3 mmHg    AV peak gradient 4 mmHg    Ao peak anam 1.04 m/s    Ao VTI 21.80 cm    LVOT peak VTI 16.70 cm    AV valve area 2.88 cm²    AV Velocity Ratio 0.70     AV index (prosthetic) 0.77     SANCHO by Velocity Ratio 2.64 cm²    Triscuspid Valve Regurgitation Peak Gradient 8 mmHg    PV mean gradient 1 mmHg    RVOT peak anam 0.56 m/s    Ao root annulus 3.44 cm    STJ 3.74 cm    Ascending aorta 3.74 cm    IVC diameter 1.63 cm    Mean e' 0.10 m/s    ZLVIDS -4.88     ZLVIDD -7.38     LA Volume Index 23.5 mL/m2    LA volume 56.86 cm3    LA WIDTH 3.6 cm    TAPSE 2.20 cm    RA Width 4.1 cm    TV  resting pulmonary artery pressure 11 mmHg    RV TB RVSP 4 mmHg    Est. RA pres 3 mmHg    Narrative      Left Ventricle: The left ventricle is normal in size. Normal wall   thickness. Normal wall motion. There is normal systolic function with a   visually estimated ejection fraction of 55 - 70%. There is normal   diastolic function.    Right Ventricle: Normal right ventricular cavity size. Wall thickness   is normal. Right ventricle wall motion  is normal. Systolic function is   normal.    Tricuspid Valve: There is mild regurgitation.    IVC/SVC: Normal venous pressure at 3 mmHg.     , EKG: reviewed, Stress Test: reviewed, and X-Ray: CXR: X-Ray Chest 1 View (CXR): No results found for this visit on 08/23/23.    Assessment and Plan:       * S/P CABG x 2  Recovering well, chest tubes in place, likely remove today  Cont management per CTS    Coronary artery disease  Cont ASA, statin, plavix, BB    Mixed hyperlipidemia  statin        VTE Risk Mitigation (From admission, onward)         Ordered     enoxaparin injection 40 mg  Daily         08/25/23 0745     IP VTE HIGH RISK PATIENT  Once         08/25/23 0745     Place sequential compression device  Until discontinued         08/25/23 0745     Place sequential compression device  Until discontinued         08/23/23 0637                Scarlet Mcfadden, NP  Cardiology  [facility] ([facility])

## 2023-08-25 NOTE — PT/OT/SLP PROGRESS
Physical Therapy Treatment    Patient Name:  Abdirahman Rodriguez   MRN:  [id number]    Recommendations:     Discharge Recommendations: home health PT  Discharge Equipment Recommendations: shower chair  Barriers to discharge: None    Assessment:     Abdirahman Rodriguez is a 67 y.o. male admitted with a medical diagnosis of S/P CABG x 2.  He presents with the following impairments/functional limitations: weakness, impaired endurance, impaired functional mobility, impaired balance, gait instability, decreased safety awareness, decreased lower extremity function, decreased coordination, impaired cardiopulmonary response to activity.    Rehab Prognosis: Good; patient would benefit from acute skilled PT services to address these deficits and reach maximum level of function.    Recent Surgery: Procedure(s) (LRB):  CORONARY ARTERY BYPASS GRAFT (CABG) (N/A)  SURGICAL PROCUREMENT, VEIN, ENDOSCOPIC (Left)  ECHOCARDIOGRAM,TRANSESOPHAGEAL (N/A)  BLOCK, NERVE, INTERCOSTAL, 2 OR MORE (N/A) 2 Days Post-Op    Plan:     During this hospitalization, patient to be seen 3 x/week to address the identified rehab impairments via gait training, therapeutic activities, therapeutic exercises and progress toward the following goals:    Plan of Care Expires:  09/07/23    Subjective     Chief Complaint: NONE, READY TO WALK  Patient/Family Comments/goals:   Pain/Comfort:  Pain Rating 1: 4/10  Location 1: chest      Objective:     Communicated with NURSE KAM prior to session.  Patient found up in chair with telemetry, pulse ox (continuous), blood pressure cuff, peripheral IV, wound vac, central line, oxygen, barillas catheter upon PT entry to room.     General Precautions: Standard, fall, sternal, respiratory  Orthopedic Precautions: N/A  Braces: N/A  Respiratory Status: Nasal cannula, flow 3 L/min     Functional Mobility:  Bed Mobility:     Scooting: stand by assistance-seated scoot fwd in chair, maxa for sup to sit from reclined position in chair  Transfers:    "  Sit to Stand:  minimum assistance with no AD-educated in good technique with compliance of sternal precautions  Gait: PT AMB 60' X 4 TRIALS WITH CGA, FREQUENT STANDING REST BREAKS, GOOD PACING, CUES FOR UPRIGHT POSTURE AND DEEP BREATHING, PT WITH MILD HYPOTENSIVE EVENT AT END OF GT TRIAL BUT QUICK TO RECOVER WITH SEATED REST  Balance: GOOD SITTING BALANCE, FAIR DYNAMIC BALANCE DURING GAIT  BP DURING TX:        SEATED BF GT TRIAL: 116/67        DURING GAIT: 101/54        END OF GT TRIAL: 91/55        SEATED WITH 2 MIN RECOVERY: 123/64    AM-PAC 6 CLICK MOBILITY  Turning over in bed (including adjusting bedclothes, sheets and blankets)?: 1  Sitting down on and standing up from a chair with arms (e.g., wheelchair, bedside commode, etc.): 3  Moving from lying on back to sitting on the side of the bed?: 1  Moving to and from a bed to a chair (including a wheelchair)?: 3  Need to walk in hospital room?: 3  Climbing 3-5 steps with a railing?: 1  Basic Mobility Total Score: 12     Treatment & Education:  PT EDUCATION:  - ROLE OF P.T. AND POC IN ACUTE CARE HOSPITAL SETTING  - STERNAL PRECAUTIONS  - IMPORTANCE OF ACTIVITY PACING FOR ENERGY CONSERVATION  - ENCOURAGED TO INCREASE TIME OOB IN CHAIR TO TOLERANCE   - TO CONTINUE THERAPUETIC EXERCISES THROUGHOUT THE DAY TO INCREASE ACTIVITY TOLERANCE AND DECREASE RISK FOR PNEUMONIA AND BLOOD CLOTS: HIP FLEX/EXT, HIP ABD/ADD, QUAD SET, HEEL SLIDE, AP  - RISK FOR FALLS DUE TO GENERALIZED WEAKNESS, EDUCATED ON "CALL DON'T FALL", ENCOURAGED TO CALL FOR ASSISTANCE WITH ALL NEEDS SUCH AS BED<>CHAIR TRANSFERS OR TRIPS TO BATHROOM, PT AGREEABLE TO SAFETY PRECAUTIONS    Patient left up in chair with all lines intact, call button in reach, and NURSE notified..    GOALS:   Multidisciplinary Problems       Physical Therapy Goals          Problem: Physical Therapy    Goal Priority Disciplines Outcome Goal Variances Interventions   Physical Therapy Goal     PT, PT/OT Ongoing, Progressing   "   Description: LTG'S TO BE MET IN 14 DAYS (9-7-23)  PT WILL REQUIRE SBA FOR BED MOBILITY  PT WILL REQUIRE SPV FOR BED<>CHAIR TF'S  PT WILL  FEET NO AD WITH SPV  PT WILL INC AMPAC SCORE BY 2 POINTS TO PROGRESS GROSS FUNC MOBILITY                         Time Tracking:     PT Received On: 08/25/23  PT Start Time: 0710     PT Stop Time: 0735  PT Total Time (min): 25 min     Billable Minutes: Gait Training 10 and Therapeutic Activity 15    Treatment Type: Treatment  PT/PTA: PT     Number of PTA visits since last PT visit: 0     08/25/2023

## 2023-08-25 NOTE — TELEPHONE ENCOUNTER
Pt's wife in lobby - writing letter stating  had surgery    Paperwork not found - asked wife to send us another copy and we will start working on it      Wife ststed she would send us another copy via Fax [phone]    Please be on lookout

## 2023-08-25 NOTE — PLAN OF CARE
Pt aaox3. NSR on monitor. BP stable. Tmax 100.6 this shift. 3L O2 NC. Tolerating diet. Voids per urinal. Bowel movement this AM. Skin intact. Central line removed, PIV intact. Ambulates with stand by assist. Bed low, wheels locked, alarms audible. POC reviewed.

## 2023-08-25 NOTE — PROGRESS NOTES
[facility] ([facility])  Critical Care Medicine  Progress Note    Patient Name: Abdirahman Rodriguez  MRN: [id number]  Admission Date: 8/23/2023  Hospital Length of Stay: 2 days  Code Status: Full Code  Attending Provider: Emma Arzola MD  Primary Care Provider: Mt Noel MD   Principal Problem: S/P CABG x 2    Subjective:     HPI:  Mr Rodriguez is a 68 y/o AAM with HTN, hypothyroidism, CKDIII, and CAD who presents to the ICU following CABG x 2 with Dr Arzola.  He had an abnormal stress test and then LHC that showed LAD and D1 stenosis.  Came in today for scheduled procedure.    I examined him following arrival to the ICU.  Currently on insulin drip and low dose Epi.  He is on minimal vent settings, is awake and following commands, and doing well on PSV.        Hospital/ICU Course:  8/23 - to ICU following CABG x 2,  extubated in the ICU, weaned off vasopressors  8/24 - stable on NC, lines/tubes coming out later today  8/25 - oxygenation and hemodynamics are stable, up in the bedside chair and ambulating with PT      Interval History:   No acute events.  Low grade temp overnight.  Up in the bedside chair and ambulating with PT/OT.  Chest discomfort improving.      Objective:     Vital Signs (Most Recent):  Temp: (!) 100.4 °F (38 °C) (08/25/23 0839)  Pulse: 75 (08/25/23 1107)  Resp: (!) 27 (08/25/23 1107)  BP: 135/74 (08/25/23 1102)  SpO2: 96 % (08/25/23 1107) Vital Signs (24h Range):  Temp:  [100.2 °F (37.9 °C)-101.3 °F (38.5 °C)] 100.4 °F (38 °C)  Pulse:  [71-92] 75  Resp:  [19-50] 27  SpO2:  [85 %-98 %] 96 %  BP: (116-162)/(60-88) 135/74     Weight: 117.4 kg (258 lb 13.1 oz)  Body mass index is 33.23 kg/m².      Intake/Output Summary (Last 24 hours) at 8/25/2023 1110  Last data filed at 8/25/2023 1030  Gross per 24 hour   Intake 993.85 ml   Output 3504 ml   Net -2510.15 ml        Physical Exam  Vitals and nursing note reviewed.   Constitutional:       General: He is not in acute distress.     Comments:  Bedside chair   Cardiovascular:      Rate and Rhythm: Normal rate and regular rhythm.      Pulses: Normal pulses.      Heart sounds: No murmur heard.  Pulmonary:      Effort: Pulmonary effort is normal. No respiratory distress.      Breath sounds: No wheezing, rhonchi or rales.   Abdominal:      General: Abdomen is flat. There is no distension.      Palpations: Abdomen is soft.      Tenderness: There is no abdominal tenderness.   Musculoskeletal:      Right lower leg: No edema.      Left lower leg: No edema.   Neurological:      General: No focal deficit present.      Mental Status: He is alert and oriented to person, place, and time. Mental status is at baseline.           Review of Systems    Vents:  Vent Mode: (S) Spont (08/23/23 1414)  Set Rate: 16 BPM (08/23/23 1301)  Vt Set: 500 mL (08/23/23 1301)  Pressure Support: 8 cmH20 (08/23/23 1414)  PEEP/CPAP: 5 cmH20 (08/23/23 1414)  Oxygen Concentration (%): (S) 32 (08/25/23 0708)  Peak Airway Pressure: 14 cmH20 (08/23/23 1414)  Plateau Pressure: 0 cmH20 (08/23/23 1414)  Total Ve: 13.5 L/m (08/23/23 1414)  F/VT Ratio<105 (RSBI): (!) 38.34 (08/23/23 1414)    Lines/Drains/Airways       Peripheral Intravenous Line  Duration                  Peripheral IV - Single Lumen 08/23/23 0550 20 G Anterior;Left Hand 2 days                    Significant Labs:    CBC/Anemia Profile:  Recent Labs   Lab 08/24/23  0357 08/24/23  1658 08/25/23  0454   WBC 6.72 10.04 10.21   HGB 9.8* 11.0* 11.0*   HCT 29.9* 33.8* 33.9*   * 135* 125*   MCV 93 94 93   RDW 13.2 13.6 13.6        Chemistries:  Recent Labs   Lab 08/24/23  0357 08/24/23  1658 08/25/23  0454    135* 134*   K 4.2 4.6 4.7   * 107 103   CO2 19* 21* 23   BUN 16 16 17   CREATININE 1.4 1.4 1.4   CALCIUM 8.1* 8.5* 8.4*   ALBUMIN  --   --  3.1*   PROT  --   --  7.5   BILITOT  --   --  0.9   ALKPHOS  --   --  42*   ALT  --   --  22   AST  --   --  66*   MG 2.5 2.2 2.1       All pertinent labs within the past 24 hours  have been reviewed.    Significant Imaging:  I have reviewed all pertinent imaging results/findings within the past 24 hours.      ABG  Recent Labs   Lab 08/23/23  1239   PH 7.306*   PO2 77*   PCO2 45.0   HCO3 22.4*   BE -4     Assessment/Plan:     Pulmonary  Respiratory insufficiency  - came to the ICU intubated post-operatively  - extubated shortly after arrival  - wean supplemental oxygen with goal SpO2 > 90%  - PT/OT, encourage IS, up OOB as much as possible    Cardiac/Vascular  * S/P CABG x 2  - CABG x 2 8/23 with Dr Arzola  - post-op per Dr Arzola  - chest tubes out    Coronary artery disease  - now s/p CABG x 2  - ASA/Plavix  - Cardiology following    Mixed hyperlipidemia  - checking lipid panel    Benign hypertension  - remains off vasopressors  - getting metoprolol    Renal/  Hypertensive kidney disease with chronic kidney disease stage III  - baseline around 1.6  - Cr essentially at baseline  - monitor lytes and UOP  - renally dose meds and avoid nephrtoxins    Endocrine  Hypothyroidism, unspecified  - continue Synthroid    Prediabetes  - transitioned to SSI   - monitor glucose trends      Primary team planning transfer to the floor.  Critical Care will sign off.  Please call with any questions or concerns.       Jose R Hansen MD  Critical Care Medicine  [facility] ([facility])

## 2023-08-25 NOTE — ASSESSMENT & PLAN NOTE
- came to the ICU intubated post-operatively  - extubated shortly after arrival  - wean supplemental oxygen with goal SpO2 > 90%  - PT/OT, encourage IS, up OOB as much as possible

## 2023-08-25 NOTE — PLAN OF CARE
Patient awake, alert and oriented. Patient remained stable over night. Tmax of 101.3, treated with tylenol x 2 doses. Irvin in place with good urine output. RIJ in place, normal saline locked. Prevena wound vac to midline incision for incision management.

## 2023-08-25 NOTE — HOSPITAL COURSE
8/25/23 Pt seen and examined today sitting up in bedside chair, walked with PT/OT this mroning, feels good. Labs reviewed, chart reviewed    8/26/23 - stable on floor, no acute events, CP improving; stable hemodyn    8/27/23 - AF v RVR this am on Amio otherwise pt has no CP/SOB; progressed well with PT/OT yesterday.     8/28/23-Patient seen and examined today, sitting up in bedside chair. Feels ok. Worked with PT/OT today. Converted back to SR. Labs reviewed. Creatinine 1.6. CTS planning to d/c home today.

## 2023-08-25 NOTE — PT/OT/SLP PROGRESS
"Occupational Therapy   Treatment    Name: Abdirahman Rodriguez  MRN: [id number]  Admitting Diagnosis:  S/P CABG x 2  2 Days Post-Op    Recommendations:     Discharge Recommendations: home health OT (24/7 SPV and A)  Discharge Equipment Recommendations:  shower chair  Barriers to discharge:  None    Assessment:     Abdirahman Rodriguez is a 67 y.o. male with a medical diagnosis of S/P CABG x 2.  He presents with the following performance deficits affecting function are weakness, impaired endurance, impaired self care skills, impaired functional mobility, impaired balance, impaired cardiopulmonary response to activity, pain, edema, decreased lower extremity function (sternal precautions).     Rehab Prognosis:  Good; patient would benefit from acute skilled OT services to address these deficits and reach maximum level of function.       Plan:     Patient to be seen 2 x/week to address the above listed problems via self-care/home management, therapeutic activities, therapeutic exercises  Plan of Care Expires: 09/07/23  Plan of Care Reviewed with: patient    Subjective     Chief Complaint: Reported "I am doing better today."  Patient/Family Comments/goals: get stronger  Pain/Comfort:  Pain Rating 1: 4/10  Location 1: chest  Pain Addressed 1:  (activity pacing)    Objective:     Communicated with: NurseCaryn, prior to session.  Patient found up in chair with peripheral IV, blood pressure cuff, pulse ox (continuous), telemetry, barillas catheter, oxygen, wound vac, central line upon OT entry to room.    General Precautions: Standard, fall, sternal, respiratory    Orthopedic Precautions:N/A  Braces: N/A  Respiratory Status: Nasal cannula, flow 2 L/min     Occupational Performance:     Bed Mobility:    OOB in chair at start and end of treatment session.    Functional Mobility/Transfers:  Patient completed Sit <> Stand Transfer with minimum assistance  with  no assistive device   Patient completed Bed <> Chair Transfer using Step Transfer " technique with contact guard assistance with no assistive device  Functional Mobility: Patient completed x60ft x4 trials functional mobility with CGA and no AD to increase dynamic standing balance and activity tolerance needed for ADL completion.  Provided with v/c for breathing technique. With increase in chest pain patient takes short, shallows breaths resulting in desaturation.    Blood Pressures with mobility:  Sitting- 116/67  retirement through mobility- 101/54  At end of mobility- 91/55  After seated rest break- 123/64    AMPA 6 Click ADL: 16    Treatment & Education:  Patient tolerated session well overall. Symptomatic of orthostatic hypotension and end of mobility. Provided with additional education re: sternal precautions and their functional implications. Provided with max cueing for technique with transfers to ensure compliance with precautions. Patient encouraged to continue completion of B UE AROM therex, within sternal precautions, to increase functional strength and activity tolerance. Patient stated understanding, in agreement with POC, and in agreement to call for A to transfer back to bed.    Patient left up in chair with all lines intact, call button in reach, and nurse notified    GOALS:   Multidisciplinary Problems       Occupational Therapy Goals          Problem: Occupational Therapy    Goal Priority Disciplines Outcome Interventions   Occupational Therapy Goal     OT, PT/OT Ongoing, Progressing    Description: Goals to be met by: 9/7/23     Patient will increase functional independence with ADLs by performing:    Toileting from toilet with Contact Guard Assistance for hygiene and clothing management.   Toilet transfer to toilet with Contact Guard Assistance.  Demonstrates 100% functional compliance with sternal precautions.                         Time Tracking:     OT Date of Treatment: 08/25/23  OT Start Time: 0720  OT Stop Time: 0745  OT Total Time (min): 25 min    Billable  Minutes:Therapeutic Activity 25    Gali Cheney OT  8/25/2023

## 2023-08-25 NOTE — PLAN OF CARE
Tolerated intervention well. Orthostatic with prolonged standing, improves with seated rest break. Recommending HHOT at d/c.

## 2023-08-26 LAB
ANION GAP SERPL CALC-SCNC: 11 MMOL/L (ref 8–16)
ANION GAP SERPL CALC-SCNC: 9 MMOL/L (ref 8–16)
BASOPHILS # BLD AUTO: 0.01 K/UL (ref 0–0.2)
BASOPHILS # BLD AUTO: 0.01 K/UL (ref 0–0.2)
BASOPHILS NFR BLD: 0.1 % (ref 0–1.9)
BASOPHILS NFR BLD: 0.1 % (ref 0–1.9)
BUN SERPL-MCNC: 25 MG/DL (ref 8–23)
BUN SERPL-MCNC: 31 MG/DL (ref 8–23)
CALCIUM SERPL-MCNC: 8.7 MG/DL (ref 8.7–10.5)
CALCIUM SERPL-MCNC: 8.7 MG/DL (ref 8.7–10.5)
CHLORIDE SERPL-SCNC: 98 MMOL/L (ref 95–110)
CHLORIDE SERPL-SCNC: 98 MMOL/L (ref 95–110)
CO2 SERPL-SCNC: 24 MMOL/L (ref 23–29)
CO2 SERPL-SCNC: 26 MMOL/L (ref 23–29)
CREAT SERPL-MCNC: 1.6 MG/DL (ref 0.5–1.4)
CREAT SERPL-MCNC: 1.8 MG/DL (ref 0.5–1.4)
DIFFERENTIAL METHOD: ABNORMAL
DIFFERENTIAL METHOD: ABNORMAL
EOSINOPHIL # BLD AUTO: 0.2 K/UL (ref 0–0.5)
EOSINOPHIL # BLD AUTO: 0.2 K/UL (ref 0–0.5)
EOSINOPHIL NFR BLD: 2.1 % (ref 0–8)
EOSINOPHIL NFR BLD: 2.2 % (ref 0–8)
ERYTHROCYTE [DISTWIDTH] IN BLOOD BY AUTOMATED COUNT: 13.6 % (ref 11.5–14.5)
ERYTHROCYTE [DISTWIDTH] IN BLOOD BY AUTOMATED COUNT: 13.6 % (ref 11.5–14.5)
EST. GFR  (NO RACE VARIABLE): 41 ML/MIN/1.73 M^2
EST. GFR  (NO RACE VARIABLE): 48 ML/MIN/1.73 M^2
GLUCOSE SERPL-MCNC: 100 MG/DL (ref 70–110)
GLUCOSE SERPL-MCNC: 111 MG/DL (ref 70–110)
HCT VFR BLD AUTO: 32.4 % (ref 40–54)
HCT VFR BLD AUTO: 35.8 % (ref 40–54)
HGB BLD-MCNC: 10.7 G/DL (ref 14–18)
HGB BLD-MCNC: 11.5 G/DL (ref 14–18)
IMM GRANULOCYTES # BLD AUTO: 0.05 K/UL (ref 0–0.04)
IMM GRANULOCYTES # BLD AUTO: 0.06 K/UL (ref 0–0.04)
IMM GRANULOCYTES NFR BLD AUTO: 0.6 % (ref 0–0.5)
IMM GRANULOCYTES NFR BLD AUTO: 0.7 % (ref 0–0.5)
LYMPHOCYTES # BLD AUTO: 0.8 K/UL (ref 1–4.8)
LYMPHOCYTES # BLD AUTO: 1.2 K/UL (ref 1–4.8)
LYMPHOCYTES NFR BLD: 10.7 % (ref 18–48)
LYMPHOCYTES NFR BLD: 12.4 % (ref 18–48)
MAGNESIUM SERPL-MCNC: 2.2 MG/DL (ref 1.6–2.6)
MAGNESIUM SERPL-MCNC: 2.4 MG/DL (ref 1.6–2.6)
MCH RBC QN AUTO: 30.3 PG (ref 27–31)
MCH RBC QN AUTO: 30.5 PG (ref 27–31)
MCHC RBC AUTO-ENTMCNC: 32.1 G/DL (ref 32–36)
MCHC RBC AUTO-ENTMCNC: 33 G/DL (ref 32–36)
MCV RBC AUTO: 92 FL (ref 82–98)
MCV RBC AUTO: 95 FL (ref 82–98)
MONOCYTES # BLD AUTO: 0.7 K/UL (ref 0.3–1)
MONOCYTES # BLD AUTO: 0.9 K/UL (ref 0.3–1)
MONOCYTES NFR BLD: 8.7 % (ref 4–15)
MONOCYTES NFR BLD: 9.3 % (ref 4–15)
NEUTROPHILS # BLD AUTO: 5.9 K/UL (ref 1.8–7.7)
NEUTROPHILS # BLD AUTO: 7.5 K/UL (ref 1.8–7.7)
NEUTROPHILS NFR BLD: 75.5 % (ref 38–73)
NEUTROPHILS NFR BLD: 77.6 % (ref 38–73)
NRBC BLD-RTO: 0 /100 WBC
NRBC BLD-RTO: 0 /100 WBC
PLATELET # BLD AUTO: 152 K/UL (ref 150–450)
PLATELET # BLD AUTO: 169 K/UL (ref 150–450)
PMV BLD AUTO: 10.1 FL (ref 9.2–12.9)
PMV BLD AUTO: 9.9 FL (ref 9.2–12.9)
POCT GLUCOSE: 113 MG/DL (ref 70–110)
POCT GLUCOSE: 117 MG/DL (ref 70–110)
POCT GLUCOSE: 134 MG/DL (ref 70–110)
POTASSIUM SERPL-SCNC: 4.1 MMOL/L (ref 3.5–5.1)
POTASSIUM SERPL-SCNC: 4.3 MMOL/L (ref 3.5–5.1)
RBC # BLD AUTO: 3.51 M/UL (ref 4.6–6.2)
RBC # BLD AUTO: 3.79 M/UL (ref 4.6–6.2)
SODIUM SERPL-SCNC: 133 MMOL/L (ref 136–145)
SODIUM SERPL-SCNC: 133 MMOL/L (ref 136–145)
WBC # BLD AUTO: 7.58 K/UL (ref 3.9–12.7)
WBC # BLD AUTO: 9.88 K/UL (ref 3.9–12.7)

## 2023-08-26 PROCEDURE — 36415 COLL VENOUS BLD VENIPUNCTURE: CPT | Performed by: THORACIC SURGERY (CARDIOTHORACIC VASCULAR SURGERY)

## 2023-08-26 PROCEDURE — 99233 SBSQ HOSP IP/OBS HIGH 50: CPT | Mod: ,,, | Performed by: INTERNAL MEDICINE

## 2023-08-26 PROCEDURE — 80048 BASIC METABOLIC PNL TOTAL CA: CPT | Mod: 91 | Performed by: THORACIC SURGERY (CARDIOTHORACIC VASCULAR SURGERY)

## 2023-08-26 PROCEDURE — 21400001 HC TELEMETRY ROOM

## 2023-08-26 PROCEDURE — 25000003 PHARM REV CODE 250: Performed by: THORACIC SURGERY (CARDIOTHORACIC VASCULAR SURGERY)

## 2023-08-26 PROCEDURE — 83735 ASSAY OF MAGNESIUM: CPT | Performed by: THORACIC SURGERY (CARDIOTHORACIC VASCULAR SURGERY)

## 2023-08-26 PROCEDURE — 97530 THERAPEUTIC ACTIVITIES: CPT | Mod: CQ

## 2023-08-26 PROCEDURE — 63600175 PHARM REV CODE 636 W HCPCS: Performed by: THORACIC SURGERY (CARDIOTHORACIC VASCULAR SURGERY)

## 2023-08-26 PROCEDURE — 94799 UNLISTED PULMONARY SVC/PX: CPT

## 2023-08-26 PROCEDURE — 97116 GAIT TRAINING THERAPY: CPT | Mod: CQ

## 2023-08-26 PROCEDURE — 99900035 HC TECH TIME PER 15 MIN (STAT)

## 2023-08-26 PROCEDURE — 85025 COMPLETE CBC W/AUTO DIFF WBC: CPT | Mod: 91 | Performed by: THORACIC SURGERY (CARDIOTHORACIC VASCULAR SURGERY)

## 2023-08-26 PROCEDURE — 99233 PR SUBSEQUENT HOSPITAL CARE,LEVL III: ICD-10-PCS | Mod: ,,, | Performed by: INTERNAL MEDICINE

## 2023-08-26 RX ADMIN — FUROSEMIDE 40 MG: 10 INJECTION, SOLUTION INTRAMUSCULAR; INTRAVENOUS at 10:08

## 2023-08-26 RX ADMIN — CYANOCOBALAMIN TAB 1000 MCG 1000 MCG: 1000 TAB at 10:08

## 2023-08-26 RX ADMIN — OXYCODONE HYDROCHLORIDE AND ACETAMINOPHEN 500 MG: 500 TABLET ORAL at 10:08

## 2023-08-26 RX ADMIN — POTASSIUM CHLORIDE 20 MEQ: 1500 TABLET, EXTENDED RELEASE ORAL at 08:08

## 2023-08-26 RX ADMIN — ACETAMINOPHEN 650 MG: 325 TABLET ORAL at 06:08

## 2023-08-26 RX ADMIN — POTASSIUM CHLORIDE 20 MEQ: 1500 TABLET, EXTENDED RELEASE ORAL at 10:08

## 2023-08-26 RX ADMIN — FAMOTIDINE 20 MG: 20 TABLET, FILM COATED ORAL at 10:08

## 2023-08-26 RX ADMIN — CHLORHEXIDINE GLUCONATE 0.12% ORAL RINSE 10 ML: 1.2 LIQUID ORAL at 08:08

## 2023-08-26 RX ADMIN — ASPIRIN 81 MG: 81 TABLET, COATED ORAL at 10:08

## 2023-08-26 RX ADMIN — MAGNESIUM HYDROXIDE 2400 MG: 400 SUSPENSION ORAL at 10:08

## 2023-08-26 RX ADMIN — CHLORHEXIDINE GLUCONATE 0.12% ORAL RINSE 10 ML: 1.2 LIQUID ORAL at 10:08

## 2023-08-26 RX ADMIN — METOPROLOL TARTRATE 25 MG: 25 TABLET, FILM COATED ORAL at 08:08

## 2023-08-26 RX ADMIN — METOPROLOL TARTRATE 25 MG: 25 TABLET, FILM COATED ORAL at 10:08

## 2023-08-26 RX ADMIN — ACETAMINOPHEN 650 MG: 325 TABLET ORAL at 05:08

## 2023-08-26 RX ADMIN — POLYETHYLENE GLYCOL 3350 17 G: 17 POWDER, FOR SOLUTION ORAL at 10:08

## 2023-08-26 RX ADMIN — OXYCODONE HYDROCHLORIDE 5 MG: 5 TABLET ORAL at 02:08

## 2023-08-26 RX ADMIN — CLOPIDOGREL BISULFATE 75 MG: 75 TABLET ORAL at 10:08

## 2023-08-26 RX ADMIN — FERROUS SULFATE TAB 325 MG (65 MG ELEMENTAL FE) 1 EACH: 325 (65 FE) TAB at 10:08

## 2023-08-26 RX ADMIN — ACETAMINOPHEN 650 MG: 325 TABLET ORAL at 11:08

## 2023-08-26 RX ADMIN — ATORVASTATIN CALCIUM 40 MG: 40 TABLET, FILM COATED ORAL at 08:08

## 2023-08-26 RX ADMIN — FUROSEMIDE 40 MG: 10 INJECTION, SOLUTION INTRAMUSCULAR; INTRAVENOUS at 08:08

## 2023-08-26 RX ADMIN — FAMOTIDINE 20 MG: 20 TABLET, FILM COATED ORAL at 08:08

## 2023-08-26 RX ADMIN — OXYCODONE HYDROCHLORIDE AND ACETAMINOPHEN 500 MG: 500 TABLET ORAL at 08:08

## 2023-08-26 RX ADMIN — FOLIC ACID 1 MG: 1 TABLET ORAL at 10:08

## 2023-08-26 RX ADMIN — ENOXAPARIN SODIUM 40 MG: 40 INJECTION SUBCUTANEOUS at 05:08

## 2023-08-26 NOTE — PROGRESS NOTES
Subjective:      Patient ID: Abdirahman Rodriguez is a 67 y.o. male.    Chief Complaint: CABG (CABG/)    HPI:  67-year-old male with a history of longstanding hypertension strong family history of coronary artery disease had abnormal stress test and had a left heart catheterization which revealed disease involving the proximal left anterior descending artery as well as diagonal 1 with a critical 80% stenosis.  Patient does not have any active chest pain at this time.  He recovered from COVID last year.    8/23/2024  Procedure(s):  CORONARY ARTERY BYPASS GRAFT (CABG)  SURGICAL PROCUREMENT, VEIN, ENDOSCOPIC  ECHOCARDIOGRAM,TRANSESOPHAGEAL  BLOCK, NERVE, INTERCOSTAL, 2 OR MORE      PROCEDURAL SUMMARY:   Coronary artery bypass graft, x 2  1) Pedicled LIMA to LAD  2) Saphenous vein graft to diagonal   Endoscopic vein harvesting from the left leg    Patient was extubated within the 1st 4 hour window he was hemodynamically stable on minimal dose of Levophed making good urine chest tube output minimal sitting up in a chair this morning pain well controlled in sinus rhythm has 3 chest tubes 2 mediastinal 1 left pleural chest tube right IJ double-lumen catheter.  Minimal drainage from the SANG drain    8/25 /2023 the patient is sitting up in a chair chest tubes discontinued pain well controlled Irvin catheter draining clear urine sinus rhythm hemodynamically stable overnight no issues    08/26/2023 the patient was transferred to the telemetry he is ambulating has not had a bowel movement pain well controlled hemodynamically stable no acute issues at this time.    Review of patient's allergies indicates:  No Known Allergies    Past Medical History:   Diagnosis Date    Abnormal nuclear stress test 08/13/2023    Benign hypertension     Class 1 obesity in adult 05/25/2023    Coronary artery disease     History of colon polyps     Hypergammaglobulinemia     Hypertensive kidney disease with chronic kidney disease stage III      Hypothyroidism, unspecified        Family History   Problem Relation Age of Onset    Heart disease Mother     Hypertension Mother     Heart disease Father     Hypertension Father        Social History     Socioeconomic History    Marital status:    Tobacco Use    Smoking status: Never    Smokeless tobacco: Never   Substance and Sexual Activity    Alcohol use: No     Comment: rarely    Drug use: No       Past Surgical History:   Procedure Laterality Date    ARTERIOGRAPHY OF SUBCLAVIAN ARTERY Left 8/14/2023    Procedure: ARTERIOGRAM, SUBCLAVIAN;  Surgeon: Vahid Juarez MD;  Location: [facility] CATH LAB;  Service: Cardiology;  Laterality: Left;    COLONOSCOPY N/A 12/6/2021    Procedure: COLONOSCOPY;  Surgeon: Doris Altman MD;  Location: [facility] ENDO;  Service: Endoscopy;  Laterality: N/A;    CORONARY ARTERY BYPASS GRAFT (CABG) N/A 8/23/2023    Procedure: CORONARY ARTERY BYPASS GRAFT (CABG);  Surgeon: Emma Arzola MD;  Location: [facility] OR;  Service: Cardiothoracic;  Laterality: N/A;  2-VESSEL    ECHOCARDIOGRAM,TRANSESOPHAGEAL N/A 8/23/2023    Procedure: ECHOCARDIOGRAM,TRANSESOPHAGEAL;  Surgeon: Emma Arzola MD;  Location: [facility] OR;  Service: Cardiothoracic;  Laterality: N/A;    ENDOSCOPIC HARVEST OF VEIN Left 8/23/2023    Procedure: SURGICAL PROCUREMENT, VEIN, ENDOSCOPIC;  Surgeon: Emma Arzola MD;  Location: [facility] OR;  Service: Cardiothoracic;  Laterality: Left;    INJECTION OF ANESTHETIC AGENT AROUND MULTIPLE INTERCOSTAL NERVES N/A 8/23/2023    Procedure: BLOCK, NERVE, INTERCOSTAL, 2 OR MORE;  Surgeon: Emma Arzola MD;  Location: [facility] OR;  Service: Cardiothoracic;  Laterality: N/A;    LEFT HEART CATHETERIZATION Left 8/14/2023    Procedure: Left heart cath;  Surgeon: Vahid Juarez MD;  Location: [facility] CATH LAB;  Service: Cardiology;  Laterality: Left;  pt instructed 930-10am start/    None         Review of Systems   Constitutional:  Negative for activity change and appetite change.  "  HENT:  Negative for dental problem, nosebleeds and sore throat.    Eyes:  Negative for discharge and visual disturbance.   Respiratory:  Negative for cough, chest tightness and stridor.    Cardiovascular:  Negative for leg swelling.   Gastrointestinal:  Negative for abdominal distention and abdominal pain.   Genitourinary:  Negative for difficulty urinating and dysuria.   Musculoskeletal:  Negative for arthralgias, back pain and joint swelling.   Allergic/Immunologic: Negative for environmental allergies.   Neurological:  Negative for dizziness, syncope and headaches.   Hematological:  Does not bruise/bleed easily.   Psychiatric/Behavioral:  Negative for behavioral problems.           Objective:   /63   Pulse 83   Temp 97.7 °F (36.5 °C)   Resp 18   Ht 6' 2" (1.88 m)   Wt 117.4 kg (258 lb 13.1 oz)   SpO2 (!) 93%   BMI 33.23 kg/m²     X-Ray Chest AP Portable  Narrative: EXAMINATION:  XR CHEST AP PORTABLE    CLINICAL HISTORY:  Post-op; Atherosclerotic heart disease of native coronary artery without angina pectoris    TECHNIQUE:  Single frontal view of the chest was performed.    COMPARISON:  08/24/2023    FINDINGS:  Tip right central line terminates in the right atrium.  No pneumothorax.  Left chest tube and mediastinal drainage tube.  Heart size is stable.  Focal opacification at the lung bases bilaterally could represent atelectasis.  Can not exclude aspiration or airspace disease.  No pneumothorax. In comparison to the prior study, there is no adverse interval changes  Impression: In comparison to the prior study, there is no adverse interval changes    Electronically signed by: Jose R Ramsay MD  Date:    08/25/2023  Time:    07:37         Physical Exam  Vitals and nursing note reviewed.   Constitutional:       Appearance: He is obese.      Comments: Sitting up in a chair oriented x3 right IJ double-lumen    HENT:      Head: Normocephalic.      Mouth/Throat:      Mouth: Mucous membranes are moist. "   Eyes:      Extraocular Movements: Extraocular movements intact.      Pupils: Pupils are equal, round, and reactive to light.   Cardiovascular:      Rate and Rhythm: Normal rate and regular rhythm.      Pulses: Normal pulses.      Heart sounds:      Friction rub present.   Pulmonary:      Effort: Pulmonary effort is normal.      Breath sounds: Wheezing, rhonchi and rales present.   Abdominal:      Palpations: Abdomen is soft.   Musculoskeletal:         General: Normal range of motion.      Cervical back: Normal range of motion and neck supple.      Left lower leg: Edema present.   Skin:     General: Skin is warm.      Capillary Refill: Capillary refill takes less than 2 seconds.   Neurological:      General: No focal deficit present.      Mental Status: He is alert and oriented to person, place, and time.   Psychiatric:         Mood and Affect: Mood normal.       Chest x-ray shows postsurgical changes with bilateral atelectasis.  Assessment:     1. Coronary artery disease involving native coronary artery of native heart without angina pectoris    2. Prediabetes    3. Post-operative state    4. S/P CABG x 2          Plan   Postop day 3 status post CABG x2 with EVH.  Neuro awake alert pain control as needed  Cardiovascular beta-blockers   Hyperlipidemia on statins  Aspirin Plavix on board  Respiratory aggressive pulmonary toilet incentive spirometry bronchodilators as needed  GI diabetic cardiac diet  Renal creatinine back to  baseline urine output   Anemia multifactorial continuous monitoring with serial H and H's  Hyperglycemia on insulin sliding scale  Electrolyte replacement protocol  DVT and GI prophylaxis with Pepcid and bilateral SCDs  PTOT   DC planning tomorrow    I        Emma Arzola MD  [facility]  [place]

## 2023-08-26 NOTE — PLAN OF CARE
Pt transferred from ICU.   VSS on 3L NC.  AO x4. Ambulates steadily independently.  Provena WV C/D/I with seal intact.  Pulls 1500 on incentive spirometer.  Voids per urinal without difficulty.  Pain mostly controlled with Oxy.  Call light and personal items within reach.  Will continue to monitor

## 2023-08-26 NOTE — NURSING
Pt denies any complaints. No change in condition noted.  Placed on portable monitor, and transferred to [facility] via  on oxygen.  All belongings sent with patient.  Safety in place. Nurse at bedside. Verified portable monitor transmitting with the monitor room. Family aware of patient being transferred.   English

## 2023-08-26 NOTE — PLAN OF CARE
REVIEWED STERNAL PREC     BED MOB I    SIT<-->STAND SBA    STATIC AND DYNAMIC SITTING EOB BALANCE TO GIOVANNI GOWN SBA    GT TRG SLOW ROBBY 2 X 150' WITH FATIGUE NOTED AT THE LAST 25' OF GAIT    ALL WITHOUT O2 WITHOUT SYMPTOMS OF SOB    PT IS AGREEABLE TO SIT OOB AFTER THIS SESSION AND WAS EDUCATED ON CALL DON'T FALL PROCEDURES

## 2023-08-26 NOTE — SUBJECTIVE & OBJECTIVE
Review of Systems   Constitutional: Negative.   HENT: Negative.     Eyes: Negative.    Cardiovascular: Negative.    Respiratory: Negative.     Skin: Negative.    Musculoskeletal: Negative.    Gastrointestinal: Negative.    Genitourinary: Negative.    Neurological: Negative.    Psychiatric/Behavioral: Negative.       Objective:     Vital Signs (Most Recent):  Temp: 98.9 °F (37.2 °C) (08/26/23 1541)  Pulse: 94 (08/26/23 1541)  Resp: 18 (08/26/23 1541)  BP: 115/66 (08/26/23 1541)  SpO2: (!) 93 % (08/26/23 1541) Vital Signs (24h Range):  Temp:  [97.7 °F (36.5 °C)-99.1 °F (37.3 °C)] 98.9 °F (37.2 °C)  Pulse:  [70-94] 94  Resp:  [17-29] 18  SpO2:  [92 %-97 %] 93 %  BP: ()/(58-69) 115/66     Weight: 117.4 kg (258 lb 13.1 oz)  Body mass index is 33.23 kg/m².     SpO2: (!) 93 %         Intake/Output Summary (Last 24 hours) at 8/26/2023 1706  Last data filed at 8/26/2023 0821  Gross per 24 hour   Intake 440 ml   Output 500 ml   Net -60 ml         Lines/Drains/Airways       Peripheral Intravenous Line  Duration                  Peripheral IV - Single Lumen 08/23/23 0550 20 G Anterior;Left Hand 3 days                       Physical Exam  Vitals and nursing note reviewed.   Constitutional:       Appearance: Normal appearance.   HENT:      Head: Normocephalic and atraumatic.   Eyes:      General:         Right eye: No discharge.         Left eye: No discharge.      Pupils: Pupils are equal, round, and reactive to light.   Cardiovascular:      Rate and Rhythm: Normal rate and regular rhythm.      Heart sounds: S1 normal and S2 normal. No murmur heard.     No friction rub.   Pulmonary:      Effort: Pulmonary effort is normal. No respiratory distress.      Breath sounds: Normal breath sounds. No rales.   Abdominal:      Palpations: Abdomen is soft.      Tenderness: There is no abdominal tenderness.   Musculoskeletal:      Cervical back: Neck supple.      Right lower leg: No edema.      Left lower leg: No edema.   Skin:      "General: Skin is warm and dry.      Comments: Sternotomy site C/D/I   Neurological:      General: No focal deficit present.      Mental Status: He is alert and oriented to person, place, and time.      Motor: Weakness present.   Psychiatric:         Mood and Affect: Mood normal.         Behavior: Behavior normal.         Thought Content: Thought content normal.            Significant Labs: BMP:   Recent Labs   Lab 08/25/23  0454 08/25/23  1723 08/26/23  0537   * 117* 111*   * 133* 133*   K 4.7 4.4 4.1    101 98   CO2 23 23 24   BUN 17 21 25*   CREATININE 1.4 1.5* 1.6*   CALCIUM 8.4* 8.4* 8.7   MG 2.1 2.1 2.2     , CMP   Recent Labs   Lab 08/25/23 0454 08/25/23  1723 08/26/23  0537   * 133* 133*   K 4.7 4.4 4.1    101 98   CO2 23 23 24   * 117* 111*   BUN 17 21 25*   CREATININE 1.4 1.5* 1.6*   CALCIUM 8.4* 8.4* 8.7   PROT 7.5  --   --    ALBUMIN 3.1*  --   --    BILITOT 0.9  --   --    ALKPHOS 42*  --   --    AST 66*  --   --    ALT 22  --   --    ANIONGAP 8 9 11     , CBC   Recent Labs   Lab 08/25/23 0454 08/25/23  1723 08/26/23  0537   WBC 10.21 10.44 9.88   HGB 11.0* 11.1* 11.5*   HCT 33.9* 34.5* 35.8*   * 145* 152     , INR   No results for input(s): "INR", "PROTIME" in the last 48 hours.  , Lipid Panel   No results for input(s): "CHOL", "HDL", "LDLCALC", "TRIG", "CHOLHDL" in the last 48 hours.  , Troponin No results for input(s): "TROPONINI" in the last 48 hours., and All pertinent lab results from the last 24 hours have been reviewed.    Significant Imaging: Echocardiogram: Transthoracic echo (TTE) complete (Cupid Only):   Results for orders placed or performed during the hospital encounter of 08/04/23   Echo   Result Value Ref Range    BSA 2.47 m2    LVOT stroke volume 62.87 cm3    LVIDd 5.47 3.5 - 6.0 cm    LV Systolic Volume 57.91 mL    LV Systolic Volume Index 23.9 mL/m2    LVIDs 3.69 2.1 - 4.0 cm    LV Diastolic Volume 145.32 mL    LV Diastolic Volume Index " 60.05 mL/m2    IVS 1.07 0.6 - 1.1 cm    LVOT diameter 2.19 cm    LVOT area 3.8 cm2    FS 33 28 - 44 %    Left Ventricle Relative Wall Thickness 0.41 cm    Posterior Wall 1.11 (A) 0.6 - 1.1 cm    LV mass 236.91 g    LV Mass Index 98 g/m2    MV Peak E Anam 0.56 m/s    TDI LATERAL 0.09 m/s    TDI SEPTAL 0.10 m/s    E/E' ratio 5.89 m/s    MV Peak A Anam 0.59 m/s    TR Max Anam 1.44 m/s    E/A ratio 0.95     IVRT 95.15 msec    E wave deceleration time 244.86 msec    LV SEPTAL E/E' RATIO 5.60 m/s    LV LATERAL E/E' RATIO 6.22 m/s    LVOT peak anam 0.73 m/s    Left Ventricular Outflow Tract Mean Velocity 0.58 cm/s    Left Ventricular Outflow Tract Mean Gradient 1.43 mmHg    LA size 3.81 cm    Left Atrium Major Axis 5.31 cm    Left Atrium Minor Axis 4.51 cm    RVOT peak VTI 12.8 cm    RA Major Axis 4.59 cm    AV mean gradient 3 mmHg    AV peak gradient 4 mmHg    Ao peak anam 1.04 m/s    Ao VTI 21.80 cm    LVOT peak VTI 16.70 cm    AV valve area 2.88 cm²    AV Velocity Ratio 0.70     AV index (prosthetic) 0.77     SANCHO by Velocity Ratio 2.64 cm²    Triscuspid Valve Regurgitation Peak Gradient 8 mmHg    PV mean gradient 1 mmHg    RVOT peak anam 0.56 m/s    Ao root annulus 3.44 cm    STJ 3.74 cm    Ascending aorta 3.74 cm    IVC diameter 1.63 cm    Mean e' 0.10 m/s    ZLVIDS -4.88     ZLVIDD -7.38     LA Volume Index 23.5 mL/m2    LA volume 56.86 cm3    LA WIDTH 3.6 cm    TAPSE 2.20 cm    RA Width 4.1 cm    TV resting pulmonary artery pressure 11 mmHg    RV TB RVSP 4 mmHg    Est. RA pres 3 mmHg    Narrative      Left Ventricle: The left ventricle is normal in size. Normal wall   thickness. Normal wall motion. There is normal systolic function with a   visually estimated ejection fraction of 55 - 70%. There is normal   diastolic function.    Right Ventricle: Normal right ventricular cavity size. Wall thickness   is normal. Right ventricle wall motion  is normal. Systolic function is   normal.    Tricuspid Valve: There is mild  regurgitation.    IVC/SVC: Normal venous pressure at 3 mmHg.     , EKG: reviewed, Stress Test: reviewed, and X-Ray: CXR: X-Ray Chest 1 View (CXR): No results found for this visit on 08/23/23.

## 2023-08-26 NOTE — PROGRESS NOTES
[facility] - Telemetry ([facility])  Cardiology  Progress Note    Patient Name: Abdirahman Rodriguez  MRN: [id number]  Admission Date: 8/23/2023  Hospital Length of Stay: 3 days  Code Status: Full Code   Attending Physician: Emma Arzola MD   Primary Care Physician: Mt Noel MD  Expected Discharge Date:   Principal Problem:S/P CABG x 2    Subjective:     Hospital Course:   8/25/23 Pt seen and examined today sitting up in bedside chair, walked with PT/OT this mroning, feels good. Labs reviewed, chart reviewed    8/26/23 - stable on floor, no acute events, CP improving; stable hemodyn          Review of Systems   Constitutional: Negative.   HENT: Negative.     Eyes: Negative.    Cardiovascular: Negative.    Respiratory: Negative.     Skin: Negative.    Musculoskeletal: Negative.    Gastrointestinal: Negative.    Genitourinary: Negative.    Neurological: Negative.    Psychiatric/Behavioral: Negative.       Objective:     Vital Signs (Most Recent):  Temp: 98.9 °F (37.2 °C) (08/26/23 1541)  Pulse: 94 (08/26/23 1541)  Resp: 18 (08/26/23 1541)  BP: 115/66 (08/26/23 1541)  SpO2: (!) 93 % (08/26/23 1541) Vital Signs (24h Range):  Temp:  [97.7 °F (36.5 °C)-99.1 °F (37.3 °C)] 98.9 °F (37.2 °C)  Pulse:  [70-94] 94  Resp:  [17-29] 18  SpO2:  [92 %-97 %] 93 %  BP: ()/(58-69) 115/66     Weight: 117.4 kg (258 lb 13.1 oz)  Body mass index is 33.23 kg/m².     SpO2: (!) 93 %         Intake/Output Summary (Last 24 hours) at 8/26/2023 1706  Last data filed at 8/26/2023 0821  Gross per 24 hour   Intake 440 ml   Output 500 ml   Net -60 ml         Lines/Drains/Airways       Peripheral Intravenous Line  Duration                  Peripheral IV - Single Lumen 08/23/23 0550 20 G Anterior;Left Hand 3 days                       Physical Exam  Vitals and nursing note reviewed.   Constitutional:       Appearance: Normal appearance.   HENT:      Head: Normocephalic and atraumatic.   Eyes:      General:         Right eye: No discharge.          "Left eye: No discharge.      Pupils: Pupils are equal, round, and reactive to light.   Cardiovascular:      Rate and Rhythm: Normal rate and regular rhythm.      Heart sounds: S1 normal and S2 normal. No murmur heard.     No friction rub.   Pulmonary:      Effort: Pulmonary effort is normal. No respiratory distress.      Breath sounds: Normal breath sounds. No rales.   Abdominal:      Palpations: Abdomen is soft.      Tenderness: There is no abdominal tenderness.   Musculoskeletal:      Cervical back: Neck supple.      Right lower leg: No edema.      Left lower leg: No edema.   Skin:     General: Skin is warm and dry.      Comments: Sternotomy site C/D/I   Neurological:      General: No focal deficit present.      Mental Status: He is alert and oriented to person, place, and time.      Motor: Weakness present.   Psychiatric:         Mood and Affect: Mood normal.         Behavior: Behavior normal.         Thought Content: Thought content normal.            Significant Labs: BMP:   Recent Labs   Lab 08/25/23 0454 08/25/23 1723 08/26/23  0537   * 117* 111*   * 133* 133*   K 4.7 4.4 4.1    101 98   CO2 23 23 24   BUN 17 21 25*   CREATININE 1.4 1.5* 1.6*   CALCIUM 8.4* 8.4* 8.7   MG 2.1 2.1 2.2     , CMP   Recent Labs   Lab 08/25/23 0454 08/25/23 1723 08/26/23  0537   * 133* 133*   K 4.7 4.4 4.1    101 98   CO2 23 23 24   * 117* 111*   BUN 17 21 25*   CREATININE 1.4 1.5* 1.6*   CALCIUM 8.4* 8.4* 8.7   PROT 7.5  --   --    ALBUMIN 3.1*  --   --    BILITOT 0.9  --   --    ALKPHOS 42*  --   --    AST 66*  --   --    ALT 22  --   --    ANIONGAP 8 9 11     , CBC   Recent Labs   Lab 08/25/23 0454 08/25/23 1723 08/26/23  0537   WBC 10.21 10.44 9.88   HGB 11.0* 11.1* 11.5*   HCT 33.9* 34.5* 35.8*   * 145* 152     , INR   No results for input(s): "INR", "PROTIME" in the last 48 hours.  , Lipid Panel   No results for input(s): "CHOL", "HDL", "LDLCALC", "TRIG", "CHOLHDL" in the " "last 48 hours.  , Troponin No results for input(s): "TROPONINI" in the last 48 hours., and All pertinent lab results from the last 24 hours have been reviewed.    Significant Imaging: Echocardiogram: Transthoracic echo (TTE) complete (Cupid Only):   Results for orders placed or performed during the hospital encounter of 08/04/23   Echo   Result Value Ref Range    BSA 2.47 m2    LVOT stroke volume 62.87 cm3    LVIDd 5.47 3.5 - 6.0 cm    LV Systolic Volume 57.91 mL    LV Systolic Volume Index 23.9 mL/m2    LVIDs 3.69 2.1 - 4.0 cm    LV Diastolic Volume 145.32 mL    LV Diastolic Volume Index 60.05 mL/m2    IVS 1.07 0.6 - 1.1 cm    LVOT diameter 2.19 cm    LVOT area 3.8 cm2    FS 33 28 - 44 %    Left Ventricle Relative Wall Thickness 0.41 cm    Posterior Wall 1.11 (A) 0.6 - 1.1 cm    LV mass 236.91 g    LV Mass Index 98 g/m2    MV Peak E Anam 0.56 m/s    TDI LATERAL 0.09 m/s    TDI SEPTAL 0.10 m/s    E/E' ratio 5.89 m/s    MV Peak A Anam 0.59 m/s    TR Max Anam 1.44 m/s    E/A ratio 0.95     IVRT 95.15 msec    E wave deceleration time 244.86 msec    LV SEPTAL E/E' RATIO 5.60 m/s    LV LATERAL E/E' RATIO 6.22 m/s    LVOT peak anam 0.73 m/s    Left Ventricular Outflow Tract Mean Velocity 0.58 cm/s    Left Ventricular Outflow Tract Mean Gradient 1.43 mmHg    LA size 3.81 cm    Left Atrium Major Axis 5.31 cm    Left Atrium Minor Axis 4.51 cm    RVOT peak VTI 12.8 cm    RA Major Axis 4.59 cm    AV mean gradient 3 mmHg    AV peak gradient 4 mmHg    Ao peak anam 1.04 m/s    Ao VTI 21.80 cm    LVOT peak VTI 16.70 cm    AV valve area 2.88 cm²    AV Velocity Ratio 0.70     AV index (prosthetic) 0.77     SANCHO by Velocity Ratio 2.64 cm²    Triscuspid Valve Regurgitation Peak Gradient 8 mmHg    PV mean gradient 1 mmHg    RVOT peak anam 0.56 m/s    Ao root annulus 3.44 cm    STJ 3.74 cm    Ascending aorta 3.74 cm    IVC diameter 1.63 cm    Mean e' 0.10 m/s    ZLVIDS -4.88     ZLVIDD -7.38     LA Volume Index 23.5 mL/m2    LA volume 56.86 " cm3    LA WIDTH 3.6 cm    TAPSE 2.20 cm    RA Width 4.1 cm    TV resting pulmonary artery pressure 11 mmHg    RV TB RVSP 4 mmHg    Est. RA pres 3 mmHg    Narrative      Left Ventricle: The left ventricle is normal in size. Normal wall   thickness. Normal wall motion. There is normal systolic function with a   visually estimated ejection fraction of 55 - 70%. There is normal   diastolic function.    Right Ventricle: Normal right ventricular cavity size. Wall thickness   is normal. Right ventricle wall motion  is normal. Systolic function is   normal.    Tricuspid Valve: There is mild regurgitation.    IVC/SVC: Normal venous pressure at 3 mmHg.     , EKG: reviewed, Stress Test: reviewed, and X-Ray: CXR: X-Ray Chest 1 View (CXR): No results found for this visit on 08/23/23.    Assessment and Plan:     Brief HPI: stable on floor, cont PT/OT     * S/P CABG x 2  Cont management per CTS  Progressing well, cont PT OT and cardiac rehab upon DC    Coronary artery disease  Cont ASA, statin, plavix, BB  S/p CABG    Mixed hyperlipidemia  statin    Benign hypertension  Titrate meds         VTE Risk Mitigation (From admission, onward)         Ordered     enoxaparin injection 40 mg  Daily         08/25/23 0745     IP VTE HIGH RISK PATIENT  Once         08/25/23 0745     Place sequential compression device  Until discontinued         08/25/23 0745     Place sequential compression device  Until discontinued         08/23/23 0637                Sacha Eckert Md, MD  Cardiology  [facility] - Telemetry ([facility])

## 2023-08-26 NOTE — PLAN OF CARE
Updated patient on plan of care. Instructed patient to use call light for assistance, call light in reach. Hourly rounding performed. Vitals q4 hours. Sternal Precautions maintained. Education provided, questions answered/encouraged. Chart check complete. Sinus rhythm on tele box #[id number]    Problem: Adult Inpatient Plan of Care  Goal: Plan of Care Review  8/26/2023 1254 by Anu Ochoa, RN  Outcome: Ongoing, Progressing  8/26/2023 1254 by Anu Ochoa, RN  Outcome: Ongoing, Progressing

## 2023-08-26 NOTE — PT/OT/SLP PROGRESS
Physical Therapy  Treatment    Abdirahman Rodriguez   MRN: [id number]   Admitting Diagnosis: S/P CABG x 2       PT Start Time: 0925     PT Stop Time: 0950    PT Total Time (min): 25 min       Billable Minutes:  Gait Training 15 and Therapeutic Activity 10    Treatment Type: Treatment  PT/PTA: PTA     Number of PTA visits since last PT visit: 1       General Precautions: Standard, sternal  Orthopedic Precautions: N/A  Braces: N/A     Subjective:  Communicated with ARYAN WARD  prior to session.      Pain/Comfort  Pain Rating 1: 0/10  Pain Rating Post-Intervention 1: 0/10    Treatment and Education:    REVIEWED STERNAL PREC     BED MOB I    SIT<-->STAND SBA    STATIC AND DYNAMIC SITTING EOB BALANCE TO GIOVANNI GOWN SBA    GT TRG SLOW ROBBY 2 X 150' WITH FATIGUE NOTED AT THE LAST 25' OF GAIT    ALL WITHOUT O2 WITHOUT SYMPTOMS OF SOB    PT IS AGREEABLE TO SIT OOB AFTER THIS SESSION AND WAS EDUCATED ON CALL DON'T FALL PROCEDURES     AM-PAC 6 CLICK MOBILITY  How much help from another person does this patient currently need?   1 = Unable, Total/Dependent Assistance  2 = A lot, Maximum/Moderate Assistance  3 = A little, Minimum/Contact Guard/Supervision  4 = None, Modified [place]/Independent    Turning over in bed (including adjusting bedclothes, sheets and blankets)?: 4  Sitting down on and standing up from a chair with arms (e.g., wheelchair, bedside commode, etc.): 4  Moving from lying on back to sitting on the side of the bed?: 4  Moving to and from a bed to a chair (including a wheelchair)?: 4  Need to walk in hospital room?: 3  Climbing 3-5 steps with a railing?: 1  Basic Mobility Total Score: 20    AM-PAC Raw Score CMS G-Code Modifier Level of Impairment Assistance   6 % Total / Unable   7 - 9 CM 80 - 100% Maximal Assist   10 - 14 CL 60 - 80% Moderate Assist   15 - 19 CK 40 - 60% Moderate Assist   20 - 22 CJ 20 - 40% Minimal Assist   23 CI 1-20% SBA / CGA   24 CH 0% Independent/ Mod I     Patient left up in chair with  call button in reach and NSG notified.    Assessment:  Abdirahman Rodriguez is a 67 y.o. male with a medical diagnosis of S/P CABG x 2 and presents with .    Rehab identified problem list/impairments: weakness, gait instability, decreased upper extremity function, impaired self care skills, impaired functional mobility    Rehab potential is good.    Activity tolerance: Good    Discharge recommendations: home health PT      Barriers to discharge:      Equipment recommendations: shower chair     GOALS:   Multidisciplinary Problems       Physical Therapy Goals          Problem: Physical Therapy    Goal Priority Disciplines Outcome Goal Variances Interventions   Physical Therapy Goal     PT, PT/OT Ongoing, Progressing     Description: LTG'S TO BE MET IN 14 DAYS (9-7-23)  PT WILL REQUIRE SBA FOR BED MOBILITY  PT WILL REQUIRE SPV FOR BED<>CHAIR TF'S  PT WILL  FEET NO AD WITH SPV  PT WILL INC AMPAC SCORE BY 2 POINTS TO PROGRESS GROSS FUNC MOBILITY                         PLAN:    Patient to be seen 3 x/week to address the above listed problems via gait training, therapeutic activities, therapeutic exercises  Plan of Care expires: 09/07/23  Plan of Care reviewed with: patient         08/26/2023

## 2023-08-27 LAB
ANION GAP SERPL CALC-SCNC: 10 MMOL/L (ref 8–16)
ANION GAP SERPL CALC-SCNC: 11 MMOL/L (ref 8–16)
BASOPHILS # BLD AUTO: 0 K/UL (ref 0–0.2)
BASOPHILS # BLD AUTO: 0 K/UL (ref 0–0.2)
BASOPHILS NFR BLD: 0 % (ref 0–1.9)
BASOPHILS NFR BLD: 0 % (ref 0–1.9)
BLD PROD TYP BPU: NORMAL
BLD PROD TYP BPU: NORMAL
BLOOD UNIT EXPIRATION DATE: NORMAL
BLOOD UNIT EXPIRATION DATE: NORMAL
BLOOD UNIT TYPE CODE: 9500
BLOOD UNIT TYPE CODE: 9500
BLOOD UNIT TYPE: NORMAL
BLOOD UNIT TYPE: NORMAL
BUN SERPL-MCNC: 33 MG/DL (ref 8–23)
BUN SERPL-MCNC: 37 MG/DL (ref 8–23)
CALCIUM SERPL-MCNC: 8.3 MG/DL (ref 8.7–10.5)
CALCIUM SERPL-MCNC: 8.9 MG/DL (ref 8.7–10.5)
CHLORIDE SERPL-SCNC: 101 MMOL/L (ref 95–110)
CHLORIDE SERPL-SCNC: 102 MMOL/L (ref 95–110)
CO2 SERPL-SCNC: 22 MMOL/L (ref 23–29)
CO2 SERPL-SCNC: 24 MMOL/L (ref 23–29)
CODING SYSTEM: NORMAL
CODING SYSTEM: NORMAL
CREAT SERPL-MCNC: 1.7 MG/DL (ref 0.5–1.4)
CREAT SERPL-MCNC: 1.7 MG/DL (ref 0.5–1.4)
CROSSMATCH INTERPRETATION: NORMAL
CROSSMATCH INTERPRETATION: NORMAL
DIFFERENTIAL METHOD: ABNORMAL
DIFFERENTIAL METHOD: ABNORMAL
DISPENSE STATUS: NORMAL
DISPENSE STATUS: NORMAL
EOSINOPHIL # BLD AUTO: 0.2 K/UL (ref 0–0.5)
EOSINOPHIL # BLD AUTO: 0.2 K/UL (ref 0–0.5)
EOSINOPHIL NFR BLD: 3 % (ref 0–8)
EOSINOPHIL NFR BLD: 4.9 % (ref 0–8)
ERYTHROCYTE [DISTWIDTH] IN BLOOD BY AUTOMATED COUNT: 13.6 % (ref 11.5–14.5)
ERYTHROCYTE [DISTWIDTH] IN BLOOD BY AUTOMATED COUNT: 13.7 % (ref 11.5–14.5)
EST. GFR  (NO RACE VARIABLE): 44 ML/MIN/1.73 M^2
EST. GFR  (NO RACE VARIABLE): 44 ML/MIN/1.73 M^2
GLUCOSE SERPL-MCNC: 111 MG/DL (ref 70–110)
GLUCOSE SERPL-MCNC: 125 MG/DL (ref 70–110)
HCT VFR BLD AUTO: 32.5 % (ref 40–54)
HCT VFR BLD AUTO: 35.1 % (ref 40–54)
HGB BLD-MCNC: 10.5 G/DL (ref 14–18)
HGB BLD-MCNC: 11.4 G/DL (ref 14–18)
IMM GRANULOCYTES # BLD AUTO: 0.03 K/UL (ref 0–0.04)
IMM GRANULOCYTES # BLD AUTO: 0.03 K/UL (ref 0–0.04)
IMM GRANULOCYTES NFR BLD AUTO: 0.5 % (ref 0–0.5)
IMM GRANULOCYTES NFR BLD AUTO: 0.6 % (ref 0–0.5)
LYMPHOCYTES # BLD AUTO: 0.6 K/UL (ref 1–4.8)
LYMPHOCYTES # BLD AUTO: 0.8 K/UL (ref 1–4.8)
LYMPHOCYTES NFR BLD: 12.9 % (ref 18–48)
LYMPHOCYTES NFR BLD: 13.4 % (ref 18–48)
MAGNESIUM SERPL-MCNC: 2.4 MG/DL (ref 1.6–2.6)
MAGNESIUM SERPL-MCNC: 2.7 MG/DL (ref 1.6–2.6)
MCH RBC QN AUTO: 30 PG (ref 27–31)
MCH RBC QN AUTO: 30.5 PG (ref 27–31)
MCHC RBC AUTO-ENTMCNC: 32.3 G/DL (ref 32–36)
MCHC RBC AUTO-ENTMCNC: 32.5 G/DL (ref 32–36)
MCV RBC AUTO: 93 FL (ref 82–98)
MCV RBC AUTO: 94 FL (ref 82–98)
MONOCYTES # BLD AUTO: 0.5 K/UL (ref 0.3–1)
MONOCYTES # BLD AUTO: 0.5 K/UL (ref 0.3–1)
MONOCYTES NFR BLD: 11.2 % (ref 4–15)
MONOCYTES NFR BLD: 9.1 % (ref 4–15)
NEUTROPHILS # BLD AUTO: 3.3 K/UL (ref 1.8–7.7)
NEUTROPHILS # BLD AUTO: 4.4 K/UL (ref 1.8–7.7)
NEUTROPHILS NFR BLD: 70.4 % (ref 38–73)
NEUTROPHILS NFR BLD: 74 % (ref 38–73)
NRBC BLD-RTO: 0 /100 WBC
NRBC BLD-RTO: 0 /100 WBC
NUM UNITS TRANS PACKED RBC: NORMAL
NUM UNITS TRANS PACKED RBC: NORMAL
PLATELET # BLD AUTO: 159 K/UL (ref 150–450)
PLATELET # BLD AUTO: 192 K/UL (ref 150–450)
PMV BLD AUTO: 9.6 FL (ref 9.2–12.9)
PMV BLD AUTO: 9.6 FL (ref 9.2–12.9)
POCT GLUCOSE: 108 MG/DL (ref 70–110)
POCT GLUCOSE: 112 MG/DL (ref 70–110)
POCT GLUCOSE: 118 MG/DL (ref 70–110)
POCT GLUCOSE: 136 MG/DL (ref 70–110)
POTASSIUM SERPL-SCNC: 4 MMOL/L (ref 3.5–5.1)
POTASSIUM SERPL-SCNC: 4.1 MMOL/L (ref 3.5–5.1)
RBC # BLD AUTO: 3.5 M/UL (ref 4.6–6.2)
RBC # BLD AUTO: 3.74 M/UL (ref 4.6–6.2)
SODIUM SERPL-SCNC: 134 MMOL/L (ref 136–145)
SODIUM SERPL-SCNC: 136 MMOL/L (ref 136–145)
WBC # BLD AUTO: 4.74 K/UL (ref 3.9–12.7)
WBC # BLD AUTO: 5.91 K/UL (ref 3.9–12.7)

## 2023-08-27 PROCEDURE — 83735 ASSAY OF MAGNESIUM: CPT | Mod: 91 | Performed by: THORACIC SURGERY (CARDIOTHORACIC VASCULAR SURGERY)

## 2023-08-27 PROCEDURE — 93010 EKG 12-LEAD: ICD-10-PCS | Mod: ,,, | Performed by: INTERNAL MEDICINE

## 2023-08-27 PROCEDURE — 36415 COLL VENOUS BLD VENIPUNCTURE: CPT | Performed by: THORACIC SURGERY (CARDIOTHORACIC VASCULAR SURGERY)

## 2023-08-27 PROCEDURE — 93005 ELECTROCARDIOGRAM TRACING: CPT

## 2023-08-27 PROCEDURE — 85025 COMPLETE CBC W/AUTO DIFF WBC: CPT | Performed by: THORACIC SURGERY (CARDIOTHORACIC VASCULAR SURGERY)

## 2023-08-27 PROCEDURE — 99233 SBSQ HOSP IP/OBS HIGH 50: CPT | Mod: ,,, | Performed by: INTERNAL MEDICINE

## 2023-08-27 PROCEDURE — 99900035 HC TECH TIME PER 15 MIN (STAT)

## 2023-08-27 PROCEDURE — 80048 BASIC METABOLIC PNL TOTAL CA: CPT | Performed by: THORACIC SURGERY (CARDIOTHORACIC VASCULAR SURGERY)

## 2023-08-27 PROCEDURE — 99233 PR SUBSEQUENT HOSPITAL CARE,LEVL III: ICD-10-PCS | Mod: ,,, | Performed by: INTERNAL MEDICINE

## 2023-08-27 PROCEDURE — 25000003 PHARM REV CODE 250: Performed by: THORACIC SURGERY (CARDIOTHORACIC VASCULAR SURGERY)

## 2023-08-27 PROCEDURE — 93010 ELECTROCARDIOGRAM REPORT: CPT | Mod: ,,, | Performed by: INTERNAL MEDICINE

## 2023-08-27 PROCEDURE — 63600175 PHARM REV CODE 636 W HCPCS: Performed by: THORACIC SURGERY (CARDIOTHORACIC VASCULAR SURGERY)

## 2023-08-27 PROCEDURE — 21400001 HC TELEMETRY ROOM

## 2023-08-27 PROCEDURE — 27000221 HC OXYGEN, UP TO 24 HOURS

## 2023-08-27 PROCEDURE — 94799 UNLISTED PULMONARY SVC/PX: CPT

## 2023-08-27 PROCEDURE — 94761 N-INVAS EAR/PLS OXIMETRY MLT: CPT

## 2023-08-27 RX ADMIN — FERROUS SULFATE TAB 325 MG (65 MG ELEMENTAL FE) 1 EACH: 325 (65 FE) TAB at 09:08

## 2023-08-27 RX ADMIN — OXYCODONE HYDROCHLORIDE AND ACETAMINOPHEN 500 MG: 500 TABLET ORAL at 08:08

## 2023-08-27 RX ADMIN — ENOXAPARIN SODIUM 40 MG: 40 INJECTION SUBCUTANEOUS at 05:08

## 2023-08-27 RX ADMIN — ACETAMINOPHEN 650 MG: 325 TABLET ORAL at 05:08

## 2023-08-27 RX ADMIN — ASPIRIN 81 MG: 81 TABLET, COATED ORAL at 08:08

## 2023-08-27 RX ADMIN — CHLORHEXIDINE GLUCONATE 0.12% ORAL RINSE 10 ML: 1.2 LIQUID ORAL at 08:08

## 2023-08-27 RX ADMIN — CLOPIDOGREL BISULFATE 75 MG: 75 TABLET ORAL at 09:08

## 2023-08-27 RX ADMIN — OXYCODONE HYDROCHLORIDE AND ACETAMINOPHEN 500 MG: 500 TABLET ORAL at 09:08

## 2023-08-27 RX ADMIN — AMIODARONE HYDROCHLORIDE 150 MG: 1.5 INJECTION, SOLUTION INTRAVENOUS at 07:08

## 2023-08-27 RX ADMIN — AMIODARONE HYDROCHLORIDE 0.5 MG/MIN: 1.8 INJECTION, SOLUTION INTRAVENOUS at 11:08

## 2023-08-27 RX ADMIN — FAMOTIDINE 20 MG: 20 TABLET, FILM COATED ORAL at 09:08

## 2023-08-27 RX ADMIN — AMIODARONE HYDROCHLORIDE 1 MG/MIN: 1.8 INJECTION, SOLUTION INTRAVENOUS at 12:08

## 2023-08-27 RX ADMIN — AMIODARONE HYDROCHLORIDE 1 MG/MIN: 1.8 INJECTION, SOLUTION INTRAVENOUS at 07:08

## 2023-08-27 RX ADMIN — METOPROLOL TARTRATE 25 MG: 25 TABLET, FILM COATED ORAL at 08:08

## 2023-08-27 RX ADMIN — FOLIC ACID 1 MG: 1 TABLET ORAL at 09:08

## 2023-08-27 RX ADMIN — ATORVASTATIN CALCIUM 40 MG: 40 TABLET, FILM COATED ORAL at 08:08

## 2023-08-27 RX ADMIN — METOPROLOL TARTRATE 25 MG: 25 TABLET, FILM COATED ORAL at 09:08

## 2023-08-27 RX ADMIN — CYANOCOBALAMIN TAB 1000 MCG 1000 MCG: 1000 TAB at 09:08

## 2023-08-27 RX ADMIN — FAMOTIDINE 20 MG: 20 TABLET, FILM COATED ORAL at 08:08

## 2023-08-27 NOTE — ASSESSMENT & PLAN NOTE
Cont management per CTS  Progressing well, cont PT OT and cardiac rehab upon DC    8/27/23 - post op AFib on Amio; rate controlled this AM

## 2023-08-27 NOTE — PROGRESS NOTES
Subjective:      Patient ID: Abdirahman Rodriguez is a 67 y.o. male.    Chief Complaint: CABG (CABG/)    HPI:  67-year-old male with a history of longstanding hypertension strong family history of coronary artery disease had abnormal stress test and had a left heart catheterization which revealed disease involving the proximal left anterior descending artery as well as diagonal 1 with a critical 80% stenosis.  Patient does not have any active chest pain at this time.  He recovered from COVID last year.    8/23/2024  Procedure(s):  CORONARY ARTERY BYPASS GRAFT (CABG)  SURGICAL PROCUREMENT, VEIN, ENDOSCOPIC  ECHOCARDIOGRAM,TRANSESOPHAGEAL  BLOCK, NERVE, INTERCOSTAL, 2 OR MORE      PROCEDURAL SUMMARY:   Coronary artery bypass graft, x 2  1) Pedicled LIMA to LAD  2) Saphenous vein graft to diagonal   Endoscopic vein harvesting from the left leg    Patient was extubated within the 1st 4 hour window he was hemodynamically stable on minimal dose of Levophed making good urine chest tube output minimal sitting up in a chair this morning pain well controlled in sinus rhythm has 3 chest tubes 2 mediastinal 1 left pleural chest tube right IJ double-lumen catheter.  Minimal drainage from the SANG drain    8/25 /2023 the patient is sitting up in a chair chest tubes discontinued pain well controlled Irvin catheter draining clear urine sinus rhythm hemodynamically stable overnight no issues    08/26/2023 the patient was transferred to the telemetry he is ambulating has not had a bowel movement pain well controlled hemodynamically stable no acute issues at this time.    08/27/2023 patient went into AFib this morning was loaded with amiodarone converted back to sinus rhythm he is otherwise hemodynamically stable pain well controlled urine output is good ambulating.    Review of patient's allergies indicates:  No Known Allergies    Past Medical History:   Diagnosis Date    Abnormal nuclear stress test 08/13/2023    Benign hypertension      Class 1 obesity in adult 05/25/2023    Coronary artery disease     History of colon polyps     Hypergammaglobulinemia     Hypertensive kidney disease with chronic kidney disease stage III     Hypothyroidism, unspecified        Family History   Problem Relation Age of Onset    Heart disease Mother     Hypertension Mother     Heart disease Father     Hypertension Father        Social History     Socioeconomic History    Marital status:    Tobacco Use    Smoking status: Never    Smokeless tobacco: Never   Substance and Sexual Activity    Alcohol use: No     Comment: rarely    Drug use: No       Past Surgical History:   Procedure Laterality Date    ARTERIOGRAPHY OF SUBCLAVIAN ARTERY Left 8/14/2023    Procedure: ARTERIOGRAM, SUBCLAVIAN;  Surgeon: Vahid Juarez MD;  Location: [facility] CATH LAB;  Service: Cardiology;  Laterality: Left;    COLONOSCOPY N/A 12/6/2021    Procedure: COLONOSCOPY;  Surgeon: Doris Altman MD;  Location: [facility] ENDO;  Service: Endoscopy;  Laterality: N/A;    CORONARY ARTERY BYPASS GRAFT (CABG) N/A 8/23/2023    Procedure: CORONARY ARTERY BYPASS GRAFT (CABG);  Surgeon: Emma Arzola MD;  Location: [facility] OR;  Service: Cardiothoracic;  Laterality: N/A;  2-VESSEL    ECHOCARDIOGRAM,TRANSESOPHAGEAL N/A 8/23/2023    Procedure: ECHOCARDIOGRAM,TRANSESOPHAGEAL;  Surgeon: Emma Arzola MD;  Location: [facility] OR;  Service: Cardiothoracic;  Laterality: N/A;    ENDOSCOPIC HARVEST OF VEIN Left 8/23/2023    Procedure: SURGICAL PROCUREMENT, VEIN, ENDOSCOPIC;  Surgeon: Emma Arzola MD;  Location: [facility] OR;  Service: Cardiothoracic;  Laterality: Left;    INJECTION OF ANESTHETIC AGENT AROUND MULTIPLE INTERCOSTAL NERVES N/A 8/23/2023    Procedure: BLOCK, NERVE, INTERCOSTAL, 2 OR MORE;  Surgeon: Emma Arzola MD;  Location: [facility] OR;  Service: Cardiothoracic;  Laterality: N/A;    LEFT HEART CATHETERIZATION Left 8/14/2023    Procedure: Left heart cath;  Surgeon: Vahid Juarez MD;  Location: [facility]  "CATH LAB;  Service: Cardiology;  Laterality: Left;  pt instructed 930-10am start/    None         Review of Systems   Constitutional:  Negative for activity change and appetite change.   HENT:  Negative for dental problem, nosebleeds and sore throat.    Eyes:  Negative for discharge and visual disturbance.   Respiratory:  Negative for cough, chest tightness and stridor.    Cardiovascular:  Negative for leg swelling.   Gastrointestinal:  Negative for abdominal distention and abdominal pain.   Genitourinary:  Negative for difficulty urinating and dysuria.   Musculoskeletal:  Negative for arthralgias, back pain and joint swelling.   Allergic/Immunologic: Negative for environmental allergies.   Neurological:  Negative for dizziness, syncope and headaches.   Hematological:  Does not bruise/bleed easily.   Psychiatric/Behavioral:  Negative for behavioral problems.           Objective:   BP 99/60   Pulse (!) 140   Temp 97.5 °F (36.4 °C) (Temporal) Comment: amio gtt started  Resp (!) 22   Ht 6' 2" (1.88 m)   Wt 117.4 kg (258 lb 13.1 oz)   SpO2 97%   BMI 33.23 kg/m²     X-Ray Chest AP Portable  Narrative: EXAMINATION:  XR CHEST AP PORTABLE    CLINICAL HISTORY:  Post-op; Atherosclerotic heart disease of native coronary artery without angina pectoris    TECHNIQUE:  Single frontal view of the chest was performed.    COMPARISON:  08/24/2023    FINDINGS:  Tip right central line terminates in the right atrium.  No pneumothorax.  Left chest tube and mediastinal drainage tube.  Heart size is stable.  Focal opacification at the lung bases bilaterally could represent atelectasis.  Can not exclude aspiration or airspace disease.  No pneumothorax. In comparison to the prior study, there is no adverse interval changes  Impression: In comparison to the prior study, there is no adverse interval changes    Electronically signed by: Jose R Ramsay MD  Date:    08/25/2023  Time:    07:37         Physical Exam  Vitals and nursing note " reviewed.   Constitutional:       Appearance: He is obese.      Comments: Sitting up in a chair oriented x3 right IJ double-lumen    HENT:      Head: Normocephalic.      Mouth/Throat:      Mouth: Mucous membranes are moist.   Eyes:      Extraocular Movements: Extraocular movements intact.      Pupils: Pupils are equal, round, and reactive to light.   Cardiovascular:      Rate and Rhythm: Normal rate and regular rhythm.      Pulses: Normal pulses.      Heart sounds:      Friction rub present.   Pulmonary:      Effort: Pulmonary effort is normal.      Breath sounds: Wheezing, rhonchi and rales present.   Abdominal:      Palpations: Abdomen is soft.   Musculoskeletal:         General: Normal range of motion.      Cervical back: Normal range of motion and neck supple.      Left lower leg: Edema present.   Skin:     General: Skin is warm.      Capillary Refill: Capillary refill takes less than 2 seconds.   Neurological:      General: No focal deficit present.      Mental Status: He is alert and oriented to person, place, and time.   Psychiatric:         Mood and Affect: Mood normal.       Chest x-ray shows postsurgical changes with bilateral atelectasis.  Assessment:     1. Coronary artery disease involving native coronary artery of native heart without angina pectoris    2. Prediabetes    3. Post-operative state    4. S/P CABG x 2    5. Tachycardia          Plan   Postop day 4 status post CABG x2 with EVH.  Neuro awake alert pain control as needed  Cardiovascular beta-blockers   Atrial fibrillation with rapid ventricular rate started amiodarone bolus with drip rate controlled at this time we will keep him for 24 hours on the drip  Hyperlipidemia on statins  Aspirin Plavix on board  Respiratory aggressive pulmonary toilet incentive spirometry bronchodilators as needed  GI diabetic cardiac diet  Renal creatinine back to  baseline urine output   Anemia multifactorial continuous monitoring with serial H and  H's  Hyperglycemia on insulin sliding scale  Electrolyte replacement protocol  DVT and GI prophylaxis with Pepcid and bilateral SCDs  PTOT       I        Emma Arzola MD  [facility]  [place]

## 2023-08-27 NOTE — PLAN OF CARE
POD #4  CABG X2. No complaints of pain overnight. Notified by MT room that patients HR converted to Afib for 20 minutes, and then back to NSR. Patient got up to use the restroom during this time. Vitals currently stable. Provena wound vac in place. On 2L NC, pulse ox 86-89% on room air while sleeping. No complaints at this time, call bell within reach.

## 2023-08-27 NOTE — SUBJECTIVE & OBJECTIVE
Review of Systems   Constitutional: Negative.   HENT: Negative.     Eyes: Negative.    Cardiovascular: Negative.    Respiratory: Negative.     Skin: Negative.    Musculoskeletal: Negative.    Gastrointestinal: Negative.    Genitourinary: Negative.    Neurological: Negative.    Psychiatric/Behavioral: Negative.       Objective:     Vital Signs (Most Recent):  Temp: 97.8 °F (36.6 °C) (08/27/23 1531)  Pulse: 76 (08/27/23 1702)  Resp: 20 (08/27/23 1531)  BP: 119/69 (08/27/23 1531)  SpO2: 98 % (08/27/23 1531) Vital Signs (24h Range):  Temp:  [97.5 °F (36.4 °C)-98.2 °F (36.8 °C)] 97.8 °F (36.6 °C)  Pulse:  [] 76  Resp:  [17-22] 20  SpO2:  [86 %-98 %] 98 %  BP: ()/(55-74) 119/69     Weight: 117.4 kg (258 lb 13.1 oz)  Body mass index is 33.23 kg/m².     SpO2: 98 %         Intake/Output Summary (Last 24 hours) at 8/27/2023 1725  Last data filed at 8/27/2023 1702  Gross per 24 hour   Intake 2042.44 ml   Output 500 ml   Net 1542.44 ml         Lines/Drains/Airways       Peripheral Intravenous Line  Duration                  Peripheral IV - Single Lumen 08/23/23 0550 20 G Anterior;Left Hand 4 days                       Physical Exam  Vitals and nursing note reviewed.   Constitutional:       Appearance: Normal appearance.   HENT:      Head: Normocephalic and atraumatic.   Eyes:      General:         Right eye: No discharge.         Left eye: No discharge.      Pupils: Pupils are equal, round, and reactive to light.   Cardiovascular:      Rate and Rhythm: Normal rate and regular rhythm.      Heart sounds: S1 normal and S2 normal. No murmur heard.     No friction rub.   Pulmonary:      Effort: Pulmonary effort is normal. No respiratory distress.      Breath sounds: Normal breath sounds. No rales.   Abdominal:      Palpations: Abdomen is soft.      Tenderness: There is no abdominal tenderness.   Musculoskeletal:      Cervical back: Neck supple.      Right lower leg: No edema.      Left lower leg: No edema.   Skin:     " General: Skin is warm and dry.      Comments: Sternotomy site C/D/I   Neurological:      General: No focal deficit present.      Mental Status: He is alert and oriented to person, place, and time.      Motor: Weakness present.   Psychiatric:         Mood and Affect: Mood normal.         Behavior: Behavior normal.         Thought Content: Thought content normal.            Significant Labs: BMP:   Recent Labs   Lab 08/26/23  0537 08/26/23  1648 08/27/23  0509   * 100 111*   * 133* 134*   K 4.1 4.3 4.1   CL 98 98 101   CO2 24 26 22*   BUN 25* 31* 37*   CREATININE 1.6* 1.8* 1.7*   CALCIUM 8.7 8.7 8.3*   MG 2.2 2.4 2.4     , CMP   Recent Labs   Lab 08/26/23  0537 08/26/23  1648 08/27/23  0509   * 133* 134*   K 4.1 4.3 4.1   CL 98 98 101   CO2 24 26 22*   * 100 111*   BUN 25* 31* 37*   CREATININE 1.6* 1.8* 1.7*   CALCIUM 8.7 8.7 8.3*   ANIONGAP 11 9 11     , CBC   Recent Labs   Lab 08/26/23  0537 08/26/23  1648 08/27/23  0509   WBC 9.88 7.58 5.91   HGB 11.5* 10.7* 10.5*   HCT 35.8* 32.4* 32.5*    169 159     , INR   No results for input(s): "INR", "PROTIME" in the last 48 hours.  , Lipid Panel   No results for input(s): "CHOL", "HDL", "LDLCALC", "TRIG", "CHOLHDL" in the last 48 hours.  , Troponin No results for input(s): "TROPONINI" in the last 48 hours., and All pertinent lab results from the last 24 hours have been reviewed.    Significant Imaging: Echocardiogram: Transthoracic echo (TTE) complete (Cupid Only):   Results for orders placed or performed during the hospital encounter of 08/04/23   Echo   Result Value Ref Range    BSA 2.47 m2    LVOT stroke volume 62.87 cm3    LVIDd 5.47 3.5 - 6.0 cm    LV Systolic Volume 57.91 mL    LV Systolic Volume Index 23.9 mL/m2    LVIDs 3.69 2.1 - 4.0 cm    LV Diastolic Volume 145.32 mL    LV Diastolic Volume Index 60.05 mL/m2    IVS 1.07 0.6 - 1.1 cm    LVOT diameter 2.19 cm    LVOT area 3.8 cm2    FS 33 28 - 44 %    Left Ventricle Relative Wall " Thickness 0.41 cm    Posterior Wall 1.11 (A) 0.6 - 1.1 cm    LV mass 236.91 g    LV Mass Index 98 g/m2    MV Peak E Anam 0.56 m/s    TDI LATERAL 0.09 m/s    TDI SEPTAL 0.10 m/s    E/E' ratio 5.89 m/s    MV Peak A Anam 0.59 m/s    TR Max Anam 1.44 m/s    E/A ratio 0.95     IVRT 95.15 msec    E wave deceleration time 244.86 msec    LV SEPTAL E/E' RATIO 5.60 m/s    LV LATERAL E/E' RATIO 6.22 m/s    LVOT peak anam 0.73 m/s    Left Ventricular Outflow Tract Mean Velocity 0.58 cm/s    Left Ventricular Outflow Tract Mean Gradient 1.43 mmHg    LA size 3.81 cm    Left Atrium Major Axis 5.31 cm    Left Atrium Minor Axis 4.51 cm    RVOT peak VTI 12.8 cm    RA Major Axis 4.59 cm    AV mean gradient 3 mmHg    AV peak gradient 4 mmHg    Ao peak anam 1.04 m/s    Ao VTI 21.80 cm    LVOT peak VTI 16.70 cm    AV valve area 2.88 cm²    AV Velocity Ratio 0.70     AV index (prosthetic) 0.77     SANCHO by Velocity Ratio 2.64 cm²    Triscuspid Valve Regurgitation Peak Gradient 8 mmHg    PV mean gradient 1 mmHg    RVOT peak anam 0.56 m/s    Ao root annulus 3.44 cm    STJ 3.74 cm    Ascending aorta 3.74 cm    IVC diameter 1.63 cm    Mean e' 0.10 m/s    ZLVIDS -4.88     ZLVIDD -7.38     LA Volume Index 23.5 mL/m2    LA volume 56.86 cm3    LA WIDTH 3.6 cm    TAPSE 2.20 cm    RA Width 4.1 cm    TV resting pulmonary artery pressure 11 mmHg    RV TB RVSP 4 mmHg    Est. RA pres 3 mmHg    Narrative      Left Ventricle: The left ventricle is normal in size. Normal wall   thickness. Normal wall motion. There is normal systolic function with a   visually estimated ejection fraction of 55 - 70%. There is normal   diastolic function.    Right Ventricle: Normal right ventricular cavity size. Wall thickness   is normal. Right ventricle wall motion  is normal. Systolic function is   normal.    Tricuspid Valve: There is mild regurgitation.    IVC/SVC: Normal venous pressure at 3 mmHg.     , EKG: reviewed, Stress Test: reviewed, and X-Ray: CXR: X-Ray Chest 1 View  (CXR): No results found for this visit on 08/23/23.

## 2023-08-27 NOTE — PLAN OF CARE
Patient and family updated on plan of care. Instructed patient to use call light for assistance, call light in reach. Hourly rounding performed, fall and safety precautions maintained; bed alarm declined, patient ambulating independently in room. Amio gtt started, patient converted back to sinus rhythm around 9/10am. Incisions CDI. Vitals q 4hours. Education provided, questions answered/encouraged. IV's and lines CDI, medication administered as ordered. Chart check complete.  Sinus rhythm on tele box #[id number].      Problem: Adult Inpatient Plan of Care  Goal: Plan of Care Review  Outcome: Ongoing, Progressing

## 2023-08-27 NOTE — PROGRESS NOTES
08/27/23 0454   Vital Signs   Pulse (!) 144     Patient's HR sustaining in the 140s while at rest, notified Dr. Arzola.

## 2023-08-27 NOTE — PROGRESS NOTES
[facility] - Telemetry ([facility])  Cardiology  Progress Note    Patient Name: Abdirahman Rodriguez  MRN: [id number]  Admission Date: 8/23/2023  Hospital Length of Stay: 4 days  Code Status: Full Code   Attending Physician: Emma Arzola MD   Primary Care Physician: Mt Noel MD  Expected Discharge Date:   Principal Problem:S/P CABG x 2    Subjective:     Hospital Course:   8/25/23 Pt seen and examined today sitting up in bedside chair, walked with PT/OT this mroning, feels good. Labs reviewed, chart reviewed    8/26/23 - stable on floor, no acute events, CP improving; stable hemodyn    8/27/23 - AF v RVR this am on Amio otherwise pt has no CP/SOB; progressed well with PT/OT yesterday.           Review of Systems   Constitutional: Negative.   HENT: Negative.     Eyes: Negative.    Cardiovascular: Negative.    Respiratory: Negative.     Skin: Negative.    Musculoskeletal: Negative.    Gastrointestinal: Negative.    Genitourinary: Negative.    Neurological: Negative.    Psychiatric/Behavioral: Negative.       Objective:     Vital Signs (Most Recent):  Temp: 97.8 °F (36.6 °C) (08/27/23 1531)  Pulse: 76 (08/27/23 1702)  Resp: 20 (08/27/23 1531)  BP: 119/69 (08/27/23 1531)  SpO2: 98 % (08/27/23 1531) Vital Signs (24h Range):  Temp:  [97.5 °F (36.4 °C)-98.2 °F (36.8 °C)] 97.8 °F (36.6 °C)  Pulse:  [] 76  Resp:  [17-22] 20  SpO2:  [86 %-98 %] 98 %  BP: ()/(55-74) 119/69     Weight: 117.4 kg (258 lb 13.1 oz)  Body mass index is 33.23 kg/m².     SpO2: 98 %         Intake/Output Summary (Last 24 hours) at 8/27/2023 1725  Last data filed at 8/27/2023 1702  Gross per 24 hour   Intake 2042.44 ml   Output 500 ml   Net 1542.44 ml         Lines/Drains/Airways       Peripheral Intravenous Line  Duration                  Peripheral IV - Single Lumen 08/23/23 0550 20 G Anterior;Left Hand 4 days                       Physical Exam  Vitals and nursing note reviewed.   Constitutional:       Appearance: Normal appearance.  "  HENT:      Head: Normocephalic and atraumatic.   Eyes:      General:         Right eye: No discharge.         Left eye: No discharge.      Pupils: Pupils are equal, round, and reactive to light.   Cardiovascular:      Rate and Rhythm: Normal rate and regular rhythm.      Heart sounds: S1 normal and S2 normal. No murmur heard.     No friction rub.   Pulmonary:      Effort: Pulmonary effort is normal. No respiratory distress.      Breath sounds: Normal breath sounds. No rales.   Abdominal:      Palpations: Abdomen is soft.      Tenderness: There is no abdominal tenderness.   Musculoskeletal:      Cervical back: Neck supple.      Right lower leg: No edema.      Left lower leg: No edema.   Skin:     General: Skin is warm and dry.      Comments: Sternotomy site C/D/I   Neurological:      General: No focal deficit present.      Mental Status: He is alert and oriented to person, place, and time.      Motor: Weakness present.   Psychiatric:         Mood and Affect: Mood normal.         Behavior: Behavior normal.         Thought Content: Thought content normal.            Significant Labs: BMP:   Recent Labs   Lab 08/26/23  0537 08/26/23  1648 08/27/23  0509   * 100 111*   * 133* 134*   K 4.1 4.3 4.1   CL 98 98 101   CO2 24 26 22*   BUN 25* 31* 37*   CREATININE 1.6* 1.8* 1.7*   CALCIUM 8.7 8.7 8.3*   MG 2.2 2.4 2.4     , CMP   Recent Labs   Lab 08/26/23  0537 08/26/23  1648 08/27/23  0509   * 133* 134*   K 4.1 4.3 4.1   CL 98 98 101   CO2 24 26 22*   * 100 111*   BUN 25* 31* 37*   CREATININE 1.6* 1.8* 1.7*   CALCIUM 8.7 8.7 8.3*   ANIONGAP 11 9 11     , CBC   Recent Labs   Lab 08/26/23  0537 08/26/23  1648 08/27/23  0509   WBC 9.88 7.58 5.91   HGB 11.5* 10.7* 10.5*   HCT 35.8* 32.4* 32.5*    169 159     , INR   No results for input(s): "INR", "PROTIME" in the last 48 hours.  , Lipid Panel   No results for input(s): "CHOL", "HDL", "LDLCALC", "TRIG", "CHOLHDL" in the last 48 hours.  , " "Troponin No results for input(s): "TROPONINI" in the last 48 hours., and All pertinent lab results from the last 24 hours have been reviewed.    Significant Imaging: Echocardiogram: Transthoracic echo (TTE) complete (Cupid Only):   Results for orders placed or performed during the hospital encounter of 08/04/23   Echo   Result Value Ref Range    BSA 2.47 m2    LVOT stroke volume 62.87 cm3    LVIDd 5.47 3.5 - 6.0 cm    LV Systolic Volume 57.91 mL    LV Systolic Volume Index 23.9 mL/m2    LVIDs 3.69 2.1 - 4.0 cm    LV Diastolic Volume 145.32 mL    LV Diastolic Volume Index 60.05 mL/m2    IVS 1.07 0.6 - 1.1 cm    LVOT diameter 2.19 cm    LVOT area 3.8 cm2    FS 33 28 - 44 %    Left Ventricle Relative Wall Thickness 0.41 cm    Posterior Wall 1.11 (A) 0.6 - 1.1 cm    LV mass 236.91 g    LV Mass Index 98 g/m2    MV Peak E Anam 0.56 m/s    TDI LATERAL 0.09 m/s    TDI SEPTAL 0.10 m/s    E/E' ratio 5.89 m/s    MV Peak A Anam 0.59 m/s    TR Max Anam 1.44 m/s    E/A ratio 0.95     IVRT 95.15 msec    E wave deceleration time 244.86 msec    LV SEPTAL E/E' RATIO 5.60 m/s    LV LATERAL E/E' RATIO 6.22 m/s    LVOT peak anam 0.73 m/s    Left Ventricular Outflow Tract Mean Velocity 0.58 cm/s    Left Ventricular Outflow Tract Mean Gradient 1.43 mmHg    LA size 3.81 cm    Left Atrium Major Axis 5.31 cm    Left Atrium Minor Axis 4.51 cm    RVOT peak VTI 12.8 cm    RA Major Axis 4.59 cm    AV mean gradient 3 mmHg    AV peak gradient 4 mmHg    Ao peak anam 1.04 m/s    Ao VTI 21.80 cm    LVOT peak VTI 16.70 cm    AV valve area 2.88 cm²    AV Velocity Ratio 0.70     AV index (prosthetic) 0.77     SANCHO by Velocity Ratio 2.64 cm²    Triscuspid Valve Regurgitation Peak Gradient 8 mmHg    PV mean gradient 1 mmHg    RVOT peak anam 0.56 m/s    Ao root annulus 3.44 cm    STJ 3.74 cm    Ascending aorta 3.74 cm    IVC diameter 1.63 cm    Mean e' 0.10 m/s    ZLVIDS -4.88     ZLVIDD -7.38     LA Volume Index 23.5 mL/m2    LA volume 56.86 cm3    LA WIDTH 3.6 " cm    TAPSE 2.20 cm    RA Width 4.1 cm    TV resting pulmonary artery pressure 11 mmHg    RV TB RVSP 4 mmHg    Est. RA pres 3 mmHg    Narrative      Left Ventricle: The left ventricle is normal in size. Normal wall   thickness. Normal wall motion. There is normal systolic function with a   visually estimated ejection fraction of 55 - 70%. There is normal   diastolic function.    Right Ventricle: Normal right ventricular cavity size. Wall thickness   is normal. Right ventricle wall motion  is normal. Systolic function is   normal.    Tricuspid Valve: There is mild regurgitation.    IVC/SVC: Normal venous pressure at 3 mmHg.     , EKG: reviewed, Stress Test: reviewed, and X-Ray: CXR: X-Ray Chest 1 View (CXR): No results found for this visit on 08/23/23.    Assessment and Plan:     Brief HPI: Post op AF on Amio drip now, stable hemodynamics     * S/P CABG x 2  Cont management per CTS  Progressing well, cont PT OT and cardiac rehab upon DC    8/27/23 - post op AFib on Amio; rate controlled this AM       Coronary artery disease  Cont ASA, statin, plavix, BB  S/p CABG    Mixed hyperlipidemia  statin    Benign hypertension  Titrate meds         VTE Risk Mitigation (From admission, onward)         Ordered     enoxaparin injection 40 mg  Daily         08/25/23 0745     IP VTE HIGH RISK PATIENT  Once         08/25/23 0745     Place sequential compression device  Until discontinued         08/25/23 0745                Sacha Eckert Md, MD  Cardiology  [facility] - Telemetry ([facility])

## 2023-08-27 NOTE — PROGRESS NOTES
08/27/23 0637   Vital Signs   Pulse (!) 167     Patient's HR sustaining in the 140-160s, Dr. Arzola notified, ordered to start an amiodarone gtt.

## 2023-08-27 NOTE — PT/OT/SLP PROGRESS
Occupational Therapy      Patient Name:  Abdirahman Rodriguez   MRN:  [id number]    Chart review completed.  Patient not seen today secondary to uncontrolled tachycardia. Will follow-up at a later day/time.    8/27/2023

## 2023-08-28 ENCOUNTER — TELEPHONE (OUTPATIENT)
Dept: CARDIOLOGY | Facility: HOSPITAL | Age: 68
End: 2023-08-28
Payer: COMMERCIAL

## 2023-08-28 VITALS
OXYGEN SATURATION: 93 % | HEIGHT: 74 IN | WEIGHT: 259.69 LBS | SYSTOLIC BLOOD PRESSURE: 119 MMHG | RESPIRATION RATE: 16 BRPM | BODY MASS INDEX: 33.33 KG/M2 | DIASTOLIC BLOOD PRESSURE: 73 MMHG | HEART RATE: 81 BPM | TEMPERATURE: 98 F

## 2023-08-28 LAB
ANION GAP SERPL CALC-SCNC: 12 MMOL/L (ref 8–16)
BASOPHILS # BLD AUTO: 0 K/UL (ref 0–0.2)
BASOPHILS NFR BLD: 0 % (ref 0–1.9)
BUN SERPL-MCNC: 31 MG/DL (ref 8–23)
CALCIUM SERPL-MCNC: 8.5 MG/DL (ref 8.7–10.5)
CHLORIDE SERPL-SCNC: 102 MMOL/L (ref 95–110)
CO2 SERPL-SCNC: 21 MMOL/L (ref 23–29)
CREAT SERPL-MCNC: 1.6 MG/DL (ref 0.5–1.4)
DIFFERENTIAL METHOD: ABNORMAL
EOSINOPHIL # BLD AUTO: 0.3 K/UL (ref 0–0.5)
EOSINOPHIL NFR BLD: 5.4 % (ref 0–8)
ERYTHROCYTE [DISTWIDTH] IN BLOOD BY AUTOMATED COUNT: 13.6 % (ref 11.5–14.5)
EST. GFR  (NO RACE VARIABLE): 48 ML/MIN/1.73 M^2
GLUCOSE SERPL-MCNC: 109 MG/DL (ref 70–110)
HCT VFR BLD AUTO: 31.9 % (ref 40–54)
HGB BLD-MCNC: 10.2 G/DL (ref 14–18)
IMM GRANULOCYTES # BLD AUTO: 0.02 K/UL (ref 0–0.04)
IMM GRANULOCYTES NFR BLD AUTO: 0.4 % (ref 0–0.5)
LYMPHOCYTES # BLD AUTO: 0.7 K/UL (ref 1–4.8)
LYMPHOCYTES NFR BLD: 15.3 % (ref 18–48)
MAGNESIUM SERPL-MCNC: 2.6 MG/DL (ref 1.6–2.6)
MCH RBC QN AUTO: 30.3 PG (ref 27–31)
MCHC RBC AUTO-ENTMCNC: 32 G/DL (ref 32–36)
MCV RBC AUTO: 95 FL (ref 82–98)
MONOCYTES # BLD AUTO: 0.5 K/UL (ref 0.3–1)
MONOCYTES NFR BLD: 10.7 % (ref 4–15)
NEUTROPHILS # BLD AUTO: 3.1 K/UL (ref 1.8–7.7)
NEUTROPHILS NFR BLD: 68.2 % (ref 38–73)
NRBC BLD-RTO: 0 /100 WBC
PLATELET # BLD AUTO: 171 K/UL (ref 150–450)
PMV BLD AUTO: 9.2 FL (ref 9.2–12.9)
POCT GLUCOSE: 117 MG/DL (ref 70–110)
POCT GLUCOSE: 140 MG/DL (ref 70–110)
POTASSIUM SERPL-SCNC: 4 MMOL/L (ref 3.5–5.1)
RBC # BLD AUTO: 3.37 M/UL (ref 4.6–6.2)
SODIUM SERPL-SCNC: 135 MMOL/L (ref 136–145)
WBC # BLD AUTO: 4.59 K/UL (ref 3.9–12.7)

## 2023-08-28 PROCEDURE — 99900035 HC TECH TIME PER 15 MIN (STAT)

## 2023-08-28 PROCEDURE — 97530 THERAPEUTIC ACTIVITIES: CPT | Mod: CQ

## 2023-08-28 PROCEDURE — 63600175 PHARM REV CODE 636 W HCPCS: Performed by: THORACIC SURGERY (CARDIOTHORACIC VASCULAR SURGERY)

## 2023-08-28 PROCEDURE — 25000003 PHARM REV CODE 250: Performed by: THORACIC SURGERY (CARDIOTHORACIC VASCULAR SURGERY)

## 2023-08-28 PROCEDURE — 80048 BASIC METABOLIC PNL TOTAL CA: CPT | Performed by: THORACIC SURGERY (CARDIOTHORACIC VASCULAR SURGERY)

## 2023-08-28 PROCEDURE — 97116 GAIT TRAINING THERAPY: CPT | Mod: CQ

## 2023-08-28 PROCEDURE — 99232 SBSQ HOSP IP/OBS MODERATE 35: CPT | Mod: ,,, | Performed by: PHYSICIAN ASSISTANT

## 2023-08-28 PROCEDURE — 99232 PR SUBSEQUENT HOSPITAL CARE,LEVL II: ICD-10-PCS | Mod: ,,, | Performed by: PHYSICIAN ASSISTANT

## 2023-08-28 PROCEDURE — 83735 ASSAY OF MAGNESIUM: CPT | Performed by: THORACIC SURGERY (CARDIOTHORACIC VASCULAR SURGERY)

## 2023-08-28 PROCEDURE — 85025 COMPLETE CBC W/AUTO DIFF WBC: CPT | Performed by: THORACIC SURGERY (CARDIOTHORACIC VASCULAR SURGERY)

## 2023-08-28 PROCEDURE — 36415 COLL VENOUS BLD VENIPUNCTURE: CPT | Performed by: THORACIC SURGERY (CARDIOTHORACIC VASCULAR SURGERY)

## 2023-08-28 RX ORDER — AMIODARONE HYDROCHLORIDE 200 MG/1
200 TABLET ORAL DAILY
Qty: 30 TABLET | Refills: 3 | Status: SHIPPED | OUTPATIENT
Start: 2023-08-28 | End: 2023-09-19 | Stop reason: SDUPTHER

## 2023-08-28 RX ORDER — OXYCODONE HYDROCHLORIDE 5 MG/1
5 TABLET ORAL EVERY 4 HOURS PRN
Qty: 15 TABLET | Refills: 0 | Status: SHIPPED | OUTPATIENT
Start: 2023-08-28 | End: 2024-01-10

## 2023-08-28 RX ADMIN — FAMOTIDINE 20 MG: 20 TABLET, FILM COATED ORAL at 10:08

## 2023-08-28 RX ADMIN — FERROUS SULFATE TAB 325 MG (65 MG ELEMENTAL FE) 1 EACH: 325 (65 FE) TAB at 10:08

## 2023-08-28 RX ADMIN — ASPIRIN 81 MG: 81 TABLET, COATED ORAL at 10:08

## 2023-08-28 RX ADMIN — CYANOCOBALAMIN TAB 1000 MCG 1000 MCG: 1000 TAB at 10:08

## 2023-08-28 RX ADMIN — CLOPIDOGREL BISULFATE 75 MG: 75 TABLET ORAL at 10:08

## 2023-08-28 RX ADMIN — FOLIC ACID 1 MG: 1 TABLET ORAL at 10:08

## 2023-08-28 RX ADMIN — CHLORHEXIDINE GLUCONATE 0.12% ORAL RINSE 10 ML: 1.2 LIQUID ORAL at 10:08

## 2023-08-28 RX ADMIN — OXYCODONE HYDROCHLORIDE AND ACETAMINOPHEN 500 MG: 500 TABLET ORAL at 10:08

## 2023-08-28 RX ADMIN — METOPROLOL TARTRATE 25 MG: 25 TABLET, FILM COATED ORAL at 10:08

## 2023-08-28 RX ADMIN — AMIODARONE HYDROCHLORIDE 0.5 MG/MIN: 1.8 INJECTION, SOLUTION INTRAVENOUS at 10:08

## 2023-08-28 NOTE — PLAN OF CARE
Nutrition Recommendations 8/28:  1. Recommend pt continues on Cardiac diet as medically appropriate   2. Recommend Suplena BID to assist filling nutritional gaps   3. Recommend Nicola BID x 2 weeks for surgical wound healing   4. Weekly weights  5. Collaboration of care with medical providers     Goals:   1. Pt will tolerate and consume > 75% EEN and EPN prior to RD follow up   2. Pt will consume Nicola BID prior to RD follow up   3. Cardiac diet education will be provided at RD follow up (Resolved)     Mickie Omalley, Registration Eligible, Provisional LDN

## 2023-08-28 NOTE — PROGRESS NOTES
Home Oxygen Evaluation    Date Performed: 8/28/2023    1) Patient's Home O2 Sat on room air, while at rest: 87        If O2 sats on room air at rest are 88% or below, patient qualifies. No additional testing needed. Document N/A in steps 2 and 3. If 89% or above, complete steps 2.      2) Patient's O2 Sat on room air while exercising: na        If O2 sats on room air while exercising remain 89% or above patient does not qualify, no further testing needed Document N/A in step 3. If O2 sats on room air while exercising are 88% or below, continue to step 3.      3) Patient's O2 Sat while exercising on O2: 96 at 3 LPM         (Must show improvement from #2 for patients to qualify)    If O2 sats improve on oxygen, patient qualifies for portable oxygen. If not, the patient does not qualify.

## 2023-08-28 NOTE — PROGRESS NOTES
[facility] - Telemetry ([facility])  Adult Nutrition  Progress Note    SUMMARY       Recommendations    Recommendation/Intervention:   1. Recommend pt continues on Cardiac diet as medically appropriate   2. Recommend Suplena BID to assist filling nutritional gaps   3. Recommend Nicola BID x 2 weeks for surgical wound healing   4. Weekly weights    Goals:   1. Pt will tolerate and consume > 75% EEN and EPN prior to RD follow up   2. Pt will consume Nicola BID prior to RD follow up   3. Cardiac diet education will be provided at RD follow up (Resolved)   Nutrition Goal Status: continues/ resolved   Communication of RD Recs: other (comment); (POC, sticky note)     Assessment and Plan    Nutrition Problem  Inadequate energy intake (Improving)   Increased protein needs      Related to (etiology):   Increased demand for nutrition      Signs and Symptoms (as evidenced by):   Estimated intake of food less than estimated needs: 25% PO intake of meals   Surgery      Interventions(treatment strategy):  1. Sodium and Fat modified diet   2. Commercial beverage  3. Wound healing medical food supplement therapy  4. Collaboration of care with medical providers      Nutrition Diagnosis Status:   Improving/ Continues     Malnutrition Assessment     Skin (Micronutrient): wounds unhealed                                 Reason for Assessment    Reason For Assessment: consult (Post Op)  Diagnosis:  (S/P CABG x 2)  Relevant Medical History: HTN, CKD 3, HLD, Prediabetes, Hypothyroidism, CAD, Respiratory insufficiency  Hx: Restrictive lung disease, Colon polyps, Obesity, Hyperammagloblinemia  General Information Comments:   8/24/23: 67 y.o. Male admitted for S/P CABG x 2. Pt currently in the ICU on Diabetic 2000 calories, Cardiac diet, consulted for post op. H&P noted that the pt presented to the hospital for CABG x 2 on 8/23/23. Cardiology NP noted on 8/24 that the pt reported that he has some soreness but feels ok and that he is recovering well,  "still has chest tubes in place but will likely removed today. Visited pt at bedside, pt was visiting with family and eating lunch, will provide Cardiac diet education at follow up. Reviewed chart: LBM 8/23; Skin: dry, bruised, incision L leg/ chest WDL; Aryan score: 16 (mild risk); Edema: None. Labs, meds, weight reviewed. Labs 8/24: Calcium (L), Anion gap (L), Cl (H), Glu (H), eGFR 56. Weight charted 5/25 267 lbs, 8/23 258 lbs (BMI 33.23, Obese), -9 lb wt loss (3% wt change) x 3 months. No NFPE warranted at this time, BMI >30. RD will continue to monitor.    Nutrition Discharge Planning: Consistent carbohydrate, Cardiac diet + Nicola BID x 2 weeks + Suplena if warranted    Follow up:  8/28: RD follow up. Visited pt at bedside, pt wife present. Pt confirmed he is consuming 75% PO intake of meals, not experiencing any N/V/D, no abdominal pain/distention, denies chewing/swallowing difficulties, no cultural/spiritual/Spiritism beliefs that refrain foods, daily MTV intake,  lbs last weighed at MD office PTA, no NFPE warranted, pt appeared well nourished and BMI > 30. Provided pt with "Cardiac TLC Nutrition Therapy" and "Low Sodium Nutrition Therapy" per the [facility] handouts w/ RD contact information. RD educated patient on low sodium, general healthful diet r/t recent hospital diagnosis. Recommended a well balanced diet with a variety of fresh foods, fruits and vegetables (5 cups/day), whole grains (3 oz/day), and fat-free or low fat dairy. Discussed reading food packages, food labels, and nutrition facts labels to identify nutrient content of foods. Discussed the importance of limiting sodium to less than 2,000 mg per day. Recommended salt free seasonings and other herbs and spices in meals to enhance flavor without additional sodium. Discussed dietary sources of cholesterol, the importance of incorporating healthy fats into the diet, and avoiding saturated and trans fats for heart health. Pt and pt wife expressed " "understanding, encouraged pt and pt wife to use RD contact information for any future questions/concerns they may have. Reviewed chart: LBM 8/27; Skin: incision L leg/ chest WDL; Aryan score improved to 20 (no risk); No edema continues. Labs, meds, weight reviewed. Weight charted 8/23 258 lbs, 8/28 259 lbs (BMI 33.34, Obese), +1 lb wt gain x 5 days. RD will continue to monitor.     Nutrition Risk Screen    Nutrition Risk Screen: no indicators present    Nutrition/Diet History    Spiritual, Cultural Beliefs, Amish Practices, Values that Affect Care: no  Food Allergies: NKFA  Factors Affecting Nutritional Intake: pain    Anthropometrics    Temp: 98.5 °F (36.9 °C)  Height Method: Stated  Height: 6' 2" (188 cm)  Height (inches): 74 in  Weight Method: Bed Scale  Weight: 117.8 kg (259 lb 11.2 oz)  Weight (lb): 259.7 lb  Ideal Body Weight (IBW), Male: 190 lb  % Ideal Body Weight, Male (lb): 136.22 %  BMI (Calculated): 33.3  BMI Grade: 30 - 34.9- obesity - grade I     Wt Readings from Last 15 Encounters:   08/28/23 117.8 kg (259 lb 11.2 oz)   08/18/23 117 kg (258 lb)   08/18/23 117.2 kg (258 lb 6.1 oz)   08/17/23 117.2 kg (258 lb 6.1 oz)   08/14/23 116.1 kg (256 lb)   08/04/23 117 kg (258 lb)   08/04/23 117 kg (258 lb)   07/18/23 117 kg (258 lb)   07/10/23 117.3 kg (258 lb 9.6 oz)   05/25/23 121.3 kg (267 lb 6.7 oz)   01/04/23 126.8 kg (279 lb 8.7 oz)   10/12/22 125.4 kg (276 lb 7.3 oz)   06/22/22 124.9 kg (275 lb 5.7 oz)   06/16/22 120.2 kg (264 lb 15.9 oz)   06/16/22 120.2 kg (265 lb)     Lab/Procedures/Meds    Pertinent Labs Reviewed: reviewed  Pertinent Labs Comments: Calcium (L), Anion gap (L), Cl (H), Glu (H), eGFR 56  Pertinent Medications Reviewed: reviewed  Pertinent Medications Comments: potassium chloride, polyethylene glycol, Lopressor, magnesium hydroxide, furosemide, ferrous sulfate, famotidine, clopidogrel, chlorhexidine, asprin, vitamin C  BMP  Lab Results   Component Value Date     (L) 08/28/2023 "    K 4.0 08/28/2023     08/28/2023    CO2 21 (L) 08/28/2023    BUN 31 (H) 08/28/2023    CREATININE 1.6 (H) 08/28/2023    CALCIUM 8.5 (L) 08/28/2023    ANIONGAP 12 08/28/2023    EGFRNORACEVR 48 (A) 08/28/2023     Scheduled Meds:   ascorbic acid (vitamin C)  500 mg Oral BID    aspirin  81 mg Oral Daily    atorvastatin  40 mg Oral QHS    clopidogreL  75 mg Oral Daily    cyanocobalamin  1,000 mcg Oral Daily    enoxparin  40 mg Subcutaneous Daily    famotidine  20 mg Oral BID    ferrous sulfate  1 tablet Oral Daily    folic acid  1 mg Oral Daily    magnesium hydroxide 400 mg/5 ml  30 mL Oral Daily    metoprolol tartrate  25 mg Oral BID    polyethylene glycol  17 g Oral Daily     Continuous Infusions:   amiodarone in dextrose 5% 0.5 mg/min (08/28/23 1013)     PRN Meds:.acetaminophen, albumin human 5%, dextrose 10%, dextrose 10%, glucagon (human recombinant), glucose, glucose, insulin aspart U-100, lactated ringers, magnesium sulfate IVPB, metoclopramide HCl, ondansetron, oxyCODONE, pneumoc 20-michael conj-dip cr(PF)    Physical Findings/Assessment         Estimated/Assessed Needs    Weight Used For Calorie Calculations: 117.8 kg (259 lb 11.2 oz)  Energy Calorie Requirements (kcal): 2033 kcals (MSJ x no AF (CABG, Obese BMI 33.34)  Energy Need Method: [facility]  Protein Requirements: 51-57 g (0.55-6.0 g/kg Adj BW (CKD , non dialysis, Obese 136.22% IBW)  Weight Used For Protein Calculations: 94.2 kg (207 lb 10.8 oz) (Adj BW)  Fluid Requirements (mL): 2033 mL (1 mL/kcal)  Estimated Fluid Requirement Method: RDA Method  RDA Method (mL): 2033  CHO Requirement: 254 g (2033 kcals/8)      Nutrition Prescription Ordered    Current Diet Order: Cardiac diet    Evaluation of Received Nutrient/Fluid Intake  I/O: (Net since admit)  8/24: -762.9 mL  8/28: -719.1 mL    Energy Calories Required: meeting needs  Protein Required: meeting needs  Fluid Required: exceeding needs  Total Fluid Intake (mL): 2068.8  Tolerance: tolerating  %  Intake of Estimated Energy Needs: 50 - 75 %  % Meal Intake: 75%     Nutrition Risk    Level of Risk/Frequency of Follow-up: Low (F/u x 1 weekly)     Monitor and Evaluation    Food and Nutrient Intake: energy intake, food and beverage intake  Food and Nutrient Adminstration: diet order  Knowledge/Beliefs/Attitudes: food and nutrition knowledge/skill, beliefs and attitudes  Anthropometric Measurements: weight, weight change, body mass index  Biochemical Data, Medical Tests and Procedures: electrolyte and renal panel, glucose/endocrine profile     Nutrition Follow-Up    RD Follow-up?: Yes  Mickie Omalley, Registration Eligible, Provisional LDN

## 2023-08-28 NOTE — PLAN OF CARE
A240/A240 CECIL Rodriguez is a 67 y.o.male admitted on 8/23/2023 for S/P CABG x 2   Code Status: Full Code MRN: [id number]   Review of patient's allergies indicates:  No Known Allergies  Past Medical History:   Diagnosis Date    Abnormal nuclear stress test 08/13/2023    Benign hypertension     Class 1 obesity in adult 05/25/2023    Coronary artery disease     History of colon polyps     Hypergammaglobulinemia     Hypertensive kidney disease with chronic kidney disease stage III     Hypothyroidism, unspecified       PRN meds    acetaminophen, 650 mg, Q6H PRN  albumin human 5%, 25 g, PRN  dextrose 10%, 12.5 g, PRN  dextrose 10%, 25 g, PRN  glucagon (human recombinant), 1 mg, PRN  glucose, 16 g, PRN  glucose, 24 g, PRN  insulin aspart U-100, 0-10 Units, QID (AC + HS) PRN  lactated ringers, 1,000 mL, PRN  magnesium sulfate IVPB, 4 g, PRN  metoclopramide HCl, 5 mg, Q6H PRN  ondansetron, 4 mg, Q12H PRN  oxyCODONE, 5 mg, Q4H PRN  pneumoc 20-michael conj-dip cr(PF), 0.5 mL, vaccine x 1 dose      AVS Discharge instructions received and reviewed with pt and family at bedside.  Pt voiced understanding and all questions answered to satisfaction.  Stressed importance to making and keeping all follow up appointments.  Medication and paper prescription at bedside and reviewed with pt.  Tele monitor removed and brought to monitor tech.  IV d/c'd with tip intact, pressure dressing applied.  Pt transported via wheelchair with home O2 to personal transportation at main entrance.      Orientation: oriented x 4  [facility] Coma Scale Score: 15     Lead Monitored: Lead II Rhythm: normal sinus rhythm    Cardiac/Telemetry Box Number: [id number]  VTE Required Core Measure: (TEDs) Compression stocking therapy initiated/maintained Last Bowel Movement: 08/27/23  Diet Cardiac  Diet Cardiac  Voiding Characteristics: voids spontaneously without difficulty  Aryan Score: 20  Fall Risk Score: 11  Accucheck [x]   Freq?      Lines/Drains/Airways       None

## 2023-08-28 NOTE — PLAN OF CARE
Home 02 approved and delivered by [facility] orders in chart.       08/28/23 1503   Post-Acute Status   Post-Acute Authorization HME;Home Health   HME Status Set-up Complete/Auth obtained   Home Health Status Set-up Complete/Auth obtained   Discharge Plan   Discharge Plan A [facility]

## 2023-08-28 NOTE — PLAN OF CARE
[facility] - Telemetry (Hospital)  Discharge Final Note    Primary Care Provider: Mt Noel MD    Expected Discharge Date: 8/28/2023    Final Discharge Note (most recent)       Final Note - 08/28/23 1504          Final Note    Assessment Type Final Discharge Note (P)      Anticipated Discharge Disposition Home-Health Care Svc (P)         Post-Acute Status    Post-Acute Authorization HME;Home Health (P)      HME Status Set-up Complete/Auth obtained (P)    Home 02 - [facility]    Home Health Status Set-up Complete/Auth obtained (P)    [facility]    Discharge Delays None known at this time (P)                      Important Message from Medicare

## 2023-08-28 NOTE — PT/OT/SLP PROGRESS
Physical Therapy  Treatment    Abdirahman Rodriguez   MRN: [id number]   Admitting Diagnosis: S/P CABG x 2    PT Received On: 08/28/23  PT Start Time: 0735     PT Stop Time: 0805    PT Total Time (min): 30 min       Billable Minutes:  Gait Training 15 and Therapeutic Activity 15    Treatment Type: Treatment  PT/PTA: PTA     Number of PTA visits since last PT visit: 2       General Precautions: Standard, fall, sternal  Orthopedic Precautions: N/A  Braces: N/A  Respiratory Status: Room air         Subjective:  Communicated with patient's nurse, Biju, and completed Epic chart review prior to session.  Patient agreed to PT session.     Pain/Comfort  Pain Rating 1: 0/10  Pain Rating Post-Intervention 1: 0/10    Objective:   Patient found with: telemetry, peripheral IV, wound vac    Reviewed sternal precautions and re enforced importance of compliance.     STS from chair No AD: SBA    400ft No AD CGA-SBA (steady pace throughout; no SOB or LOB noted)    Stand pivot T/F to chair No AD: SBA    Completed x15 reps AROM TE to BLE: LAQ, Hip Flex, AP   Intermittent cues given as needed to maintain correct form during repetitions    Educated patient on importance of increased tolerance to upright position and direct impact on CV endurance and strength. Patient encouraged to sit up in chair/ EOB, for a minimum of 2 consecutive hours, 3x per day. Encouraged patient to perform AROM TE to BLE throughout the day within all available planes of motion. Re enforced importance of utilizing call light to meet needs in room and not attempt to get up without staff assistance. Patient verbalized understanding and agreed to comply.        AM-PAC 6 CLICK MOBILITY  How much help from another person does this patient currently need?   1 = Unable, Total/Dependent Assistance  2 = A lot, Maximum/Moderate Assistance  3 = A little, Minimum/Contact Guard/Supervision  4 = None, Modified [place]/Independent    Turning over in bed (including adjusting  bedclothes, sheets and blankets)?: 1 (NT)  Sitting down on and standing up from a chair with arms (e.g., wheelchair, bedside commode, etc.): 4  Moving from lying on back to sitting on the side of the bed?: 1 (NT)  Moving to and from a bed to a chair (including a wheelchair)?: 4  Need to walk in hospital room?: 4  Climbing 3-5 steps with a railing?: 1 (NT)  Basic Mobility Total Score: 15    AM-PAC Raw Score CMS G-Code Modifier Level of Impairment Assistance   6 % Total / Unable   7 - 9 CM 80 - 100% Maximal Assist   10 - 14 CL 60 - 80% Moderate Assist   15 - 19 CK 40 - 60% Moderate Assist   20 - 22 CJ 20 - 40% Minimal Assist   23 CI 1-20% SBA / CGA   24 CH 0% Independent/ Mod I     Patient left up in chair with call button in reach and visitor present.    Assessment:  Abdirahman Rodriguez is a 67 y.o. male with a medical diagnosis of S/P CABG x 2 and presents with overall decline in functional mobility. Patient would continue to benefit from skilled PT to address functional limitations listed below in order to return to PLOF/decrease caregiver burden. Patient was able to significantly increase gait distance prior to onset of fatigue. Noted improved overall strength, mobility and CV endurance/activity tolerance. Progressing well towards goals established within PT POC.     Rehab identified problem list/impairments: impaired endurance, impaired functional mobility, gait instability, impaired balance, decreased upper extremity function, decreased ROM, impaired cardiopulmonary response to activity, other (comment) (STERNAL PRECAUTIONS)    Rehab potential is good.    Activity tolerance: Good    Discharge recommendations: home health PT      Barriers to discharge:      Equipment recommendations: shower chair     GOALS:   Multidisciplinary Problems       Physical Therapy Goals          Problem: Physical Therapy    Goal Priority Disciplines Outcome Goal Variances Interventions   Physical Therapy Goal     PT, PT/OT Ongoing,  Progressing     Description: LTG'S TO BE MET IN 14 DAYS (9-7-23)  PT WILL REQUIRE SBA FOR BED MOBILITY  PT WILL REQUIRE SPV FOR BED<>CHAIR TF'S  PT WILL  FEET NO AD WITH SPV  PT WILL INC AMPAC SCORE BY 2 POINTS TO PROGRESS GROSS FUNC MOBILITY                         PLAN:    Patient to be seen 3 x/week to address the above listed problems via gait training, therapeutic activities, therapeutic exercises  Plan of Care expires: 09/07/23  Plan of Care reviewed with: patient         08/28/2023

## 2023-08-28 NOTE — PLAN OF CARE
POD #5  CABG X2. No complaints of pain overnight. Amio gtt continued, NSR all night. [place]a wound vac in place. On 2L NC, pulse ox 86-89% on room air while sleeping. No complaints at this time, call bell within reach.

## 2023-08-28 NOTE — PROGRESS NOTES
[facility] - Telemetry ([facility])  Cardiology  Progress Note    Patient Name: Abdirahman Rodriguez  MRN: [id number]  Admission Date: 8/23/2023  Hospital Length of Stay: 5 days  Code Status: Full Code   Attending Physician: Emma Arzola MD   Primary Care Physician: Mt Noel MD  Expected Discharge Date: 8/28/2023  Principal Problem:S/P CABG x 2    Subjective:   HPI:  Mr Rodriguez is a 68 y/o AAM with HTN, hypothyroidism, CKDIII, and CAD who presents to the ICU following CABG x 2 with Dr Arzola. Cardiology consulted to assist with management. Pt seen and examined today, sitting up in bedside chair, reports some soreness but feels ok. Labs reviewed, chart reviewed    Hospital Course:   8/25/23 Pt seen and examined today sitting up in bedside chair, walked with PT/OT this loreto, feels good. Labs reviewed, chart reviewed    8/26/23 - stable on floor, no acute events, CP improving; stable hemodyn    8/27/23 - AF v RVR this am on Amio otherwise pt has no CP/SOB; progressed well with PT/OT yesterday.     8/28/23-Patient seen and examined today, sitting up in bedside chair. Feels ok. Worked with PT/OT today. Converted back to SR. Labs reviewed. Creatinine 1.6. CTS planning to d/c home today.          Review of Systems   Constitutional: Positive for malaise/fatigue.   HENT: Negative.     Eyes: Negative.    Cardiovascular:  Positive for dyspnea on exertion.   Respiratory:  Positive for shortness of breath.    Endocrine: Negative.    Hematologic/Lymphatic: Negative.    Skin: Negative.    Musculoskeletal: Negative.    Gastrointestinal: Negative.    Genitourinary: Negative.    Neurological: Negative.    Psychiatric/Behavioral: Negative.     Allergic/Immunologic: Negative.      Objective:     Vital Signs (Most Recent):  Temp: 98.5 °F (36.9 °C) (08/28/23 0837)  Pulse: 85 (08/28/23 0837)  Resp: 18 (08/28/23 0837)  BP: (!) 105/59 (08/28/23 0837)  SpO2: (!) 93 % (08/28/23 0837) Vital Signs (24h Range):  Temp:  [97 °F (36.1 °C)-98.5 °F  (36.9 °C)] 98.5 °F (36.9 °C)  Pulse:  [72-85] 85  Resp:  [17-20] 18  SpO2:  [93 %-98 %] 93 %  BP: (105-119)/(59-79) 105/59     Weight: 117.8 kg (259 lb 11.2 oz)  Body mass index is 33.34 kg/m².     SpO2: (!) 93 %         Intake/Output Summary (Last 24 hours) at 8/28/2023 1206  Last data filed at 8/28/2023 0224  Gross per 24 hour   Intake 1333.83 ml   Output 400 ml   Net 933.83 ml       Lines/Drains/Airways       Peripheral Intravenous Line  Duration                  Peripheral IV - Single Lumen 08/23/23 0550 20 G Anterior;Left Hand 5 days                       Physical Exam  Vitals and nursing note reviewed.   Constitutional:       General: He is not in acute distress.     Appearance: Normal appearance. He is well-developed. He is not diaphoretic.      Comments: On supplemental O2   HENT:      Head: Normocephalic and atraumatic.   Eyes:      General:         Right eye: No discharge.         Left eye: No discharge.      Pupils: Pupils are equal, round, and reactive to light.   Neck:      Thyroid: No thyromegaly.      Vascular: No JVD.      Trachea: No tracheal deviation.   Cardiovascular:      Rate and Rhythm: Normal rate and regular rhythm.      Heart sounds: Normal heart sounds, S1 normal and S2 normal. No murmur heard.     Comments: Sternotomy site C/D/I; no active bleeding erythema or drainage  Pulmonary:      Effort: Pulmonary effort is normal. No respiratory distress.      Breath sounds: No wheezing.      Comments: Diminished BS at bases  Abdominal:      General: There is no distension.      Tenderness: There is no rebound.   Musculoskeletal:      Cervical back: Neck supple.      Right lower leg: Edema present.      Left lower leg: Edema present.   Skin:     General: Skin is warm and dry.      Findings: No erythema.   Neurological:      General: No focal deficit present.      Mental Status: He is alert and oriented to person, place, and time.   Psychiatric:         Mood and Affect: Mood normal.          "Behavior: Behavior normal.         Thought Content: Thought content normal.            Significant Labs: CMP   Recent Labs   Lab 08/27/23  0509 08/27/23  1812 08/28/23  0514   * 136 135*   K 4.1 4.0 4.0    102 102   CO2 22* 24 21*   * 125* 109   BUN 37* 33* 31*   CREATININE 1.7* 1.7* 1.6*   CALCIUM 8.3* 8.9 8.5*   ANIONGAP 11 10 12   , CBC   Recent Labs   Lab 08/27/23  0509 08/27/23  1812 08/28/23  0514   WBC 5.91 4.74 4.59   HGB 10.5* 11.4* 10.2*   HCT 32.5* 35.1* 31.9*    192 171   , Troponin No results for input(s): "TROPONINI" in the last 48 hours., and All pertinent lab results from the last 24 hours have been reviewed.    Significant Imaging: Echocardiogram: Transthoracic echo (TTE) complete (Cupid Only):   Results for orders placed or performed during the hospital encounter of 08/04/23   Echo   Result Value Ref Range    BSA 2.47 m2    LVOT stroke volume 62.87 cm3    LVIDd 5.47 3.5 - 6.0 cm    LV Systolic Volume 57.91 mL    LV Systolic Volume Index 23.9 mL/m2    LVIDs 3.69 2.1 - 4.0 cm    LV Diastolic Volume 145.32 mL    LV Diastolic Volume Index 60.05 mL/m2    IVS 1.07 0.6 - 1.1 cm    LVOT diameter 2.19 cm    LVOT area 3.8 cm2    FS 33 28 - 44 %    Left Ventricle Relative Wall Thickness 0.41 cm    Posterior Wall 1.11 (A) 0.6 - 1.1 cm    LV mass 236.91 g    LV Mass Index 98 g/m2    MV Peak E Anam 0.56 m/s    TDI LATERAL 0.09 m/s    TDI SEPTAL 0.10 m/s    E/E' ratio 5.89 m/s    MV Peak A Anam 0.59 m/s    TR Max Anam 1.44 m/s    E/A ratio 0.95     IVRT 95.15 msec    E wave deceleration time 244.86 msec    LV SEPTAL E/E' RATIO 5.60 m/s    LV LATERAL E/E' RATIO 6.22 m/s    LVOT peak anam 0.73 m/s    Left Ventricular Outflow Tract Mean Velocity 0.58 cm/s    Left Ventricular Outflow Tract Mean Gradient 1.43 mmHg    LA size 3.81 cm    Left Atrium Major Axis 5.31 cm    Left Atrium Minor Axis 4.51 cm    RVOT peak VTI 12.8 cm    RA Major Axis 4.59 cm    AV mean gradient 3 mmHg    AV peak gradient 4 " mmHg    Ao peak afsaneh 1.04 m/s    Ao VTI 21.80 cm    LVOT peak VTI 16.70 cm    AV valve area 2.88 cm²    AV Velocity Ratio 0.70     AV index (prosthetic) 0.77     SANCHO by Velocity Ratio 2.64 cm²    Triscuspid Valve Regurgitation Peak Gradient 8 mmHg    PV mean gradient 1 mmHg    RVOT peak afsaneh 0.56 m/s    Ao root annulus 3.44 cm    STJ 3.74 cm    Ascending aorta 3.74 cm    IVC diameter 1.63 cm    Mean e' 0.10 m/s    ZLVIDS -4.88     ZLVIDD -7.38     LA Volume Index 23.5 mL/m2    LA volume 56.86 cm3    LA WIDTH 3.6 cm    TAPSE 2.20 cm    RA Width 4.1 cm    TV resting pulmonary artery pressure 11 mmHg    RV TB RVSP 4 mmHg    Est. RA pres 3 mmHg    Narrative      Left Ventricle: The left ventricle is normal in size. Normal wall   thickness. Normal wall motion. There is normal systolic function with a   visually estimated ejection fraction of 55 - 70%. There is normal   diastolic function.    Right Ventricle: Normal right ventricular cavity size. Wall thickness   is normal. Right ventricle wall motion  is normal. Systolic function is   normal.    Tricuspid Valve: There is mild regurgitation.    IVC/SVC: Normal venous pressure at 3 mmHg.     , EKG: Reviewed, and X-Ray: CXR: X-Ray Chest 1 View (CXR): No results found for this visit on 08/23/23. and X-Ray Chest PA and Lateral (CXR): No results found for this visit on 08/23/23.    Assessment and Plan:   Patient who presents with multivessel CAD, recovering well s/p CABG x 2. Converted back to SR. Still on supplemental O2. Continue current mgmt as per CTS. Follow-up in clinic.  * S/P CABG x 2  Cont management per CTS  Progressing well, cont PT OT and cardiac rehab upon DC    8/27/23 - post op AFib on Amio; rate controlled this AM     8/28/23  -Converted back to SR  -Continue ASA, Plavix, BB, statin  -Defer AC to CTS  -Follow-up in clinic    Respiratory insufficiency  -On supplemental O2  -CTS managing    Coronary artery disease  Cont ASA, statin, plavix, BB  S/p  CABG    Mixed hyperlipidemia  statin    Benign hypertension  Titrate meds         VTE Risk Mitigation (From admission, onward)         Ordered     enoxaparin injection 40 mg  Daily         08/25/23 0745     IP VTE HIGH RISK PATIENT  Once         08/25/23 0745     Place sequential compression device  Until discontinued         08/25/23 0745                Day Coronado PA-C  Cardiology  [facility] - Telemetry ([facility])

## 2023-08-28 NOTE — ASSESSMENT & PLAN NOTE
Cont management per CTS  Progressing well, cont PT OT and cardiac rehab upon DC    8/27/23 - post op AFib on Amio; rate controlled this AM     8/28/23  -Converted back to SR  -Continue ASA, Plavix, BB, statin  -Defer AC to CTS  -Follow-up in clinic

## 2023-08-28 NOTE — SUBJECTIVE & OBJECTIVE
Review of Systems   Constitutional: Positive for malaise/fatigue.   HENT: Negative.     Eyes: Negative.    Cardiovascular:  Positive for dyspnea on exertion.   Respiratory:  Positive for shortness of breath.    Endocrine: Negative.    Hematologic/Lymphatic: Negative.    Skin: Negative.    Musculoskeletal: Negative.    Gastrointestinal: Negative.    Genitourinary: Negative.    Neurological: Negative.    Psychiatric/Behavioral: Negative.     Allergic/Immunologic: Negative.      Objective:     Vital Signs (Most Recent):  Temp: 98.5 °F (36.9 °C) (08/28/23 0837)  Pulse: 85 (08/28/23 0837)  Resp: 18 (08/28/23 0837)  BP: (!) 105/59 (08/28/23 0837)  SpO2: (!) 93 % (08/28/23 0837) Vital Signs (24h Range):  Temp:  [97 °F (36.1 °C)-98.5 °F (36.9 °C)] 98.5 °F (36.9 °C)  Pulse:  [72-85] 85  Resp:  [17-20] 18  SpO2:  [93 %-98 %] 93 %  BP: (105-119)/(59-79) 105/59     Weight: 117.8 kg (259 lb 11.2 oz)  Body mass index is 33.34 kg/m².     SpO2: (!) 93 %         Intake/Output Summary (Last 24 hours) at 8/28/2023 1206  Last data filed at 8/28/2023 0224  Gross per 24 hour   Intake 1333.83 ml   Output 400 ml   Net 933.83 ml       Lines/Drains/Airways       Peripheral Intravenous Line  Duration                  Peripheral IV - Single Lumen 08/23/23 0550 20 G Anterior;Left Hand 5 days                       Physical Exam  Vitals and nursing note reviewed.   Constitutional:       General: He is not in acute distress.     Appearance: Normal appearance. He is well-developed. He is not diaphoretic.      Comments: On supplemental O2   HENT:      Head: Normocephalic and atraumatic.   Eyes:      General:         Right eye: No discharge.         Left eye: No discharge.      Pupils: Pupils are equal, round, and reactive to light.   Neck:      Thyroid: No thyromegaly.      Vascular: No JVD.      Trachea: No tracheal deviation.   Cardiovascular:      Rate and Rhythm: Normal rate and regular rhythm.      Heart sounds: Normal heart sounds, S1  "normal and S2 normal. No murmur heard.     Comments: Sternotomy site C/D/I; no active bleeding erythema or drainage  Pulmonary:      Effort: Pulmonary effort is normal. No respiratory distress.      Breath sounds: No wheezing.      Comments: Diminished BS at bases  Abdominal:      General: There is no distension.      Tenderness: There is no rebound.   Musculoskeletal:      Cervical back: Neck supple.      Right lower leg: Edema present.      Left lower leg: Edema present.   Skin:     General: Skin is warm and dry.      Findings: No erythema.   Neurological:      General: No focal deficit present.      Mental Status: He is alert and oriented to person, place, and time.   Psychiatric:         Mood and Affect: Mood normal.         Behavior: Behavior normal.         Thought Content: Thought content normal.            Significant Labs: CMP   Recent Labs   Lab 08/27/23  0509 08/27/23 1812 08/28/23  0514   * 136 135*   K 4.1 4.0 4.0    102 102   CO2 22* 24 21*   * 125* 109   BUN 37* 33* 31*   CREATININE 1.7* 1.7* 1.6*   CALCIUM 8.3* 8.9 8.5*   ANIONGAP 11 10 12   , CBC   Recent Labs   Lab 08/27/23  0509 08/27/23  1812 08/28/23  0514   WBC 5.91 4.74 4.59   HGB 10.5* 11.4* 10.2*   HCT 32.5* 35.1* 31.9*    192 171   , Troponin No results for input(s): "TROPONINI" in the last 48 hours., and All pertinent lab results from the last 24 hours have been reviewed.    Significant Imaging: Echocardiogram: Transthoracic echo (TTE) complete (Cupid Only):   Results for orders placed or performed during the hospital encounter of 08/04/23   Echo   Result Value Ref Range    BSA 2.47 m2    LVOT stroke volume 62.87 cm3    LVIDd 5.47 3.5 - 6.0 cm    LV Systolic Volume 57.91 mL    LV Systolic Volume Index 23.9 mL/m2    LVIDs 3.69 2.1 - 4.0 cm    LV Diastolic Volume 145.32 mL    LV Diastolic Volume Index 60.05 mL/m2    IVS 1.07 0.6 - 1.1 cm    LVOT diameter 2.19 cm    LVOT area 3.8 cm2    FS 33 28 - 44 %    Left " Ventricle Relative Wall Thickness 0.41 cm    Posterior Wall 1.11 (A) 0.6 - 1.1 cm    LV mass 236.91 g    LV Mass Index 98 g/m2    MV Peak E Anam 0.56 m/s    TDI LATERAL 0.09 m/s    TDI SEPTAL 0.10 m/s    E/E' ratio 5.89 m/s    MV Peak A Anam 0.59 m/s    TR Max Anam 1.44 m/s    E/A ratio 0.95     IVRT 95.15 msec    E wave deceleration time 244.86 msec    LV SEPTAL E/E' RATIO 5.60 m/s    LV LATERAL E/E' RATIO 6.22 m/s    LVOT peak anam 0.73 m/s    Left Ventricular Outflow Tract Mean Velocity 0.58 cm/s    Left Ventricular Outflow Tract Mean Gradient 1.43 mmHg    LA size 3.81 cm    Left Atrium Major Axis 5.31 cm    Left Atrium Minor Axis 4.51 cm    RVOT peak VTI 12.8 cm    RA Major Axis 4.59 cm    AV mean gradient 3 mmHg    AV peak gradient 4 mmHg    Ao peak anam 1.04 m/s    Ao VTI 21.80 cm    LVOT peak VTI 16.70 cm    AV valve area 2.88 cm²    AV Velocity Ratio 0.70     AV index (prosthetic) 0.77     SANCHO by Velocity Ratio 2.64 cm²    Triscuspid Valve Regurgitation Peak Gradient 8 mmHg    PV mean gradient 1 mmHg    RVOT peak anam 0.56 m/s    Ao root annulus 3.44 cm    STJ 3.74 cm    Ascending aorta 3.74 cm    IVC diameter 1.63 cm    Mean e' 0.10 m/s    ZLVIDS -4.88     ZLVIDD -7.38     LA Volume Index 23.5 mL/m2    LA volume 56.86 cm3    LA WIDTH 3.6 cm    TAPSE 2.20 cm    RA Width 4.1 cm    TV resting pulmonary artery pressure 11 mmHg    RV TB RVSP 4 mmHg    Est. RA pres 3 mmHg    Narrative      Left Ventricle: The left ventricle is normal in size. Normal wall   thickness. Normal wall motion. There is normal systolic function with a   visually estimated ejection fraction of 55 - 70%. There is normal   diastolic function.    Right Ventricle: Normal right ventricular cavity size. Wall thickness   is normal. Right ventricle wall motion  is normal. Systolic function is   normal.    Tricuspid Valve: There is mild regurgitation.    IVC/SVC: Normal venous pressure at 3 mmHg.     , EKG: Reviewed, and X-Ray: CXR: X-Ray Chest 1 View  (CXR): No results found for this visit on 08/23/23. and X-Ray Chest PA and Lateral (CXR): No results found for this visit on 08/23/23.

## 2023-08-29 ENCOUNTER — TELEPHONE (OUTPATIENT)
Dept: CARDIOLOGY | Facility: CLINIC | Age: 68
End: 2023-08-29
Payer: COMMERCIAL

## 2023-08-29 ENCOUNTER — PATIENT MESSAGE (OUTPATIENT)
Dept: CARDIOLOGY | Facility: CLINIC | Age: 68
End: 2023-08-29
Payer: COMMERCIAL

## 2023-08-29 PROCEDURE — G0180 MD CERTIFICATION HHA PATIENT: HCPCS | Mod: ,,, | Performed by: INTERNAL MEDICINE

## 2023-08-29 PROCEDURE — G0180 PR HOME HEALTH MD CERTIFICATION: ICD-10-PCS | Mod: ,,, | Performed by: INTERNAL MEDICINE

## 2023-08-29 NOTE — TELEPHONE ENCOUNTER
Called back - no answer - could not leave message- we have paperwork but still waiting on DR Arzoal to sign - will send him a reminder and call you back when completed          ----- Message from Ara Kyle sent at 8/29/2023 10:09 AM CDT -----  Contact: Abdirahman  Patient is calling to speak with the nurse regarding paperwork that was requested to be filled. Patient is needing to know the status and needs to know if the paperwork needs to be picked up. Please give patient a call at .[phone]

## 2023-08-30 ENCOUNTER — TELEPHONE (OUTPATIENT)
Dept: CARDIAC REHAB | Facility: HOSPITAL | Age: 68
End: 2023-08-30
Payer: COMMERCIAL

## 2023-08-30 DIAGNOSIS — Z95.1 S/P CABG X 2: Primary | ICD-10-CM

## 2023-08-30 NOTE — DISCHARGE SUMMARY
[facility])  Congenital Cardiovascular Surgery  Discharge Summary      Patient Name: Abdirahman Rodriguez  MRN: [id number]  Admission Date: 8/23/2023  Hospital Length of Stay: 5 days  Discharge Date and Time: 8/28/2023  3:45 PM  Attending Physician: Brenda att. providers found   Discharging Provider: Emma Arzola MD  Primary Care Provider: Mt Noel MD     HPI: HPI:  67-year-old male with a history of longstanding hypertension strong family history of coronary artery disease had abnormal stress test and had a left heart catheterization which revealed disease involving the proximal left anterior descending artery as well as diagonal 1 with a critical 80% stenosis.  Patient does not have any active chest pain at this time.  He recovered from COVID last year.     8/23/2024  Procedure(s):  CORONARY ARTERY BYPASS GRAFT (CABG)  SURGICAL PROCUREMENT, VEIN, ENDOSCOPIC  ECHOCARDIOGRAM,TRANSESOPHAGEAL  BLOCK, NERVE, INTERCOSTAL, 2 OR MORE      PROCEDURAL SUMMARY:   Coronary artery bypass graft, x 2  1) Pedicled LIMA to LAD  2) Saphenous vein graft to diagonal   Endoscopic vein harvesting from the left leg       Procedure(s) (LRB):  CORONARY ARTERY BYPASS GRAFT (CABG) (N/A)  SURGICAL PROCUREMENT, VEIN, ENDOSCOPIC (Left)  ECHOCARDIOGRAM,TRANSESOPHAGEAL (N/A)  BLOCK, NERVE, INTERCOSTAL, 2 OR MORE (N/A)    Hospital Course: Patient was extubated within the 1st 4 hour window he was hemodynamically stable on minimal dose of Levophed making good urine chest tube output minimal sitting up in a chair this morning pain well controlled in sinus rhythm has 3 chest tubes 2 mediastinal 1 left pleural chest tube right IJ double-lumen catheter.  Minimal drainage from the SANG drain     8/25 /2023 the patient is sitting up in a chair chest tubes discontinued pain well controlled Irvin catheter draining clear urine sinus rhythm hemodynamically stable overnight no issues     08/26/2023 the patient was transferred to the telemetry  he is ambulating has not had a bowel movement pain well controlled hemodynamically stable no acute issues at this time.     08/27/2023 patient went into AFib this morning was loaded with amiodarone converted back to sinus rhythm he is otherwise hemodynamically stable pain well controlled urine output is good ambulating.    Consults (From admission, onward)          Status Ordering Provider     Consult to Case Management/Social Work  Once        Provider:  (Not yet assigned)    Completed MILY HAYNES     Consult to Cardiology  Once        Provider:  (Not yet assigned)    Completed MILY HAYNES     Consult Case Management/Social Work  Once        Provider:  (Not yet assigned)    Completed MILY HAYNES     Inpatient consult to Registered Dietitian/Nutritionist  Once        Provider:  (Not yet assigned)    Completed MILY HAYNES            Significant Diagnostic Studies: Labs: BMP:   Recent Labs   Lab 08/28/23  0514      *   K 4.0      CO2 21*   BUN 31*   CREATININE 1.6*   CALCIUM 8.5*   MG 2.6    and CBC   Recent Labs   Lab 08/28/23  0514   WBC 4.59   HGB 10.2*   HCT 31.9*          Pending Diagnostic Studies:       None          Final Active Diagnoses:    Diagnosis Date Noted POA    PRINCIPAL PROBLEM:  S/P CABG x 2 [Z95.1] 08/23/2023 Not Applicable    Respiratory insufficiency [R06.89] 08/23/2023 No    Coronary artery disease [I25.10]  Yes    Hypothyroidism, unspecified [E03.9] 07/11/2023 Yes    Hypertensive kidney disease with chronic kidney disease stage III [I12.9, N18.30]  Yes    Benign hypertension [I10]  Yes    Prediabetes [R73.03] 01/31/2018 Yes      Problems Resolved During this Admission:        Discharged Condition: good    Disposition: Home or Self Care    Follow Up:    Patient Instructions:      OXYGEN FOR HOME USE     Order Specific Question Answer Comments   Liter Flow 2    Duration With sleep    Qualifying Test Performed at: Rest    Oxygen saturation: 87   "  Portable mode: continuous    Route nasal cannula    Device: home concentrator with portable concentrator    Length of need (in months): 99 mos    Patient condition with qualifying saturation COPD    Height: 6' 2" (1.88 m)    Weight: 117.8 kg (259 lb 11.2 oz)    Alternative treatment measures have been tried or considered and deemed clinically ineffective. Yes      Ambulatory referral/consult to Cardiac Rehab   Standing Status: Future   Referral Priority: Routine Referral Type: Consultation   Referral Reason: Specialty Services Required   Requested Specialty: Cardiac Rehabilitation   Number of Visits Requested: 1     Ambulatory referral/consult to Home Health   Standing Status: Future   Referral Priority: Routine Referral Type: Home Health   Referral Reason: Specialty Services Required   Requested Specialty: Home Health Services   Number of Visits Requested: 1     Diet Cardiac     No dressing needed     Activity as tolerated     Medications:  Reconciled Home Medications:      Medication List        START taking these medications      amiodarone 200 MG Tab  Commonly known as: PACERONE  Take 1 tablet (200 mg total) by mouth once daily.     oxyCODONE 5 MG immediate release tablet  Commonly known as: ROXICODONE  Take 1 tablet (5 mg total) by mouth every 4 (four) hours as needed for Pain.            CONTINUE taking these medications      aspirin 81 MG EC tablet  Commonly known as: ECOTRIN  Take 81 mg by mouth once daily.     levothyroxine 50 MCG tablet  Commonly known as: SYNTHROID  Take 1 tablet (50 mcg total) by mouth before breakfast.     losartan-hydrochlorothiazide 100-25 mg 100-25 mg per tablet  Commonly known as: HYZAAR  TAKE 1 TABLET BY MOUTH EVERY DAY     metoprolol tartrate 25 MG tablet  Commonly known as: LOPRESSOR  Take 1 tablet (25 mg total) by mouth 2 (two) times daily.            ASK your doctor about these medications      sildenafil 20 mg Tab  Commonly known as: REVATIO  Take 2-5  tablets one hour prior " to intercourse              Emma Arzola MD  Congenital Cardiovascular Surgery  Formerly [facility] - Telemetry ([facility])

## 2023-08-31 ENCOUNTER — TELEPHONE (OUTPATIENT)
Dept: CARDIOTHORACIC SURGERY | Facility: CLINIC | Age: 68
End: 2023-08-31
Payer: COMMERCIAL

## 2023-08-31 NOTE — TELEPHONE ENCOUNTER
Notified pt wife paperwork is ready for . She will try to come to clinic today by 5pm or tomorrow. She was instructed to ask for eHlen today or Maria E on 9/1/2023.    Paperwork scanned into pt chart

## 2023-09-11 ENCOUNTER — TELEPHONE (OUTPATIENT)
Dept: INTERNAL MEDICINE | Facility: CLINIC | Age: 68
End: 2023-09-11
Payer: COMMERCIAL

## 2023-09-11 NOTE — TELEPHONE ENCOUNTER
Pt just had open heart surgery and wanted to move his lab work to [place] with his cardio appt.. and will see us next year.

## 2023-09-11 NOTE — TELEPHONE ENCOUNTER
----- Message from Elizabeth Borges sent at 9/11/2023 12:23 PM CDT -----  Contact: patient  [phone]  Patient called requesting a call back from Dr. Noel's nurse, regarding a blood work appt for tomorrow, please call patient to discuss, he is not seeing Dr. Noel until 01/10/24

## 2023-09-14 ENCOUNTER — OFFICE VISIT (OUTPATIENT)
Dept: CARDIOTHORACIC SURGERY | Facility: CLINIC | Age: 68
End: 2023-09-14
Payer: COMMERCIAL

## 2023-09-14 ENCOUNTER — LAB VISIT (OUTPATIENT)
Dept: LAB | Facility: HOSPITAL | Age: 68
End: 2023-09-14
Attending: INTERNAL MEDICINE
Payer: COMMERCIAL

## 2023-09-14 VITALS
HEART RATE: 93 BPM | OXYGEN SATURATION: 96 % | HEIGHT: 74 IN | DIASTOLIC BLOOD PRESSURE: 68 MMHG | BODY MASS INDEX: 33.05 KG/M2 | WEIGHT: 257.5 LBS | SYSTOLIC BLOOD PRESSURE: 114 MMHG

## 2023-09-14 DIAGNOSIS — I10 BENIGN HYPERTENSION: Primary | ICD-10-CM

## 2023-09-14 DIAGNOSIS — E78.2 MIXED HYPERLIPIDEMIA: ICD-10-CM

## 2023-09-14 DIAGNOSIS — E03.9 ACQUIRED HYPOTHYROIDISM: ICD-10-CM

## 2023-09-14 DIAGNOSIS — N18.30 HYPERTENSIVE KIDNEY DISEASE WITH STAGE 3 CHRONIC KIDNEY DISEASE, UNSPECIFIED WHETHER STAGE 3A OR 3B CKD: ICD-10-CM

## 2023-09-14 DIAGNOSIS — D89.2 HYPERGAMMAGLOBULINEMIA: ICD-10-CM

## 2023-09-14 DIAGNOSIS — I12.9 HYPERTENSIVE KIDNEY DISEASE WITH STAGE 3 CHRONIC KIDNEY DISEASE, UNSPECIFIED WHETHER STAGE 3A OR 3B CKD: ICD-10-CM

## 2023-09-14 DIAGNOSIS — J98.4 RESTRICTIVE LUNG DISEASE: ICD-10-CM

## 2023-09-14 DIAGNOSIS — N18.30 STAGE 3 CHRONIC KIDNEY DISEASE, UNSPECIFIED WHETHER STAGE 3A OR 3B CKD: ICD-10-CM

## 2023-09-14 DIAGNOSIS — R73.03 PREDIABETES: ICD-10-CM

## 2023-09-14 DIAGNOSIS — E02 SUBCLINICAL IODINE-DEFICIENCY HYPOTHYROIDISM: ICD-10-CM

## 2023-09-14 LAB — TSH SERPL DL<=0.005 MIU/L-ACNC: 3.61 UIU/ML (ref 0.4–4)

## 2023-09-14 PROCEDURE — 84165 PROTEIN E-PHORESIS SERUM: CPT | Performed by: INTERNAL MEDICINE

## 2023-09-14 PROCEDURE — 1101F PT FALLS ASSESS-DOCD LE1/YR: CPT | Mod: CPTII,S$GLB,, | Performed by: THORACIC SURGERY (CARDIOTHORACIC VASCULAR SURGERY)

## 2023-09-14 PROCEDURE — 99024 PR POST-OP FOLLOW-UP VISIT: ICD-10-PCS | Mod: S$GLB,,, | Performed by: THORACIC SURGERY (CARDIOTHORACIC VASCULAR SURGERY)

## 2023-09-14 PROCEDURE — 84165 PATHOLOGIST INTERPRETATION SPE: ICD-10-PCS | Mod: 26,,, | Performed by: PATHOLOGY

## 2023-09-14 PROCEDURE — 1126F AMNT PAIN NOTED NONE PRSNT: CPT | Mod: CPTII,S$GLB,, | Performed by: THORACIC SURGERY (CARDIOTHORACIC VASCULAR SURGERY)

## 2023-09-14 PROCEDURE — 3288F PR FALLS RISK ASSESSMENT DOCUMENTED: ICD-10-PCS | Mod: CPTII,S$GLB,, | Performed by: THORACIC SURGERY (CARDIOTHORACIC VASCULAR SURGERY)

## 2023-09-14 PROCEDURE — 3288F FALL RISK ASSESSMENT DOCD: CPT | Mod: CPTII,S$GLB,, | Performed by: THORACIC SURGERY (CARDIOTHORACIC VASCULAR SURGERY)

## 2023-09-14 PROCEDURE — 3078F PR MOST RECENT DIASTOLIC BLOOD PRESSURE < 80 MM HG: ICD-10-PCS | Mod: CPTII,S$GLB,, | Performed by: THORACIC SURGERY (CARDIOTHORACIC VASCULAR SURGERY)

## 2023-09-14 PROCEDURE — 3008F PR BODY MASS INDEX (BMI) DOCUMENTED: ICD-10-PCS | Mod: CPTII,S$GLB,, | Performed by: THORACIC SURGERY (CARDIOTHORACIC VASCULAR SURGERY)

## 2023-09-14 PROCEDURE — 1101F PR PT FALLS ASSESS DOC 0-1 FALLS W/OUT INJ PAST YR: ICD-10-PCS | Mod: CPTII,S$GLB,, | Performed by: THORACIC SURGERY (CARDIOTHORACIC VASCULAR SURGERY)

## 2023-09-14 PROCEDURE — 1159F MED LIST DOCD IN RCRD: CPT | Mod: CPTII,S$GLB,, | Performed by: THORACIC SURGERY (CARDIOTHORACIC VASCULAR SURGERY)

## 2023-09-14 PROCEDURE — 99024 POSTOP FOLLOW-UP VISIT: CPT | Mod: S$GLB,,, | Performed by: THORACIC SURGERY (CARDIOTHORACIC VASCULAR SURGERY)

## 2023-09-14 PROCEDURE — 3044F PR MOST RECENT HEMOGLOBIN A1C LEVEL <7.0%: ICD-10-PCS | Mod: CPTII,S$GLB,, | Performed by: THORACIC SURGERY (CARDIOTHORACIC VASCULAR SURGERY)

## 2023-09-14 PROCEDURE — 1126F PR PAIN SEVERITY QUANTIFIED, NO PAIN PRESENT: ICD-10-PCS | Mod: CPTII,S$GLB,, | Performed by: THORACIC SURGERY (CARDIOTHORACIC VASCULAR SURGERY)

## 2023-09-14 PROCEDURE — 36415 COLL VENOUS BLD VENIPUNCTURE: CPT | Performed by: INTERNAL MEDICINE

## 2023-09-14 PROCEDURE — 99999 PR PBB SHADOW E&M-EST. PATIENT-LVL III: CPT | Mod: PBBFAC,,, | Performed by: THORACIC SURGERY (CARDIOTHORACIC VASCULAR SURGERY)

## 2023-09-14 PROCEDURE — 3078F DIAST BP <80 MM HG: CPT | Mod: CPTII,S$GLB,, | Performed by: THORACIC SURGERY (CARDIOTHORACIC VASCULAR SURGERY)

## 2023-09-14 PROCEDURE — 1159F PR MEDICATION LIST DOCUMENTED IN MEDICAL RECORD: ICD-10-PCS | Mod: CPTII,S$GLB,, | Performed by: THORACIC SURGERY (CARDIOTHORACIC VASCULAR SURGERY)

## 2023-09-14 PROCEDURE — 84165 PROTEIN E-PHORESIS SERUM: CPT | Mod: 26,,, | Performed by: PATHOLOGY

## 2023-09-14 PROCEDURE — 3074F PR MOST RECENT SYSTOLIC BLOOD PRESSURE < 130 MM HG: ICD-10-PCS | Mod: CPTII,S$GLB,, | Performed by: THORACIC SURGERY (CARDIOTHORACIC VASCULAR SURGERY)

## 2023-09-14 PROCEDURE — 3044F HG A1C LEVEL LT 7.0%: CPT | Mod: CPTII,S$GLB,, | Performed by: THORACIC SURGERY (CARDIOTHORACIC VASCULAR SURGERY)

## 2023-09-14 PROCEDURE — 3008F BODY MASS INDEX DOCD: CPT | Mod: CPTII,S$GLB,, | Performed by: THORACIC SURGERY (CARDIOTHORACIC VASCULAR SURGERY)

## 2023-09-14 PROCEDURE — 3074F SYST BP LT 130 MM HG: CPT | Mod: CPTII,S$GLB,, | Performed by: THORACIC SURGERY (CARDIOTHORACIC VASCULAR SURGERY)

## 2023-09-14 PROCEDURE — 99999 PR PBB SHADOW E&M-EST. PATIENT-LVL III: ICD-10-PCS | Mod: PBBFAC,,, | Performed by: THORACIC SURGERY (CARDIOTHORACIC VASCULAR SURGERY)

## 2023-09-14 PROCEDURE — 84443 ASSAY THYROID STIM HORMONE: CPT | Performed by: INTERNAL MEDICINE

## 2023-09-14 NOTE — PROGRESS NOTES
"Subjective:      Patient ID: Abdirahman Rodriguez is a 67 y.o. male.    Chief Complaint: No chief complaint on file.    HPI:  67-year-old male with a history of coronary artery bypass grafting is here for the 2 weeks follow-up visit no major complaints except incisional pain.  Activity level is increasing    Review of patient's allergies indicates:  No Known Allergies    Review of Systems   Constitutional:  Negative for activity change and appetite change.   HENT:  Negative for dental problem, nosebleeds and sore throat.    Eyes:  Negative for discharge and visual disturbance.   Respiratory:  Negative for cough, chest tightness and stridor.    Cardiovascular:  Negative for leg swelling.   Gastrointestinal:  Negative for abdominal distention and abdominal pain.   Genitourinary:  Negative for difficulty urinating and dysuria.   Musculoskeletal:  Negative for arthralgias, back pain and joint swelling.   Allergic/Immunologic: Negative for environmental allergies.   Neurological:  Negative for dizziness, syncope and headaches.   Hematological:  Does not bruise/bleed easily.   Psychiatric/Behavioral:  Negative for behavioral problems.           Objective:   /68   Pulse 93   Ht 6' 2" (1.88 m)   Wt 116.8 kg (257 lb 8 oz)   SpO2 96%   BMI 33.06 kg/m²     X-Ray Chest AP Portable  Narrative: EXAMINATION:  XR CHEST AP PORTABLE    CLINICAL HISTORY:  Post-op; Atherosclerotic heart disease of native coronary artery without angina pectoris    TECHNIQUE:  Single frontal view of the chest was performed.    COMPARISON:  08/24/2023    FINDINGS:  Tip right central line terminates in the right atrium.  No pneumothorax.  Left chest tube and mediastinal drainage tube.  Heart size is stable.  Focal opacification at the lung bases bilaterally could represent atelectasis.  Can not exclude aspiration or airspace disease.  No pneumothorax. In comparison to the prior study, there is no adverse interval changes  Impression: In comparison to " the prior study, there is no adverse interval changes    Electronically signed by: Jose R Ramsay MD  Date:    08/25/2023  Time:    07:37         Physical Exam  Vitals reviewed.   Constitutional:       Appearance: Normal appearance. He is obese.   HENT:      Head: Normocephalic and atraumatic.   Cardiovascular:      Rate and Rhythm: Normal rate and regular rhythm.      Pulses: Normal pulses.      Heart sounds: Normal heart sounds.      Comments: Patient is healing well sternum is stable  Pulmonary:      Effort: Pulmonary effort is normal.      Breath sounds: Normal breath sounds.   Abdominal:      Palpations: Abdomen is soft.   Musculoskeletal:         General: Normal range of motion.      Cervical back: Normal range of motion and neck supple.      Comments: Leg incision healing well   Skin:     General: Skin is warm.      Capillary Refill: Capillary refill takes less than 2 seconds.   Neurological:      General: No focal deficit present.      Mental Status: He is alert and oriented to person, place, and time.   Psychiatric:         Mood and Affect: Mood normal.         Assessment:     1. Benign hypertension    2. Mixed hyperlipidemia    3. Stage 3 chronic kidney disease, unspecified whether stage 3a or 3b CKD    4. Hypertensive kidney disease with stage 3 chronic kidney disease, unspecified whether stage 3a or 3b CKD    5. Prediabetes    6. Restrictive lung disease    7. Subclinical iodine-deficiency hypothyroidism          Plan   Continue to increase activity level  Sternal precautions and driving and lifting restrictions for 1 more month  Aspirin statin beta blocker  Follow-up with Cardiology  Follow-up with me in 1 month          Emma Arzola MD  [facility]  [place]

## 2023-09-15 LAB
ALBUMIN SERPL ELPH-MCNC: 3.26 G/DL (ref 3.35–5.55)
ALPHA1 GLOB SERPL ELPH-MCNC: 0.4 G/DL (ref 0.17–0.41)
ALPHA2 GLOB SERPL ELPH-MCNC: 0.87 G/DL (ref 0.43–0.99)
B-GLOBULIN SERPL ELPH-MCNC: 1.06 G/DL (ref 0.5–1.1)
GAMMA GLOB SERPL ELPH-MCNC: 2.42 G/DL (ref 0.67–1.58)
PROT SERPL-MCNC: 8 G/DL (ref 6–8.4)

## 2023-09-18 LAB — PATHOLOGIST INTERPRETATION SPE: NORMAL

## 2023-09-19 ENCOUNTER — OFFICE VISIT (OUTPATIENT)
Dept: CARDIOLOGY | Facility: CLINIC | Age: 68
End: 2023-09-19
Payer: COMMERCIAL

## 2023-09-19 VITALS
HEART RATE: 67 BPM | WEIGHT: 254.63 LBS | HEIGHT: 74 IN | SYSTOLIC BLOOD PRESSURE: 120 MMHG | DIASTOLIC BLOOD PRESSURE: 82 MMHG | BODY MASS INDEX: 32.68 KG/M2 | OXYGEN SATURATION: 94 %

## 2023-09-19 DIAGNOSIS — I25.118 CORONARY ARTERY DISEASE OF NATIVE ARTERY OF NATIVE HEART WITH STABLE ANGINA PECTORIS: ICD-10-CM

## 2023-09-19 DIAGNOSIS — N18.30 HYPERTENSIVE KIDNEY DISEASE WITH STAGE 3 CHRONIC KIDNEY DISEASE, UNSPECIFIED WHETHER STAGE 3A OR 3B CKD: ICD-10-CM

## 2023-09-19 DIAGNOSIS — R94.39 ABNORMAL NUCLEAR STRESS TEST: ICD-10-CM

## 2023-09-19 DIAGNOSIS — Z95.1 S/P CABG X 2: Primary | ICD-10-CM

## 2023-09-19 DIAGNOSIS — N18.31 STAGE 3A CHRONIC KIDNEY DISEASE: ICD-10-CM

## 2023-09-19 DIAGNOSIS — I10 BENIGN HYPERTENSION: ICD-10-CM

## 2023-09-19 DIAGNOSIS — I12.9 HYPERTENSIVE KIDNEY DISEASE WITH STAGE 3 CHRONIC KIDNEY DISEASE, UNSPECIFIED WHETHER STAGE 3A OR 3B CKD: ICD-10-CM

## 2023-09-19 DIAGNOSIS — R73.03 PREDIABETES: ICD-10-CM

## 2023-09-19 DIAGNOSIS — J96.11 CHRONIC RESPIRATORY FAILURE WITH HYPOXIA: ICD-10-CM

## 2023-09-19 PROCEDURE — 1111F DSCHRG MED/CURRENT MED MERGE: CPT | Mod: CPTII,S$GLB,, | Performed by: PHYSICIAN ASSISTANT

## 2023-09-19 PROCEDURE — 3008F PR BODY MASS INDEX (BMI) DOCUMENTED: ICD-10-PCS | Mod: CPTII,S$GLB,, | Performed by: PHYSICIAN ASSISTANT

## 2023-09-19 PROCEDURE — 3074F SYST BP LT 130 MM HG: CPT | Mod: CPTII,S$GLB,, | Performed by: PHYSICIAN ASSISTANT

## 2023-09-19 PROCEDURE — 1159F MED LIST DOCD IN RCRD: CPT | Mod: CPTII,S$GLB,, | Performed by: PHYSICIAN ASSISTANT

## 2023-09-19 PROCEDURE — 3044F PR MOST RECENT HEMOGLOBIN A1C LEVEL <7.0%: ICD-10-PCS | Mod: CPTII,S$GLB,, | Performed by: PHYSICIAN ASSISTANT

## 2023-09-19 PROCEDURE — 3288F PR FALLS RISK ASSESSMENT DOCUMENTED: ICD-10-PCS | Mod: CPTII,S$GLB,, | Performed by: PHYSICIAN ASSISTANT

## 2023-09-19 PROCEDURE — 1126F PR PAIN SEVERITY QUANTIFIED, NO PAIN PRESENT: ICD-10-PCS | Mod: CPTII,S$GLB,, | Performed by: PHYSICIAN ASSISTANT

## 2023-09-19 PROCEDURE — 1160F RVW MEDS BY RX/DR IN RCRD: CPT | Mod: CPTII,S$GLB,, | Performed by: PHYSICIAN ASSISTANT

## 2023-09-19 PROCEDURE — 99214 PR OFFICE/OUTPT VISIT, EST, LEVL IV, 30-39 MIN: ICD-10-PCS | Mod: S$GLB,,, | Performed by: PHYSICIAN ASSISTANT

## 2023-09-19 PROCEDURE — 1160F PR REVIEW ALL MEDS BY PRESCRIBER/CLIN PHARMACIST DOCUMENTED: ICD-10-PCS | Mod: CPTII,S$GLB,, | Performed by: PHYSICIAN ASSISTANT

## 2023-09-19 PROCEDURE — 3044F HG A1C LEVEL LT 7.0%: CPT | Mod: CPTII,S$GLB,, | Performed by: PHYSICIAN ASSISTANT

## 2023-09-19 PROCEDURE — 1101F PR PT FALLS ASSESS DOC 0-1 FALLS W/OUT INJ PAST YR: ICD-10-PCS | Mod: CPTII,S$GLB,, | Performed by: PHYSICIAN ASSISTANT

## 2023-09-19 PROCEDURE — 1101F PT FALLS ASSESS-DOCD LE1/YR: CPT | Mod: CPTII,S$GLB,, | Performed by: PHYSICIAN ASSISTANT

## 2023-09-19 PROCEDURE — 1159F PR MEDICATION LIST DOCUMENTED IN MEDICAL RECORD: ICD-10-PCS | Mod: CPTII,S$GLB,, | Performed by: PHYSICIAN ASSISTANT

## 2023-09-19 PROCEDURE — 3079F PR MOST RECENT DIASTOLIC BLOOD PRESSURE 80-89 MM HG: ICD-10-PCS | Mod: CPTII,S$GLB,, | Performed by: PHYSICIAN ASSISTANT

## 2023-09-19 PROCEDURE — 3079F DIAST BP 80-89 MM HG: CPT | Mod: CPTII,S$GLB,, | Performed by: PHYSICIAN ASSISTANT

## 2023-09-19 PROCEDURE — 99214 OFFICE O/P EST MOD 30 MIN: CPT | Mod: S$GLB,,, | Performed by: PHYSICIAN ASSISTANT

## 2023-09-19 PROCEDURE — 1126F AMNT PAIN NOTED NONE PRSNT: CPT | Mod: CPTII,S$GLB,, | Performed by: PHYSICIAN ASSISTANT

## 2023-09-19 PROCEDURE — 3074F PR MOST RECENT SYSTOLIC BLOOD PRESSURE < 130 MM HG: ICD-10-PCS | Mod: CPTII,S$GLB,, | Performed by: PHYSICIAN ASSISTANT

## 2023-09-19 PROCEDURE — 3008F BODY MASS INDEX DOCD: CPT | Mod: CPTII,S$GLB,, | Performed by: PHYSICIAN ASSISTANT

## 2023-09-19 PROCEDURE — 1111F PR DISCHARGE MEDS RECONCILED W/ CURRENT OUTPATIENT MED LIST: ICD-10-PCS | Mod: CPTII,S$GLB,, | Performed by: PHYSICIAN ASSISTANT

## 2023-09-19 PROCEDURE — 99999 PR PBB SHADOW E&M-EST. PATIENT-LVL IV: ICD-10-PCS | Mod: PBBFAC,,, | Performed by: PHYSICIAN ASSISTANT

## 2023-09-19 PROCEDURE — 99999 PR PBB SHADOW E&M-EST. PATIENT-LVL IV: CPT | Mod: PBBFAC,,, | Performed by: PHYSICIAN ASSISTANT

## 2023-09-19 PROCEDURE — 3288F FALL RISK ASSESSMENT DOCD: CPT | Mod: CPTII,S$GLB,, | Performed by: PHYSICIAN ASSISTANT

## 2023-09-19 RX ORDER — METOPROLOL TARTRATE 25 MG/1
25 TABLET, FILM COATED ORAL 2 TIMES DAILY
Qty: 60 TABLET | Refills: 6 | Status: SHIPPED | OUTPATIENT
Start: 2023-09-19

## 2023-09-19 RX ORDER — METOPROLOL TARTRATE 25 MG/1
25 TABLET, FILM COATED ORAL 2 TIMES DAILY
Qty: 60 TABLET | Refills: 6 | Status: CANCELLED | OUTPATIENT
Start: 2023-09-19

## 2023-09-19 RX ORDER — AMIODARONE HYDROCHLORIDE 200 MG/1
200 TABLET ORAL DAILY
Qty: 30 TABLET | Refills: 3 | Status: SHIPPED | OUTPATIENT
Start: 2023-09-19 | End: 2023-12-15

## 2023-09-19 NOTE — PROGRESS NOTES
Subjective:   Patient ID:  Abdirahman Rodriguez is a 67 y.o. male who presents for follow-up of hospital follow-up      HPI  Mr. Rodriguez is a 67 year old male patient whose current medical conditions include HTN, familial history of CAD, prior abnormal stress test, and CAD s/p LHC that showed multivessel CAD s/p CABG x 2 on 8/14/23. He returns today and states he is feeling well overall. Admits to incisional soreness/tightness from the skin pulling but denies any william chest pain or heaviness. No SOB/FENG. No lightheadedness, dizziness, palpitations, near syncope, or syncope. No s/s suggestive of CHF. BP stable and controlled. Working with PT/OT at home. He is compliant with his medications, not on Plavix or statin.    Review of Systems   Constitutional: Negative for chills, decreased appetite, fever and malaise/fatigue.   HENT:  Negative for congestion, hoarse voice and sore throat.    Eyes:  Negative for blurred vision and discharge.   Cardiovascular:  Positive for chest pain (incisional). Negative for claudication, cyanosis, dyspnea on exertion, irregular heartbeat, leg swelling, near-syncope, orthopnea, palpitations and paroxysmal nocturnal dyspnea.   Respiratory:  Negative for cough, hemoptysis, shortness of breath, snoring, sputum production and wheezing.    Endocrine: Negative for cold intolerance and heat intolerance.   Hematologic/Lymphatic: Negative for bleeding problem. Does not bruise/bleed easily.   Skin:  Negative for rash.   Musculoskeletal:  Negative for arthritis, back pain, joint pain, joint swelling, muscle cramps, muscle weakness and myalgias.   Gastrointestinal:  Negative for abdominal pain, constipation, diarrhea, heartburn, melena and nausea.   Genitourinary:  Negative for hematuria.   Neurological:  Negative for dizziness, focal weakness, headaches, light-headedness, loss of balance, numbness, paresthesias, seizures and weakness.   Psychiatric/Behavioral:  Negative for memory loss. The patient does not  "have insomnia.    Allergic/Immunologic: Negative for hives.     /82 (BP Location: Left arm, Patient Position: Sitting, BP Method: Large (Manual))   Pulse 67   Ht 6' 2" (1.88 m)   Wt 115.5 kg (254 lb 10.1 oz)   SpO2 (!) 94%   BMI 32.69 kg/m²     Objective:   Physical Exam  Vitals and nursing note reviewed.   Constitutional:       General: He is not in acute distress.     Appearance: Normal appearance. He is well-developed. He is not diaphoretic.   HENT:      Head: Normocephalic and atraumatic.   Eyes:      General:         Right eye: No discharge.         Left eye: No discharge.      Pupils: Pupils are equal, round, and reactive to light.   Neck:      Thyroid: No thyromegaly.      Vascular: No JVD.      Trachea: No tracheal deviation.   Cardiovascular:      Rate and Rhythm: Normal rate and regular rhythm.      Heart sounds: Normal heart sounds, S1 normal and S2 normal. No murmur heard.     Comments: Sternotomy site healing well  Pulmonary:      Effort: Pulmonary effort is normal. No respiratory distress.      Breath sounds: Normal breath sounds. No wheezing or rales.   Abdominal:      General: There is no distension.      Palpations: Abdomen is soft.      Tenderness: There is no rebound.   Musculoskeletal:      Cervical back: Neck supple.      Right lower leg: No edema.      Left lower leg: No edema.   Skin:     General: Skin is warm and dry.      Findings: No erythema.      Comments: SVG site LLE healing well   Neurological:      General: No focal deficit present.      Mental Status: He is alert and oriented to person, place, and time.   Psychiatric:         Mood and Affect: Mood normal.         Behavior: Behavior normal.         Thought Content: Thought content normal.       [facility] Results  Summary         The Prox LAD lesion was 70% stenosed.    The Mid LAD lesion was 99% stenosed.    The ejection fraction was calculated to be 55%.    The pre-procedure left ventricular end diastolic pressure was 10.    The " post-procedure left ventricular end diastolic pressure was 13.    The estimated blood loss was none.    There was single vessel coronary artery disease.    Echo Results  Summary         Left Ventricle: The left ventricle is normal in size. Normal wall thickness. Normal wall motion. There is normal systolic function with a visually estimated ejection fraction of 55 - 70%. There is normal diastolic function.    Right Ventricle: Normal right ventricular cavity size. Wall thickness is normal. Right ventricle wall motion  is normal. Systolic function is normal.    Tricuspid Valve: There is mild regurgitation.    IVC/SVC: Normal venous pressure at 3 mmHg.  Assessment:      1. S/P CABG x 2    2. Chronic respiratory failure with hypoxia    3. Stage 3a chronic kidney disease    4. Hypertensive kidney disease with stage 3 chronic kidney disease, unspecified whether stage 3a or 3b CKD    5. Abnormal nuclear stress test    6. Benign hypertension    7. Coronary artery disease of native artery of native heart with stable angina pectoris    8. Prediabetes      Patient presents for hospital f/u. Stable CV wise, recovering well. No angina. Working with PT/OT. Unclear if he should be on Plavix, message sent to CTS. Needs statin if LFT's normal. Will recheck CMP. Continue other CV meds/mgmt.   Plan:   -Message sent to CTS regarding Plavix prescription  -Needs statin on board if LFT's normal on repeat CMP  -Continue other CV meds  -Cardiac low salt diet  -Continue working with PT/OT  -RTC 2-3 months with Dr. Juarez

## 2023-09-22 RX ORDER — AMIODARONE HYDROCHLORIDE 200 MG/1
200 TABLET ORAL DAILY
Qty: 30 TABLET | Refills: 3 | Status: SHIPPED | OUTPATIENT
Start: 2023-09-22 | End: 2023-09-28

## 2023-09-26 ENCOUNTER — TELEPHONE (OUTPATIENT)
Dept: CARDIOLOGY | Facility: HOSPITAL | Age: 68
End: 2023-09-26
Payer: COMMERCIAL

## 2023-09-26 DIAGNOSIS — Z95.1 S/P CABG X 2: Primary | ICD-10-CM

## 2023-09-26 RX ORDER — CLOPIDOGREL BISULFATE 75 MG/1
75 TABLET ORAL DAILY
Qty: 30 TABLET | Refills: 11 | Status: SHIPPED | OUTPATIENT
Start: 2023-09-26 | End: 2024-09-25

## 2023-09-26 RX ORDER — ATORVASTATIN CALCIUM 40 MG/1
40 TABLET, FILM COATED ORAL NIGHTLY
Qty: 90 TABLET | Refills: 3 | Status: SHIPPED | OUTPATIENT
Start: 2023-09-26 | End: 2024-09-25

## 2023-09-26 NOTE — TELEPHONE ENCOUNTER
Contacted pt and informed him that he needs to take Plavix 75 mg daily and atorvastatin post CABG, and they have been sent to pharmacy. Pt priti. Pt states he has had an off and on again constant cough since last visit. Informed pt I would let ANTONY Antonio know. Pt priti w/o further q/c      --- ANTONY Farooq 09/26/23 03:29 PM ---  Please phone patient. He should be on Plavix 75 mg daily and atorvastatin post CABG.    Prescriptions sent to pharmacy, reviewed with CTS.    Thanks

## 2023-09-26 NOTE — TELEPHONE ENCOUNTER
Please phone patient. He should be on Plavix 75 mg daily and atorvastatin post CABG.    Prescriptions sent to pharmacy, reviewed with CTS.    Thanks

## 2023-09-28 NOTE — TELEPHONE ENCOUNTER
I spoke with patient. Cancelled duplicate amiodarone prescription sent to other pharmacy. He is currently taking it once daily as prescribed.    Advised to call clinic with any issues. He verbalized understanding.

## 2023-10-04 ENCOUNTER — EXTERNAL HOME HEALTH (OUTPATIENT)
Dept: HOME HEALTH SERVICES | Facility: HOSPITAL | Age: 68
End: 2023-10-04
Payer: COMMERCIAL

## 2023-10-19 ENCOUNTER — OFFICE VISIT (OUTPATIENT)
Dept: CARDIOTHORACIC SURGERY | Facility: CLINIC | Age: 68
End: 2023-10-19
Payer: COMMERCIAL

## 2023-10-19 VITALS
HEIGHT: 74 IN | OXYGEN SATURATION: 97 % | BODY MASS INDEX: 33.38 KG/M2 | WEIGHT: 260.13 LBS | SYSTOLIC BLOOD PRESSURE: 134 MMHG | HEART RATE: 80 BPM | DIASTOLIC BLOOD PRESSURE: 84 MMHG

## 2023-10-19 DIAGNOSIS — E78.2 MIXED HYPERLIPIDEMIA: ICD-10-CM

## 2023-10-19 DIAGNOSIS — J98.4 RESTRICTIVE LUNG DISEASE: ICD-10-CM

## 2023-10-19 DIAGNOSIS — E02 SUBCLINICAL IODINE-DEFICIENCY HYPOTHYROIDISM: ICD-10-CM

## 2023-10-19 DIAGNOSIS — I12.9 HYPERTENSIVE KIDNEY DISEASE WITH STAGE 3 CHRONIC KIDNEY DISEASE, UNSPECIFIED WHETHER STAGE 3A OR 3B CKD: ICD-10-CM

## 2023-10-19 DIAGNOSIS — N18.30 STAGE 3 CHRONIC KIDNEY DISEASE, UNSPECIFIED WHETHER STAGE 3A OR 3B CKD: ICD-10-CM

## 2023-10-19 DIAGNOSIS — N18.30 HYPERTENSIVE KIDNEY DISEASE WITH STAGE 3 CHRONIC KIDNEY DISEASE, UNSPECIFIED WHETHER STAGE 3A OR 3B CKD: ICD-10-CM

## 2023-10-19 DIAGNOSIS — I10 BENIGN HYPERTENSION: Primary | ICD-10-CM

## 2023-10-19 DIAGNOSIS — R73.03 PREDIABETES: ICD-10-CM

## 2023-10-19 PROCEDURE — 99999 PR PBB SHADOW E&M-EST. PATIENT-LVL III: CPT | Mod: PBBFAC,,, | Performed by: THORACIC SURGERY (CARDIOTHORACIC VASCULAR SURGERY)

## 2023-10-19 PROCEDURE — 3075F SYST BP GE 130 - 139MM HG: CPT | Mod: CPTII,S$GLB,, | Performed by: THORACIC SURGERY (CARDIOTHORACIC VASCULAR SURGERY)

## 2023-10-19 PROCEDURE — 3079F DIAST BP 80-89 MM HG: CPT | Mod: CPTII,S$GLB,, | Performed by: THORACIC SURGERY (CARDIOTHORACIC VASCULAR SURGERY)

## 2023-10-19 PROCEDURE — 99999 PR PBB SHADOW E&M-EST. PATIENT-LVL III: ICD-10-PCS | Mod: PBBFAC,,, | Performed by: THORACIC SURGERY (CARDIOTHORACIC VASCULAR SURGERY)

## 2023-10-19 PROCEDURE — 3044F PR MOST RECENT HEMOGLOBIN A1C LEVEL <7.0%: ICD-10-PCS | Mod: CPTII,S$GLB,, | Performed by: THORACIC SURGERY (CARDIOTHORACIC VASCULAR SURGERY)

## 2023-10-19 PROCEDURE — 1159F MED LIST DOCD IN RCRD: CPT | Mod: CPTII,S$GLB,, | Performed by: THORACIC SURGERY (CARDIOTHORACIC VASCULAR SURGERY)

## 2023-10-19 PROCEDURE — 99024 POSTOP FOLLOW-UP VISIT: CPT | Mod: S$GLB,,, | Performed by: THORACIC SURGERY (CARDIOTHORACIC VASCULAR SURGERY)

## 2023-10-19 PROCEDURE — 3044F HG A1C LEVEL LT 7.0%: CPT | Mod: CPTII,S$GLB,, | Performed by: THORACIC SURGERY (CARDIOTHORACIC VASCULAR SURGERY)

## 2023-10-19 PROCEDURE — 1101F PR PT FALLS ASSESS DOC 0-1 FALLS W/OUT INJ PAST YR: ICD-10-PCS | Mod: CPTII,S$GLB,, | Performed by: THORACIC SURGERY (CARDIOTHORACIC VASCULAR SURGERY)

## 2023-10-19 PROCEDURE — 1159F PR MEDICATION LIST DOCUMENTED IN MEDICAL RECORD: ICD-10-PCS | Mod: CPTII,S$GLB,, | Performed by: THORACIC SURGERY (CARDIOTHORACIC VASCULAR SURGERY)

## 2023-10-19 PROCEDURE — 3288F PR FALLS RISK ASSESSMENT DOCUMENTED: ICD-10-PCS | Mod: CPTII,S$GLB,, | Performed by: THORACIC SURGERY (CARDIOTHORACIC VASCULAR SURGERY)

## 2023-10-19 PROCEDURE — 3288F FALL RISK ASSESSMENT DOCD: CPT | Mod: CPTII,S$GLB,, | Performed by: THORACIC SURGERY (CARDIOTHORACIC VASCULAR SURGERY)

## 2023-10-19 PROCEDURE — 3079F PR MOST RECENT DIASTOLIC BLOOD PRESSURE 80-89 MM HG: ICD-10-PCS | Mod: CPTII,S$GLB,, | Performed by: THORACIC SURGERY (CARDIOTHORACIC VASCULAR SURGERY)

## 2023-10-19 PROCEDURE — 1101F PT FALLS ASSESS-DOCD LE1/YR: CPT | Mod: CPTII,S$GLB,, | Performed by: THORACIC SURGERY (CARDIOTHORACIC VASCULAR SURGERY)

## 2023-10-19 PROCEDURE — 99024 PR POST-OP FOLLOW-UP VISIT: ICD-10-PCS | Mod: S$GLB,,, | Performed by: THORACIC SURGERY (CARDIOTHORACIC VASCULAR SURGERY)

## 2023-10-19 PROCEDURE — 1126F PR PAIN SEVERITY QUANTIFIED, NO PAIN PRESENT: ICD-10-PCS | Mod: CPTII,S$GLB,, | Performed by: THORACIC SURGERY (CARDIOTHORACIC VASCULAR SURGERY)

## 2023-10-19 PROCEDURE — 1126F AMNT PAIN NOTED NONE PRSNT: CPT | Mod: CPTII,S$GLB,, | Performed by: THORACIC SURGERY (CARDIOTHORACIC VASCULAR SURGERY)

## 2023-10-19 PROCEDURE — 3075F PR MOST RECENT SYSTOLIC BLOOD PRESS GE 130-139MM HG: ICD-10-PCS | Mod: CPTII,S$GLB,, | Performed by: THORACIC SURGERY (CARDIOTHORACIC VASCULAR SURGERY)

## 2023-10-19 NOTE — PROGRESS NOTES
"Subjective:      Patient ID: Abdirahman Rodriguez is a 68 y.o. male.    Chief Complaint: No chief complaint on file.    HPI:  68-year-old male is coming to a post off follow-up 6 weeks following CABG x2.  Does not have any major complaints except occasional chest discomfort which is relieved by Tylenol.  He is slowly getting back to his activities without any limitations.    Review of patient's allergies indicates:  No Known Allergies    Review of Systems   Constitutional:  Negative for activity change and appetite change.   HENT:  Negative for dental problem, nosebleeds and sore throat.    Eyes:  Negative for discharge and visual disturbance.   Respiratory:  Negative for cough, chest tightness and stridor.    Cardiovascular:  Negative for leg swelling.   Gastrointestinal:  Negative for abdominal distention and abdominal pain.   Genitourinary:  Negative for difficulty urinating and dysuria.   Musculoskeletal:  Negative for arthralgias, back pain and joint swelling.   Allergic/Immunologic: Negative for environmental allergies.   Neurological:  Negative for dizziness, syncope and headaches.   Hematological:  Does not bruise/bleed easily.   Psychiatric/Behavioral:  Negative for behavioral problems.           Objective:   /84   Pulse 80   Ht 6' 2" (1.88 m)   Wt 118 kg (260 lb 2.3 oz)   SpO2 97%   BMI 33.40 kg/m²     X-Ray Chest AP Portable  Narrative: EXAMINATION:  XR CHEST AP PORTABLE    CLINICAL HISTORY:  Post-op; Atherosclerotic heart disease of native coronary artery without angina pectoris    TECHNIQUE:  Single frontal view of the chest was performed.    COMPARISON:  08/24/2023    FINDINGS:  Tip right central line terminates in the right atrium.  No pneumothorax.  Left chest tube and mediastinal drainage tube.  Heart size is stable.  Focal opacification at the lung bases bilaterally could represent atelectasis.  Can not exclude aspiration or airspace disease.  No pneumothorax. In comparison to the prior study, " there is no adverse interval changes  Impression: In comparison to the prior study, there is no adverse interval changes    Electronically signed by: Jose R Ramsay MD  Date:    08/25/2023  Time:    07:37         Physical Exam  Vitals reviewed.   Constitutional:       Appearance: Normal appearance. He is obese.   HENT:      Head: Normocephalic and atraumatic.   Cardiovascular:      Rate and Rhythm: Normal rate and regular rhythm.      Pulses: Normal pulses.      Heart sounds: Normal heart sounds.      Comments: Sternum is stable  Pulmonary:      Effort: Pulmonary effort is normal.      Breath sounds: Normal breath sounds.   Abdominal:      Palpations: Abdomen is soft.   Musculoskeletal:         General: Normal range of motion.      Cervical back: Normal range of motion and neck supple.      Comments: Leg incisions are healing well   Skin:     General: Skin is warm.      Capillary Refill: Capillary refill takes less than 2 seconds.   Neurological:      General: No focal deficit present.      Mental Status: He is alert and oriented to person, place, and time.   Psychiatric:         Mood and Affect: Mood normal.         Assessment:     1. Benign hypertension    2. Mixed hyperlipidemia    3. Stage 3 chronic kidney disease, unspecified whether stage 3a or 3b CKD    4. Hypertensive kidney disease with stage 3 chronic kidney disease, unspecified whether stage 3a or 3b CKD    5. Prediabetes    6. Restrictive lung disease    7. Subclinical iodine-deficiency hypothyroidism          Plan   Continue aspirin statin Plavix and beta-blocker  Advised outpatient cardiopulmonary rehabilitation  Patient can start driving   Restrictions not lifting more than 20 lb for the next 2 months  Follow-up with cardiologist Dr. Salmon and primary care Dr. Noel.  I will discharge the patient from my care today.          Emma Arzola MD  [facility]  [place]

## 2023-10-25 ENCOUNTER — TELEPHONE (OUTPATIENT)
Dept: CARDIOTHORACIC SURGERY | Facility: CLINIC | Age: 68
End: 2023-10-25
Payer: COMMERCIAL

## 2023-10-25 NOTE — TELEPHONE ENCOUNTER
Patient contacted and was that the provider would sign the paperwork on Thursday. The patient state understanding with no questions or concern.

## 2023-11-02 ENCOUNTER — TELEPHONE (OUTPATIENT)
Dept: CARDIOLOGY | Facility: CLINIC | Age: 68
End: 2023-11-02
Payer: COMMERCIAL

## 2023-11-02 DIAGNOSIS — Z95.1 S/P CABG X 2: Primary | ICD-10-CM

## 2023-11-02 DIAGNOSIS — Z98.890 POST-OPERATIVE STATE: ICD-10-CM

## 2023-11-02 DIAGNOSIS — I25.118 CORONARY ARTERY DISEASE OF NATIVE ARTERY OF NATIVE HEART WITH STABLE ANGINA PECTORIS: ICD-10-CM

## 2023-11-02 DIAGNOSIS — E78.2 MIXED HYPERLIPIDEMIA: ICD-10-CM

## 2023-11-02 NOTE — TELEPHONE ENCOUNTER
LM for pt to call back and see if still interested in cardio rehab - will need to speak with pt and get pre-testing set up

## 2023-11-03 ENCOUNTER — TELEPHONE (OUTPATIENT)
Dept: CARDIOLOGY | Facility: CLINIC | Age: 68
End: 2023-11-03
Payer: COMMERCIAL

## 2023-11-03 NOTE — TELEPHONE ENCOUNTER
Pt no longer interested in cardio rehab- he has gone back to work and is doing pretty good now- I offered work excuse to do program while back at work but pt still refused   Will be taken off of list for rehab

## 2023-12-13 DIAGNOSIS — R94.31 ABNORMAL EKG: Primary | ICD-10-CM

## 2023-12-13 DIAGNOSIS — Z95.1 S/P CABG X 2: ICD-10-CM

## 2023-12-14 ENCOUNTER — OFFICE VISIT (OUTPATIENT)
Dept: CARDIOLOGY | Facility: CLINIC | Age: 68
End: 2023-12-14
Payer: COMMERCIAL

## 2023-12-14 VITALS
WEIGHT: 263.69 LBS | SYSTOLIC BLOOD PRESSURE: 106 MMHG | OXYGEN SATURATION: 95 % | HEIGHT: 74 IN | HEART RATE: 68 BPM | DIASTOLIC BLOOD PRESSURE: 76 MMHG | BODY MASS INDEX: 33.84 KG/M2

## 2023-12-14 DIAGNOSIS — N18.30 HYPERTENSIVE KIDNEY DISEASE WITH STAGE 3 CHRONIC KIDNEY DISEASE, UNSPECIFIED WHETHER STAGE 3A OR 3B CKD: ICD-10-CM

## 2023-12-14 DIAGNOSIS — Z95.1 S/P CABG X 2: ICD-10-CM

## 2023-12-14 DIAGNOSIS — I25.118 CORONARY ARTERY DISEASE OF NATIVE ARTERY OF NATIVE HEART WITH STABLE ANGINA PECTORIS: Primary | ICD-10-CM

## 2023-12-14 DIAGNOSIS — E02 SUBCLINICAL IODINE-DEFICIENCY HYPOTHYROIDISM: ICD-10-CM

## 2023-12-14 DIAGNOSIS — R73.03 PREDIABETES: ICD-10-CM

## 2023-12-14 DIAGNOSIS — I10 BENIGN HYPERTENSION: ICD-10-CM

## 2023-12-14 DIAGNOSIS — J98.4 RESTRICTIVE LUNG DISEASE: ICD-10-CM

## 2023-12-14 DIAGNOSIS — I12.9 HYPERTENSIVE KIDNEY DISEASE WITH STAGE 3 CHRONIC KIDNEY DISEASE, UNSPECIFIED WHETHER STAGE 3A OR 3B CKD: ICD-10-CM

## 2023-12-14 DIAGNOSIS — N18.31 STAGE 3A CHRONIC KIDNEY DISEASE: ICD-10-CM

## 2023-12-14 DIAGNOSIS — E78.2 MIXED HYPERLIPIDEMIA: ICD-10-CM

## 2023-12-14 DIAGNOSIS — R94.39 ABNORMAL NUCLEAR STRESS TEST: ICD-10-CM

## 2023-12-14 DIAGNOSIS — R94.2 DIFFUSION CAPACITY OF LUNG (DL), DECREASED: ICD-10-CM

## 2023-12-14 PROCEDURE — 1101F PT FALLS ASSESS-DOCD LE1/YR: CPT | Mod: CPTII,S$GLB,, | Performed by: INTERNAL MEDICINE

## 2023-12-14 PROCEDURE — 3008F BODY MASS INDEX DOCD: CPT | Mod: CPTII,S$GLB,, | Performed by: INTERNAL MEDICINE

## 2023-12-14 PROCEDURE — 3288F FALL RISK ASSESSMENT DOCD: CPT | Mod: CPTII,S$GLB,, | Performed by: INTERNAL MEDICINE

## 2023-12-14 PROCEDURE — 1101F PR PT FALLS ASSESS DOC 0-1 FALLS W/OUT INJ PAST YR: ICD-10-PCS | Mod: CPTII,S$GLB,, | Performed by: INTERNAL MEDICINE

## 2023-12-14 PROCEDURE — 3008F PR BODY MASS INDEX (BMI) DOCUMENTED: ICD-10-PCS | Mod: CPTII,S$GLB,, | Performed by: INTERNAL MEDICINE

## 2023-12-14 PROCEDURE — 1125F AMNT PAIN NOTED PAIN PRSNT: CPT | Mod: CPTII,S$GLB,, | Performed by: INTERNAL MEDICINE

## 2023-12-14 PROCEDURE — 99214 PR OFFICE/OUTPT VISIT, EST, LEVL IV, 30-39 MIN: ICD-10-PCS | Mod: S$GLB,,, | Performed by: INTERNAL MEDICINE

## 2023-12-14 PROCEDURE — 3044F HG A1C LEVEL LT 7.0%: CPT | Mod: CPTII,S$GLB,, | Performed by: INTERNAL MEDICINE

## 2023-12-14 PROCEDURE — 1125F PR PAIN SEVERITY QUANTIFIED, PAIN PRESENT: ICD-10-PCS | Mod: CPTII,S$GLB,, | Performed by: INTERNAL MEDICINE

## 2023-12-14 PROCEDURE — 99999 PR PBB SHADOW E&M-EST. PATIENT-LVL IV: ICD-10-PCS | Mod: PBBFAC,,, | Performed by: INTERNAL MEDICINE

## 2023-12-14 PROCEDURE — 3078F PR MOST RECENT DIASTOLIC BLOOD PRESSURE < 80 MM HG: ICD-10-PCS | Mod: CPTII,S$GLB,, | Performed by: INTERNAL MEDICINE

## 2023-12-14 PROCEDURE — 99214 OFFICE O/P EST MOD 30 MIN: CPT | Mod: S$GLB,,, | Performed by: INTERNAL MEDICINE

## 2023-12-14 PROCEDURE — 3044F PR MOST RECENT HEMOGLOBIN A1C LEVEL <7.0%: ICD-10-PCS | Mod: CPTII,S$GLB,, | Performed by: INTERNAL MEDICINE

## 2023-12-14 PROCEDURE — 1159F MED LIST DOCD IN RCRD: CPT | Mod: CPTII,S$GLB,, | Performed by: INTERNAL MEDICINE

## 2023-12-14 PROCEDURE — 3074F SYST BP LT 130 MM HG: CPT | Mod: CPTII,S$GLB,, | Performed by: INTERNAL MEDICINE

## 2023-12-14 PROCEDURE — 3288F PR FALLS RISK ASSESSMENT DOCUMENTED: ICD-10-PCS | Mod: CPTII,S$GLB,, | Performed by: INTERNAL MEDICINE

## 2023-12-14 PROCEDURE — 3078F DIAST BP <80 MM HG: CPT | Mod: CPTII,S$GLB,, | Performed by: INTERNAL MEDICINE

## 2023-12-14 PROCEDURE — 1159F PR MEDICATION LIST DOCUMENTED IN MEDICAL RECORD: ICD-10-PCS | Mod: CPTII,S$GLB,, | Performed by: INTERNAL MEDICINE

## 2023-12-14 PROCEDURE — 99999 PR PBB SHADOW E&M-EST. PATIENT-LVL IV: CPT | Mod: PBBFAC,,, | Performed by: INTERNAL MEDICINE

## 2023-12-14 PROCEDURE — 3074F PR MOST RECENT SYSTOLIC BLOOD PRESSURE < 130 MM HG: ICD-10-PCS | Mod: CPTII,S$GLB,, | Performed by: INTERNAL MEDICINE

## 2023-12-14 NOTE — PROGRESS NOTES
Subjective:   Patient ID:  Abdirahman Rodriguez is a 68 y.o. male who presents for follow up of No chief complaint on file.      HPI  5/25/2023  A 68 yo maLE  WHO HAD COVID AND HAD RESIDUAL LUNG EFFECT WAS SEEN BY PULMONARY AT THAT TIME. HE IS KNOWN TO HAVE HTN HE SNORES AT NITE . HE HAS NOT HAD SLEEP EVAL. 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 HE HAS TO REST FOR 5 MINUTES. HAD ONE EPISODE OF CHEST PAIN CHEST PAIN  WITH PHYSICAL ACTIVITY SHORT LIVED. NO LEG SWELLING. HAS NO PALPITATION. HE HAS NO NOCTURNAL CHEST PAIN.  LAST  EVAL FOR COVID F/U WAS IN 6/2022 9/19/2023 ANGIE HARDY  Mr. Rodriguez is a 67 year old male patient whose current medical conditions include HTN, familial history of CAD, prior abnormal stress test, and CAD s/p C that showed multivessel CAD s/p CABG x 2 on 8/14/23. He returns today and states he is feeling well overall. Admits to incisional soreness/tightness from the skin pulling but denies any william chest pain or heaviness. No SOB/FENG. No lightheadedness, dizziness, palpitations, near syncope, or syncope. No s/s suggestive of CHF. BP stable and controlled. Working with PT/OT at home. He is compliant with his medications, not on Plavix or statin.     12/14/2023   Here for f/u has been back to work does not have any complaints of  chest pain however has a cold with cough and pain in chets incisional when he coughs. Denies any other symptoms cardiac wise. He is compliant with meds and diet he is walking for work no regular exercise.   Past Medical History:   Diagnosis Date    Abnormal nuclear stress test 08/13/2023    Benign hypertension     Class 1 obesity in adult 05/25/2023    Coronary artery disease     History of colon polyps     Hypergammaglobulinemia     Hypertensive kidney disease with chronic kidney disease stage III     Hypothyroidism, unspecified        Past Surgical History:   Procedure Laterality Date     ARTERIOGRAPHY OF SUBCLAVIAN ARTERY Left 8/14/2023    Procedure: ARTERIOGRAM, SUBCLAVIAN;  Surgeon: Vahid Juarez MD;  Location: [facility] CATH LAB;  Service: Cardiology;  Laterality: Left;    COLONOSCOPY N/A 12/6/2021    Procedure: COLONOSCOPY;  Surgeon: Doris Altman MD;  Location: [facility] ENDO;  Service: Endoscopy;  Laterality: N/A;    CORONARY ARTERY BYPASS GRAFT (CABG) N/A 8/23/2023    Procedure: CORONARY ARTERY BYPASS GRAFT (CABG);  Surgeon: Emma Arzola MD;  Location: [facility] OR;  Service: Cardiothoracic;  Laterality: N/A;  2-VESSEL    ECHOCARDIOGRAM,TRANSESOPHAGEAL N/A 8/23/2023    Procedure: ECHOCARDIOGRAM,TRANSESOPHAGEAL;  Surgeon: Emma Arzola MD;  Location: [facility] OR;  Service: Cardiothoracic;  Laterality: N/A;    ENDOSCOPIC HARVEST OF VEIN Left 8/23/2023    Procedure: SURGICAL PROCUREMENT, VEIN, ENDOSCOPIC;  Surgeon: Emma Arzola MD;  Location: [facility] OR;  Service: Cardiothoracic;  Laterality: Left;    INJECTION OF ANESTHETIC AGENT AROUND MULTIPLE INTERCOSTAL NERVES N/A 8/23/2023    Procedure: BLOCK, NERVE, INTERCOSTAL, 2 OR MORE;  Surgeon: Emma Arzola MD;  Location: [facility] OR;  Service: Cardiothoracic;  Laterality: N/A;    LEFT HEART CATHETERIZATION Left 8/14/2023    Procedure: Left heart cath;  Surgeon: Vahid Juarez MD;  Location: [facility] CATH LAB;  Service: Cardiology;  Laterality: Left;  pt instructed 930-10am start/    None         Social History     Tobacco Use    Smoking status: Never    Smokeless tobacco: Never   Substance Use Topics    Alcohol use: No     Comment: rarely    Drug use: No       Family History   Problem Relation Age of Onset    Heart disease Mother     Hypertension Mother     Heart disease Father     Hypertension Father        Current Outpatient Medications   Medication Sig    amiodarone (PACERONE) 200 MG Tab Take 1 tablet (200 mg total) by mouth once daily.    aspirin (ECOTRIN) 81 MG EC tablet Take 81 mg by mouth once daily.    atorvastatin (LIPITOR) 40 MG tablet  Take 1 tablet (40 mg total) by mouth every evening.    clopidogreL (PLAVIX) 75 mg tablet Take 1 tablet (75 mg total) by mouth once daily.    levothyroxine (SYNTHROID) 50 MCG tablet Take 1 tablet (50 mcg total) by mouth before breakfast.    losartan-hydrochlorothiazide 100-25 mg (HYZAAR) 100-25 mg per tablet TAKE 1 TABLET BY MOUTH EVERY DAY    metoprolol tartrate (LOPRESSOR) 25 MG tablet Take 1 tablet (25 mg total) by mouth 2 (two) times daily.    oxyCODONE (ROXICODONE) 5 MG immediate release tablet Take 1 tablet (5 mg total) by mouth every 4 (four) hours as needed for Pain.    sildenafil (REVATIO) 20 mg Tab Take 2-5  tablets one hour prior to intercourse     Current Facility-Administered Medications   Medication    0.9%  NaCl infusion (for blood administration)     Current Outpatient Medications on File Prior to Visit   Medication Sig    amiodarone (PACERONE) 200 MG Tab Take 1 tablet (200 mg total) by mouth once daily.    aspirin (ECOTRIN) 81 MG EC tablet Take 81 mg by mouth once daily.    atorvastatin (LIPITOR) 40 MG tablet Take 1 tablet (40 mg total) by mouth every evening.    clopidogreL (PLAVIX) 75 mg tablet Take 1 tablet (75 mg total) by mouth once daily.    levothyroxine (SYNTHROID) 50 MCG tablet Take 1 tablet (50 mcg total) by mouth before breakfast.    losartan-hydrochlorothiazide 100-25 mg (HYZAAR) 100-25 mg per tablet TAKE 1 TABLET BY MOUTH EVERY DAY    metoprolol tartrate (LOPRESSOR) 25 MG tablet Take 1 tablet (25 mg total) by mouth 2 (two) times daily.    oxyCODONE (ROXICODONE) 5 MG immediate release tablet Take 1 tablet (5 mg total) by mouth every 4 (four) hours as needed for Pain.    sildenafil (REVATIO) 20 mg Tab Take 2-5  tablets one hour prior to intercourse     Current Facility-Administered Medications on File Prior to Visit   Medication    0.9%  NaCl infusion (for blood administration)       Review of Systems   Constitutional: Negative for malaise/fatigue.   Eyes:  Negative for blurred vision.    Cardiovascular:  Negative for chest pain, claudication, cyanosis, dyspnea on exertion, irregular heartbeat, leg swelling, near-syncope, orthopnea, palpitations and paroxysmal nocturnal dyspnea.   Respiratory:  Negative for cough, hemoptysis and shortness of breath.    Hematologic/Lymphatic: Negative for bleeding problem. Does not bruise/bleed easily.   Skin:  Negative for dry skin and itching.   Musculoskeletal:  Negative for falls, muscle weakness and myalgias.   Gastrointestinal:  Negative for abdominal pain, diarrhea, heartburn, hematemesis, hematochezia and melena.   Genitourinary:  Negative for flank pain and hematuria.   Neurological:  Negative for dizziness, focal weakness, headaches, light-headedness, numbness, paresthesias, seizures and weakness.   Psychiatric/Behavioral:  Negative for altered mental status and memory loss. The patient is not nervous/anxious.    Allergic/Immunologic: Negative for hives.       Objective:   Physical Exam  Vitals and nursing note reviewed.   Constitutional:       General: He is not in acute distress.     Appearance: He is well-developed. He is not diaphoretic.   HENT:      Head: Normocephalic and atraumatic.   Eyes:      General:         Right eye: No discharge.         Left eye: No discharge.      Pupils: Pupils are equal, round, and reactive to light.   Neck:      Thyroid: No thyromegaly.      Vascular: No JVD.   Cardiovascular:      Rate and Rhythm: Normal rate and regular rhythm.      Pulses: Normal pulses and intact distal pulses.      Heart sounds: Normal heart sounds. No murmur heard.     No friction rub. No gallop.   Pulmonary:      Effort: Pulmonary effort is normal. No respiratory distress.      Breath sounds: Normal breath sounds. No wheezing or rales.      Comments: SCAR CABG WELL HEALED.  Chest:      Chest wall: No tenderness.   Abdominal:      General: Bowel sounds are normal. There is no distension.      Palpations: Abdomen is soft.      Tenderness: There is  "no abdominal tenderness.   Musculoskeletal:         General: Normal range of motion.      Cervical back: Neck supple.   Skin:     General: Skin is warm and dry.      Findings: No erythema or rash.   Neurological:      Mental Status: He is alert and oriented to person, place, and time.      Cranial Nerves: No cranial nerve deficit.   Psychiatric:         Behavior: Behavior normal.       Vitals:    12/14/23 0820 12/14/23 0824   BP: 102/70 106/76   BP Location: Right arm Left arm   Patient Position: Sitting Sitting   BP Method: Large (Manual) Large (Manual)   Pulse: 68    SpO2: 95%    Weight: 119.6 kg (263 lb 10.7 oz)    Height: 6' 2" (1.88 m)      Lab Results   Component Value Date    CHOL 92 (L) 08/24/2023    CHOL 124 07/10/2023    CHOL 147 12/28/2022      Body mass index is 33.85 kg/m².   Lab Results   Component Value Date    HGBA1C 5.9 (H) 08/18/2023      BMP  Lab Results   Component Value Date     (L) 08/28/2023    K 4.0 08/28/2023     08/28/2023    CO2 21 (L) 08/28/2023    BUN 31 (H) 08/28/2023    CREATININE 1.6 (H) 08/28/2023    CALCIUM 8.5 (L) 08/28/2023    ANIONGAP 12 08/28/2023    EGFRNORACEVR 48 (A) 08/28/2023      Lab Results   Component Value Date    HDL 17 (L) 08/24/2023    HDL 27 (L) 07/10/2023    HDL 31 (L) 12/28/2022     Lab Results   Component Value Date    LDLCALC 62.4 (L) 08/24/2023    LDLCALC 83.8 07/10/2023    LDLCALC 102.2 12/28/2022     Lab Results   Component Value Date    TRIG 63 08/24/2023    TRIG 66 07/10/2023    TRIG 69 12/28/2022     Lab Results   Component Value Date    CHOLHDL 18.5 (L) 08/24/2023    CHOLHDL 21.8 07/10/2023    CHOLHDL 21.1 12/28/2022       Chemistry        Component Value Date/Time     (L) 08/28/2023 0514    K 4.0 08/28/2023 0514     08/28/2023 0514    CO2 21 (L) 08/28/2023 0514    BUN 31 (H) 08/28/2023 0514    CREATININE 1.6 (H) 08/28/2023 0514     08/28/2023 0514        Component Value Date/Time    CALCIUM 8.5 (L) 08/28/2023 0514    " ALKPHOS 42 (L) 08/25/2023 0454    AST 66 (H) 08/25/2023 0454    ALT 22 08/25/2023 0454    BILITOT 0.9 08/25/2023 0454    ESTGFRAFRICA 48.2 (A) 06/15/2022 0749    EGFRNONAA 41.7 (A) 06/15/2022 0749          Lab Results   Component Value Date    TSH 3.606 09/14/2023     Lab Results   Component Value Date    INR 1.1 08/24/2023    INR 1.1 08/23/2023    INR 1.0 08/18/2023     Lab Results   Component Value Date    WBC 4.59 08/28/2023    HGB 10.2 (L) 08/28/2023    HCT 31.9 (L) 08/28/2023    MCV 95 08/28/2023     08/28/2023     BMP  Sodium   Date Value Ref Range Status   08/28/2023 135 (L) 136 - 145 mmol/L Final     Potassium   Date Value Ref Range Status   08/28/2023 4.0 3.5 - 5.1 mmol/L Final     Chloride   Date Value Ref Range Status   08/28/2023 102 95 - 110 mmol/L Final     CO2   Date Value Ref Range Status   08/28/2023 21 (L) 23 - 29 mmol/L Final     BUN   Date Value Ref Range Status   08/28/2023 31 (H) 8 - 23 mg/dL Final     Creatinine   Date Value Ref Range Status   08/28/2023 1.6 (H) 0.5 - 1.4 mg/dL Final     Calcium   Date Value Ref Range Status   08/28/2023 8.5 (L) 8.7 - 10.5 mg/dL Final     Anion Gap   Date Value Ref Range Status   08/28/2023 12 8 - 16 mmol/L Final     eGFR if    Date Value Ref Range Status   06/15/2022 48.2 (A) >60 mL/min/1.73 m^2 Final     eGFR if non    Date Value Ref Range Status   06/15/2022 41.7 (A) >60 mL/min/1.73 m^2 Final     Comment:     Calculation used to obtain the estimated glomerular filtration  rate (eGFR) is the CKD-EPI equation.        CrCl cannot be calculated (Patient's most recent lab result is older than the maximum 7 days allowed.).    Assessment:     1. Coronary artery disease of native artery of native heart with stable angina pectoris    2. Benign hypertension    3. Stage 3a chronic kidney disease    4. Hypertensive kidney disease with stage 3 chronic kidney disease, unspecified whether stage 3a or 3b CKD    5. Mixed hyperlipidemia     6. Prediabetes    7. Restrictive lung disease    8. Diffusion capacity of lung (dl), decreased    9. Subclinical iodine-deficiency hypothyroidism    10. Abnormal nuclear stress test    11. S/P CABG x 2    Cad s/p cabg asymptomatic needs to be back on regular exercise and weight loss counseled.  Prediabetes on target discussed low starches diet and weight loss and compliance  Hlp on target tolerated meds well stable on target continue the same.  Htn controlled low salt diet emphasized continue the same.   Ckd stage 3 discussed importance of diabetes and htn control to delay progression.   Plan:   Continue current therapy  Cardiac low salt diet.  Risk factor modification and excercise program./weight loss   F/u in 6 months with lipid cmp a1c

## 2023-12-15 RX ORDER — AMIODARONE HYDROCHLORIDE 200 MG/1
200 TABLET ORAL
Qty: 90 TABLET | Refills: 1 | Status: SHIPPED | OUTPATIENT
Start: 2023-12-15

## 2024-01-10 ENCOUNTER — OFFICE VISIT (OUTPATIENT)
Dept: INTERNAL MEDICINE | Facility: CLINIC | Age: 69
End: 2024-01-10
Payer: COMMERCIAL

## 2024-01-10 VITALS
OXYGEN SATURATION: 94 % | SYSTOLIC BLOOD PRESSURE: 118 MMHG | HEART RATE: 64 BPM | DIASTOLIC BLOOD PRESSURE: 80 MMHG | HEIGHT: 74 IN | BODY MASS INDEX: 33.92 KG/M2 | WEIGHT: 264.31 LBS

## 2024-01-10 DIAGNOSIS — I25.118 CORONARY ARTERY DISEASE OF NATIVE ARTERY OF NATIVE HEART WITH STABLE ANGINA PECTORIS: ICD-10-CM

## 2024-01-10 DIAGNOSIS — R73.03 PREDIABETES: ICD-10-CM

## 2024-01-10 DIAGNOSIS — N18.31 STAGE 3A CHRONIC KIDNEY DISEASE: ICD-10-CM

## 2024-01-10 DIAGNOSIS — I12.9 HYPERTENSIVE KIDNEY DISEASE WITH STAGE 3 CHRONIC KIDNEY DISEASE, UNSPECIFIED WHETHER STAGE 3A OR 3B CKD: ICD-10-CM

## 2024-01-10 DIAGNOSIS — J98.4 RESTRICTIVE LUNG DISEASE: ICD-10-CM

## 2024-01-10 DIAGNOSIS — Z12.5 PROSTATE CANCER SCREENING: ICD-10-CM

## 2024-01-10 DIAGNOSIS — E78.2 MIXED HYPERLIPIDEMIA: ICD-10-CM

## 2024-01-10 DIAGNOSIS — Z86.010 HISTORY OF COLON POLYPS: ICD-10-CM

## 2024-01-10 DIAGNOSIS — N18.30 HYPERTENSIVE KIDNEY DISEASE WITH STAGE 3 CHRONIC KIDNEY DISEASE, UNSPECIFIED WHETHER STAGE 3A OR 3B CKD: ICD-10-CM

## 2024-01-10 DIAGNOSIS — I10 BENIGN HYPERTENSION: ICD-10-CM

## 2024-01-10 DIAGNOSIS — Z00.00 ROUTINE GENERAL MEDICAL EXAMINATION AT A HEALTH CARE FACILITY: Primary | ICD-10-CM

## 2024-01-10 DIAGNOSIS — E02 SUBCLINICAL IODINE-DEFICIENCY HYPOTHYROIDISM: ICD-10-CM

## 2024-01-10 DIAGNOSIS — R94.2 DIFFUSION CAPACITY OF LUNG (DL), DECREASED: ICD-10-CM

## 2024-01-10 DIAGNOSIS — D89.2 HYPERGAMMAGLOBULINEMIA: ICD-10-CM

## 2024-01-10 PROCEDURE — 99999 PR PBB SHADOW E&M-EST. PATIENT-LVL IV: CPT | Mod: PBBFAC,,, | Performed by: INTERNAL MEDICINE

## 2024-01-10 PROCEDURE — 3079F DIAST BP 80-89 MM HG: CPT | Mod: CPTII,S$GLB,, | Performed by: INTERNAL MEDICINE

## 2024-01-10 PROCEDURE — 3288F FALL RISK ASSESSMENT DOCD: CPT | Mod: CPTII,S$GLB,, | Performed by: INTERNAL MEDICINE

## 2024-01-10 PROCEDURE — 1101F PT FALLS ASSESS-DOCD LE1/YR: CPT | Mod: CPTII,S$GLB,, | Performed by: INTERNAL MEDICINE

## 2024-01-10 PROCEDURE — 3008F BODY MASS INDEX DOCD: CPT | Mod: CPTII,S$GLB,, | Performed by: INTERNAL MEDICINE

## 2024-01-10 PROCEDURE — 99397 PER PM REEVAL EST PAT 65+ YR: CPT | Mod: S$GLB,,, | Performed by: INTERNAL MEDICINE

## 2024-01-10 PROCEDURE — 1159F MED LIST DOCD IN RCRD: CPT | Mod: CPTII,S$GLB,, | Performed by: INTERNAL MEDICINE

## 2024-01-10 PROCEDURE — 1126F AMNT PAIN NOTED NONE PRSNT: CPT | Mod: CPTII,S$GLB,, | Performed by: INTERNAL MEDICINE

## 2024-01-10 PROCEDURE — 3074F SYST BP LT 130 MM HG: CPT | Mod: CPTII,S$GLB,, | Performed by: INTERNAL MEDICINE

## 2024-01-10 RX ORDER — LOSARTAN POTASSIUM AND HYDROCHLOROTHIAZIDE 25; 100 MG/1; MG/1
1 TABLET ORAL DAILY
Qty: 90 TABLET | Refills: 3 | Status: SHIPPED | OUTPATIENT
Start: 2024-01-10

## 2024-01-10 NOTE — PROGRESS NOTES
HPI:  Patient is a 68-year-old gentleman who comes in today for follow-up of his coronary disease, hypertension, lipids, and for his annual physical.  Patient is been doing well.  There has been no major problems or complaints.  He had successful bypass surgery 6 months ago.  He denies any chest pain.  His blood pressure has been well controlled    Current MEDS: medcard review, verified and update  Allergies: Per the electronic medical record    Past Medical History:   Diagnosis Date    Abnormal nuclear stress test 08/13/2023    Benign hypertension     Class 1 obesity in adult 05/25/2023    Coronary artery disease     s/p cabg X2    History of colon polyps     Hypergammaglobulinemia     Hypertensive kidney disease with chronic kidney disease stage III     Hypothyroidism, unspecified        Past Surgical History:   Procedure Laterality Date    ARTERIOGRAPHY OF SUBCLAVIAN ARTERY Left 8/14/2023    Procedure: ARTERIOGRAM, SUBCLAVIAN;  Surgeon: Vahid Juarez MD;  Location: [facility] CATH LAB;  Service: Cardiology;  Laterality: Left;    COLONOSCOPY N/A 12/6/2021    Procedure: COLONOSCOPY;  Surgeon: Doris Altman MD;  Location: [facility] ENDO;  Service: Endoscopy;  Laterality: N/A;    CORONARY ARTERY BYPASS GRAFT (CABG) N/A 8/23/2023    Procedure: CORONARY ARTERY BYPASS GRAFT (CABG);  Surgeon: Emma Arzola MD;  Location: [facility] OR;  Service: Cardiothoracic;  Laterality: N/A;  2-VESSEL    ECHOCARDIOGRAM,TRANSESOPHAGEAL N/A 8/23/2023    Procedure: ECHOCARDIOGRAM,TRANSESOPHAGEAL;  Surgeon: Emma Arzola MD;  Location: [facility] OR;  Service: Cardiothoracic;  Laterality: N/A;    ENDOSCOPIC HARVEST OF VEIN Left 8/23/2023    Procedure: SURGICAL PROCUREMENT, VEIN, ENDOSCOPIC;  Surgeon: Emma Arzola MD;  Location: [facility] OR;  Service: Cardiothoracic;  Laterality: Left;    INJECTION OF ANESTHETIC AGENT AROUND MULTIPLE INTERCOSTAL NERVES N/A 8/23/2023    Procedure: BLOCK, NERVE, INTERCOSTAL, 2 OR MORE;  Surgeon: Emma Arzola  "MD GRACIE;  Location: [facility] OR;  Service: Cardiothoracic;  Laterality: N/A;    LEFT HEART CATHETERIZATION Left 8/14/2023    Procedure: Left heart cath;  Surgeon: Vahid Juarez MD;  Location: [facility] CATH LAB;  Service: Cardiology;  Laterality: Left;  pt instructed 930-10am start/    None         SHx: per the electronic medical record    FHx: recorded in the electronic medical record    ROS:    denies any chest pains or shortness of breath. Denies any nausea, vomiting or diarrhea. Denies any fever, chills or sweats. Denies any change in weight, voice, stool, skin or hair. Denies any dysuria, dyspepsia or dysphagia. Denies any change in vision, hearing or headaches. Denies any swollen lymph nodes or loss of memory.    PE:  /80 (BP Location: Right arm)   Pulse 64   Ht 6' 2" (1.88 m)   Wt 119.9 kg (264 lb 5.3 oz)   SpO2 (!) 94%   BMI 33.94 kg/m²   Gen: Well-developed, well-nourished, male, in no acute distress, oriented x3  HEENT: neck is supple, no adenopathy, carotids 2+ equal without bruits, thyroid exam normal size without nodules.  CHEST: clear to auscultation and percussion  CVS: regular rate and rhythm without significant murmur, gallop, or rubs  ABD: soft, benign, no rebound no guarding, no distention.  Bowel sounds are normal.     nontender.  No palpable masses.  No organomegaly and no audible bruits.  RECTAL:  Deferred.  EXT: no clubbing, cyanosis, or edema  LYMPH: no cervical, inguinal, or axillary adenopathy  FEET: no loss of sensation.  No ulcers or pressure sores.  NEURO: gait normal.  Cranial nerves II- XII intact. No nystagmus.  Speech normal.   Gross motor and sensory unremarkable.    Lab Results   Component Value Date    WBC 4.59 08/28/2023    HGB 10.2 (L) 08/28/2023    HCT 31.9 (L) 08/28/2023     08/28/2023    CHOL 92 (L) 08/24/2023    TRIG 63 08/24/2023    HDL 17 (L) 08/24/2023    ALT 22 08/25/2023    AST 66 (H) 08/25/2023     (L) 08/28/2023    K 4.0 08/28/2023     " 08/28/2023    CREATININE 1.6 (H) 08/28/2023    BUN 31 (H) 08/28/2023    CO2 21 (L) 08/28/2023    TSH 3.606 09/14/2023    PSA 0.68 12/28/2022    INR 1.1 08/24/2023    HGBA1C 5.9 (H) 08/18/2023    BNP 47 05/27/2019    SEDRATE 18 01/04/2023    CRP 11.2 (H) 01/04/2023       Impression:  Coronary artery disease, need to reassess his lipids  Hypertension, well controlled  Will check his lipids this week.  Patient Active Problem List   Diagnosis    Benign hypertension    CKD (chronic kidney disease), stage III    Hypertensive kidney disease with chronic kidney disease stage III    History of colon polyps    Hypergammaglobulinemia    Mixed hyperlipidemia    Prediabetes    Restrictive lung disease    Diffusion capacity of lung (dl), decreased    Hypothyroidism, unspecified    Coronary artery disease       Plan:   Orders Placed This Encounter    Hemoglobin A1C    Lipid Panel    Basic Metabolic Panel    PSA, Screening    losartan-hydrochlorothiazide 100-25 mg (HYZAAR) 100-25 mg per tablet     Patient will have the above lab work done this week.  Patient will see me again in 6 months.  Medications remain same.  This note is generated with speech recognition software and is subject to transcription error and sound alike phrases that may be missed by proofreading.

## 2024-01-12 ENCOUNTER — LAB VISIT (OUTPATIENT)
Dept: LAB | Facility: HOSPITAL | Age: 69
End: 2024-01-12
Attending: INTERNAL MEDICINE
Payer: COMMERCIAL

## 2024-01-12 DIAGNOSIS — E78.2 MIXED HYPERLIPIDEMIA: ICD-10-CM

## 2024-01-12 DIAGNOSIS — I10 BENIGN HYPERTENSION: ICD-10-CM

## 2024-01-12 DIAGNOSIS — Z12.5 PROSTATE CANCER SCREENING: ICD-10-CM

## 2024-01-12 DIAGNOSIS — R73.03 PREDIABETES: ICD-10-CM

## 2024-01-12 LAB
ANION GAP SERPL CALC-SCNC: 6 MMOL/L (ref 8–16)
BUN SERPL-MCNC: 21 MG/DL (ref 8–23)
CALCIUM SERPL-MCNC: 9 MG/DL (ref 8.7–10.5)
CHLORIDE SERPL-SCNC: 106 MMOL/L (ref 95–110)
CHOLEST SERPL-MCNC: 114 MG/DL (ref 120–199)
CHOLEST/HDLC SERPL: 3.3 {RATIO} (ref 2–5)
CO2 SERPL-SCNC: 26 MMOL/L (ref 23–29)
COMPLEXED PSA SERPL-MCNC: 0.42 NG/ML (ref 0–4)
CREAT SERPL-MCNC: 1.7 MG/DL (ref 0.5–1.4)
EST. GFR  (NO RACE VARIABLE): 43.9 ML/MIN/1.73 M^2
ESTIMATED AVG GLUCOSE: 126 MG/DL (ref 68–131)
GLUCOSE SERPL-MCNC: 101 MG/DL (ref 70–110)
HBA1C MFR BLD: 6 % (ref 4–5.6)
HDLC SERPL-MCNC: 35 MG/DL (ref 40–75)
HDLC SERPL: 30.7 % (ref 20–50)
LDLC SERPL CALC-MCNC: 67.6 MG/DL (ref 63–159)
NONHDLC SERPL-MCNC: 79 MG/DL
POTASSIUM SERPL-SCNC: 5.3 MMOL/L (ref 3.5–5.1)
SODIUM SERPL-SCNC: 138 MMOL/L (ref 136–145)
TRIGL SERPL-MCNC: 57 MG/DL (ref 30–150)

## 2024-01-12 PROCEDURE — 80048 BASIC METABOLIC PNL TOTAL CA: CPT | Performed by: INTERNAL MEDICINE

## 2024-01-12 PROCEDURE — 80061 LIPID PANEL: CPT | Performed by: INTERNAL MEDICINE

## 2024-01-12 PROCEDURE — 36415 COLL VENOUS BLD VENIPUNCTURE: CPT | Mod: PO | Performed by: INTERNAL MEDICINE

## 2024-01-12 PROCEDURE — 84153 ASSAY OF PSA TOTAL: CPT | Performed by: INTERNAL MEDICINE

## 2024-01-12 PROCEDURE — 83036 HEMOGLOBIN GLYCOSYLATED A1C: CPT | Performed by: INTERNAL MEDICINE

## 2024-01-13 ENCOUNTER — PATIENT MESSAGE (OUTPATIENT)
Dept: INTERNAL MEDICINE | Facility: CLINIC | Age: 69
End: 2024-01-13
Payer: COMMERCIAL

## 2024-01-20 ENCOUNTER — HOSPITAL ENCOUNTER (EMERGENCY)
Facility: HOSPITAL | Age: 69
Discharge: HOME | End: 2024-01-21
Attending: EMERGENCY MEDICINE
Payer: COMMERCIAL

## 2024-01-20 DIAGNOSIS — U07.1 COVID: Primary | ICD-10-CM

## 2024-01-20 DIAGNOSIS — J96.01 ACUTE RESPIRATORY FAILURE WITH HYPOXIA: ICD-10-CM

## 2024-01-20 PROCEDURE — 96374 THER/PROPH/DIAG INJ IV PUSH: CPT

## 2024-01-20 PROCEDURE — 99284 EMERGENCY DEPT VISIT MOD MDM: CPT | Mod: 25

## 2024-01-20 NOTE — LETTER
"Abdirahman Kelly"Queen was seen and treated in our emergency department on 1/20/2024.  He may return to work on 01/29/2024.       If you have any questions or concerns, please don't hesitate to call.      CECIL Albrecht RN    "

## 2024-01-20 NOTE — LETTER
"Abdirahman"Gino Rodriguez was seen and treated in our emergency department on 1/20/2024.     COVID-19 is present in our communities across the state. There is limited testing for COVID at this time, so not all patients can be tested. In this situation, your employee meets the following criteria:    Abdirahman Rodriguez has met the criteria for COVID-19 testing and has a POSITIVE result. He can return to work once they are asymptomatic for 24 hours without the use of fever reducing medications AND at least five days from the first positive result. A mask is recommended for 5 days post quarantine.     If you have any questions or concerns, or if I can be of further assistance, please do not hesitate to contact me.    Sincerely,             Mark CHAO"

## 2024-01-20 NOTE — Clinical Note
Date: 1/21/2024  Patient: Abdirahman Rodriguez  Admitted: 1/20/2024 11:59 PM  Attending Provider: Jose R Fernandes MD

## 2024-01-21 VITALS
BODY MASS INDEX: 33.9 KG/M2 | OXYGEN SATURATION: 88 % | TEMPERATURE: 101 F | RESPIRATION RATE: 30 BRPM | HEART RATE: 85 BPM | DIASTOLIC BLOOD PRESSURE: 67 MMHG | HEIGHT: 74 IN | SYSTOLIC BLOOD PRESSURE: 118 MMHG | WEIGHT: 264.13 LBS

## 2024-01-21 DIAGNOSIS — U07.1 COVID-19 VIRUS DETECTED: ICD-10-CM

## 2024-01-21 LAB
ALBUMIN SERPL BCP-MCNC: 3 G/DL (ref 3.5–5.2)
ALP SERPL-CCNC: 54 U/L (ref 55–135)
ALT SERPL W/O P-5'-P-CCNC: 33 U/L (ref 10–44)
ANION GAP SERPL CALC-SCNC: 7 MMOL/L (ref 8–16)
AST SERPL-CCNC: 25 U/L (ref 10–40)
BASOPHILS # BLD AUTO: 0.01 K/UL (ref 0–0.2)
BASOPHILS NFR BLD: 0.2 % (ref 0–1.9)
BILIRUB SERPL-MCNC: 0.4 MG/DL (ref 0.1–1)
BNP SERPL-MCNC: 152 PG/ML (ref 0–99)
BUN SERPL-MCNC: 25 MG/DL (ref 8–23)
CALCIUM SERPL-MCNC: 8.5 MG/DL (ref 8.7–10.5)
CHLORIDE SERPL-SCNC: 106 MMOL/L (ref 95–110)
CO2 SERPL-SCNC: 20 MMOL/L (ref 23–29)
CREAT SERPL-MCNC: 1.8 MG/DL (ref 0.5–1.4)
DIFFERENTIAL METHOD BLD: ABNORMAL
EOSINOPHIL # BLD AUTO: 0.1 K/UL (ref 0–0.5)
EOSINOPHIL NFR BLD: 1.3 % (ref 0–8)
ERYTHROCYTE [DISTWIDTH] IN BLOOD BY AUTOMATED COUNT: 14.2 % (ref 11.5–14.5)
EST. GFR  (NO RACE VARIABLE): 41 ML/MIN/1.73 M^2
GLUCOSE SERPL-MCNC: 102 MG/DL (ref 70–110)
HCT VFR BLD AUTO: 39.1 % (ref 40–54)
HGB BLD-MCNC: 12.9 G/DL (ref 14–18)
IMM GRANULOCYTES # BLD AUTO: 0.01 K/UL (ref 0–0.04)
IMM GRANULOCYTES NFR BLD AUTO: 0.2 % (ref 0–0.5)
INFLUENZA A, MOLECULAR: NEGATIVE
INFLUENZA B, MOLECULAR: NEGATIVE
LACTATE SERPL-SCNC: 1.1 MMOL/L (ref 0.5–2.2)
LYMPHOCYTES # BLD AUTO: 0.7 K/UL (ref 1–4.8)
LYMPHOCYTES NFR BLD: 14.1 % (ref 18–48)
MCH RBC QN AUTO: 30.3 PG (ref 27–31)
MCHC RBC AUTO-ENTMCNC: 33 G/DL (ref 32–36)
MCV RBC AUTO: 92 FL (ref 82–98)
MONOCYTES # BLD AUTO: 0.7 K/UL (ref 0.3–1)
MONOCYTES NFR BLD: 14.8 % (ref 4–15)
NEUTROPHILS # BLD AUTO: 3.2 K/UL (ref 1.8–7.7)
NEUTROPHILS NFR BLD: 69.4 % (ref 38–73)
NRBC BLD-RTO: 0 /100 WBC
PLATELET # BLD AUTO: 145 K/UL (ref 150–450)
PMV BLD AUTO: 10 FL (ref 9.2–12.9)
POTASSIUM SERPL-SCNC: 4.3 MMOL/L (ref 3.5–5.1)
PROT SERPL-MCNC: 8.5 G/DL (ref 6–8.4)
RBC # BLD AUTO: 4.26 M/UL (ref 4.6–6.2)
SARS-COV-2 RDRP RESP QL NAA+PROBE: POSITIVE
SODIUM SERPL-SCNC: 133 MMOL/L (ref 136–145)
SPECIMEN SOURCE: NORMAL
TROPONIN I SERPL DL<=0.01 NG/ML-MCNC: 0.01 NG/ML (ref 0–0.03)
WBC # BLD AUTO: 4.6 K/UL (ref 3.9–12.7)

## 2024-01-21 PROCEDURE — 25000003 PHARM REV CODE 250: Performed by: EMERGENCY MEDICINE

## 2024-01-21 PROCEDURE — 83880 ASSAY OF NATRIURETIC PEPTIDE: CPT | Performed by: EMERGENCY MEDICINE

## 2024-01-21 PROCEDURE — 84484 ASSAY OF TROPONIN QUANT: CPT | Performed by: EMERGENCY MEDICINE

## 2024-01-21 PROCEDURE — 87040 BLOOD CULTURE FOR BACTERIA: CPT | Performed by: EMERGENCY MEDICINE

## 2024-01-21 PROCEDURE — 85025 COMPLETE CBC W/AUTO DIFF WBC: CPT | Performed by: EMERGENCY MEDICINE

## 2024-01-21 PROCEDURE — 83605 ASSAY OF LACTIC ACID: CPT | Performed by: EMERGENCY MEDICINE

## 2024-01-21 PROCEDURE — 80053 COMPREHEN METABOLIC PANEL: CPT | Performed by: EMERGENCY MEDICINE

## 2024-01-21 PROCEDURE — 87502 INFLUENZA DNA AMP PROBE: CPT | Performed by: EMERGENCY MEDICINE

## 2024-01-21 PROCEDURE — U0002 COVID-19 LAB TEST NON-CDC: HCPCS | Performed by: EMERGENCY MEDICINE

## 2024-01-21 PROCEDURE — 63600175 PHARM REV CODE 636 W HCPCS: Performed by: EMERGENCY MEDICINE

## 2024-01-21 RX ORDER — ACETAMINOPHEN 500 MG
1000 TABLET ORAL
Status: COMPLETED | OUTPATIENT
Start: 2024-01-21 | End: 2024-01-21

## 2024-01-21 RX ORDER — DEXAMETHASONE SODIUM PHOSPHATE 4 MG/ML
6 INJECTION, SOLUTION INTRA-ARTICULAR; INTRALESIONAL; INTRAMUSCULAR; INTRAVENOUS; SOFT TISSUE
Status: COMPLETED | OUTPATIENT
Start: 2024-01-21 | End: 2024-01-21

## 2024-01-21 RX ORDER — DEXAMETHASONE 6 MG/1
6 TABLET ORAL DAILY
Qty: 9 TABLET | Refills: 0 | Status: SHIPPED | OUTPATIENT
Start: 2024-01-21 | End: 2024-01-30

## 2024-01-21 RX ADMIN — DEXAMETHASONE SODIUM PHOSPHATE 6 MG: 4 INJECTION INTRA-ARTICULAR; INTRALESIONAL; INTRAMUSCULAR; INTRAVENOUS; SOFT TISSUE at 02:01

## 2024-01-21 RX ADMIN — ACETAMINOPHEN 1000 MG: 500 TABLET ORAL at 02:01

## 2024-01-21 NOTE — ED PROVIDER NOTES
SCRIBE #1 NOTE: I, Tony Rey, am scribing for, and in the presence of, Jose R Fernandes MD. I have scribed the entire note.       History     Chief Complaint   Patient presents with    Cough     Cough started this am,      Review of patient's allergies indicates:  No Known Allergies      History of Present Illness     HPI    1/21/2024, 1:00 AM  History obtained from the patient      History of Present Illness: Abdirahman Rodriguez is a 68 y.o. male patient with a PMHx of CAD s/p CABG x2, colon polyps, hypergammaglobulinemia, hypertensive kidney disease with chronic kidney disease stage III, and hypothyroidism who presents to the Emergency Department for evaluation of cough which onset today. Pt states he was fine yesterday. No recent sick contacts. Symptoms are constant and moderate in severity. No mitigating or exacerbating factors reported. Associated sxs include fever, and mild headache. Patient denies any abdominal pain, myalgias, and all other sxs at this time. No prior Tx reported. Pt denies the use of home O2. No further complaints or concerns at this time.       Arrival mode: Personal vehicle    PCP: Mt Noel MD        Past Medical History:  Past Medical History:   Diagnosis Date    Abnormal nuclear stress test 08/13/2023    Benign hypertension     Class 1 obesity in adult 05/25/2023    Coronary artery disease 08/23/2023    s/p cabg X2    History of colon polyps     Hypergammaglobulinemia     Hypertensive kidney disease with chronic kidney disease stage III     Hypothyroidism, unspecified        Past Surgical History:  Past Surgical History:   Procedure Laterality Date    ARTERIOGRAPHY OF SUBCLAVIAN ARTERY Left 8/14/2023    Procedure: ARTERIOGRAM, SUBCLAVIAN;  Surgeon: Vahid Juarez MD;  Location: [facility] CATH LAB;  Service: Cardiology;  Laterality: Left;    COLONOSCOPY N/A 12/6/2021    Procedure: COLONOSCOPY;  Surgeon: Doris Altman MD;  Location: [facility] ENDO;  Service: Endoscopy;  Laterality: N/A;     CORONARY ARTERY BYPASS GRAFT (CABG) N/A 8/23/2023    Procedure: CORONARY ARTERY BYPASS GRAFT (CABG);  Surgeon: Emma Arzola MD;  Location: [facility] OR;  Service: Cardiothoracic;  Laterality: N/A;  2-VESSEL    ECHOCARDIOGRAM,TRANSESOPHAGEAL N/A 8/23/2023    Procedure: ECHOCARDIOGRAM,TRANSESOPHAGEAL;  Surgeon: Emma Arzola MD;  Location: [facility] OR;  Service: Cardiothoracic;  Laterality: N/A;    ENDOSCOPIC HARVEST OF VEIN Left 8/23/2023    Procedure: SURGICAL PROCUREMENT, VEIN, ENDOSCOPIC;  Surgeon: Emma Arzola MD;  Location: [facility] OR;  Service: Cardiothoracic;  Laterality: Left;    INJECTION OF ANESTHETIC AGENT AROUND MULTIPLE INTERCOSTAL NERVES N/A 8/23/2023    Procedure: BLOCK, NERVE, INTERCOSTAL, 2 OR MORE;  Surgeon: Emma Arzola MD;  Location: [facility] OR;  Service: Cardiothoracic;  Laterality: N/A;    LEFT HEART CATHETERIZATION Left 8/14/2023    Procedure: Left heart cath;  Surgeon: Vahid Juarez MD;  Location: [facility] CATH LAB;  Service: Cardiology;  Laterality: Left;  pt instructed 930-10am start/    None           Family History:  Family History   Problem Relation Age of Onset    Heart disease Mother     Hypertension Mother     Heart disease Father     Hypertension Father        Social History:  Social History     Tobacco Use    Smoking status: Never    Smokeless tobacco: Never   Substance and Sexual Activity    Alcohol use: No     Comment: rarely    Drug use: No    Sexual activity: Not on file        Review of Systems     Review of Systems   Constitutional:  Positive for fever.   HENT:  Negative for sore throat.    Respiratory:  Positive for cough. Negative for shortness of breath.    Cardiovascular:  Negative for chest pain.   Gastrointestinal:  Negative for abdominal pain and nausea.   Genitourinary:  Negative for dysuria.   Musculoskeletal:  Negative for back pain.   Skin:  Negative for rash.   Neurological:  Positive for headaches (mild). Negative for weakness.   Hematological:  Does not  bruise/bleed easily.   All other systems reviewed and are negative.       Physical Exam     Initial Vitals [01/20/24 2357]   BP Pulse Resp Temp SpO2   (!) 162/67 81 (!) 24 (!) 101.3 °F (38.5 °C) (!) 89 %      MAP       --          Physical Exam  Nursing Notes and Vital Signs Reviewed.  Constitutional: Patient is in no acute distress. Well-developed and well-nourished.  Head: Atraumatic. Normocephalic.  Eyes: PERRL. EOM intact. Conjunctivae are not pale. No scleral icterus.  ENT: Mucous membranes are moist.    Neck: Supple. Full ROM.   Cardiovascular: Regular rate. Regular rhythm. No murmurs, rubs, or gallops. Distal pulses are 2+ and symmetric.  Pulmonary/Chest: No respiratory distress. Clear to auscultation bilaterally. No wheezing or rales. Pt has a midline chest incision.  Abdominal: Soft and non-distended.  There is no tenderness.  No rebound, guarding, or rigidity.  Genitourinary: No CVA tenderness  Musculoskeletal: Moves all extremities. No obvious deformities. No edema.   Skin: Warm and dry.  Neurological:  Alert, awake, and appropriate.  Normal speech.  No acute focal neurological deficits are appreciated.  Psychiatric: Normal affect. Good eye contact. Appropriate in content.     ED Course   Critical Care    Date/Time: 1/21/2024 2:09 AM    Performed by: Jose R Fernandes MD  Authorized by: Jose R Fernandes MD  Direct patient critical care time: 12 minutes  Additional history critical care time: 5 minutes  Ordering / reviewing critical care time: 5 minutes  Documentation critical care time: 5 minutes  Consulting other physicians critical care time: 5 minutes  Consult with family critical care time: 3 minutes  Total critical care time (exclusive of procedural time) : 35 minutes  Critical care time was exclusive of separately billable procedures and treating other patients and teaching time.  Critical care was necessary to treat or prevent imminent or life-threatening deterioration of the following conditions:  "respiratory failure.  Critical care was time spent personally by me on the following activities: development of treatment plan with patient or surrogate, blood draw for specimens, discussions with consultants, interpretation of cardiac output measurements, evaluation of patient's response to treatment, review of old charts, re-evaluation of patient's condition, pulse oximetry, ordering and review of radiographic studies, ordering and review of laboratory studies, ordering and performing treatments and interventions, obtaining history from patient or surrogate and examination of patient.        ED Vital Signs:  Vitals:    01/20/24 2357 01/21/24 0007 01/21/24 0015 01/21/24 0030   BP: (!) 162/67 132/63 (!) 115/57 114/63   Pulse: 81 79 77 73   Resp: (!) 24 (!) 24 (!) 34 (!) 30   Temp: (!) 101.3 °F (38.5 °C)      TempSrc: Oral      SpO2: (!) 89% (!) 93% (!) 94% 96%   Weight: 119.8 kg (264 lb 1.8 oz)      Height: 6' 2" (1.88 m)       01/21/24 0045 01/21/24 0105 01/21/24 0200   BP: 118/67     Pulse: 73 73 85   Resp: (!) 24 (!) 24 (!) 30   Temp:      TempSrc:      SpO2: 96% 97% (S) (!) 88%   Weight:      Height:          Abnormal Lab Results:  Labs Reviewed   SARS-COV-2 RNA AMPLIFICATION, QUAL - Abnormal; Notable for the following components:       Result Value    SARS-CoV-2 RNA, Amplification, Qual Positive (*)     All other components within normal limits   COMPREHENSIVE METABOLIC PANEL - Abnormal; Notable for the following components:    Sodium 133 (*)     CO2 20 (*)     BUN 25 (*)     Creatinine 1.8 (*)     Calcium 8.5 (*)     Total Protein 8.5 (*)     Albumin 3.0 (*)     Alkaline Phosphatase 54 (*)     eGFR 41 (*)     Anion Gap 7 (*)     All other components within normal limits   CBC W/ AUTO DIFFERENTIAL - Abnormal; Notable for the following components:    RBC 4.26 (*)     Hemoglobin 12.9 (*)     Hematocrit 39.1 (*)     Platelets 145 (*)     Lymph # 0.7 (*)     Lymph % 14.1 (*)     All other components within normal " limits   B-TYPE NATRIURETIC PEPTIDE - Abnormal; Notable for the following components:     (*)     All other components within normal limits   INFLUENZA A & B BY MOLECULAR   CULTURE, BLOOD   CULTURE, BLOOD   LACTIC ACID, PLASMA   TROPONIN I        All Lab Results:  Results for orders placed or performed during the hospital encounter of 01/20/24   Influenza A & B by Molecular    Specimen: Nasopharyngeal Swab   Result Value Ref Range    Influenza A, Molecular Negative Negative    Influenza B, Molecular Negative Negative    Flu A & B Source Nasal swab    COVID-19 Rapid Screening   Result Value Ref Range    SARS-CoV-2 RNA, Amplification, Qual Positive (A) Negative   Comprehensive metabolic panel   Result Value Ref Range    Sodium 133 (L) 136 - 145 mmol/L    Potassium 4.3 3.5 - 5.1 mmol/L    Chloride 106 95 - 110 mmol/L    CO2 20 (L) 23 - 29 mmol/L    Glucose 102 70 - 110 mg/dL    BUN 25 (H) 8 - 23 mg/dL    Creatinine 1.8 (H) 0.5 - 1.4 mg/dL    Calcium 8.5 (L) 8.7 - 10.5 mg/dL    Total Protein 8.5 (H) 6.0 - 8.4 g/dL    Albumin 3.0 (L) 3.5 - 5.2 g/dL    Total Bilirubin 0.4 0.1 - 1.0 mg/dL    Alkaline Phosphatase 54 (L) 55 - 135 U/L    AST 25 10 - 40 U/L    ALT 33 10 - 44 U/L    eGFR 41 (A) >60 mL/min/1.73 m^2    Anion Gap 7 (L) 8 - 16 mmol/L   CBC auto differential   Result Value Ref Range    WBC 4.60 3.90 - 12.70 K/uL    RBC 4.26 (L) 4.60 - 6.20 M/uL    Hemoglobin 12.9 (L) 14.0 - 18.0 g/dL    Hematocrit 39.1 (L) 40.0 - 54.0 %    MCV 92 82 - 98 fL    MCH 30.3 27.0 - 31.0 pg    MCHC 33.0 32.0 - 36.0 g/dL    RDW 14.2 11.5 - 14.5 %    Platelets 145 (L) 150 - 450 K/uL    MPV 10.0 9.2 - 12.9 fL    Immature Granulocytes 0.2 0.0 - 0.5 %    Gran # (ANC) 3.2 1.8 - 7.7 K/uL    Immature Grans (Abs) 0.01 0.00 - 0.04 K/uL    Lymph # 0.7 (L) 1.0 - 4.8 K/uL    Mono # 0.7 0.3 - 1.0 K/uL    Eos # 0.1 0.0 - 0.5 K/uL    Baso # 0.01 0.00 - 0.20 K/uL    nRBC 0 0 /100 WBC    Gran % 69.4 38.0 - 73.0 %    Lymph % 14.1 (L) 18.0 - 48.0 %     Mono % 14.8 4.0 - 15.0 %    Eosinophil % 1.3 0.0 - 8.0 %    Basophil % 0.2 0.0 - 1.9 %    Differential Method Automated    Lactic acid, plasma   Result Value Ref Range    Lactate (Lactic Acid) 1.1 0.5 - 2.2 mmol/L   Troponin I   Result Value Ref Range    Troponin I 0.014 0.000 - 0.026 ng/mL   Brain natriuretic peptide   Result Value Ref Range     (H) 0 - 99 pg/mL         Imaging Results:  Imaging Results              X-Ray Chest AP Portable (Final result)  Result time 01/21/24 00:54:31      Final result by Fahad Victor MD (01/21/24 00:54:31)                   Impression:      As above.      Electronically signed by: Fahad Victor  Date:    01/21/2024  Time:    00:54               Narrative:    EXAMINATION:  XR CHEST AP PORTABLE    CLINICAL HISTORY:  cough;    TECHNIQUE:  Single frontal view of the chest was performed.    COMPARISON:  08/25/2023    FINDINGS:  Interstitial prominence of the lung bases may represent edema, pneumonitis, or chronic interstitial lung disease/scarring.  No focal consolidation.  Normal heart size.                                              The Emergency Provider reviewed the vital signs and test results, which are outlined above.     ED Discussion     2:04 AM: Pt wishes to leave AMA at this time.     2:05 AM: Pt is A&O x3, appropriate, and of capacity at this time. Pt voices desire to leave hospital. D/w pt in length need for further evaluation and treatment due to HPI and PEx. Pt declines any further evaluation or tx at this time. All risks, including worsening sx, permanent bodily harm and death, were discussed in length. Pt acknowledges all risk at this time and agrees to sign AMA form. Pt given RTER instructions. All questions and concerns addressed at this time. Pt leaving AMA.       ED Course as of 01/21/24 0228   Sun Jan 21, 2024   0203 Ambulatory pulse ox.  Patient 85% on ambulatory pulse ox.  I recommended observation/admission at this time, but patient  wants to leave against medical advice.  I discussed all risks, benefits, and alternatives.  Patient voiced full understanding.  All questions answered.  Patient has full decision-making capacity.  He notes that he does have home oxygen at home that he will use.  I will send him home on steroids.  He knows to return to the emergency department at any time should he change his mind or symptoms worsen.  Patient will sign AMA paperwork [DP]   0209 Medication list reviewed.  Patient has multiple contraindications to Paxlovid on his medication list.  Will not be able to prescribe this.  Discussed this with patient and significant other at bedside.  They will still leave against medical advice [DP]      ED Course User Index  [DP] Jose R Fernandes MD     Medical Decision Making  COVID positive.  89% in triage.  I performed an ambulance or pulse ox at bedside inpatient dropped to 85%.  Recommend admission.  Patient left against medical advice.  See ED course.  Contraindications to Paxlovid.  Prescriptions for Decadron provided.  ER return precautions provided.  Of note, patient does have home oxygen at home to use.  He does not use it normally, but he does have it available    Amount and/or Complexity of Data Reviewed  External Data Reviewed: notes.     Details: Past medical history reviewed  Labs: ordered. Decision-making details documented in ED Course.  Radiology: ordered. Decision-making details documented in ED Course.  ECG/medicine tests: ordered and independent interpretation performed. Decision-making details documented in ED Course.    Risk  OTC drugs.  Prescription drug management.  Decision regarding hospitalization.                ED Medication(s):  Medications   acetaminophen tablet 1,000 mg (1,000 mg Oral Given 1/21/24 0224)   dexAMETHasone injection 6 mg (6 mg Intravenous Given 1/21/24 0224)       New Prescriptions    DEXAMETHASONE (DECADRON) 6 MG TABLET    Take 1 tablet (6 mg total) by mouth once daily. for 9  days        Follow-up Information       Call  Mt Noel MD.    Specialty: Internal Medicine  Contact information:  [address]  [address]  [facility] [address]  [phone]               [facility] - Emergency Dept..    Specialty: Emergency Medicine  Why: As needed, If symptoms worsen  Contact information:  [address]  [facility] [address] [phone]                               Scribe Attestation:   Scribe #1: I performed the above scribed service and the documentation accurately describes the services I performed. I attest to the accuracy of the note.     Attending:   Physician Attestation Statement for Scribe #1: I, Jose R Fernandes MD, personally performed the services described in this documentation, as scribed by Tony Rey, in my presence, and it is both accurate and complete.           Clinical Impression       ICD-10-CM ICD-9-CM   1. COVID  U07.1 079.89   2. Acute respiratory failure with hypoxia  J96.01 518.81       Disposition:   Disposition: Jose R Martinez MD  01/21/24 0228

## 2024-01-22 ENCOUNTER — TELEPHONE (OUTPATIENT)
Dept: INTERNAL MEDICINE | Facility: CLINIC | Age: 69
End: 2024-01-22
Payer: COMMERCIAL

## 2024-01-22 NOTE — TELEPHONE ENCOUNTER
----- Message from Marti Graf sent at 1/22/2024 10:21 AM CST -----  Regarding: concerns  Name of who is calling:   Sandra / [facility]    What is the request in detail: Requesting a call back in ref to discussing pt ER visit over the weekend      Can the clinic reply by [facility]:no      What number to call back if not [facility]:[phone]

## 2024-01-24 ENCOUNTER — NURSE TRIAGE (OUTPATIENT)
Dept: ADMINISTRATIVE | Facility: CLINIC | Age: 69
End: 2024-01-24
Payer: COMMERCIAL

## 2024-01-24 NOTE — TELEPHONE ENCOUNTER
[place]    PCP:  Dr. Mt Noel    Pt responded to Covid [facility] message for new or worsening symptoms.  C/O fever (unknown d/t unable to check temp) and sweats.  Denies new/worsening symptoms, cough, SOB, and CP.  Per protocol, care advised is home care.  Pt VU.  Instructed to call [facility] with worsening of symptoms and/or questions/concerns.  Verbalizes understanding.    Reason for Disposition   COVID-19 diagnosed by positive lab test (e.g., PCR, rapid self-test kit) and mild symptoms (e.g., cough, fever, others) and no complications or SOB    Additional Information   Negative: SEVERE difficulty breathing (e.g., struggling for each breath, speaks in single words)   Negative: Difficult to awaken or acting confused (e.g., disoriented, slurred speech)   Negative: Bluish (or gray) lips or face now   Negative: Shock suspected (e.g., cold/pale/clammy skin, too weak to stand, low BP, rapid pulse)   Negative: Sounds like a life-threatening emergency to the triager   Negative: SEVERE or constant chest pain or pressure  (Exception: Mild central chest pain, present only when coughing.)   Negative: MODERATE difficulty breathing (e.g., speaks in phrases, SOB even at rest, pulse 100-120)   Negative: Headache and stiff neck (can't touch chin to chest)   Negative: Chest pain or pressure  (Exception: MILD central chest pain, present only when coughing.)   Negative: Drinking very little and dehydration suspected (e.g., no urine > 12 hours, very dry mouth, very lightheaded)   Negative: Patient sounds very sick or weak to the triager   Negative: MILD difficulty breathing (e.g., minimal/no SOB at rest, SOB with walking, pulse <100)   Negative: HIGH RISK patient (e.g., weak immune system, age > 64 years, obesity with BMI of 30 or higher, pregnant, chronic lung disease or other chronic medical condition) and COVID symptoms (e.g., cough, fever)  (Exceptions: Already seen by doctor or NP/PA and no new or worsening symptoms.)   Negative: HIGH RISK  patient and influenza is widespread in the community and ONE OR MORE respiratory symptoms: cough, sore throat, runny or stuffy nose   Negative: HIGH RISK patient and influenza exposure within the last 7 days and ONE OR MORE respiratory symptoms: cough, sore throat, runny or stuffy nose   Negative: COVID-19 infection suspected by caller or triager and mild symptoms (cough, fever, or others) and negative COVID-19 rapid test   Negative: Fever present > 3 days (72 hours)   Negative: Fever returns after gone for over 24 hours and symptoms worse or not improved   Negative: Continuous (nonstop) coughing interferes with work or school and no improvement using cough treatment per Care Advice   Negative: Cough present > 3 weeks   Negative: COVID-19 diagnosed by positive lab test (e.g., PCR, rapid self-test kit) and NO symptoms (e.g., cough, fever, others)    Protocols used: Coronavirus (COVID-19) Diagnosed or [id number]

## 2024-01-26 LAB — BACTERIA BLD CULT: NORMAL

## 2024-02-28 NOTE — TELEPHONE ENCOUNTER
Your additional lab work was okay. No further testing needed.       Donepezil (Aricept)- take 1 tablet (5mg) nightly

## 2024-04-27 DIAGNOSIS — Z95.1 S/P CABG X 2: ICD-10-CM

## 2024-04-29 RX ORDER — LEVOTHYROXINE SODIUM 50 UG/1
50 TABLET ORAL
Qty: 90 TABLET | Refills: 3 | Status: SHIPPED | OUTPATIENT
Start: 2024-04-29

## 2024-04-29 RX ORDER — METOPROLOL TARTRATE 25 MG/1
25 TABLET, FILM COATED ORAL 2 TIMES DAILY
Qty: 60 TABLET | Refills: 1 | Status: SHIPPED | OUTPATIENT
Start: 2024-04-29 | End: 2024-06-04

## 2024-04-29 NOTE — TELEPHONE ENCOUNTER
Requested Prescriptions     Pending Prescriptions Disp Refills    levothyroxine (SYNTHROID) 50 MCG tablet [Pharmacy Med Name: LEVOTHYROXINE 50 MCG TABLET] 90 tablet 1     Sig: TAKE 1 TABLET BY MOUTH BEFORE BREAKFAST.     LV 01/10/2024   NV 07/10/2024   LF 07/11/2023

## 2024-06-01 DIAGNOSIS — Z95.1 S/P CABG X 2: ICD-10-CM

## 2024-06-04 RX ORDER — METOPROLOL TARTRATE 25 MG/1
TABLET, FILM COATED ORAL
Qty: 180 TABLET | Refills: 1 | Status: SHIPPED | OUTPATIENT
Start: 2024-06-04

## 2024-06-10 DIAGNOSIS — R94.31 ABNORMAL EKG: ICD-10-CM

## 2024-06-10 DIAGNOSIS — Z95.1 S/P CABG X 2: ICD-10-CM

## 2024-06-10 RX ORDER — AMIODARONE HYDROCHLORIDE 200 MG/1
200 TABLET ORAL
Qty: 90 TABLET | Refills: 0 | Status: SHIPPED | OUTPATIENT
Start: 2024-06-10

## 2024-06-20 ENCOUNTER — LAB VISIT (OUTPATIENT)
Dept: LAB | Facility: HOSPITAL | Age: 69
End: 2024-06-20
Attending: INTERNAL MEDICINE
Payer: COMMERCIAL

## 2024-06-20 DIAGNOSIS — R73.03 PREDIABETES: ICD-10-CM

## 2024-06-20 DIAGNOSIS — E78.2 MIXED HYPERLIPIDEMIA: ICD-10-CM

## 2024-06-20 LAB
ALBUMIN SERPL BCP-MCNC: 3.2 G/DL (ref 3.5–5.2)
ALP SERPL-CCNC: 51 U/L (ref 55–135)
ALT SERPL W/O P-5'-P-CCNC: 26 U/L (ref 10–44)
ANION GAP SERPL CALC-SCNC: 8 MMOL/L (ref 8–16)
AST SERPL-CCNC: 24 U/L (ref 10–40)
BILIRUB SERPL-MCNC: 0.4 MG/DL (ref 0.1–1)
BUN SERPL-MCNC: 23 MG/DL (ref 8–23)
CALCIUM SERPL-MCNC: 9.2 MG/DL (ref 8.7–10.5)
CHLORIDE SERPL-SCNC: 109 MMOL/L (ref 95–110)
CHOLEST SERPL-MCNC: 117 MG/DL (ref 120–199)
CHOLEST/HDLC SERPL: 3.3 {RATIO} (ref 2–5)
CO2 SERPL-SCNC: 22 MMOL/L (ref 23–29)
CREAT SERPL-MCNC: 1.8 MG/DL (ref 0.5–1.4)
EST. GFR  (NO RACE VARIABLE): 41 ML/MIN/1.73 M^2
ESTIMATED AVG GLUCOSE: 128 MG/DL (ref 68–131)
GLUCOSE SERPL-MCNC: 102 MG/DL (ref 70–110)
HBA1C MFR BLD: 6.1 % (ref 4–5.6)
HDLC SERPL-MCNC: 36 MG/DL (ref 40–75)
HDLC SERPL: 30.8 % (ref 20–50)
LDLC SERPL CALC-MCNC: 66.6 MG/DL (ref 63–159)
NONHDLC SERPL-MCNC: 81 MG/DL
POTASSIUM SERPL-SCNC: 4.5 MMOL/L (ref 3.5–5.1)
PROT SERPL-MCNC: 8.3 G/DL (ref 6–8.4)
SODIUM SERPL-SCNC: 139 MMOL/L (ref 136–145)
TRIGL SERPL-MCNC: 72 MG/DL (ref 30–150)

## 2024-06-20 PROCEDURE — 80053 COMPREHEN METABOLIC PANEL: CPT | Performed by: INTERNAL MEDICINE

## 2024-06-20 PROCEDURE — 80061 LIPID PANEL: CPT | Performed by: INTERNAL MEDICINE

## 2024-06-20 PROCEDURE — 83036 HEMOGLOBIN GLYCOSYLATED A1C: CPT | Performed by: INTERNAL MEDICINE

## 2024-06-20 PROCEDURE — 36415 COLL VENOUS BLD VENIPUNCTURE: CPT | Performed by: INTERNAL MEDICINE

## 2024-07-10 ENCOUNTER — OFFICE VISIT (OUTPATIENT)
Dept: INTERNAL MEDICINE | Facility: CLINIC | Age: 69
End: 2024-07-10
Payer: COMMERCIAL

## 2024-07-10 VITALS
OXYGEN SATURATION: 95 % | BODY MASS INDEX: 34.32 KG/M2 | HEART RATE: 66 BPM | WEIGHT: 267.44 LBS | DIASTOLIC BLOOD PRESSURE: 92 MMHG | HEIGHT: 74 IN | SYSTOLIC BLOOD PRESSURE: 138 MMHG

## 2024-07-10 DIAGNOSIS — I25.118 CORONARY ARTERY DISEASE OF NATIVE ARTERY OF NATIVE HEART WITH STABLE ANGINA PECTORIS: ICD-10-CM

## 2024-07-10 DIAGNOSIS — I12.9 HYPERTENSIVE KIDNEY DISEASE WITH STAGE 3 CHRONIC KIDNEY DISEASE, UNSPECIFIED WHETHER STAGE 3A OR 3B CKD: ICD-10-CM

## 2024-07-10 DIAGNOSIS — J98.4 RESTRICTIVE LUNG DISEASE: ICD-10-CM

## 2024-07-10 DIAGNOSIS — E02 SUBCLINICAL IODINE-DEFICIENCY HYPOTHYROIDISM: ICD-10-CM

## 2024-07-10 DIAGNOSIS — N18.30 HYPERTENSIVE KIDNEY DISEASE WITH STAGE 3 CHRONIC KIDNEY DISEASE, UNSPECIFIED WHETHER STAGE 3A OR 3B CKD: ICD-10-CM

## 2024-07-10 DIAGNOSIS — J96.11 CHRONIC RESPIRATORY FAILURE WITH HYPOXIA: ICD-10-CM

## 2024-07-10 DIAGNOSIS — I10 BENIGN HYPERTENSION: ICD-10-CM

## 2024-07-10 DIAGNOSIS — E78.2 MIXED HYPERLIPIDEMIA: ICD-10-CM

## 2024-07-10 DIAGNOSIS — N18.31 STAGE 3A CHRONIC KIDNEY DISEASE: ICD-10-CM

## 2024-07-10 DIAGNOSIS — D89.2 HYPERGAMMAGLOBULINEMIA: ICD-10-CM

## 2024-07-10 DIAGNOSIS — Z86.010 HISTORY OF COLON POLYPS: ICD-10-CM

## 2024-07-10 DIAGNOSIS — Z12.5 PROSTATE CANCER SCREENING: ICD-10-CM

## 2024-07-10 DIAGNOSIS — R73.03 PREDIABETES: Primary | ICD-10-CM

## 2024-07-10 PROCEDURE — 3288F FALL RISK ASSESSMENT DOCD: CPT | Mod: CPTII,S$GLB,, | Performed by: INTERNAL MEDICINE

## 2024-07-10 PROCEDURE — G2211 COMPLEX E/M VISIT ADD ON: HCPCS | Mod: S$GLB,,, | Performed by: INTERNAL MEDICINE

## 2024-07-10 PROCEDURE — 99214 OFFICE O/P EST MOD 30 MIN: CPT | Mod: S$GLB,,, | Performed by: INTERNAL MEDICINE

## 2024-07-10 PROCEDURE — 1101F PT FALLS ASSESS-DOCD LE1/YR: CPT | Mod: CPTII,S$GLB,, | Performed by: INTERNAL MEDICINE

## 2024-07-10 PROCEDURE — 99999 PR PBB SHADOW E&M-EST. PATIENT-LVL IV: CPT | Mod: PBBFAC,,, | Performed by: INTERNAL MEDICINE

## 2024-07-10 PROCEDURE — 1160F RVW MEDS BY RX/DR IN RCRD: CPT | Mod: CPTII,S$GLB,, | Performed by: INTERNAL MEDICINE

## 2024-07-10 PROCEDURE — 1159F MED LIST DOCD IN RCRD: CPT | Mod: CPTII,S$GLB,, | Performed by: INTERNAL MEDICINE

## 2024-07-10 PROCEDURE — 3044F HG A1C LEVEL LT 7.0%: CPT | Mod: CPTII,S$GLB,, | Performed by: INTERNAL MEDICINE

## 2024-07-10 PROCEDURE — 3080F DIAST BP >= 90 MM HG: CPT | Mod: CPTII,S$GLB,, | Performed by: INTERNAL MEDICINE

## 2024-07-10 PROCEDURE — 3075F SYST BP GE 130 - 139MM HG: CPT | Mod: CPTII,S$GLB,, | Performed by: INTERNAL MEDICINE

## 2024-07-10 PROCEDURE — 1126F AMNT PAIN NOTED NONE PRSNT: CPT | Mod: CPTII,S$GLB,, | Performed by: INTERNAL MEDICINE

## 2024-07-10 PROCEDURE — 3008F BODY MASS INDEX DOCD: CPT | Mod: CPTII,S$GLB,, | Performed by: INTERNAL MEDICINE

## 2024-07-10 NOTE — PROGRESS NOTES
"HPI:  Patient is a 68-year-old man who comes in today for follow-up of his hypertension, lipids, coronary disease, chronic kidney disease and prediabetes.  He denies any chest pains or shortness a breath.  He does not check his blood pressure home.  There have been no other new problems or complaints.    Current meds have been verified and updated per the EMR  Exam:BP (!) 138/92 (BP Location: Right arm)   Pulse 66   Ht 6' 2" (1.88 m)   Wt 121.3 kg (267 lb 6.7 oz)   SpO2 95%   BMI 34.33 kg/m²   Carotids 2+ equal without bruits, neck is supple without adenopathy  Chest clear  Cardiovascular regular rate and rhythm without murmur gallop or rub  Extremities without edema    Lab Results   Component Value Date    WBC 4.60 01/21/2024    HGB 12.9 (L) 01/21/2024    HCT 39.1 (L) 01/21/2024     (L) 01/21/2024    CHOL 117 (L) 06/20/2024    TRIG 72 06/20/2024    HDL 36 (L) 06/20/2024    ALT 26 06/20/2024    AST 24 06/20/2024     06/20/2024    K 4.5 06/20/2024     06/20/2024    CREATININE 1.8 (H) 06/20/2024    BUN 23 06/20/2024    CO2 22 (L) 06/20/2024    TSH 3.606 09/14/2023    PSA 0.42 01/12/2024    INR 1.1 08/24/2023    HGBA1C 6.1 (H) 06/20/2024     (H) 01/21/2024    SEDRATE 18 01/04/2023    CRP 11.2 (H) 01/04/2023       Impression:  Hypertension, lipids, pre diabetes all very well controlled on current therapy  Coronary artery disease, asymptomatic, followed by his cardiologist  Chronic kidney disease, stable GFR  Hypothyroidism, on adequate replacement therapy  Patient Active Problem List   Diagnosis    Benign hypertension    CKD (chronic kidney disease), stage III    Hypertensive kidney disease with chronic kidney disease stage III    History of colon polyps    Hypergammaglobulinemia    Mixed hyperlipidemia    Prediabetes    Restrictive lung disease    Diffusion capacity of lung (dl), decreased    Hypothyroidism, unspecified    Coronary artery disease       Plan:  Orders Placed This Encounter "    Hemoglobin A1C    Comprehensive Metabolic Panel    Lipid Panel    TSH    CBC Auto Differential    PSA, Screening     His medications remain the same.  He will be seen again in 6 months with above lab work.    This note is generated with speech recognition software and is subject to transcription error and sound alike phrases that may be missed by proofreading.

## 2024-09-05 ENCOUNTER — LAB VISIT (OUTPATIENT)
Dept: LAB | Facility: HOSPITAL | Age: 69
End: 2024-09-05
Attending: INTERNAL MEDICINE
Payer: COMMERCIAL

## 2024-09-05 ENCOUNTER — OFFICE VISIT (OUTPATIENT)
Dept: CARDIOLOGY | Facility: CLINIC | Age: 69
End: 2024-09-05
Payer: COMMERCIAL

## 2024-09-05 ENCOUNTER — TELEPHONE (OUTPATIENT)
Dept: SLEEP MEDICINE | Facility: CLINIC | Age: 69
End: 2024-09-05
Payer: COMMERCIAL

## 2024-09-05 VITALS
SYSTOLIC BLOOD PRESSURE: 134 MMHG | HEART RATE: 61 BPM | OXYGEN SATURATION: 92 % | HEIGHT: 74 IN | BODY MASS INDEX: 34.52 KG/M2 | WEIGHT: 268.94 LBS | DIASTOLIC BLOOD PRESSURE: 80 MMHG

## 2024-09-05 DIAGNOSIS — R94.39 ABNORMAL NUCLEAR STRESS TEST: ICD-10-CM

## 2024-09-05 DIAGNOSIS — I10 BENIGN HYPERTENSION: ICD-10-CM

## 2024-09-05 DIAGNOSIS — I25.118 CORONARY ARTERY DISEASE OF NATIVE ARTERY OF NATIVE HEART WITH STABLE ANGINA PECTORIS: Primary | ICD-10-CM

## 2024-09-05 DIAGNOSIS — I12.9 HYPERTENSIVE KIDNEY DISEASE WITH STAGE 3 CHRONIC KIDNEY DISEASE, UNSPECIFIED WHETHER STAGE 3A OR 3B CKD: ICD-10-CM

## 2024-09-05 DIAGNOSIS — Z95.1 S/P CABG (CORONARY ARTERY BYPASS GRAFT): ICD-10-CM

## 2024-09-05 DIAGNOSIS — R06.83 SNORING: ICD-10-CM

## 2024-09-05 DIAGNOSIS — N18.31 STAGE 3A CHRONIC KIDNEY DISEASE: ICD-10-CM

## 2024-09-05 DIAGNOSIS — J98.4 RESTRICTIVE LUNG DISEASE: ICD-10-CM

## 2024-09-05 DIAGNOSIS — R06.02 SHORTNESS OF BREATH: ICD-10-CM

## 2024-09-05 DIAGNOSIS — R73.03 PREDIABETES: ICD-10-CM

## 2024-09-05 DIAGNOSIS — R94.2 DIFFUSION CAPACITY OF LUNG (DL), DECREASED: ICD-10-CM

## 2024-09-05 DIAGNOSIS — N18.30 HYPERTENSIVE KIDNEY DISEASE WITH STAGE 3 CHRONIC KIDNEY DISEASE, UNSPECIFIED WHETHER STAGE 3A OR 3B CKD: ICD-10-CM

## 2024-09-05 DIAGNOSIS — I25.118 CORONARY ARTERY DISEASE OF NATIVE ARTERY OF NATIVE HEART WITH STABLE ANGINA PECTORIS: ICD-10-CM

## 2024-09-05 DIAGNOSIS — E78.2 MIXED HYPERLIPIDEMIA: ICD-10-CM

## 2024-09-05 DIAGNOSIS — E02 SUBCLINICAL IODINE-DEFICIENCY HYPOTHYROIDISM: ICD-10-CM

## 2024-09-05 LAB
T4 FREE SERPL-MCNC: 1 NG/DL (ref 0.71–1.51)
TSH SERPL DL<=0.005 MIU/L-ACNC: 6.2 UIU/ML (ref 0.4–4)

## 2024-09-05 PROCEDURE — 82652 VIT D 1 25-DIHYDROXY: CPT | Performed by: INTERNAL MEDICINE

## 2024-09-05 PROCEDURE — 3008F BODY MASS INDEX DOCD: CPT | Mod: CPTII,S$GLB,, | Performed by: INTERNAL MEDICINE

## 2024-09-05 PROCEDURE — 3079F DIAST BP 80-89 MM HG: CPT | Mod: CPTII,S$GLB,, | Performed by: INTERNAL MEDICINE

## 2024-09-05 PROCEDURE — 99999 PR PBB SHADOW E&M-EST. PATIENT-LVL III: CPT | Mod: PBBFAC,,, | Performed by: INTERNAL MEDICINE

## 2024-09-05 PROCEDURE — 1101F PT FALLS ASSESS-DOCD LE1/YR: CPT | Mod: CPTII,S$GLB,, | Performed by: INTERNAL MEDICINE

## 2024-09-05 PROCEDURE — 3044F HG A1C LEVEL LT 7.0%: CPT | Mod: CPTII,S$GLB,, | Performed by: INTERNAL MEDICINE

## 2024-09-05 PROCEDURE — 36415 COLL VENOUS BLD VENIPUNCTURE: CPT | Performed by: INTERNAL MEDICINE

## 2024-09-05 PROCEDURE — 1125F AMNT PAIN NOTED PAIN PRSNT: CPT | Mod: CPTII,S$GLB,, | Performed by: INTERNAL MEDICINE

## 2024-09-05 PROCEDURE — 3075F SYST BP GE 130 - 139MM HG: CPT | Mod: CPTII,S$GLB,, | Performed by: INTERNAL MEDICINE

## 2024-09-05 PROCEDURE — 84443 ASSAY THYROID STIM HORMONE: CPT | Performed by: INTERNAL MEDICINE

## 2024-09-05 PROCEDURE — 84439 ASSAY OF FREE THYROXINE: CPT | Performed by: INTERNAL MEDICINE

## 2024-09-05 PROCEDURE — 1159F MED LIST DOCD IN RCRD: CPT | Mod: CPTII,S$GLB,, | Performed by: INTERNAL MEDICINE

## 2024-09-05 PROCEDURE — 3288F FALL RISK ASSESSMENT DOCD: CPT | Mod: CPTII,S$GLB,, | Performed by: INTERNAL MEDICINE

## 2024-09-05 PROCEDURE — 99215 OFFICE O/P EST HI 40 MIN: CPT | Mod: S$GLB,,, | Performed by: INTERNAL MEDICINE

## 2024-09-05 NOTE — PROGRESS NOTES
Mom Shannon is calling to see if she can get this filled today.  The patient has soccer practice tonight and will need it today.  Mom apologizes for the short notice but she was unaware that she was so low and out of the medication.  Please call mom to let her know that this has been called into the pharmacy. [phone] (home)     Brisa Irwin  Patient Representative     Subjective:   Patient ID:  Abdirahman Rodriguez is a 68 y.o. male who presents for follow up of Follow-up      HPI  5/25/2023  A 68 yo maLE  WHO HAD COVID AND HAD RESIDUAL LUNG EFFECT WAS SEEN BY PULMONARY AT THAT TIME. HE IS KNOWN TO HAVE HTN HE SNORES AT NITE . HE HAS NOT HAD SLEEP EVAL. 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 HE HAS TO REST FOR 5 MINUTES. HAD ONE EPISODE OF CHEST PAIN CHEST PAIN  WITH PHYSICAL ACTIVITY SHORT LIVED. NO LEG SWELLING. HAS NO PALPITATION. HE HAS NO NOCTURNAL CHEST PAIN.  LAST  EVAL FOR COVID F/U WAS IN 6/2022 9/19/2023 ANGIE HARDY  Mr. Rodriguez is a 67 year old male patient whose current medical conditions include HTN, familial history of CAD, prior abnormal stress test, and CAD s/p C that showed multivessel CAD s/p CABG x 2 on 8/14/23. He returns today and states he is feeling well overall. Admits to incisional soreness/tightness from the skin pulling but denies any william chest pain or heaviness. No SOB/FENG. No lightheadedness, dizziness, palpitations, near syncope, or syncope. No s/s suggestive of CHF. BP stable and controlled. Working with PT/OT at home. He is compliant with his medications, not on Plavix or statin.      12/14/2023   Here for f/u has been back to work does not have any complaints of  chest pain however has a cold with cough and pain in chets incisional when he coughs. Denies any other symptoms cardiac wise. He is compliant with meds and diet he is walking for work no regular exercise.     9/5/2024  Has dizziness when he takes synthroid unusual response. Has shortness of breath walking some distance he feels tired. No chest pain . He is not compliant with diet special salt intake.   He has fatigue he snores not had a sleep study.  Past Medical History:   Diagnosis Date    Abnormal nuclear stress test 08/13/2023    Benign hypertension     Class 1 obesity in adult 05/25/2023    Coronary  artery disease 08/23/2023    s/p cabg X2    History of colon polyps     Hypergammaglobulinemia     Hypertensive kidney disease with chronic kidney disease stage III     Hypothyroidism, unspecified        Past Surgical History:   Procedure Laterality Date    ARTERIOGRAPHY OF SUBCLAVIAN ARTERY Left 8/14/2023    Procedure: ARTERIOGRAM, SUBCLAVIAN;  Surgeon: Vahid Juarez MD;  Location: [facility] CATH LAB;  Service: Cardiology;  Laterality: Left;    COLONOSCOPY N/A 12/6/2021    Procedure: COLONOSCOPY;  Surgeon: Doris Altman MD;  Location: [facility] ENDO;  Service: Endoscopy;  Laterality: N/A;    CORONARY ARTERY BYPASS GRAFT (CABG) N/A 8/23/2023    Procedure: CORONARY ARTERY BYPASS GRAFT (CABG);  Surgeon: Emma Arzola MD;  Location: [facility] OR;  Service: Cardiothoracic;  Laterality: N/A;  2-VESSEL    ECHOCARDIOGRAM,TRANSESOPHAGEAL N/A 8/23/2023    Procedure: ECHOCARDIOGRAM,TRANSESOPHAGEAL;  Surgeon: Emma Arzola MD;  Location: [facility] OR;  Service: Cardiothoracic;  Laterality: N/A;    ENDOSCOPIC HARVEST OF VEIN Left 8/23/2023    Procedure: SURGICAL PROCUREMENT, VEIN, ENDOSCOPIC;  Surgeon: Emma Arzola MD;  Location: [facility] OR;  Service: Cardiothoracic;  Laterality: Left;    INJECTION OF ANESTHETIC AGENT AROUND MULTIPLE INTERCOSTAL NERVES N/A 8/23/2023    Procedure: BLOCK, NERVE, INTERCOSTAL, 2 OR MORE;  Surgeon: Emma Arzola MD;  Location: [facility] OR;  Service: Cardiothoracic;  Laterality: N/A;    LEFT HEART CATHETERIZATION Left 8/14/2023    Procedure: Left heart cath;  Surgeon: Vahid Juarez MD;  Location: [facility] CATH LAB;  Service: Cardiology;  Laterality: Left;  pt instructed 930-10am start/    None         Social History     Tobacco Use    Smoking status: Never    Smokeless tobacco: Never   Substance Use Topics    Alcohol use: No     Comment: rarely    Drug use: No       Family History   Problem Relation Name Age of Onset    Heart disease Mother      Hypertension Mother      Heart disease Father       Hypertension Father         Current Outpatient Medications   Medication Sig    amiodarone (PACERONE) 200 MG Tab TAKE 1 TABLET BY MOUTH EVERY DAY    aspirin (ECOTRIN) 81 MG EC tablet Take 81 mg by mouth once daily.    atorvastatin (LIPITOR) 40 MG tablet Take 1 tablet (40 mg total) by mouth every evening.    clopidogreL (PLAVIX) 75 mg tablet Take 1 tablet (75 mg total) by mouth once daily.    levothyroxine (SYNTHROID) 50 MCG tablet TAKE 1 TABLET BY MOUTH BEFORE BREAKFAST.    losartan-hydrochlorothiazide 100-25 mg (HYZAAR) 100-25 mg per tablet Take 1 tablet by mouth once daily.    metoprolol tartrate (LOPRESSOR) 25 MG tablet TAKE 1 TABLET BY MOUTH TWICE A DAY (INS REQUIRES 90 DAY)     Current Facility-Administered Medications   Medication    0.9%  NaCl infusion (for blood administration)     Current Outpatient Medications on File Prior to Visit   Medication Sig    amiodarone (PACERONE) 200 MG Tab TAKE 1 TABLET BY MOUTH EVERY DAY    aspirin (ECOTRIN) 81 MG EC tablet Take 81 mg by mouth once daily.    atorvastatin (LIPITOR) 40 MG tablet Take 1 tablet (40 mg total) by mouth every evening.    clopidogreL (PLAVIX) 75 mg tablet Take 1 tablet (75 mg total) by mouth once daily.    levothyroxine (SYNTHROID) 50 MCG tablet TAKE 1 TABLET BY MOUTH BEFORE BREAKFAST.    losartan-hydrochlorothiazide 100-25 mg (HYZAAR) 100-25 mg per tablet Take 1 tablet by mouth once daily.    metoprolol tartrate (LOPRESSOR) 25 MG tablet TAKE 1 TABLET BY MOUTH TWICE A DAY (INS REQUIRES 90 DAY)     Current Facility-Administered Medications on File Prior to Visit   Medication    0.9%  NaCl infusion (for blood administration)     Review of patient's allergies indicates:  No Known Allergies   Review of Systems   Constitutional: Negative for diaphoresis, malaise/fatigue and weight gain.   HENT:  Negative for hoarse voice.    Eyes:  Negative for double vision and visual disturbance.   Cardiovascular:  Negative for chest pain, claudication, cyanosis, dyspnea  on exertion, irregular heartbeat, leg swelling, near-syncope, orthopnea, palpitations, paroxysmal nocturnal dyspnea and syncope.   Respiratory:  Negative for cough, hemoptysis, shortness of breath and snoring.    Hematologic/Lymphatic: Negative for bleeding problem. Does not bruise/bleed easily.   Skin:  Negative for color change and poor wound healing.   Musculoskeletal:  Negative for muscle cramps, muscle weakness and myalgias.   Gastrointestinal:  Negative for bloating, abdominal pain, change in bowel habit, diarrhea, heartburn, hematemesis, hematochezia, melena and nausea.   Neurological:  Negative for excessive daytime sleepiness, dizziness, headaches, light-headedness, loss of balance, numbness and weakness.   Psychiatric/Behavioral:  Negative for memory loss. The patient does not have insomnia.    Allergic/Immunologic: Negative for hives.       Objective:   Physical Exam  Vitals and nursing note reviewed.   Constitutional:       General: He is not in acute distress.     Appearance: He is well-developed. He is not diaphoretic.   HENT:      Head: Normocephalic and atraumatic.   Eyes:      General:         Right eye: No discharge.         Left eye: No discharge.      Pupils: Pupils are equal, round, and reactive to light.   Neck:      Thyroid: No thyromegaly.      Vascular: No JVD.   Cardiovascular:      Rate and Rhythm: Normal rate and regular rhythm.      Pulses: Intact distal pulses.      Heart sounds: Normal heart sounds. No murmur heard.     No friction rub. No gallop.   Pulmonary:      Effort: Pulmonary effort is normal. No respiratory distress.      Breath sounds: Normal breath sounds. No wheezing or rales.      Comments: Scar well healed  Chest:      Chest wall: No tenderness.   Abdominal:      General: Bowel sounds are normal. There is no distension.      Palpations: Abdomen is soft.      Tenderness: There is no abdominal tenderness.   Musculoskeletal:         General: Normal range of motion.       "Cervical back: Neck supple.   Skin:     General: Skin is warm and dry.      Findings: No erythema or rash.   Neurological:      Mental Status: He is alert and oriented to person, place, and time.      Cranial Nerves: No cranial nerve deficit.   Psychiatric:         Behavior: Behavior normal.       Vitals:    09/05/24 0829 09/05/24 0832   BP: (!) 132/90 134/80   BP Location: Right arm Left arm   Patient Position: Sitting Sitting   BP Method: Large (Manual) Large (Manual)   Pulse: 61    SpO2: (!) 92%    Weight: 122 kg (268 lb 15.4 oz)    Height: 6' 2" (1.88 m)      Lab Results   Component Value Date    CHOL 117 (L) 06/20/2024    CHOL 114 (L) 01/12/2024    CHOL 92 (L) 08/24/2023      Body mass index is 34.53 kg/m².   Lab Results   Component Value Date    HGBA1C 6.1 (H) 06/20/2024      BMP  Lab Results   Component Value Date     06/20/2024    K 4.5 06/20/2024     06/20/2024    CO2 22 (L) 06/20/2024    BUN 23 06/20/2024    CREATININE 1.8 (H) 06/20/2024    CALCIUM 9.2 06/20/2024    ANIONGAP 8 06/20/2024    EGFRNORACEVR 41.0 (A) 06/20/2024      Lab Results   Component Value Date    HDL 36 (L) 06/20/2024    HDL 35 (L) 01/12/2024    HDL 17 (L) 08/24/2023     Lab Results   Component Value Date    LDLCALC 66.6 06/20/2024    LDLCALC 67.6 01/12/2024    LDLCALC 62.4 (L) 08/24/2023     Lab Results   Component Value Date    TRIG 72 06/20/2024    TRIG 57 01/12/2024    TRIG 63 08/24/2023     Lab Results   Component Value Date    CHOLHDL 30.8 06/20/2024    CHOLHDL 30.7 01/12/2024    CHOLHDL 18.5 (L) 08/24/2023       Chemistry        Component Value Date/Time     06/20/2024 0818    K 4.5 06/20/2024 0818     06/20/2024 0818    CO2 22 (L) 06/20/2024 0818    BUN 23 06/20/2024 0818    CREATININE 1.8 (H) 06/20/2024 0818     06/20/2024 0818        Component Value Date/Time    CALCIUM 9.2 06/20/2024 0818    ALKPHOS 51 (L) 06/20/2024 0818    AST 24 06/20/2024 0818    ALT 26 06/20/2024 0818    BILITOT 0.4 " 06/20/2024 0818    ESTGFRAFRICA 48.2 (A) 06/15/2022 0749    EGFRNONAA 41.7 (A) 06/15/2022 0749          Lab Results   Component Value Date    TSH 3.606 09/14/2023     Lab Results   Component Value Date    INR 1.1 08/24/2023    INR 1.1 08/23/2023    INR 1.0 08/18/2023     Lab Results   Component Value Date    WBC 4.60 01/21/2024    HGB 12.9 (L) 01/21/2024    HCT 39.1 (L) 01/21/2024    MCV 92 01/21/2024     (L) 01/21/2024     BMP  Sodium   Date Value Ref Range Status   06/20/2024 139 136 - 145 mmol/L Final     Potassium   Date Value Ref Range Status   06/20/2024 4.5 3.5 - 5.1 mmol/L Final     Chloride   Date Value Ref Range Status   06/20/2024 109 95 - 110 mmol/L Final     CO2   Date Value Ref Range Status   06/20/2024 22 (L) 23 - 29 mmol/L Final     BUN   Date Value Ref Range Status   06/20/2024 23 8 - 23 mg/dL Final     Creatinine   Date Value Ref Range Status   06/20/2024 1.8 (H) 0.5 - 1.4 mg/dL Final     Calcium   Date Value Ref Range Status   06/20/2024 9.2 8.7 - 10.5 mg/dL Final     Anion Gap   Date Value Ref Range Status   06/20/2024 8 8 - 16 mmol/L Final     eGFR if    Date Value Ref Range Status   06/15/2022 48.2 (A) >60 mL/min/1.73 m^2 Final     eGFR if non    Date Value Ref Range Status   06/15/2022 41.7 (A) >60 mL/min/1.73 m^2 Final     Comment:     Calculation used to obtain the estimated glomerular filtration  rate (eGFR) is the CKD-EPI equation.        CrCl cannot be calculated (Patient's most recent lab result is older than the maximum 7 days allowed.).    Assessment:     1. Coronary artery disease of native artery of native heart with stable angina pectoris    2. Benign hypertension    3. Stage 3a chronic kidney disease    4. Hypertensive kidney disease with stage 3 chronic kidney disease, unspecified whether stage 3a or 3b CKD    5. Mixed hyperlipidemia    6. Prediabetes    7. Restrictive lung disease    8. Diffusion capacity of lung (dl), decreased    9.  Subclinical iodine-deficiency hypothyroidism    10. S/P CABG (coronary artery bypass graft)    11. Abnormal nuclear stress test      Cad s/p cabg with exertional shortness of breath will obtain stress evaluation    he was counseled about  weight loss .  Prediabetes on target discussed low starches diet and weight loss and compliance  Hlp on target tolerated meds well stable on target continue the same.  Htn borderline controlled low salt diet emphasized continue the same.   Ckd stage 3 discussed importance of diabetes and htn control to delay progression.will monitor clinically   Has fatigue snoring will rule out sleep apnea check tsh vit d.  Plan:   Stress cardiolite   Tsh vit d  Sleep study.  Continue current therapy  Cardiac low salt diet.  Risk factor modification and excercise program./weight loss  F/u in 6 months with lipid cmp a1c

## 2024-09-06 ENCOUNTER — PATIENT MESSAGE (OUTPATIENT)
Dept: CARDIOLOGY | Facility: CLINIC | Age: 69
End: 2024-09-06
Payer: COMMERCIAL

## 2024-09-06 ENCOUNTER — TELEPHONE (OUTPATIENT)
Dept: CARDIOLOGY | Facility: CLINIC | Age: 69
End: 2024-09-06
Payer: COMMERCIAL

## 2024-09-06 NOTE — TELEPHONE ENCOUNTER
Sent a portal message to the pt pb----- Message from Vahid Juarez MD sent at 9/6/2024  8:06 AM CDT -----  Tsh mildy elvated suggest he discuss with pcp.

## 2024-09-06 NOTE — TELEPHONE ENCOUNTER
Called [phone] and spoke with patient and advised results and he voiced understanding.         ----- Message from Vahid Juarez MD sent at 9/6/2024  8:06 AM CDT -----  Tsh mildy elvated suggest he discuss with pcp.

## 2024-09-06 NOTE — TELEPHONE ENCOUNTER
Tell pt that I reviewed his thyroid labs and that there is insignificant variation in his levels and no adjustments need to be made at this time. He should continue taking his current medications.

## 2024-09-07 DIAGNOSIS — R94.31 ABNORMAL EKG: ICD-10-CM

## 2024-09-07 DIAGNOSIS — Z95.1 S/P CABG X 2: ICD-10-CM

## 2024-09-09 ENCOUNTER — TELEPHONE (OUTPATIENT)
Dept: INTERNAL MEDICINE | Facility: CLINIC | Age: 69
End: 2024-09-09
Payer: COMMERCIAL

## 2024-09-09 LAB — 1,25(OH)2D3 SERPL-MCNC: 39 PG/ML (ref 20–79)

## 2024-09-09 RX ORDER — LEVOTHYROXINE SODIUM 50 UG/1
50 TABLET ORAL
Qty: 90 TABLET | Refills: 3 | Status: SHIPPED | OUTPATIENT
Start: 2024-09-09

## 2024-09-09 RX ORDER — AMIODARONE HYDROCHLORIDE 200 MG/1
200 TABLET ORAL
Qty: 90 TABLET | Refills: 0 | Status: SHIPPED | OUTPATIENT
Start: 2024-09-09

## 2024-09-09 NOTE — TELEPHONE ENCOUNTER
Patient notified of alab review.  No insigniicant  variation levels and no adjustment need to be made at this time.  Stated patient need to stay on current medication.

## 2024-09-09 NOTE — TELEPHONE ENCOUNTER
----- Message from Winnie Wilver sent at 9/9/2024  8:27 AM CDT -----  Contact: Abdirahman  Type:  Patient Returning Call    Who Called: Abdirahman   Who Left Message for Patient: Nurse   Does the patient know what this is regarding?:No  Would the patient rather a call back or a response via [facility]? Call back   Best Call Back Number:[phone]   Additional Information:

## 2024-09-10 ENCOUNTER — PATIENT MESSAGE (OUTPATIENT)
Dept: CARDIOLOGY | Facility: CLINIC | Age: 69
End: 2024-09-10
Payer: COMMERCIAL

## 2024-09-12 DIAGNOSIS — Z95.1 S/P CABG X 2: ICD-10-CM

## 2024-09-12 RX ORDER — CLOPIDOGREL BISULFATE 75 MG/1
75 TABLET ORAL DAILY
Qty: 30 TABLET | Refills: 11 | Status: SHIPPED | OUTPATIENT
Start: 2024-09-12 | End: 2025-09-12

## 2024-09-12 RX ORDER — ATORVASTATIN CALCIUM 40 MG/1
40 TABLET, FILM COATED ORAL NIGHTLY
Qty: 90 TABLET | Refills: 3 | Status: SHIPPED | OUTPATIENT
Start: 2024-09-12 | End: 2025-09-12

## 2024-09-13 ENCOUNTER — HOSPITAL ENCOUNTER (OUTPATIENT)
Dept: PULMONOLOGY | Facility: HOSPITAL | Age: 69
Discharge: HOME | End: 2024-09-13
Attending: INTERNAL MEDICINE
Payer: COMMERCIAL

## 2024-09-13 ENCOUNTER — HOSPITAL ENCOUNTER (OUTPATIENT)
Dept: RADIOLOGY | Facility: HOSPITAL | Age: 69
Discharge: HOME | End: 2024-09-13
Attending: INTERNAL MEDICINE
Payer: COMMERCIAL

## 2024-09-13 VITALS
BODY MASS INDEX: 34.39 KG/M2 | WEIGHT: 268 LBS | HEART RATE: 57 BPM | SYSTOLIC BLOOD PRESSURE: 151 MMHG | HEIGHT: 74 IN | DIASTOLIC BLOOD PRESSURE: 101 MMHG

## 2024-09-13 DIAGNOSIS — R06.02 SHORTNESS OF BREATH: ICD-10-CM

## 2024-09-13 DIAGNOSIS — I25.118 CORONARY ARTERY DISEASE OF NATIVE ARTERY OF NATIVE HEART WITH STABLE ANGINA PECTORIS: ICD-10-CM

## 2024-09-13 DIAGNOSIS — R06.83 SNORING: ICD-10-CM

## 2024-09-13 PROCEDURE — 63600175 PHARM REV CODE 636 W HCPCS: Performed by: INTERNAL MEDICINE

## 2024-09-13 PROCEDURE — 93017 CV STRESS TEST TRACING ONLY: CPT

## 2024-09-13 PROCEDURE — A9502 TC99M TETROFOSMIN: HCPCS | Performed by: INTERNAL MEDICINE

## 2024-09-13 PROCEDURE — 93016 CV STRESS TEST SUPVJ ONLY: CPT | Mod: ,,, | Performed by: INTERNAL MEDICINE

## 2024-09-13 PROCEDURE — 93018 CV STRESS TEST I&R ONLY: CPT | Mod: ,,, | Performed by: INTERNAL MEDICINE

## 2024-09-13 PROCEDURE — 78452 HT MUSCLE IMAGE SPECT MULT: CPT | Mod: 26,,, | Performed by: INTERNAL MEDICINE

## 2024-09-13 RX ORDER — REGADENOSON 0.08 MG/ML
0.4 INJECTION, SOLUTION INTRAVENOUS ONCE
Status: COMPLETED | OUTPATIENT
Start: 2024-09-13 | End: 2024-09-13

## 2024-09-13 RX ADMIN — REGADENOSON 0.4 MG: 0.08 INJECTION, SOLUTION INTRAVENOUS at 10:09

## 2024-09-13 RX ADMIN — TETROFOSMIN 32.8 MILLICURIE: 1.38 INJECTION, POWDER, LYOPHILIZED, FOR SOLUTION INTRAVENOUS at 10:09

## 2024-09-13 RX ADMIN — TETROFOSMIN 10 MILLICURIE: 1.38 INJECTION, POWDER, LYOPHILIZED, FOR SOLUTION INTRAVENOUS at 09:09

## 2024-09-14 LAB
CV STRESS BASE HR: 57 BPM
DIASTOLIC BLOOD PRESSURE: 101 MMHG
EJECTION FRACTION- HIGH: 73 %
END DIASTOLIC INDEX-HIGH: 165 ML/M2
END DIASTOLIC INDEX-LOW: 101 ML/M2
END SYSTOLIC INDEX-HIGH: 64 ML/M2
END SYSTOLIC INDEX-LOW: 28 ML/M2
NUC REST EJECTION FRACTION: 68
NUC STRESS EJECTION FRACTION: 59 %
OHS CV CPX 85 PERCENT MAX PREDICTED HEART RATE MALE: 131
OHS CV CPX MAX PREDICTED HEART RATE: 154
OHS CV CPX PATIENT IS FEMALE: 0
OHS CV CPX PATIENT IS MALE: 1
OHS CV CPX PEAK DIASTOLIC BLOOD PRESSURE: 114 MMHG
OHS CV CPX PEAK HEAR RATE: 96 BPM
OHS CV CPX PEAK RATE PRESSURE PRODUCT: NORMAL
OHS CV CPX PEAK SYSTOLIC BLOOD PRESSURE: 177 MMHG
OHS CV CPX PERCENT MAX PREDICTED HEART RATE ACHIEVED: 62
OHS CV CPX RATE PRESSURE PRODUCT PRESENTING: 8607
RETIRED EF AND QEF - SEE NOTES: 59 %
SYSTOLIC BLOOD PRESSURE: 151 MMHG

## 2024-09-17 ENCOUNTER — TELEPHONE (OUTPATIENT)
Dept: CARDIOLOGY | Facility: CLINIC | Age: 69
End: 2024-09-17
Payer: COMMERCIAL

## 2024-09-17 NOTE — TELEPHONE ENCOUNTER
Called and advised appt and he states that he will be able to make it.         ---- Message from Mihaela Oramous sent at 9/17/2024  4:12 PM CDT -----  Contact: [phone]  Returning a phone call.    Who left a message for the patient:  nurse    Do they know what this is regarding:  yes    Would they like a phone call back or a response via [facility]:  call back .  Pt states that he accepts the appt on 09/25/24 at 8;30

## 2024-09-17 NOTE — TELEPHONE ENCOUNTER
Called [phone]         ----- Message from Vahid Juarez MD sent at 9/16/2024  8:34 PM CDT -----  Stress test abnormal needs to discuss heart cath

## 2024-09-24 DIAGNOSIS — R94.39 ABNORMAL NUCLEAR STRESS TEST: Primary | ICD-10-CM

## 2024-09-24 DIAGNOSIS — I25.118 CORONARY ARTERY DISEASE OF NATIVE ARTERY OF NATIVE HEART WITH STABLE ANGINA PECTORIS: ICD-10-CM

## 2024-09-24 DIAGNOSIS — Z95.1 S/P CABG (CORONARY ARTERY BYPASS GRAFT): ICD-10-CM

## 2024-09-25 ENCOUNTER — HOSPITAL ENCOUNTER (OUTPATIENT)
Dept: CARDIOLOGY | Facility: HOSPITAL | Age: 69
Discharge: HOME | End: 2024-09-25
Payer: COMMERCIAL

## 2024-09-25 ENCOUNTER — OFFICE VISIT (OUTPATIENT)
Dept: CARDIOLOGY | Facility: CLINIC | Age: 69
End: 2024-09-25
Payer: COMMERCIAL

## 2024-09-25 VITALS
WEIGHT: 267 LBS | HEIGHT: 74 IN | SYSTOLIC BLOOD PRESSURE: 124 MMHG | DIASTOLIC BLOOD PRESSURE: 92 MMHG | BODY MASS INDEX: 34.27 KG/M2 | HEART RATE: 62 BPM

## 2024-09-25 DIAGNOSIS — R94.39 ABNORMAL NUCLEAR STRESS TEST: Primary | ICD-10-CM

## 2024-09-25 DIAGNOSIS — Z95.1 S/P CABG (CORONARY ARTERY BYPASS GRAFT): ICD-10-CM

## 2024-09-25 DIAGNOSIS — N18.31 STAGE 3A CHRONIC KIDNEY DISEASE: ICD-10-CM

## 2024-09-25 DIAGNOSIS — E78.2 MIXED HYPERLIPIDEMIA: ICD-10-CM

## 2024-09-25 DIAGNOSIS — I10 BENIGN HYPERTENSION: ICD-10-CM

## 2024-09-25 DIAGNOSIS — I25.118 CORONARY ARTERY DISEASE OF NATIVE ARTERY OF NATIVE HEART WITH STABLE ANGINA PECTORIS: ICD-10-CM

## 2024-09-25 DIAGNOSIS — R94.39 ABNORMAL NUCLEAR STRESS TEST: ICD-10-CM

## 2024-09-25 LAB
OHS QRS DURATION: 88 MS
OHS QTC CALCULATION: 477 MS

## 2024-09-25 PROCEDURE — 1126F AMNT PAIN NOTED NONE PRSNT: CPT | Mod: CPTII,S$GLB,,

## 2024-09-25 PROCEDURE — 3080F DIAST BP >= 90 MM HG: CPT | Mod: CPTII,S$GLB,,

## 2024-09-25 PROCEDURE — 3288F FALL RISK ASSESSMENT DOCD: CPT | Mod: CPTII,S$GLB,,

## 2024-09-25 PROCEDURE — 93010 ELECTROCARDIOGRAM REPORT: CPT | Mod: ,,, | Performed by: INTERNAL MEDICINE

## 2024-09-25 PROCEDURE — 93005 ELECTROCARDIOGRAM TRACING: CPT

## 2024-09-25 PROCEDURE — 1160F RVW MEDS BY RX/DR IN RCRD: CPT | Mod: CPTII,S$GLB,,

## 2024-09-25 PROCEDURE — 99999 PR PBB SHADOW E&M-EST. PATIENT-LVL III: CPT | Mod: PBBFAC,,,

## 2024-09-25 PROCEDURE — 1159F MED LIST DOCD IN RCRD: CPT | Mod: CPTII,S$GLB,,

## 2024-09-25 PROCEDURE — 3044F HG A1C LEVEL LT 7.0%: CPT | Mod: CPTII,S$GLB,,

## 2024-09-25 PROCEDURE — 3074F SYST BP LT 130 MM HG: CPT | Mod: CPTII,S$GLB,,

## 2024-09-25 PROCEDURE — 1101F PT FALLS ASSESS-DOCD LE1/YR: CPT | Mod: CPTII,S$GLB,,

## 2024-09-25 PROCEDURE — 3008F BODY MASS INDEX DOCD: CPT | Mod: CPTII,S$GLB,,

## 2024-09-25 PROCEDURE — 99215 OFFICE O/P EST HI 40 MIN: CPT | Mod: 25,S$GLB,,

## 2024-09-25 NOTE — H&P (VIEW-ONLY)
Subjective:   Patient ID:  Abdirahman Rodriguez is a 68 y.o. male who presents for evaluation of No chief complaint on file.      HPI 69y/o AA M whose current medical conditions include CAD s/p CABG x2 Aug 23', CKD, HTN, restrictive lung disease, pre DM, Hlp followed by Dr. Juarez in cardiology clinic, recently seen c/o dizziness and worsening SOB. States he has to take brakes when he is outside working. He is a . Denies any CP at this time but has discomfort and trouble breathing with activity. He underwent nuc stress test which came back abnormal, perfusion abnormality in mid to apical lateral walls. BP elevated in clinic today diastolic elevated on last visit as well.     EKG today NSR nonspecific T wave abnmlty  Echo 8/23' EF nml  FH early heart disease  Tobacco none  Exercise limited with SOB    Past Medical History:   Diagnosis Date    Abnormal nuclear stress test 08/13/2023    Benign hypertension     Class 1 obesity in adult 05/25/2023    Coronary artery disease 08/23/2023    s/p cabg X2    History of colon polyps     Hypergammaglobulinemia     Hypertensive kidney disease with chronic kidney disease stage III     Hypothyroidism, unspecified     Snoring 09/05/2024       Past Surgical History:   Procedure Laterality Date    ARTERIOGRAPHY OF SUBCLAVIAN ARTERY Left 8/14/2023    Procedure: ARTERIOGRAM, SUBCLAVIAN;  Surgeon: Vahid Juarez MD;  Location: [facility] CATH LAB;  Service: Cardiology;  Laterality: Left;    COLONOSCOPY N/A 12/6/2021    Procedure: COLONOSCOPY;  Surgeon: Doris Altman MD;  Location: [facility] ENDO;  Service: Endoscopy;  Laterality: N/A;    CORONARY ARTERY BYPASS GRAFT (CABG) N/A 8/23/2023    Procedure: CORONARY ARTERY BYPASS GRAFT (CABG);  Surgeon: Emma Arzola MD;  Location: [facility] OR;  Service: Cardiothoracic;  Laterality: N/A;  2-VESSEL    ECHOCARDIOGRAM,TRANSESOPHAGEAL N/A 8/23/2023    Procedure: ECHOCARDIOGRAM,TRANSESOPHAGEAL;  Surgeon: Emma Arzola MD;  Location: [facility] OR;   Service: Cardiothoracic;  Laterality: N/A;    ENDOSCOPIC HARVEST OF VEIN Left 8/23/2023    Procedure: SURGICAL PROCUREMENT, VEIN, ENDOSCOPIC;  Surgeon: Emma Arzola MD;  Location: [facility] OR;  Service: Cardiothoracic;  Laterality: Left;    INJECTION OF ANESTHETIC AGENT AROUND MULTIPLE INTERCOSTAL NERVES N/A 8/23/2023    Procedure: BLOCK, NERVE, INTERCOSTAL, 2 OR MORE;  Surgeon: Emma Arzola MD;  Location: [facility] OR;  Service: Cardiothoracic;  Laterality: N/A;    LEFT HEART CATHETERIZATION Left 8/14/2023    Procedure: Left heart cath;  Surgeon: Vahid Juarez MD;  Location: [facility] CATH LAB;  Service: Cardiology;  Laterality: Left;  pt instructed 930-10am start/    None         Social History     Tobacco Use    Smoking status: Never    Smokeless tobacco: Never   Substance Use Topics    Alcohol use: No     Comment: rarely    Drug use: No       Family History   Problem Relation Name Age of Onset    Heart disease Mother      Hypertension Mother      Heart disease Father      Hypertension Father         Current Outpatient Medications on File Prior to Visit   Medication Sig Dispense Refill    amiodarone (PACERONE) 200 MG Tab TAKE 1 TABLET BY MOUTH EVERY DAY 90 tablet 0    aspirin (ECOTRIN) 81 MG EC tablet Take 81 mg by mouth once daily.      atorvastatin (LIPITOR) 40 MG tablet Take 1 tablet (40 mg total) by mouth every evening. 90 tablet 3    clopidogreL (PLAVIX) 75 mg tablet Take 1 tablet (75 mg total) by mouth once daily. 30 tablet 11    levothyroxine (SYNTHROID) 50 MCG tablet Take 1 tablet (50 mcg total) by mouth before breakfast. 90 tablet 3    metoprolol tartrate (LOPRESSOR) 25 MG tablet TAKE 1 TABLET BY MOUTH TWICE A DAY (INS REQUIRES 90 DAY) 180 tablet 1    losartan-hydrochlorothiazide 100-25 mg (HYZAAR) 100-25 mg per tablet Take 1 tablet by mouth once daily. 90 tablet 3     Current Facility-Administered Medications on File Prior to Visit   Medication Dose Route Frequency Provider Last Rate Last Admin    0.9%   NaCl infusion (for blood administration)   Intravenous Q24H PRN Emma Arzola MD          Wt Readings from Last 3 Encounters:   09/25/24 121.1 kg (266 lb 15.6 oz)   09/13/24 121.6 kg (268 lb)   09/05/24 122 kg (268 lb 15.4 oz)     Temp Readings from Last 3 Encounters:   01/20/24 (!) 101.3 °F (38.5 °C) (Oral)   08/28/23 98.2 °F (36.8 °C) (Oral)   08/21/23 98 °F (36.7 °C) (Temporal)     BP Readings from Last 3 Encounters:   09/25/24 (!) 124/92   09/13/24 (!) 151/101   09/05/24 134/80     Pulse Readings from Last 3 Encounters:   09/25/24 62   09/13/24 (!) 57   09/05/24 61        Review of Systems   Constitutional: Positive for malaise/fatigue.   HENT: Negative.     Eyes: Negative.    Cardiovascular:  Positive for dyspnea on exertion.   Respiratory:  Positive for shortness of breath.    Skin: Negative.    Musculoskeletal: Negative.    Gastrointestinal: Negative.    Genitourinary: Negative.    Neurological: Negative.    Psychiatric/Behavioral: Negative.         Objective:   Physical Exam  Vitals and nursing note reviewed.   Constitutional:       Appearance: Normal appearance.   HENT:      Head: Normocephalic and atraumatic.   Eyes:      General:         Right eye: No discharge.         Left eye: No discharge.      Pupils: Pupils are equal, round, and reactive to light.   Cardiovascular:      Rate and Rhythm: Normal rate and regular rhythm.      Heart sounds: S1 normal and S2 normal. No murmur heard.     No friction rub.   Pulmonary:      Effort: Pulmonary effort is normal. No respiratory distress.      Breath sounds: Normal breath sounds. No rales.   Abdominal:      Palpations: Abdomen is soft.      Tenderness: There is no abdominal tenderness.   Musculoskeletal:      Cervical back: Neck supple.      Right lower leg: No edema.      Left lower leg: No edema.   Skin:     General: Skin is warm and dry.   Neurological:      General: No focal deficit present.      Mental Status: He is alert and oriented to person,  place, and time.   Psychiatric:         Mood and Affect: Mood normal.         Behavior: Behavior normal.         Thought Content: Thought content normal.         Lab Results   Component Value Date    CHOL 117 (L) 06/20/2024    CHOL 114 (L) 01/12/2024    CHOL 92 (L) 08/24/2023     Lab Results   Component Value Date    HDL 36 (L) 06/20/2024    HDL 35 (L) 01/12/2024    HDL 17 (L) 08/24/2023     Lab Results   Component Value Date    LDLCALC 66.6 06/20/2024    LDLCALC 67.6 01/12/2024    LDLCALC 62.4 (L) 08/24/2023     Lab Results   Component Value Date    TRIG 72 06/20/2024    TRIG 57 01/12/2024    TRIG 63 08/24/2023     Lab Results   Component Value Date    CHOLHDL 30.8 06/20/2024    CHOLHDL 30.7 01/12/2024    CHOLHDL 18.5 (L) 08/24/2023       Chemistry        Component Value Date/Time     06/20/2024 0818    K 4.5 06/20/2024 0818     06/20/2024 0818    CO2 22 (L) 06/20/2024 0818    BUN 23 06/20/2024 0818    CREATININE 1.8 (H) 06/20/2024 0818     06/20/2024 0818        Component Value Date/Time    CALCIUM 9.2 06/20/2024 0818    ALKPHOS 51 (L) 06/20/2024 0818    AST 24 06/20/2024 0818    ALT 26 06/20/2024 0818    BILITOT 0.4 06/20/2024 0818    ESTGFRAFRICA 48.2 (A) 06/15/2022 0749    EGFRNONAA 41.7 (A) 06/15/2022 0749          Lab Results   Component Value Date    TSH 6.201 (H) 09/05/2024     Lab Results   Component Value Date    INR 1.1 08/24/2023    INR 1.1 08/23/2023    INR 1.0 08/18/2023     @RESUFAST(WBC,HGB,HCT,MCV,PLT)  @LABRCNTIP(BNP,BNPTRIAGEBLO)@  CrCl cannot be calculated (Patient's most recent lab result is older than the maximum 7 days allowed.).     Results for orders placed during the hospital encounter of 08/04/23    Echo    Interpretation Summary    Left Ventricle: The left ventricle is normal in size. Normal wall thickness. Normal wall motion. There is normal systolic function with a visually estimated ejection fraction of 55 - 70%. There is normal diastolic function.    Right  Ventricle: Normal right ventricular cavity size. Wall thickness is normal. Right ventricle wall motion  is normal. Systolic function is normal.    Tricuspid Valve: There is mild regurgitation.    IVC/SVC: Normal venous pressure at 3 mmHg.     Results for orders placed during the hospital encounter of 09/13/24    Nuclear Stress - Cardiology Interpreted    Interpretation Summary    Stress ECG: There is hypertensive blood pressure response with stress.    Abnormal myocardial perfusion scan.    There are two significant perfusion abnormalities.    Perfusion Abnormality #1 - There is a mild to moderate intensity, mostly fixed perfusion abnormality with some reversibilty in the  mid to apical lateral wall(s).    Perfusion Abnormality #2 - There is a severe intensity, fixed perfusion abnormality consistent with scar in the mid to distal inferior wall(s).    The gated perfusion images showed an ejection fraction of 68% at rest. The gated perfusion images showed an ejection fraction of 59% post stress. Normal ejection fraction is greater than 59%.    The ECG portion of the study is negative for ischemia.     Assessment:      1. Benign hypertension    2. Mixed hyperlipidemia    3. Coronary artery disease of native artery of native heart with stable angina pectoris    4. Abnormal nuclear stress test    5. S/P CABG (coronary artery bypass graft)    6. Stage 3a chronic kidney disease        Plan:   Benign hypertension    Mixed hyperlipidemia    Coronary artery disease of native artery of native heart with stable angina pectoris    Abnormal nuclear stress test    S/P CABG (coronary artery bypass graft)    Stage 3a chronic kidney disease      Cont current CV medications  ARB/HCTZ- HTN, cont monitoring kidney function, discussed need for good hydration  Asa, statin, plavix, BB- CAD  Statin- Hlp  Amio- post op Afib from CABG, not on AC  RF modifications  Low na low fat diet daily exercise as tolerated    All risks, benefits and  treatment alternatives explained all questions answered, pt agreeable to proceed. Dr. Juarez recommends hydrating patient prior to procedure    RTC 2 week HFU    Visit today included increased complexity associated with the care of the episodic problem CAD addressed and managing the longitudinal care of the patient due to the serious and/or complex managed problem(s) CAD, HTN.       Courtney Guillot, FNP-C [facility], Cardiology

## 2024-09-25 NOTE — PROGRESS NOTES
Subjective:   Patient ID:  Abdirahman Rodriguez is a 68 y.o. male who presents for evaluation of No chief complaint on file.      HPI 69y/o AA M whose current medical conditions include CAD s/p CABG x2 Aug 23', CKD, HTN, restrictive lung disease, pre DM, Hlp followed by Dr. Juarez in cardiology clinic, recently seen c/o dizziness and worsening SOB. States he has to take brakes when he is outside working. He is a . Denies any CP at this time but has discomfort and trouble breathing with activity. He underwent nuc stress test which came back abnormal, perfusion abnormality in mid to apical lateral walls. BP elevated in clinic today diastolic elevated on last visit as well.     EKG today NSR nonspecific T wave abnmlty  Echo 8/23' EF nml  FH early heart disease  Tobacco none  Exercise limited with SOB    Past Medical History:   Diagnosis Date    Abnormal nuclear stress test 08/13/2023    Benign hypertension     Class 1 obesity in adult 05/25/2023    Coronary artery disease 08/23/2023    s/p cabg X2    History of colon polyps     Hypergammaglobulinemia     Hypertensive kidney disease with chronic kidney disease stage III     Hypothyroidism, unspecified     Snoring 09/05/2024       Past Surgical History:   Procedure Laterality Date    ARTERIOGRAPHY OF SUBCLAVIAN ARTERY Left 8/14/2023    Procedure: ARTERIOGRAM, SUBCLAVIAN;  Surgeon: Vahid Juarez MD;  Location: [facility] CATH LAB;  Service: Cardiology;  Laterality: Left;    COLONOSCOPY N/A 12/6/2021    Procedure: COLONOSCOPY;  Surgeon: Doris Altman MD;  Location: [facility] ENDO;  Service: Endoscopy;  Laterality: N/A;    CORONARY ARTERY BYPASS GRAFT (CABG) N/A 8/23/2023    Procedure: CORONARY ARTERY BYPASS GRAFT (CABG);  Surgeon: Emma Arzola MD;  Location: [facility] OR;  Service: Cardiothoracic;  Laterality: N/A;  2-VESSEL    ECHOCARDIOGRAM,TRANSESOPHAGEAL N/A 8/23/2023    Procedure: ECHOCARDIOGRAM,TRANSESOPHAGEAL;  Surgeon: Emma Arzola MD;  Location: [facility] OR;   Service: Cardiothoracic;  Laterality: N/A;    ENDOSCOPIC HARVEST OF VEIN Left 8/23/2023    Procedure: SURGICAL PROCUREMENT, VEIN, ENDOSCOPIC;  Surgeon: Emma Arzola MD;  Location: [facility] OR;  Service: Cardiothoracic;  Laterality: Left;    INJECTION OF ANESTHETIC AGENT AROUND MULTIPLE INTERCOSTAL NERVES N/A 8/23/2023    Procedure: BLOCK, NERVE, INTERCOSTAL, 2 OR MORE;  Surgeon: Emma Arzola MD;  Location: [facility] OR;  Service: Cardiothoracic;  Laterality: N/A;    LEFT HEART CATHETERIZATION Left 8/14/2023    Procedure: Left heart cath;  Surgeon: Vahid Juarez MD;  Location: [facility] CATH LAB;  Service: Cardiology;  Laterality: Left;  pt instructed 930-10am start/    None         Social History     Tobacco Use    Smoking status: Never    Smokeless tobacco: Never   Substance Use Topics    Alcohol use: No     Comment: rarely    Drug use: No       Family History   Problem Relation Name Age of Onset    Heart disease Mother      Hypertension Mother      Heart disease Father      Hypertension Father         Current Outpatient Medications on File Prior to Visit   Medication Sig Dispense Refill    amiodarone (PACERONE) 200 MG Tab TAKE 1 TABLET BY MOUTH EVERY DAY 90 tablet 0    aspirin (ECOTRIN) 81 MG EC tablet Take 81 mg by mouth once daily.      atorvastatin (LIPITOR) 40 MG tablet Take 1 tablet (40 mg total) by mouth every evening. 90 tablet 3    clopidogreL (PLAVIX) 75 mg tablet Take 1 tablet (75 mg total) by mouth once daily. 30 tablet 11    levothyroxine (SYNTHROID) 50 MCG tablet Take 1 tablet (50 mcg total) by mouth before breakfast. 90 tablet 3    metoprolol tartrate (LOPRESSOR) 25 MG tablet TAKE 1 TABLET BY MOUTH TWICE A DAY (INS REQUIRES 90 DAY) 180 tablet 1    losartan-hydrochlorothiazide 100-25 mg (HYZAAR) 100-25 mg per tablet Take 1 tablet by mouth once daily. 90 tablet 3     Current Facility-Administered Medications on File Prior to Visit   Medication Dose Route Frequency Provider Last Rate Last Admin    0.9%   NaCl infusion (for blood administration)   Intravenous Q24H PRN Emma Arzola MD          Wt Readings from Last 3 Encounters:   09/25/24 121.1 kg (266 lb 15.6 oz)   09/13/24 121.6 kg (268 lb)   09/05/24 122 kg (268 lb 15.4 oz)     Temp Readings from Last 3 Encounters:   01/20/24 (!) 101.3 °F (38.5 °C) (Oral)   08/28/23 98.2 °F (36.8 °C) (Oral)   08/21/23 98 °F (36.7 °C) (Temporal)     BP Readings from Last 3 Encounters:   09/25/24 (!) 124/92   09/13/24 (!) 151/101   09/05/24 134/80     Pulse Readings from Last 3 Encounters:   09/25/24 62   09/13/24 (!) 57   09/05/24 61        Review of Systems   Constitutional: Positive for malaise/fatigue.   HENT: Negative.     Eyes: Negative.    Cardiovascular:  Positive for dyspnea on exertion.   Respiratory:  Positive for shortness of breath.    Skin: Negative.    Musculoskeletal: Negative.    Gastrointestinal: Negative.    Genitourinary: Negative.    Neurological: Negative.    Psychiatric/Behavioral: Negative.         Objective:   Physical Exam  Vitals and nursing note reviewed.   Constitutional:       Appearance: Normal appearance.   HENT:      Head: Normocephalic and atraumatic.   Eyes:      General:         Right eye: No discharge.         Left eye: No discharge.      Pupils: Pupils are equal, round, and reactive to light.   Cardiovascular:      Rate and Rhythm: Normal rate and regular rhythm.      Heart sounds: S1 normal and S2 normal. No murmur heard.     No friction rub.   Pulmonary:      Effort: Pulmonary effort is normal. No respiratory distress.      Breath sounds: Normal breath sounds. No rales.   Abdominal:      Palpations: Abdomen is soft.      Tenderness: There is no abdominal tenderness.   Musculoskeletal:      Cervical back: Neck supple.      Right lower leg: No edema.      Left lower leg: No edema.   Skin:     General: Skin is warm and dry.   Neurological:      General: No focal deficit present.      Mental Status: He is alert and oriented to person,  place, and time.   Psychiatric:         Mood and Affect: Mood normal.         Behavior: Behavior normal.         Thought Content: Thought content normal.         Lab Results   Component Value Date    CHOL 117 (L) 06/20/2024    CHOL 114 (L) 01/12/2024    CHOL 92 (L) 08/24/2023     Lab Results   Component Value Date    HDL 36 (L) 06/20/2024    HDL 35 (L) 01/12/2024    HDL 17 (L) 08/24/2023     Lab Results   Component Value Date    LDLCALC 66.6 06/20/2024    LDLCALC 67.6 01/12/2024    LDLCALC 62.4 (L) 08/24/2023     Lab Results   Component Value Date    TRIG 72 06/20/2024    TRIG 57 01/12/2024    TRIG 63 08/24/2023     Lab Results   Component Value Date    CHOLHDL 30.8 06/20/2024    CHOLHDL 30.7 01/12/2024    CHOLHDL 18.5 (L) 08/24/2023       Chemistry        Component Value Date/Time     06/20/2024 0818    K 4.5 06/20/2024 0818     06/20/2024 0818    CO2 22 (L) 06/20/2024 0818    BUN 23 06/20/2024 0818    CREATININE 1.8 (H) 06/20/2024 0818     06/20/2024 0818        Component Value Date/Time    CALCIUM 9.2 06/20/2024 0818    ALKPHOS 51 (L) 06/20/2024 0818    AST 24 06/20/2024 0818    ALT 26 06/20/2024 0818    BILITOT 0.4 06/20/2024 0818    ESTGFRAFRICA 48.2 (A) 06/15/2022 0749    EGFRNONAA 41.7 (A) 06/15/2022 0749          Lab Results   Component Value Date    TSH 6.201 (H) 09/05/2024     Lab Results   Component Value Date    INR 1.1 08/24/2023    INR 1.1 08/23/2023    INR 1.0 08/18/2023     @RESUFAST(WBC,HGB,HCT,MCV,PLT)  @LABRCNTIP(BNP,BNPTRIAGEBLO)@  CrCl cannot be calculated (Patient's most recent lab result is older than the maximum 7 days allowed.).     Results for orders placed during the hospital encounter of 08/04/23    Echo    Interpretation Summary    Left Ventricle: The left ventricle is normal in size. Normal wall thickness. Normal wall motion. There is normal systolic function with a visually estimated ejection fraction of 55 - 70%. There is normal diastolic function.    Right  Ventricle: Normal right ventricular cavity size. Wall thickness is normal. Right ventricle wall motion  is normal. Systolic function is normal.    Tricuspid Valve: There is mild regurgitation.    IVC/SVC: Normal venous pressure at 3 mmHg.     Results for orders placed during the hospital encounter of 09/13/24    Nuclear Stress - Cardiology Interpreted    Interpretation Summary    Stress ECG: There is hypertensive blood pressure response with stress.    Abnormal myocardial perfusion scan.    There are two significant perfusion abnormalities.    Perfusion Abnormality #1 - There is a mild to moderate intensity, mostly fixed perfusion abnormality with some reversibilty in the  mid to apical lateral wall(s).    Perfusion Abnormality #2 - There is a severe intensity, fixed perfusion abnormality consistent with scar in the mid to distal inferior wall(s).    The gated perfusion images showed an ejection fraction of 68% at rest. The gated perfusion images showed an ejection fraction of 59% post stress. Normal ejection fraction is greater than 59%.    The ECG portion of the study is negative for ischemia.     Assessment:      1. Benign hypertension    2. Mixed hyperlipidemia    3. Coronary artery disease of native artery of native heart with stable angina pectoris    4. Abnormal nuclear stress test    5. S/P CABG (coronary artery bypass graft)    6. Stage 3a chronic kidney disease        Plan:   Benign hypertension    Mixed hyperlipidemia    Coronary artery disease of native artery of native heart with stable angina pectoris    Abnormal nuclear stress test    S/P CABG (coronary artery bypass graft)    Stage 3a chronic kidney disease      Cont current CV medications  ARB/HCTZ- HTN, cont monitoring kidney function, discussed need for good hydration  Asa, statin, plavix, BB- CAD  Statin- Hlp  Amio- post op Afib from CABG, not on AC  RF modifications  Low na low fat diet daily exercise as tolerated    All risks, benefits and  treatment alternatives explained all questions answered, pt agreeable to proceed. Dr. Juarez recommends hydrating patient prior to procedure    RTC 2 week HFU    Visit today included increased complexity associated with the care of the episodic problem CAD addressed and managing the longitudinal care of the patient due to the serious and/or complex managed problem(s) CAD, HTN.       Courtney Guillot, FNP-C [facility], Cardiology

## 2024-10-01 ENCOUNTER — HOSPITAL ENCOUNTER (OUTPATIENT)
Facility: HOSPITAL | Age: 69
Discharge: HOME | End: 2024-10-01
Attending: INTERNAL MEDICINE | Admitting: INTERNAL MEDICINE
Payer: COMMERCIAL

## 2024-10-01 VITALS
TEMPERATURE: 99 F | SYSTOLIC BLOOD PRESSURE: 148 MMHG | BODY MASS INDEX: 34.27 KG/M2 | OXYGEN SATURATION: 91 % | HEIGHT: 74 IN | HEART RATE: 63 BPM | RESPIRATION RATE: 22 BRPM | WEIGHT: 267 LBS | DIASTOLIC BLOOD PRESSURE: 91 MMHG

## 2024-10-01 DIAGNOSIS — R06.02 SOB (SHORTNESS OF BREATH): ICD-10-CM

## 2024-10-01 DIAGNOSIS — R94.39 ABNORMAL NUCLEAR STRESS TEST: ICD-10-CM

## 2024-10-01 DIAGNOSIS — Z95.1 S/P CABG (CORONARY ARTERY BYPASS GRAFT): ICD-10-CM

## 2024-10-01 DIAGNOSIS — I25.110 CORONARY ARTERY DISEASE INVOLVING NATIVE CORONARY ARTERY OF NATIVE HEART WITH UNSTABLE ANGINA PECTORIS: ICD-10-CM

## 2024-10-01 PROCEDURE — C1751 CATH, INF, PER/CENT/MIDLINE: HCPCS | Performed by: INTERNAL MEDICINE

## 2024-10-01 PROCEDURE — 93461 R&L HRT ART/VENTRICLE ANGIO: CPT | Performed by: INTERNAL MEDICINE

## 2024-10-01 PROCEDURE — 25500020 PHARM REV CODE 255: Performed by: INTERNAL MEDICINE

## 2024-10-01 PROCEDURE — C1769 GUIDE WIRE: HCPCS | Performed by: INTERNAL MEDICINE

## 2024-10-01 PROCEDURE — 93461 R&L HRT ART/VENTRICLE ANGIO: CPT | Mod: 26,,, | Performed by: INTERNAL MEDICINE

## 2024-10-01 PROCEDURE — 63600175 PHARM REV CODE 636 W HCPCS: Mod: JZ,JG | Performed by: INTERNAL MEDICINE

## 2024-10-01 PROCEDURE — 99152 MOD SED SAME PHYS/QHP 5/>YRS: CPT | Performed by: INTERNAL MEDICINE

## 2024-10-01 PROCEDURE — 99152 MOD SED SAME PHYS/QHP 5/>YRS: CPT | Mod: ,,, | Performed by: INTERNAL MEDICINE

## 2024-10-01 PROCEDURE — 25000003 PHARM REV CODE 250: Performed by: INTERNAL MEDICINE

## 2024-10-01 PROCEDURE — 99153 MOD SED SAME PHYS/QHP EA: CPT | Performed by: INTERNAL MEDICINE

## 2024-10-01 PROCEDURE — C1894 INTRO/SHEATH, NON-LASER: HCPCS | Performed by: INTERNAL MEDICINE

## 2024-10-01 RX ORDER — HEPARIN SODIUM 1000 [USP'U]/ML
INJECTION, SOLUTION INTRAVENOUS; SUBCUTANEOUS
Status: DISCONTINUED | OUTPATIENT
Start: 2024-10-01 | End: 2024-10-01 | Stop reason: HOSPADM

## 2024-10-01 RX ORDER — ONDANSETRON 8 MG/1
8 TABLET, ORALLY DISINTEGRATING ORAL EVERY 8 HOURS PRN
Status: DISCONTINUED | OUTPATIENT
Start: 2024-10-01 | End: 2024-10-01 | Stop reason: HOSPADM

## 2024-10-01 RX ORDER — SODIUM CHLORIDE 0.9 % (FLUSH) 0.9 %
10 SYRINGE (ML) INJECTION
Status: DISCONTINUED | OUTPATIENT
Start: 2024-10-01 | End: 2024-10-01 | Stop reason: HOSPADM

## 2024-10-01 RX ORDER — DIPHENHYDRAMINE HCL 50 MG
50 CAPSULE ORAL ONCE
Status: COMPLETED | OUTPATIENT
Start: 2024-10-01 | End: 2024-10-01

## 2024-10-01 RX ORDER — NITROGLYCERIN 5 MG/ML
INJECTION, SOLUTION INTRAVENOUS
Status: DISCONTINUED | OUTPATIENT
Start: 2024-10-01 | End: 2024-10-01 | Stop reason: HOSPADM

## 2024-10-01 RX ORDER — SODIUM CHLORIDE 9 MG/ML
INJECTION, SOLUTION INTRAVENOUS CONTINUOUS
Status: ACTIVE | OUTPATIENT
Start: 2024-10-01 | End: 2024-10-01

## 2024-10-01 RX ORDER — VERAPAMIL HYDROCHLORIDE 2.5 MG/ML
INJECTION, SOLUTION INTRAVENOUS
Status: DISCONTINUED | OUTPATIENT
Start: 2024-10-01 | End: 2024-10-01 | Stop reason: HOSPADM

## 2024-10-01 RX ORDER — ACETAMINOPHEN 325 MG/1
650 TABLET ORAL EVERY 4 HOURS PRN
Status: DISCONTINUED | OUTPATIENT
Start: 2024-10-01 | End: 2024-10-01 | Stop reason: HOSPADM

## 2024-10-01 RX ORDER — FENTANYL CITRATE 50 UG/ML
INJECTION, SOLUTION INTRAMUSCULAR; INTRAVENOUS
Status: DISCONTINUED | OUTPATIENT
Start: 2024-10-01 | End: 2024-10-01 | Stop reason: HOSPADM

## 2024-10-01 RX ORDER — DIAZEPAM 5 MG/1
5 TABLET ORAL
Status: DISCONTINUED | OUTPATIENT
Start: 2024-10-01 | End: 2024-10-01 | Stop reason: HOSPADM

## 2024-10-01 RX ORDER — NAPROXEN SODIUM 220 MG/1
81 TABLET, FILM COATED ORAL ONCE
Status: COMPLETED | OUTPATIENT
Start: 2024-10-01 | End: 2024-10-01

## 2024-10-01 RX ORDER — LIDOCAINE HYDROCHLORIDE 20 MG/ML
INJECTION, SOLUTION INFILTRATION; PERINEURAL
Status: DISCONTINUED | OUTPATIENT
Start: 2024-10-01 | End: 2024-10-01 | Stop reason: HOSPADM

## 2024-10-01 RX ORDER — MIDAZOLAM HYDROCHLORIDE 1 MG/ML
INJECTION, SOLUTION INTRAMUSCULAR; INTRAVENOUS
Status: DISCONTINUED | OUTPATIENT
Start: 2024-10-01 | End: 2024-10-01 | Stop reason: HOSPADM

## 2024-10-01 RX ADMIN — DIAZEPAM 5 MG: 5 TABLET ORAL at 10:10

## 2024-10-01 RX ADMIN — DIPHENHYDRAMINE HYDROCHLORIDE 50 MG: 50 CAPSULE ORAL at 10:10

## 2024-10-01 RX ADMIN — SODIUM CHLORIDE: 9 INJECTION, SOLUTION INTRAVENOUS at 10:10

## 2024-10-01 RX ADMIN — SODIUM CHLORIDE: 9 INJECTION, SOLUTION INTRAVENOUS at 01:10

## 2024-10-01 RX ADMIN — ASPIRIN 81 MG CHEWABLE TABLET 81 MG: 81 TABLET CHEWABLE at 10:10

## 2024-10-01 NOTE — INTERVAL H&P NOTE
The patient has been examined and the H&P has been reviewed:    I concur with the findings and no changes have occurred since H&P was written.    Procedure risks, benefits and alternative options discussed and understood by patient/family.          Active Hospital Problems    Diagnosis  POA    *Abnormal nuclear stress test [R94.39]  Yes    S/P CABG (coronary artery bypass graft) [Z95.1]  Not Applicable    Snoring [R06.83]  Yes    Coronary artery disease [I25.10]  Yes    Diffusion capacity of lung (dl), decreased [R94.2]  Yes    CKD (chronic kidney disease), stage III [N18.30]  Yes    Hypertensive kidney disease with chronic kidney disease stage III [I12.9, N18.30]  Yes    Mixed hyperlipidemia [E78.2]  Yes    Prediabetes [R73.03]  Yes      Resolved Hospital Problems   No resolved problems to display.

## 2024-10-01 NOTE — Clinical Note
The catheter was inserted into the SVG to D1. An angiography was performed of the SVG to D1. Multiple views were taken.

## 2024-10-01 NOTE — Clinical Note
The catheter was inserted into the left subclavian artery. An angiography was performed of the LIMA to LAD. Multiple views were taken.

## 2024-10-01 NOTE — Clinical Note
The PA catheter was repositioned to the main pulmonary artery. Hemodynamics were performed. Cardiac output was obtained. 5.33

## 2024-10-01 NOTE — OP NOTE
INPATIENT Operative Note         SUMMARY     Surgery Date: 10/1/2024     Surgeons and Role:     * Vahid Juarez MD - Primary    ASSISTANT:none    Pre-op Diagnosis:  Abnormal nuclear stress test [R94.39]  Coronary artery disease involving native coronary artery of native heart with unstable angina pectoris [I25.110]  S/P CABG (coronary artery bypass graft) [Z95.1]  SOB (shortness of breath) [R06.02]      Post-op Diagnosis:  Abnormal nuclear stress test [R94.39]  Coronary artery disease involving native coronary artery of native heart with unstable angina pectoris [I25.110]  S/P CABG (coronary artery bypass graft) [Z95.1]  SOB (shortness of breath) [R06.02]    Procedure(s) (LRB):  CATHETERIZATION, HEART, BOTH LEFT AND RIGHT (N/A)  Bypass graft study    COMPLICATION:none    Anesthesia: RN IV Sedation    Findings/Key Components:  Normal lvedp  Elevated rty sided pressures  Moderate pulmonary htn   Occluded lad diagonal    Patent lima to ald and svg to diagonal.  Non obc rca and lcx disease.    Estimated Blood Loss: < 50 ML.         SPECIMEN: NONE    Devices/Prostetics: None    PLAN: reassure   Rf modification

## 2024-10-02 NOTE — PLAN OF CARE
1840  Patient discharged to care of wife to POV ,per WC/  NADN/ L radial dsg ,L brachial CDI/ no hematoma noted/  AVS reviewed w/ wife also/ v/u    Pt v/u to all discharge instructions previously / wife has  AVS with her

## 2024-10-02 NOTE — DISCHARGE SUMMARY
[facility] - Cath Lab (Hospital)  Discharge Note  Short Stay    Procedure(s) (LRB):  CATHETERIZATION, HEART, BOTH LEFT AND RIGHT (N/A)  Bypass graft study      OUTCOME: Patient tolerated treatment/procedure well without complication and is now ready for discharge.    DISPOSITION: Home or Self Care    FINAL DIAGNOSIS:  Abnormal nuclear stress test    FOLLOWUP: In clinic    DISCHARGE INSTRUCTIONS:    Discharge Procedure Orders   Diet general     Call MD for:  temperature >100.4     Call MD for:  persistent nausea and vomiting     Call MD for:  severe uncontrolled pain     Call MD for:  difficulty breathing, headache or visual disturbances     Call MD for:  redness, tenderness, or signs of infection (pain, swelling, redness, odor or green/yellow discharge around incision site)     Call MD for:  hives     Call MD for:  persistent dizziness or light-headedness     Call MD for:  extreme fatigue        TIME SPENT ON DISCHARGE: 35 minutes

## 2024-10-09 ENCOUNTER — HOSPITAL ENCOUNTER (OUTPATIENT)
Dept: SLEEP MEDICINE | Facility: HOSPITAL | Age: 69
Discharge: HOME | End: 2024-10-09
Attending: INTERNAL MEDICINE
Payer: COMMERCIAL

## 2024-10-09 DIAGNOSIS — R06.02 SHORTNESS OF BREATH: ICD-10-CM

## 2024-10-09 DIAGNOSIS — G47.33 OSA (OBSTRUCTIVE SLEEP APNEA): Primary | ICD-10-CM

## 2024-10-09 DIAGNOSIS — R06.83 SNORING: ICD-10-CM

## 2024-10-09 DIAGNOSIS — I25.118 CORONARY ARTERY DISEASE OF NATIVE ARTERY OF NATIVE HEART WITH STABLE ANGINA PECTORIS: ICD-10-CM

## 2024-10-09 PROCEDURE — 95806 SLEEP STUDY UNATT&RESP EFFT: CPT | Performed by: INTERNAL MEDICINE

## 2024-10-09 NOTE — PROCEDURES
PHYSICIAN INTERPRETATION AND COMMENTS: Findings are consistent with very severe, positional obstructive sleep  apnea(ADAL). Indications of cardiac dysrhythmia are recorded. InLAB CPAP titration prefered, please refer to sleep disorders  CLINICAL HISTORY: 68 year old male. BMI: 34.28 kg/mB2. . Snoring. Fatigue.  SLEEP STUDY FINDINGS: Patient underwent a 1 night Home Sleep Test and by behavioral criteria, slept for approximately  6.52 hours, with a sleep latency of 18 minutes and a sleep efficiency of 97%. Very Severe sleep disordered breathing  (AHI=62) is noted based on a 4% hypopnea desaturation criteria. The patient slept supine 23.9% of the night based on valid  recording time of 6.58 hours and is 1.7 times as likely to have apneas/hypopneas when supine. The apneas/hypopneas are  accompanied by severe oxygen desaturation (percent time below 90% SpO2: 97%, Min SpO2: 74.1%). The average  desaturation across all sleep disordered breathing events is 5%. The mean pulse rate is 63.8 BPM, with infrequent pulse  rate variability (29 events with >= 6 BPM increase/decrease per hour), with an absence of expected pulse rate variability,  suggesting autonomic dysfunction.  TREATMENT CONSIDERATIONS: Based on the [employer] practice parameter of CPAP would be the  guideline recommendation of choice. Other therapies may include ENT procedures were appropriate, significant weight  loss. Inspire hypoglossal nerve stimulator ,mandibular advancement device may also be considered.Therapy with APAP at  6-20 cm WP using mask of choice with heated humidification is an option. Please refer to sleep disorders clinic / followup  with sleep practitioner Weight loss/management. with regular exercise per direction of physician. Avoid drowsy driving.  Follow up in sleep clinic to maximize adherence and ensure resolution of symptoms.  DISEASE MANAGEMENT CONSIDERATIONS: None.      Dear Vahid Juarez MD  [id number]  "[place] [address]/Mt Noel MD         The sleep study that you ordered is complete.  You have ordered sleep LAB services to perform the sleep study for Abdirahman Rodriguez .      Please find Sleep Study result in  the "Media tab" of Chart Review menu.        You can look  for the report in the  Media by the document type "Sleep Study Documents". Alphabetizing  "Document type" column helps to find the SLEEP STUDY report  Faster.       As the ordering provider, you are responsible for reviewing the results and implementing a treatment plan with your patient.    If you need a Sleep Medicine provider to explain the sleep study findings and arrange treatment for the patient, please refer patient for consultation to our Sleep Clinic via [facility] with Ambulatory Consult Sleep.     To do that please place an order for an  "Ambulatory Consult Sleep" -  order , it will go to our clinic work queue for our staff  to contact the patient for an appointment.      For any questions, please contact our sleep lab  staff at [phone] to talk to clinical staff          David Castaneda MD   "

## 2024-10-10 ENCOUNTER — PATIENT MESSAGE (OUTPATIENT)
Dept: PULMONOLOGY | Facility: CLINIC | Age: 69
End: 2024-10-10
Payer: COMMERCIAL

## 2024-10-16 ENCOUNTER — OFFICE VISIT (OUTPATIENT)
Dept: CARDIOLOGY | Facility: CLINIC | Age: 69
End: 2024-10-16
Payer: COMMERCIAL

## 2024-10-16 ENCOUNTER — TELEPHONE (OUTPATIENT)
Dept: CARDIOLOGY | Facility: CLINIC | Age: 69
End: 2024-10-16
Payer: COMMERCIAL

## 2024-10-16 VITALS
BODY MASS INDEX: 34.14 KG/M2 | HEIGHT: 74 IN | DIASTOLIC BLOOD PRESSURE: 82 MMHG | HEART RATE: 68 BPM | WEIGHT: 266 LBS | OXYGEN SATURATION: 95 % | SYSTOLIC BLOOD PRESSURE: 120 MMHG

## 2024-10-16 DIAGNOSIS — N18.31 STAGE 3A CHRONIC KIDNEY DISEASE: Primary | ICD-10-CM

## 2024-10-16 DIAGNOSIS — I10 BENIGN HYPERTENSION: ICD-10-CM

## 2024-10-16 DIAGNOSIS — Z95.1 S/P CABG (CORONARY ARTERY BYPASS GRAFT): ICD-10-CM

## 2024-10-16 DIAGNOSIS — E78.2 MIXED HYPERLIPIDEMIA: Primary | ICD-10-CM

## 2024-10-16 DIAGNOSIS — I25.118 CORONARY ARTERY DISEASE OF NATIVE ARTERY OF NATIVE HEART WITH STABLE ANGINA PECTORIS: ICD-10-CM

## 2024-10-16 PROCEDURE — 1126F AMNT PAIN NOTED NONE PRSNT: CPT | Mod: CPTII,S$GLB,,

## 2024-10-16 PROCEDURE — 1159F MED LIST DOCD IN RCRD: CPT | Mod: CPTII,S$GLB,,

## 2024-10-16 PROCEDURE — 99214 OFFICE O/P EST MOD 30 MIN: CPT | Mod: S$GLB,,,

## 2024-10-16 PROCEDURE — 3074F SYST BP LT 130 MM HG: CPT | Mod: CPTII,S$GLB,,

## 2024-10-16 PROCEDURE — 3288F FALL RISK ASSESSMENT DOCD: CPT | Mod: CPTII,S$GLB,,

## 2024-10-16 PROCEDURE — 3044F HG A1C LEVEL LT 7.0%: CPT | Mod: CPTII,S$GLB,,

## 2024-10-16 PROCEDURE — 99999 PR PBB SHADOW E&M-EST. PATIENT-LVL IV: CPT | Mod: PBBFAC,,,

## 2024-10-16 PROCEDURE — 1101F PT FALLS ASSESS-DOCD LE1/YR: CPT | Mod: CPTII,S$GLB,,

## 2024-10-16 PROCEDURE — 3008F BODY MASS INDEX DOCD: CPT | Mod: CPTII,S$GLB,,

## 2024-10-16 PROCEDURE — 3079F DIAST BP 80-89 MM HG: CPT | Mod: CPTII,S$GLB,,

## 2024-10-16 PROCEDURE — 1160F RVW MEDS BY RX/DR IN RCRD: CPT | Mod: CPTII,S$GLB,,

## 2024-10-16 RX ORDER — ROSUVASTATIN CALCIUM 10 MG/1
10 TABLET, COATED ORAL DAILY
Qty: 90 TABLET | Refills: 3 | Status: SHIPPED | OUTPATIENT
Start: 2024-10-16 | End: 2025-10-16

## 2024-10-16 NOTE — TELEPHONE ENCOUNTER
Called and lmtcb to discuss results         ----- Message from Vahid Juarez MD sent at 10/15/2024  6:30 PM CDT -----  Has severe sleep apnea needs to see pulmonary

## 2024-10-16 NOTE — PROGRESS NOTES
Subjective:   Patient ID:  Abdirahman Rodriguez is a 68 y.o. male who presents for evaluation of No chief complaint on file.      HPI 69y/o AA M whose current medical conditions include CAD s/p CABG x2 Aug 23', CKD, HTN, restrictive lung disease, pre DM, Hlp followed by Dr. Juarez in cardiology clinic, recently seen c/o dizziness and worsening SOB. States he has to take brakes when he is outside working. He is a . Denies any CP at this time but has discomfort and trouble breathing with activity. He underwent nuc stress test which came back abnormal, perfusion abnormality in mid to apical lateral walls. BP elevated in clinic today diastolic elevated on last visit as well.     EKG today NSR nonspecific T wave abnmlty  Echo 8/23' EF nml  FH early heart disease  Tobacco none  Exercise limited with SOB    10/16/24  Here today for CV follow up. S/p LHC Elevated rty sided pressures  Moderate pulmonary htn   Occluded lad diagonal    Patent lima to ald and svg to diagonal.  Non obc rca and lcx disease.  He stopped taking his lipitor 2 weeks ago since he was feeling fatigue, and since is feeling much better. Denies any CP at thsi time. He cut out drinking tea and watches his fluid intake.        Past Medical History:   Diagnosis Date    Abnormal nuclear stress test 08/13/2023    Benign hypertension     Class 1 obesity in adult 05/25/2023    Coronary artery disease 08/23/2023    s/p cabg X2    History of colon polyps     Hypergammaglobulinemia     Hypertensive kidney disease with chronic kidney disease stage III     Hypothyroidism, unspecified     Snoring 09/05/2024       Past Surgical History:   Procedure Laterality Date    ARTERIOGRAPHY OF SUBCLAVIAN ARTERY Left 8/14/2023    Procedure: ARTERIOGRAM, SUBCLAVIAN;  Surgeon: Vahid Juarez MD;  Location: [facility] CATH LAB;  Service: Cardiology;  Laterality: Left;    CATHETERIZATION OF BOTH LEFT AND RIGHT HEART N/A 10/1/2024    Procedure: CATHETERIZATION, HEART, BOTH LEFT AND  RIGHT;  Surgeon: Vahid Juarez MD;  Location: [facility] CATH LAB;  Service: Cardiology;  Laterality: N/A;  8-830 admit for IV Hydration    COLONOSCOPY N/A 12/6/2021    Procedure: COLONOSCOPY;  Surgeon: Doris Altman MD;  Location: [facility] ENDO;  Service: Endoscopy;  Laterality: N/A;    CORONARY ARTERY BYPASS GRAFT (CABG) N/A 8/23/2023    Procedure: CORONARY ARTERY BYPASS GRAFT (CABG);  Surgeon: Emma Arzola MD;  Location: [facility] OR;  Service: Cardiothoracic;  Laterality: N/A;  2-VESSEL    CORONARY BYPASS GRAFT ANGIOGRAPHY  10/1/2024    Procedure: Bypass graft study;  Surgeon: Vahid Juarez MD;  Location: [facility] CATH LAB;  Service: Cardiology;;    ECHOCARDIOGRAM,TRANSESOPHAGEAL N/A 8/23/2023    Procedure: ECHOCARDIOGRAM,TRANSESOPHAGEAL;  Surgeon: Emma Arzola MD;  Location: [facility] OR;  Service: Cardiothoracic;  Laterality: N/A;    ENDOSCOPIC HARVEST OF VEIN Left 8/23/2023    Procedure: SURGICAL PROCUREMENT, VEIN, ENDOSCOPIC;  Surgeon: Emma Arzola MD;  Location: [facility] OR;  Service: Cardiothoracic;  Laterality: Left;    INJECTION OF ANESTHETIC AGENT AROUND MULTIPLE INTERCOSTAL NERVES N/A 8/23/2023    Procedure: BLOCK, NERVE, INTERCOSTAL, 2 OR MORE;  Surgeon: Emma Arzola MD;  Location: [facility] OR;  Service: Cardiothoracic;  Laterality: N/A;    LEFT HEART CATHETERIZATION Left 8/14/2023    Procedure: Left heart cath;  Surgeon: Vahid Juarez MD;  Location: [facility] CATH LAB;  Service: Cardiology;  Laterality: Left;  pt instructed 930-10am start/    None         Social History     Tobacco Use    Smoking status: Never    Smokeless tobacco: Never   Substance Use Topics    Alcohol use: No     Comment: rarely    Drug use: No       Family History   Problem Relation Name Age of Onset    Heart disease Mother      Hypertension Mother      Heart disease Father      Hypertension Father         Current Outpatient Medications on File Prior to Visit   Medication Sig Dispense Refill    amiodarone (PACERONE) 200 MG Tab  TAKE 1 TABLET BY MOUTH EVERY DAY 90 tablet 0    aspirin (ECOTRIN) 81 MG EC tablet Take 81 mg by mouth once daily.      clopidogreL (PLAVIX) 75 mg tablet Take 1 tablet (75 mg total) by mouth once daily. 30 tablet 11    levothyroxine (SYNTHROID) 50 MCG tablet Take 1 tablet (50 mcg total) by mouth before breakfast. 90 tablet 3    losartan-hydrochlorothiazide 100-25 mg (HYZAAR) 100-25 mg per tablet Take 1 tablet by mouth once daily. 90 tablet 3    metoprolol tartrate (LOPRESSOR) 25 MG tablet TAKE 1 TABLET BY MOUTH TWICE A DAY (INS REQUIRES 90 DAY) 180 tablet 1    [DISCONTINUED] atorvastatin (LIPITOR) 40 MG tablet Take 1 tablet (40 mg total) by mouth every evening. (Patient not taking: Reported on 10/16/2024) 90 tablet 3     Current Facility-Administered Medications on File Prior to Visit   Medication Dose Route Frequency Provider Last Rate Last Admin    0.9%  NaCl infusion (for blood administration)   Intravenous Q24H PRN Emma Arzola MD          Wt Readings from Last 3 Encounters:   10/16/24 120.7 kg (265 lb 15.8 oz)   10/01/24 121.1 kg (266 lb 15.6 oz)   09/25/24 121.1 kg (266 lb 15.6 oz)     Temp Readings from Last 3 Encounters:   10/01/24 99 °F (37.2 °C) (Temporal)   01/20/24 (!) 101.3 °F (38.5 °C) (Oral)   08/28/23 98.2 °F (36.8 °C) (Oral)     BP Readings from Last 3 Encounters:   10/16/24 120/82   10/01/24 (!) 148/91   09/25/24 (!) 124/92     Pulse Readings from Last 3 Encounters:   10/16/24 68   10/01/24 63   09/25/24 62        Review of Systems   Constitutional: Positive for malaise/fatigue.   HENT: Negative.     Eyes: Negative.    Cardiovascular:  Positive for dyspnea on exertion.   Respiratory:  Positive for shortness of breath.    Skin: Negative.    Musculoskeletal: Negative.    Gastrointestinal: Negative.    Genitourinary: Negative.    Neurological: Negative.    Psychiatric/Behavioral: Negative.         Objective:   Physical Exam  Vitals and nursing note reviewed.   Constitutional:       Appearance:  Normal appearance.   HENT:      Head: Normocephalic and atraumatic.   Eyes:      General:         Right eye: No discharge.         Left eye: No discharge.      Pupils: Pupils are equal, round, and reactive to light.   Cardiovascular:      Rate and Rhythm: Normal rate and regular rhythm.      Heart sounds: S1 normal and S2 normal. No murmur heard.     No friction rub.   Pulmonary:      Effort: Pulmonary effort is normal. No respiratory distress.      Breath sounds: Normal breath sounds. No rales.   Abdominal:      Palpations: Abdomen is soft.      Tenderness: There is no abdominal tenderness.   Musculoskeletal:      Cervical back: Neck supple.      Right lower leg: No edema.      Left lower leg: No edema.   Skin:     General: Skin is warm and dry.   Neurological:      General: No focal deficit present.      Mental Status: He is alert and oriented to person, place, and time.   Psychiatric:         Mood and Affect: Mood normal.         Behavior: Behavior normal.         Thought Content: Thought content normal.         Lab Results   Component Value Date    CHOL 117 (L) 06/20/2024    CHOL 114 (L) 01/12/2024    CHOL 92 (L) 08/24/2023     Lab Results   Component Value Date    HDL 36 (L) 06/20/2024    HDL 35 (L) 01/12/2024    HDL 17 (L) 08/24/2023     Lab Results   Component Value Date    LDLCALC 66.6 06/20/2024    LDLCALC 67.6 01/12/2024    LDLCALC 62.4 (L) 08/24/2023     Lab Results   Component Value Date    TRIG 72 06/20/2024    TRIG 57 01/12/2024    TRIG 63 08/24/2023     Lab Results   Component Value Date    CHOLHDL 30.8 06/20/2024    CHOLHDL 30.7 01/12/2024    CHOLHDL 18.5 (L) 08/24/2023       Chemistry        Component Value Date/Time     09/25/2024 0957    K 4.6 09/25/2024 0957     09/25/2024 0957    CO2 23 09/25/2024 0957    BUN 27 (H) 09/25/2024 0957    CREATININE 2.0 (H) 09/25/2024 0957     09/25/2024 0957        Component Value Date/Time    CALCIUM 9.2 09/25/2024 0957    ALKPHOS 55 09/25/2024  0957    AST 21 09/25/2024 0957    ALT 20 09/25/2024 0957    BILITOT 0.5 09/25/2024 0957    ESTGFRAFRICA 48.2 (A) 06/15/2022 0749    EGFRNONAA 41.7 (A) 06/15/2022 0749          Lab Results   Component Value Date    TSH 6.201 (H) 09/05/2024     Lab Results   Component Value Date    INR 1.0 09/25/2024    INR 1.1 08/24/2023    INR 1.1 08/23/2023     @RESUFAST(WBC,HGB,HCT,MCV,PLT)  @LABRCNTIP(BNP,BNPTRIAGEBLO)@  CrCl cannot be calculated (Patient's most recent lab result is older than the maximum 7 days allowed.).     Results for orders placed during the hospital encounter of 08/04/23    Echo    Interpretation Summary    Left Ventricle: The left ventricle is normal in size. Normal wall thickness. Normal wall motion. There is normal systolic function with a visually estimated ejection fraction of 55 - 70%. There is normal diastolic function.    Right Ventricle: Normal right ventricular cavity size. Wall thickness is normal. Right ventricle wall motion  is normal. Systolic function is normal.    Tricuspid Valve: There is mild regurgitation.    IVC/SVC: Normal venous pressure at 3 mmHg.     Results for orders placed during the hospital encounter of 09/13/24    Nuclear Stress - Cardiology Interpreted    Interpretation Summary    Stress ECG: There is hypertensive blood pressure response with stress.    Abnormal myocardial perfusion scan.    There are two significant perfusion abnormalities.    Perfusion Abnormality #1 - There is a mild to moderate intensity, mostly fixed perfusion abnormality with some reversibilty in the  mid to apical lateral wall(s).    Perfusion Abnormality #2 - There is a severe intensity, fixed perfusion abnormality consistent with scar in the mid to distal inferior wall(s).    The gated perfusion images showed an ejection fraction of 68% at rest. The gated perfusion images showed an ejection fraction of 59% post stress. Normal ejection fraction is greater than 59%.    The ECG portion of the study is  negative for ischemia.     Assessment:      1. Mixed hyperlipidemia    2. Benign hypertension    3. Coronary artery disease of native artery of native heart with stable angina pectoris    4. S/P CABG (coronary artery bypass graft)          Plan:   Mixed hyperlipidemia  -     rosuvastatin (CRESTOR) 10 MG tablet; Take 1 tablet (10 mg total) by mouth once daily.  Dispense: 90 tablet; Refill: 3    Benign hypertension    Coronary artery disease of native artery of native heart with stable angina pectoris    S/P CABG (coronary artery bypass graft)        Will try crestor which he was on prior to CABG    ARB/HCTZ- HTN, cont monitoring kidney function, discussed need for good hydration  Asa, statin, plavix, BB- CAD  Statin- Hlp  Amio- post op Afib from CABG, not on AC  RF modifications  Low na low fat diet daily exercise as tolerated    RTC in March with lipids  Recheck BMP Courtney Guillot, FNP-C [facility], Cardiology

## 2024-11-04 ENCOUNTER — TELEPHONE (OUTPATIENT)
Dept: NEPHROLOGY | Facility: CLINIC | Age: 69
End: 2024-11-04
Payer: COMMERCIAL

## 2024-11-04 ENCOUNTER — LAB VISIT (OUTPATIENT)
Dept: LAB | Facility: HOSPITAL | Age: 69
End: 2024-11-04
Attending: INTERNAL MEDICINE
Payer: COMMERCIAL

## 2024-11-04 DIAGNOSIS — N28.9 RENAL INSUFFICIENCY: ICD-10-CM

## 2024-11-04 DIAGNOSIS — N28.9 RENAL INSUFFICIENCY: Primary | ICD-10-CM

## 2024-11-04 LAB
ALBUMIN SERPL BCP-MCNC: 3.4 G/DL (ref 3.5–5.2)
ANION GAP SERPL CALC-SCNC: 13 MMOL/L (ref 8–16)
BUN SERPL-MCNC: 18 MG/DL (ref 8–23)
CALCIUM SERPL-MCNC: 9.3 MG/DL (ref 8.7–10.5)
CHLORIDE SERPL-SCNC: 106 MMOL/L (ref 95–110)
CO2 SERPL-SCNC: 22 MMOL/L (ref 23–29)
CREAT SERPL-MCNC: 1.8 MG/DL (ref 0.5–1.4)
EST. GFR  (NO RACE VARIABLE): 41 ML/MIN/1.73 M^2
GLUCOSE SERPL-MCNC: 91 MG/DL (ref 70–110)
PHOSPHATE SERPL-MCNC: 2.8 MG/DL (ref 2.7–4.5)
POTASSIUM SERPL-SCNC: 4.3 MMOL/L (ref 3.5–5.1)
SODIUM SERPL-SCNC: 141 MMOL/L (ref 136–145)

## 2024-11-04 PROCEDURE — 80069 RENAL FUNCTION PANEL: CPT | Performed by: INTERNAL MEDICINE

## 2024-11-04 PROCEDURE — 36415 COLL VENOUS BLD VENIPUNCTURE: CPT | Performed by: INTERNAL MEDICINE

## 2024-11-05 ENCOUNTER — OFFICE VISIT (OUTPATIENT)
Dept: NEPHROLOGY | Facility: CLINIC | Age: 69
End: 2024-11-05
Payer: COMMERCIAL

## 2024-11-05 VITALS
WEIGHT: 272.06 LBS | BODY MASS INDEX: 34.91 KG/M2 | DIASTOLIC BLOOD PRESSURE: 78 MMHG | HEIGHT: 74 IN | HEART RATE: 70 BPM | SYSTOLIC BLOOD PRESSURE: 142 MMHG

## 2024-11-05 DIAGNOSIS — I10 PRIMARY HYPERTENSION: ICD-10-CM

## 2024-11-05 DIAGNOSIS — N18.32 STAGE 3B CHRONIC KIDNEY DISEASE: Primary | ICD-10-CM

## 2024-11-05 PROCEDURE — 3288F FALL RISK ASSESSMENT DOCD: CPT | Mod: CPTII,S$GLB,, | Performed by: INTERNAL MEDICINE

## 2024-11-05 PROCEDURE — 1160F RVW MEDS BY RX/DR IN RCRD: CPT | Mod: CPTII,S$GLB,, | Performed by: INTERNAL MEDICINE

## 2024-11-05 PROCEDURE — 3066F NEPHROPATHY DOC TX: CPT | Mod: CPTII,S$GLB,, | Performed by: INTERNAL MEDICINE

## 2024-11-05 PROCEDURE — 1126F AMNT PAIN NOTED NONE PRSNT: CPT | Mod: CPTII,S$GLB,, | Performed by: INTERNAL MEDICINE

## 2024-11-05 PROCEDURE — 3078F DIAST BP <80 MM HG: CPT | Mod: CPTII,S$GLB,, | Performed by: INTERNAL MEDICINE

## 2024-11-05 PROCEDURE — 99204 OFFICE O/P NEW MOD 45 MIN: CPT | Mod: S$GLB,,, | Performed by: INTERNAL MEDICINE

## 2024-11-05 PROCEDURE — 99999 PR PBB SHADOW E&M-EST. PATIENT-LVL III: CPT | Mod: PBBFAC,,, | Performed by: INTERNAL MEDICINE

## 2024-11-05 PROCEDURE — 1101F PT FALLS ASSESS-DOCD LE1/YR: CPT | Mod: CPTII,S$GLB,, | Performed by: INTERNAL MEDICINE

## 2024-11-05 PROCEDURE — 1159F MED LIST DOCD IN RCRD: CPT | Mod: CPTII,S$GLB,, | Performed by: INTERNAL MEDICINE

## 2024-11-05 PROCEDURE — 3044F HG A1C LEVEL LT 7.0%: CPT | Mod: CPTII,S$GLB,, | Performed by: INTERNAL MEDICINE

## 2024-11-05 PROCEDURE — 3077F SYST BP >= 140 MM HG: CPT | Mod: CPTII,S$GLB,, | Performed by: INTERNAL MEDICINE

## 2024-11-05 PROCEDURE — 3008F BODY MASS INDEX DOCD: CPT | Mod: CPTII,S$GLB,, | Performed by: INTERNAL MEDICINE

## 2024-11-05 NOTE — PROGRESS NOTES
Abdirahman Rodriguez is a 69 y.o. male      HPI:    He was noted to have mildly elevated creatinine on routine laboratory studies.  Nephrology has been consulted for evaluation.  In the clinic today he is accompanied by his wife.  He has no specific complaints or issues.  His laboratory studies and medications were reviewed.  All Nephrology related questions were answered to his satisfaction.  On review of his laboratory studies his creatinine has ranged between about 1.6 and 2.0 for the past several years.  Urinalyses are bland without evidence of proteinuria.  We discussed the importance of avoiding or limiting exposure to NSAIDs and [place] 2 inhibitors.    PAST MEDICAL HISTORY:  He  has a past medical history of Abnormal nuclear stress test (08/13/2023), Benign hypertension, Class 1 obesity in adult (05/25/2023), Coronary artery disease (08/23/2023), History of colon polyps, Hypergammaglobulinemia, Hypertensive kidney disease with chronic kidney disease stage III, Hypothyroidism, unspecified, and Snoring (09/05/2024).    PAST SURGICAL HISTORY:  He  has a past surgical history that includes None; Colonoscopy (N/A, 12/6/2021); Left heart catheterization (Left, 8/14/2023); Arteriography of subclavian artery (Left, 8/14/2023); Coronary Artery Bypass Graft (CABG) (N/A, 8/23/2023); Endoscopic harvest of vein (Left, 8/23/2023); echocardiogram,transesophageal (N/A, 8/23/2023); Injection of anesthetic agent around multiple intercostal nerves (N/A, 8/23/2023); Catheterization of both left and right heart (N/A, 10/1/2024); and Coronary bypass graft angiography (10/1/2024).    SOCIAL HISTORY:  He  reports that he has never smoked. He has never used smokeless tobacco. He reports that he does not drink alcohol and does not use drugs.      FAMILY MEDICAL HISTORY:  His family history includes Heart disease in his father and mother; Hypertension in his father and mother.    Review of patient's allergies indicates:  No Known  Allergies        Prior to Admission medications    Medication Sig Start Date End Date Taking? Authorizing Provider   amiodarone (PACERONE) 200 MG Tab TAKE 1 TABLET BY MOUTH EVERY DAY 9/9/24  Yes Vahid Juarez MD   aspirin (ECOTRIN) 81 MG EC tablet Take 81 mg by mouth once daily.   Yes Provider, Historical   clopidogreL (PLAVIX) 75 mg tablet Take 1 tablet (75 mg total) by mouth once daily. 9/12/24 9/12/25 Yes Vahid Juarez MD   levothyroxine (SYNTHROID) 50 MCG tablet Take 1 tablet (50 mcg total) by mouth before breakfast. 9/9/24  Yes Mt Noel MD   losartan-hydrochlorothiazide 100-25 mg (HYZAAR) 100-25 mg per tablet Take 1 tablet by mouth once daily. 1/10/24  Yes Mt Noel MD   metoprolol tartrate (LOPRESSOR) 25 MG tablet TAKE 1 TABLET BY MOUTH TWICE A DAY (INS REQUIRES 90 DAY) 6/4/24  Yes Daniel Corley MD   rosuvastatin (CRESTOR) 10 MG tablet Take 1 tablet (10 mg total) by mouth once daily. 10/16/24 10/16/25 Yes Scarlet Mcfadden NP      Sodium   Date Value Ref Range Status   11/04/2024 141 136 - 145 mmol/L Final   09/25/2024 138 136 - 145 mmol/L Final   06/20/2024 139 136 - 145 mmol/L Final     Potassium   Date Value Ref Range Status   11/04/2024 4.3 3.5 - 5.1 mmol/L Final   09/25/2024 4.6 3.5 - 5.1 mmol/L Final   06/20/2024 4.5 3.5 - 5.1 mmol/L Final     Chloride   Date Value Ref Range Status   11/04/2024 106 95 - 110 mmol/L Final   09/25/2024 107 95 - 110 mmol/L Final   06/20/2024 109 95 - 110 mmol/L Final     CO2   Date Value Ref Range Status   11/04/2024 22 (L) 23 - 29 mmol/L Final   09/25/2024 23 23 - 29 mmol/L Final   06/20/2024 22 (L) 23 - 29 mmol/L Final     Glucose   Date Value Ref Range Status   11/04/2024 91 70 - 110 mg/dL Final   09/25/2024 102 70 - 110 mg/dL Final   06/20/2024 102 70 - 110 mg/dL Final     BUN   Date Value Ref Range Status   11/04/2024 18 8 - 23 mg/dL Final   09/25/2024 27 (H) 8 - 23 mg/dL Final   06/20/2024 23 8 - 23 mg/dL Final     Creatinine   Date Value Ref  Range Status   11/04/2024 1.8 (H) 0.5 - 1.4 mg/dL Final   09/25/2024 2.0 (H) 0.5 - 1.4 mg/dL Final   06/20/2024 1.8 (H) 0.5 - 1.4 mg/dL Final     Calcium   Date Value Ref Range Status   11/04/2024 9.3 8.7 - 10.5 mg/dL Final   09/25/2024 9.2 8.7 - 10.5 mg/dL Final   06/20/2024 9.2 8.7 - 10.5 mg/dL Final     Total Protein   Date Value Ref Range Status   09/25/2024 8.8 (H) 6.0 - 8.4 g/dL Final   06/20/2024 8.3 6.0 - 8.4 g/dL Final   01/21/2024 8.5 (H) 6.0 - 8.4 g/dL Final     Albumin   Date Value Ref Range Status   11/04/2024 3.4 (L) 3.5 - 5.2 g/dL Final   09/25/2024 3.5 3.5 - 5.2 g/dL Final   06/20/2024 3.2 (L) 3.5 - 5.2 g/dL Final     Total Bilirubin   Date Value Ref Range Status   09/25/2024 0.5 0.1 - 1.0 mg/dL Final     Comment:     For infants and newborns, interpretation of results should be based  on gestational age, weight and in agreement with clinical  observations.    Premature Infant recommended reference ranges:  Up to 24 hours.............<8.0 mg/dL  Up to 48 hours............<12.0 mg/dL  3-5 days..................<15.0 mg/dL  6-29 days.................<15.0 mg/dL     06/20/2024 0.4 0.1 - 1.0 mg/dL Final     Comment:     For infants and newborns, interpretation of results should be based  on gestational age, weight and in agreement with clinical  observations.    Premature Infant recommended reference ranges:  Up to 24 hours.............<8.0 mg/dL  Up to 48 hours............<12.0 mg/dL  3-5 days..................<15.0 mg/dL  6-29 days.................<15.0 mg/dL     01/21/2024 0.4 0.1 - 1.0 mg/dL Final     Comment:     For infants and newborns, interpretation of results should be based  on gestational age, weight and in agreement with clinical  observations.    Premature Infant recommended reference ranges:  Up to 24 hours.............<8.0 mg/dL  Up to 48 hours............<12.0 mg/dL  3-5 days..................<15.0 mg/dL  6-29 days.................<15.0 mg/dL       Alkaline Phosphatase   Date Value Ref  Range Status   09/25/2024 55 55 - 135 U/L Final   06/20/2024 51 (L) 55 - 135 U/L Final   01/21/2024 54 (L) 55 - 135 U/L Final     AST   Date Value Ref Range Status   09/25/2024 21 10 - 40 U/L Final   06/20/2024 24 10 - 40 U/L Final   01/21/2024 25 10 - 40 U/L Final     ALT   Date Value Ref Range Status   09/25/2024 20 10 - 44 U/L Final   06/20/2024 26 10 - 44 U/L Final   01/21/2024 33 10 - 44 U/L Final     Anion Gap   Date Value Ref Range Status   11/04/2024 13 8 - 16 mmol/L Final   09/25/2024 8 8 - 16 mmol/L Final   06/20/2024 8 8 - 16 mmol/L Final     eGFR if    Date Value Ref Range Status   06/15/2022 48.2 (A) >60 mL/min/1.73 m^2 Final   03/14/2022 >60.0 >60 mL/min/1.73 m^2 Final   03/04/2022 42 (A) >60 mL/min/1.73 m^2 Final     eGFR if non    Date Value Ref Range Status   06/15/2022 41.7 (A) >60 mL/min/1.73 m^2 Final     Comment:     Calculation used to obtain the estimated glomerular filtration  rate (eGFR) is the CKD-EPI equation.      03/14/2022 52.7 (A) >60 mL/min/1.73 m^2 Final     Comment:     Calculation used to obtain the estimated glomerular filtration  rate (eGFR) is the CKD-EPI equation.      03/04/2022 36 (A) >60 mL/min/1.73 m^2 Final     Comment:     Calculation used to obtain the estimated glomerular filtration  rate (eGFR) is the CKD-EPI equation.        WBC, UA   Date Value Ref Range Status   06/10/2011 0-2 0 - 5 /hpf Final     Bacteria   Date Value Ref Range Status   06/10/2011 Occ None-Occ Final     Protein, Urine Random   Date Value Ref Range Status   11/04/2024 9 0 - 15 mg/dL Final     Creatinine, Urine   Date Value Ref Range Status   11/04/2024 120.7 23.0 - 375.0 mg/dL Final   03/03/2022 151.5 23.0 - 375.0 mg/dL Final     Prot/Creat Ratio, Urine   Date Value Ref Range Status   11/04/2024 0.07 0.00 - 0.20 Final       REVIEW OF SYSTEMS:  Patient has no fever, fatigue, visual changes, chest pain, edema, cough, dyspnea, nausea, vomiting, constipation, diarrhea,  "arthralgias, pruritis, dizziness, weakness, depression, confusion.        PHYSICAL EXAM:   height is 6' 2" (1.88 m) and weight is 123.4 kg (272 lb 0.8 oz). His blood pressure is 142/78 (abnormal) and his pulse is 70.   Gen: WDWN male in no apparent distress  Psych: Normal mood and affect  Skin: No rashes or ulcers  Eyes: Normal conjunctiva and lids, PERRLA  ENT: Normal hearing with no oropharyngeal lesions  Neck: No JVD  Chest: Clear with no rales, rhonchi, wheezing with normal effort  CV: Regular with no murmurs, gallops or rubs  Abd: Soft, nontender, no distension, positive bowel sounds  Ext: No cyanosis, clubbing or edema          IMPRESSION AND RECOMMENDATIONS:    1. CKD 3B:  Creatinine has been stable for the past several years ranging between 1.7 and 2.0.  This fluctuation is normal and hemodynamic in nature.  Urinalyses are bland without evidence of proteinuria.  He is on a RAAS inhibitor.    Loss of renal function is likely due to hypertension atherosclerosis.    2. Hypertension: Blood pressure was slightly higher than goal in the clinic today.  On review of other office notes by other providers his blood pressures are all within acceptable range.  Of note he is currently on losartan.  If we need better blood pressure control in the future we can change to a high potency ARB such as olmesartan or telmisartan.  Will continue to monitor.          "

## 2024-12-04 DIAGNOSIS — N18.30 CKD (CHRONIC KIDNEY DISEASE), STAGE III: ICD-10-CM

## 2024-12-22 DIAGNOSIS — Z95.1 S/P CABG X 2: ICD-10-CM

## 2024-12-23 RX ORDER — METOPROLOL TARTRATE 25 MG/1
TABLET, FILM COATED ORAL
Qty: 180 TABLET | Refills: 3 | Status: SHIPPED | OUTPATIENT
Start: 2024-12-23

## 2024-12-27 DIAGNOSIS — R94.31 ABNORMAL EKG: ICD-10-CM

## 2024-12-27 DIAGNOSIS — Z95.1 S/P CABG X 2: ICD-10-CM

## 2024-12-28 RX ORDER — LOSARTAN POTASSIUM AND HYDROCHLOROTHIAZIDE 25; 100 MG/1; MG/1
1 TABLET ORAL
Qty: 90 TABLET | Refills: 3 | Status: SHIPPED | OUTPATIENT
Start: 2024-12-28

## 2024-12-28 RX ORDER — AMIODARONE HYDROCHLORIDE 200 MG/1
200 TABLET ORAL
Qty: 90 TABLET | Refills: 3 | Status: SHIPPED | OUTPATIENT
Start: 2024-12-28

## 2024-12-30 RX ORDER — METOPROLOL TARTRATE 25 MG/1
25 TABLET, FILM COATED ORAL 2 TIMES DAILY
Qty: 180 TABLET | Refills: 3 | Status: SHIPPED | OUTPATIENT
Start: 2024-12-30

## 2025-01-03 ENCOUNTER — LAB VISIT (OUTPATIENT)
Dept: LAB | Facility: HOSPITAL | Age: 70
End: 2025-01-03
Attending: INTERNAL MEDICINE
Payer: COMMERCIAL

## 2025-01-03 DIAGNOSIS — D89.2 HYPERGAMMAGLOBULINEMIA: ICD-10-CM

## 2025-01-03 DIAGNOSIS — E78.2 MIXED HYPERLIPIDEMIA: ICD-10-CM

## 2025-01-03 DIAGNOSIS — I10 BENIGN HYPERTENSION: ICD-10-CM

## 2025-01-03 DIAGNOSIS — R73.03 PREDIABETES: ICD-10-CM

## 2025-01-03 DIAGNOSIS — Z12.5 PROSTATE CANCER SCREENING: ICD-10-CM

## 2025-01-03 LAB
ALBUMIN SERPL BCP-MCNC: 3.4 G/DL (ref 3.5–5.2)
ALP SERPL-CCNC: 57 U/L (ref 40–150)
ALT SERPL W/O P-5'-P-CCNC: 28 U/L (ref 10–44)
ANION GAP SERPL CALC-SCNC: 12 MMOL/L (ref 8–16)
AST SERPL-CCNC: 26 U/L (ref 10–40)
BASOPHILS # BLD AUTO: 0.01 K/UL (ref 0–0.2)
BASOPHILS NFR BLD: 0.3 % (ref 0–1.9)
BILIRUB SERPL-MCNC: 0.4 MG/DL (ref 0.1–1)
BUN SERPL-MCNC: 28 MG/DL (ref 8–23)
CALCIUM SERPL-MCNC: 9.1 MG/DL (ref 8.7–10.5)
CHLORIDE SERPL-SCNC: 107 MMOL/L (ref 95–110)
CHOLEST SERPL-MCNC: 117 MG/DL (ref 120–199)
CHOLEST/HDLC SERPL: 3.5 {RATIO} (ref 2–5)
CO2 SERPL-SCNC: 20 MMOL/L (ref 23–29)
COMPLEXED PSA SERPL-MCNC: 0.48 NG/ML (ref 0–4)
CREAT SERPL-MCNC: 1.9 MG/DL (ref 0.5–1.4)
DIFFERENTIAL METHOD BLD: ABNORMAL
EOSINOPHIL # BLD AUTO: 0.3 K/UL (ref 0–0.5)
EOSINOPHIL NFR BLD: 7.6 % (ref 0–8)
ERYTHROCYTE [DISTWIDTH] IN BLOOD BY AUTOMATED COUNT: 14.4 % (ref 11.5–14.5)
EST. GFR  (NO RACE VARIABLE): 38.2 ML/MIN/1.73 M^2
ESTIMATED AVG GLUCOSE: 120 MG/DL (ref 68–131)
GLUCOSE SERPL-MCNC: 88 MG/DL (ref 70–110)
HBA1C MFR BLD: 5.8 % (ref 4–5.6)
HCT VFR BLD AUTO: 47.3 % (ref 40–54)
HDLC SERPL-MCNC: 33 MG/DL (ref 40–75)
HDLC SERPL: 28.2 % (ref 20–50)
HGB BLD-MCNC: 14.7 G/DL (ref 14–18)
IMM GRANULOCYTES # BLD AUTO: 0.02 K/UL (ref 0–0.04)
IMM GRANULOCYTES NFR BLD AUTO: 0.5 % (ref 0–0.5)
LDLC SERPL CALC-MCNC: 70.4 MG/DL (ref 63–159)
LYMPHOCYTES # BLD AUTO: 0.9 K/UL (ref 1–4.8)
LYMPHOCYTES NFR BLD: 23.4 % (ref 18–48)
MCH RBC QN AUTO: 31.1 PG (ref 27–31)
MCHC RBC AUTO-ENTMCNC: 31.1 G/DL (ref 32–36)
MCV RBC AUTO: 100 FL (ref 82–98)
MONOCYTES # BLD AUTO: 0.4 K/UL (ref 0.3–1)
MONOCYTES NFR BLD: 11.5 % (ref 4–15)
NEUTROPHILS # BLD AUTO: 2.2 K/UL (ref 1.8–7.7)
NEUTROPHILS NFR BLD: 56.7 % (ref 38–73)
NONHDLC SERPL-MCNC: 84 MG/DL
NRBC BLD-RTO: 0 /100 WBC
PLATELET # BLD AUTO: 164 K/UL (ref 150–450)
PMV BLD AUTO: 11.5 FL (ref 9.2–12.9)
POTASSIUM SERPL-SCNC: 4.4 MMOL/L (ref 3.5–5.1)
PROT SERPL-MCNC: 9.4 G/DL (ref 6–8.4)
RBC # BLD AUTO: 4.72 M/UL (ref 4.6–6.2)
SODIUM SERPL-SCNC: 139 MMOL/L (ref 136–145)
TRIGL SERPL-MCNC: 68 MG/DL (ref 30–150)
TSH SERPL DL<=0.005 MIU/L-ACNC: 8.29 UIU/ML (ref 0.4–4)
WBC # BLD AUTO: 3.81 K/UL (ref 3.9–12.7)

## 2025-01-03 PROCEDURE — 80053 COMPREHEN METABOLIC PANEL: CPT | Performed by: INTERNAL MEDICINE

## 2025-01-03 PROCEDURE — 36415 COLL VENOUS BLD VENIPUNCTURE: CPT | Mod: PO | Performed by: INTERNAL MEDICINE

## 2025-01-03 PROCEDURE — 84439 ASSAY OF FREE THYROXINE: CPT | Performed by: INTERNAL MEDICINE

## 2025-01-03 PROCEDURE — 83036 HEMOGLOBIN GLYCOSYLATED A1C: CPT | Performed by: INTERNAL MEDICINE

## 2025-01-03 PROCEDURE — 84443 ASSAY THYROID STIM HORMONE: CPT | Performed by: INTERNAL MEDICINE

## 2025-01-03 PROCEDURE — 80061 LIPID PANEL: CPT | Performed by: INTERNAL MEDICINE

## 2025-01-03 PROCEDURE — 84153 ASSAY OF PSA TOTAL: CPT | Performed by: INTERNAL MEDICINE

## 2025-01-03 PROCEDURE — 85025 COMPLETE CBC W/AUTO DIFF WBC: CPT | Performed by: INTERNAL MEDICINE

## 2025-01-04 LAB — T4 FREE SERPL-MCNC: 1 NG/DL (ref 0.71–1.51)

## 2025-01-10 ENCOUNTER — OFFICE VISIT (OUTPATIENT)
Dept: INTERNAL MEDICINE | Facility: CLINIC | Age: 70
End: 2025-01-10
Payer: COMMERCIAL

## 2025-01-10 VITALS
SYSTOLIC BLOOD PRESSURE: 144 MMHG | TEMPERATURE: 97 F | DIASTOLIC BLOOD PRESSURE: 90 MMHG | HEIGHT: 74 IN | BODY MASS INDEX: 34.88 KG/M2 | OXYGEN SATURATION: 96 % | HEART RATE: 66 BPM | WEIGHT: 271.81 LBS

## 2025-01-10 DIAGNOSIS — R73.03 PREDIABETES: ICD-10-CM

## 2025-01-10 DIAGNOSIS — N18.30 HYPERTENSIVE KIDNEY DISEASE WITH STAGE 3 CHRONIC KIDNEY DISEASE, UNSPECIFIED WHETHER STAGE 3A OR 3B CKD: ICD-10-CM

## 2025-01-10 DIAGNOSIS — E02 SUBCLINICAL IODINE-DEFICIENCY HYPOTHYROIDISM: ICD-10-CM

## 2025-01-10 DIAGNOSIS — Z00.00 ROUTINE GENERAL MEDICAL EXAMINATION AT A HEALTH CARE FACILITY: Primary | ICD-10-CM

## 2025-01-10 DIAGNOSIS — I10 BENIGN HYPERTENSION: ICD-10-CM

## 2025-01-10 DIAGNOSIS — Z86.0100 HISTORY OF COLON POLYPS: ICD-10-CM

## 2025-01-10 DIAGNOSIS — I25.118 CORONARY ARTERY DISEASE OF NATIVE ARTERY OF NATIVE HEART WITH STABLE ANGINA PECTORIS: ICD-10-CM

## 2025-01-10 DIAGNOSIS — N18.31 STAGE 3A CHRONIC KIDNEY DISEASE: ICD-10-CM

## 2025-01-10 DIAGNOSIS — J98.4 RESTRICTIVE LUNG DISEASE: ICD-10-CM

## 2025-01-10 DIAGNOSIS — E78.2 MIXED HYPERLIPIDEMIA: ICD-10-CM

## 2025-01-10 DIAGNOSIS — I12.9 HYPERTENSIVE KIDNEY DISEASE WITH STAGE 3 CHRONIC KIDNEY DISEASE, UNSPECIFIED WHETHER STAGE 3A OR 3B CKD: ICD-10-CM

## 2025-01-10 PROCEDURE — 99999 PR PBB SHADOW E&M-EST. PATIENT-LVL IV: CPT | Mod: PBBFAC,,, | Performed by: INTERNAL MEDICINE

## 2025-01-10 RX ORDER — LEVOTHYROXINE SODIUM 75 UG/1
75 TABLET ORAL
Qty: 90 TABLET | Refills: 3 | Status: SHIPPED | OUTPATIENT
Start: 2025-01-10 | End: 2026-01-10

## 2025-01-10 RX ORDER — FLUTICASONE PROPIONATE 50 MCG
1 SPRAY, SUSPENSION (ML) NASAL DAILY
Qty: 16 G | Refills: 11 | Status: SHIPPED | OUTPATIENT
Start: 2025-01-10

## 2025-01-10 NOTE — PROGRESS NOTES
HPI:  Patient is a 69-year-old gentleman who comes in today for follow-up of his hypertension, hypothyroidism, coronary artery disease, and for his annual physical exam.  He has been doing well.  He has no major problems or complaints.  His blood pressures been controlled at home.      Current MEDS: medcard review, verified and update  Allergies: Per the electronic medical record    Past Medical History:   Diagnosis Date    Benign hypertension     Class 1 obesity in adult 05/25/2023    Coronary artery disease     History of colon polyps     Hypergammaglobulinemia     Hypertensive kidney disease with chronic kidney disease stage III     Hypothyroidism, unspecified     Snoring 09/05/2024       Past Surgical History:   Procedure Laterality Date    ARTERIOGRAPHY OF SUBCLAVIAN ARTERY Left 8/14/2023    Procedure: ARTERIOGRAM, SUBCLAVIAN;  Surgeon: Vahid Juarez MD;  Location: [facility] CATH LAB;  Service: Cardiology;  Laterality: Left;    CATHETERIZATION OF BOTH LEFT AND RIGHT HEART N/A 10/1/2024    Procedure: CATHETERIZATION, HEART, BOTH LEFT AND RIGHT;  Surgeon: Vahid Juarez MD;  Location: [facility] CATH LAB;  Service: Cardiology;  Laterality: N/A;  8-830 admit for IV Hydration    COLONOSCOPY N/A 12/6/2021    Procedure: COLONOSCOPY;  Surgeon: Doris Altman MD;  Location: [facility] ENDO;  Service: Endoscopy;  Laterality: N/A;    CORONARY ARTERY BYPASS GRAFT (CABG) N/A 8/23/2023    Procedure: CORONARY ARTERY BYPASS GRAFT (CABG);  Surgeon: Emma Arzola MD;  Location: [facility] OR;  Service: Cardiothoracic;  Laterality: N/A;  2-VESSEL    CORONARY BYPASS GRAFT ANGIOGRAPHY  10/1/2024    Procedure: Bypass graft study;  Surgeon: Vahid Juarez MD;  Location: [facility] CATH LAB;  Service: Cardiology;;    ECHOCARDIOGRAM,TRANSESOPHAGEAL N/A 8/23/2023    Procedure: ECHOCARDIOGRAM,TRANSESOPHAGEAL;  Surgeon: Emma Arzola MD;  Location: [facility] OR;  Service: Cardiothoracic;  Laterality: N/A;    ENDOSCOPIC HARVEST OF VEIN Left 8/23/2023  "   Procedure: SURGICAL PROCUREMENT, VEIN, ENDOSCOPIC;  Surgeon: Emma Arzola MD;  Location: [facility] OR;  Service: Cardiothoracic;  Laterality: Left;    INJECTION OF ANESTHETIC AGENT AROUND MULTIPLE INTERCOSTAL NERVES N/A 8/23/2023    Procedure: BLOCK, NERVE, INTERCOSTAL, 2 OR MORE;  Surgeon: Emma Arzola MD;  Location: [facility] OR;  Service: Cardiothoracic;  Laterality: N/A;    LEFT HEART CATHETERIZATION Left 8/14/2023    Procedure: Left heart cath;  Surgeon: Vahid Juarez MD;  Location: [facility] CATH LAB;  Service: Cardiology;  Laterality: Left;  pt instructed 930-10am start/    None         SHx: per the electronic medical record    FHx: recorded in the electronic medical record    ROS:    denies any chest pains or shortness of breath. Denies any nausea, vomiting or diarrhea. Denies any fever, chills or sweats. Denies any change in weight, voice, stool, skin or hair. Denies any dysuria, dyspepsia or dysphagia. Denies any change in vision, hearing or headaches. Denies any swollen lymph nodes or loss of memory.    PE:  BP (!) 144/90 (BP Location: Left arm, Patient Position: Sitting)   Pulse 66   Temp 97.4 °F (36.3 °C) (Tympanic)   Ht 6' 2" (1.88 m)   Wt 123.3 kg (271 lb 13.2 oz)   SpO2 96%   BMI 34.90 kg/m²   Gen: Well-developed, well-nourished, male, in no acute distress, oriented x3  HEENT: neck is supple, no adenopathy, carotids 2+ equal without bruits, thyroid exam normal size without nodules.  CHEST: clear to auscultation and percussion  CVS: regular rate and rhythm without significant murmur, gallop, or rubs  ABD: soft, benign, no rebound no guarding, no distention.  Bowel sounds are normal.     nontender.  No palpable masses.  No organomegaly and no audible bruits.  RECTAL:  Deferred.  EXT: no clubbing, cyanosis, or edema  LYMPH: no cervical, inguinal, or axillary adenopathy  FEET: no loss of sensation.  No ulcers or pressure sores.  NEURO: gait normal.  Cranial nerves II- XII intact. No nystagmus.  " Speech normal.   Gross motor and sensory unremarkable.    Lab Results   Component Value Date    WBC 3.81 (L) 01/03/2025    HGB 14.7 01/03/2025    HCT 47.3 01/03/2025     01/03/2025    CHOL 117 (L) 01/03/2025    TRIG 68 01/03/2025    HDL 33 (L) 01/03/2025    ALT 28 01/03/2025    AST 26 01/03/2025     01/03/2025    K 4.4 01/03/2025     01/03/2025    CREATININE 1.9 (H) 01/03/2025    BUN 28 (H) 01/03/2025    CO2 20 (L) 01/03/2025    TSH 8.295 (H) 01/03/2025    PSA 0.48 01/03/2025    INR 1.0 09/25/2024    HGBA1C 5.8 (H) 01/03/2025     (H) 01/21/2024    SEDRATE 18 01/04/2023    CRP 11.2 (H) 01/04/2023       Impression:  Hypothyroidism, not adequately dosed  Hypertension, lipids, prediabetes all well controlled  Chronic kidney disease, stable GFR  Patient Active Problem List   Diagnosis    Benign hypertension    CKD (chronic kidney disease), stage III    Hypertensive kidney disease with chronic kidney disease stage III    History of colon polyps    Hypergammaglobulinemia    Mixed hyperlipidemia    Prediabetes    Restrictive lung disease    Diffusion capacity of lung (dl), decreased    Hypothyroidism, unspecified    Coronary artery disease    Snoring       Plan:   Orders Placed This Encounter    Hemoglobin A1C    Comprehensive Metabolic Panel    Lipid Panel    TSH    levothyroxine (SYNTHROID) 75 MCG tablet    fluticasone propionate (FLONASE) 50 mcg/actuation nasal spray   Patient will increase his levothyroxine to 75 mcg today.  Patient will be seen again in 6 months with the above lab work.  His other medications remain the same    This note is generated with speech recognition software and is subject to transcription error and sound alike phrases that may be missed by proofreading.

## 2025-03-05 ENCOUNTER — LAB VISIT (OUTPATIENT)
Dept: LAB | Facility: HOSPITAL | Age: 70
End: 2025-03-05
Attending: INTERNAL MEDICINE
Payer: COMMERCIAL

## 2025-03-05 DIAGNOSIS — I25.118 CORONARY ARTERY DISEASE OF NATIVE ARTERY OF NATIVE HEART WITH STABLE ANGINA PECTORIS: ICD-10-CM

## 2025-03-05 DIAGNOSIS — R06.83 SNORING: ICD-10-CM

## 2025-03-05 DIAGNOSIS — R06.02 SHORTNESS OF BREATH: ICD-10-CM

## 2025-03-05 LAB
ALBUMIN SERPL BCP-MCNC: 3.3 G/DL (ref 3.5–5.2)
ALP SERPL-CCNC: 49 U/L (ref 40–150)
ALT SERPL W/O P-5'-P-CCNC: 31 U/L (ref 10–44)
ANION GAP SERPL CALC-SCNC: 9 MMOL/L (ref 8–16)
AST SERPL-CCNC: 30 U/L (ref 10–40)
BILIRUB SERPL-MCNC: 0.4 MG/DL (ref 0.1–1)
BUN SERPL-MCNC: 30 MG/DL (ref 8–23)
CALCIUM SERPL-MCNC: 9.2 MG/DL (ref 8.7–10.5)
CHLORIDE SERPL-SCNC: 108 MMOL/L (ref 95–110)
CHOLEST SERPL-MCNC: 122 MG/DL (ref 120–199)
CHOLEST/HDLC SERPL: 3.7 {RATIO} (ref 2–5)
CO2 SERPL-SCNC: 23 MMOL/L (ref 23–29)
CREAT SERPL-MCNC: 1.7 MG/DL (ref 0.5–1.4)
EST. GFR  (NO RACE VARIABLE): 43.6 ML/MIN/1.73 M^2
ESTIMATED AVG GLUCOSE: 128 MG/DL (ref 68–131)
GLUCOSE SERPL-MCNC: 102 MG/DL (ref 70–110)
HBA1C MFR BLD: 6.1 % (ref 4–5.6)
HDLC SERPL-MCNC: 33 MG/DL (ref 40–75)
HDLC SERPL: 27 % (ref 20–50)
LDLC SERPL CALC-MCNC: 75.2 MG/DL (ref 63–159)
NONHDLC SERPL-MCNC: 89 MG/DL
POTASSIUM SERPL-SCNC: 4.1 MMOL/L (ref 3.5–5.1)
PROT SERPL-MCNC: 8.7 G/DL (ref 6–8.4)
SODIUM SERPL-SCNC: 140 MMOL/L (ref 136–145)
TRIGL SERPL-MCNC: 69 MG/DL (ref 30–150)

## 2025-03-05 PROCEDURE — 80061 LIPID PANEL: CPT | Performed by: INTERNAL MEDICINE

## 2025-03-05 PROCEDURE — 83036 HEMOGLOBIN GLYCOSYLATED A1C: CPT | Performed by: INTERNAL MEDICINE

## 2025-03-05 PROCEDURE — 36415 COLL VENOUS BLD VENIPUNCTURE: CPT | Performed by: INTERNAL MEDICINE

## 2025-03-05 PROCEDURE — 80053 COMPREHEN METABOLIC PANEL: CPT | Performed by: INTERNAL MEDICINE

## 2025-03-17 ENCOUNTER — OFFICE VISIT (OUTPATIENT)
Dept: CARDIOLOGY | Facility: CLINIC | Age: 70
End: 2025-03-17
Payer: COMMERCIAL

## 2025-03-17 VITALS
HEART RATE: 72 BPM | SYSTOLIC BLOOD PRESSURE: 132 MMHG | WEIGHT: 269.5 LBS | BODY MASS INDEX: 34.59 KG/M2 | HEIGHT: 74 IN | OXYGEN SATURATION: 99 % | DIASTOLIC BLOOD PRESSURE: 82 MMHG

## 2025-03-17 DIAGNOSIS — R73.03 PREDIABETES: ICD-10-CM

## 2025-03-17 DIAGNOSIS — R06.83 SNORING: ICD-10-CM

## 2025-03-17 DIAGNOSIS — I25.118 CORONARY ARTERY DISEASE OF NATIVE ARTERY OF NATIVE HEART WITH STABLE ANGINA PECTORIS: ICD-10-CM

## 2025-03-17 DIAGNOSIS — I10 BENIGN HYPERTENSION: Primary | ICD-10-CM

## 2025-03-17 DIAGNOSIS — E66.09 CLASS 1 OBESITY DUE TO EXCESS CALORIES WITH SERIOUS COMORBIDITY AND BODY MASS INDEX (BMI) OF 34.0 TO 34.9 IN ADULT: ICD-10-CM

## 2025-03-17 DIAGNOSIS — N18.31 STAGE 3A CHRONIC KIDNEY DISEASE: ICD-10-CM

## 2025-03-17 DIAGNOSIS — I27.20 PULMONARY HTN: ICD-10-CM

## 2025-03-17 DIAGNOSIS — E66.811 CLASS 1 OBESITY DUE TO EXCESS CALORIES WITH SERIOUS COMORBIDITY AND BODY MASS INDEX (BMI) OF 34.0 TO 34.9 IN ADULT: ICD-10-CM

## 2025-03-17 DIAGNOSIS — R94.2 DIFFUSION CAPACITY OF LUNG (DL), DECREASED: ICD-10-CM

## 2025-03-17 DIAGNOSIS — E78.2 MIXED HYPERLIPIDEMIA: ICD-10-CM

## 2025-03-17 DIAGNOSIS — Z95.1 S/P CABG (CORONARY ARTERY BYPASS GRAFT): ICD-10-CM

## 2025-03-17 DIAGNOSIS — G47.33 OBSTRUCTIVE SLEEP APNEA SYNDROME: ICD-10-CM

## 2025-03-17 PROCEDURE — 1159F MED LIST DOCD IN RCRD: CPT | Mod: CPTII,S$GLB,, | Performed by: INTERNAL MEDICINE

## 2025-03-17 PROCEDURE — 3079F DIAST BP 80-89 MM HG: CPT | Mod: CPTII,S$GLB,, | Performed by: INTERNAL MEDICINE

## 2025-03-17 PROCEDURE — 3044F HG A1C LEVEL LT 7.0%: CPT | Mod: CPTII,S$GLB,, | Performed by: INTERNAL MEDICINE

## 2025-03-17 PROCEDURE — 1101F PT FALLS ASSESS-DOCD LE1/YR: CPT | Mod: CPTII,S$GLB,, | Performed by: INTERNAL MEDICINE

## 2025-03-17 PROCEDURE — 99999 PR PBB SHADOW E&M-EST. PATIENT-LVL III: CPT | Mod: PBBFAC,,, | Performed by: INTERNAL MEDICINE

## 2025-03-17 PROCEDURE — 3288F FALL RISK ASSESSMENT DOCD: CPT | Mod: CPTII,S$GLB,, | Performed by: INTERNAL MEDICINE

## 2025-03-17 PROCEDURE — 3075F SYST BP GE 130 - 139MM HG: CPT | Mod: CPTII,S$GLB,, | Performed by: INTERNAL MEDICINE

## 2025-03-17 PROCEDURE — 3008F BODY MASS INDEX DOCD: CPT | Mod: CPTII,S$GLB,, | Performed by: INTERNAL MEDICINE

## 2025-03-17 PROCEDURE — 99215 OFFICE O/P EST HI 40 MIN: CPT | Mod: S$GLB,,, | Performed by: INTERNAL MEDICINE

## 2025-03-17 NOTE — PROGRESS NOTES
Subjective:   Patient ID:  Abdirahman Rodriguez is a 69 y.o. male who presents for follow up of No chief complaint on file.      HPI  5/25/2023  A 66 yo maLE  WHO HAD COVID AND HAD RESIDUAL LUNG EFFECT WAS SEEN BY PULMONARY AT THAT TIME. HE IS KNOWN TO HAVE HTN HE SNORES AT NITE . HE HAS NOT HAD SLEEP EVAL. 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 HE HAS TO REST FOR 5 MINUTES. HAD ONE EPISODE OF CHEST PAIN CHEST PAIN  WITH PHYSICAL ACTIVITY SHORT LIVED. NO LEG SWELLING. HAS NO PALPITATION. HE HAS NO NOCTURNAL CHEST PAIN.  LAST  EVAL FOR COVID F/U WAS IN 6/2022 9/19/2023 ANGIE HARDY  Mr. Rodriguez is a 67 year old male patient whose current medical conditions include HTN, familial history of CAD, prior abnormal stress test, and CAD s/p LHC that showed multivessel CAD s/p CABG x 2 on 8/14/23. He returns today and states he is feeling well overall. Admits to incisional soreness/tightness from the skin pulling but denies any william chest pain or heaviness. No SOB/FENG. No lightheadedness, dizziness, palpitations, near syncope, or syncope. No s/s suggestive of CHF. BP stable and controlled. Working with PT/OT at home. He is compliant with his medications, not on Plavix or statin.      12/14/2023   Here for f/u has been back to work does not have any complaints of  chest pain however has a cold with cough and pain in chets incisional when he coughs. Denies any other symptoms cardiac wise. He is compliant with meds and diet he is walking for work no regular exercise.      9/5/2024  Has dizziness when he takes synthroid unusual response. Has shortness of breath walking some distance he feels tired. No chest pain . He is not compliant with diet special salt intake.   He has fatigue he snores not had a sleep study.     67y/o AA M whose current medical conditions include CAD s/p CABG x2 Aug 23', CKD, HTN, restrictive lung disease, pre DM, Hlp followed by  Dr. Juarez in cardiology clinic, recently seen c/o dizziness and worsening SOB. States he has to take brakes when he is outside working. He is a . Denies any CP at this time but has discomfort and trouble breathing with activity. He underwent nuc stress test which came back abnormal, perfusion abnormality in mid to apical lateral walls. BP elevated in clinic today diastolic elevated on last visit as well.      EKG today NSR nonspecific T wave abnmlty  Echo 8/23' EF nml  FH early heart disease  Tobacco none  Exercise limited with SOB     10/16/24  Here today for CV follow up. S/p LHC Elevated rty sided pressures  Moderate pulmonary htn   Occluded lad diagonal    Patent lima to ald and svg to diagonal.  Non obc rca and lcx disease.  He stopped taking his lipitor 2 weeks ago since he was feeling fatigue, and since is feeling much better. Denies any CP at thsi time. He cut out drinking tea and watches his fluid intake.    3/17/2025  Back on statins no further issues clinically he is compliant with meds   Sleep study showed severe sleep apnea. Not seen sleep team.   He has no new complaints he cl;aims compliance now with diet indulged a lot during holidays.   Past Medical History:   Diagnosis Date    Benign hypertension     Class 1 obesity in adult 05/25/2023    Coronary artery disease     History of colon polyps     Hypergammaglobulinemia     Hypertensive kidney disease with chronic kidney disease stage III     Hypothyroidism, unspecified     Snoring 09/05/2024       Past Surgical History:   Procedure Laterality Date    ARTERIOGRAPHY OF SUBCLAVIAN ARTERY Left 8/14/2023    Procedure: ARTERIOGRAM, SUBCLAVIAN;  Surgeon: Vahid Juarez MD;  Location: [facility] CATH LAB;  Service: Cardiology;  Laterality: Left;    CATHETERIZATION OF BOTH LEFT AND RIGHT HEART N/A 10/1/2024    Procedure: CATHETERIZATION, HEART, BOTH LEFT AND RIGHT;  Surgeon: Vahid Juarez MD;  Location: [facility] CATH LAB;  Service: Cardiology;   Laterality: N/A;  8-830 admit for IV Hydration    COLONOSCOPY N/A 12/6/2021    Procedure: COLONOSCOPY;  Surgeon: Doris Altman MD;  Location: [facility];  Service: Endoscopy;  Laterality: N/A;    CORONARY ARTERY BYPASS GRAFT (CABG) N/A 8/23/2023    Procedure: CORONARY ARTERY BYPASS GRAFT (CABG);  Surgeon: Emma Arzola MD;  Location: [facility] OR;  Service: Cardiothoracic;  Laterality: N/A;  2-VESSEL    CORONARY BYPASS GRAFT ANGIOGRAPHY  10/1/2024    Procedure: Bypass graft study;  Surgeon: Vahid Juarez MD;  Location: [facility] CATH [facility];  Service: Cardiology;;    ECHOCARDIOGRAM,TRANSESOPHAGEAL N/A 8/23/2023    Procedure: ECHOCARDIOGRAM,TRANSESOPHAGEAL;  Surgeon: Emma Arzola MD;  Location: [facility];  Service: Cardiothoracic;  Laterality: N/A;    ENDOSCOPIC HARVEST OF VEIN Left 8/23/2023    Procedure: SURGICAL PROCUREMENT, VEIN, ENDOSCOPIC;  Surgeon: Emma Arzola MD;  Location: [facility];  Service: Cardiothoracic;  Laterality: Left;    INJECTION OF ANESTHETIC AGENT AROUND MULTIPLE INTERCOSTAL NERVES N/A 8/23/2023    Procedure: BLOCK, NERVE, INTERCOSTAL, 2 OR MORE;  Surgeon: Emma Arzola MD;  Location: [facility];  Service: Cardiothoracic;  Laterality: N/A;    LEFT HEART CATHETERIZATION Left 8/14/2023    Procedure: Left heart cath;  Surgeon: Vahid Juarez MD;  Location: [facility];  Service: Cardiology;  Laterality: Left;  pt instructed 930-10am start/    None         Social History[1]    Family History   Problem Relation Name Age of Onset    Heart disease Mother      Hypertension Mother      Heart disease Father      Hypertension Father         Current Outpatient Medications   Medication Sig    amiodarone (PACERONE) 200 MG Tab TAKE 1 TABLET BY MOUTH EVERY DAY    aspirin (ECOTRIN) 81 MG EC tablet Take 81 mg by mouth once daily.    clopidogreL (PLAVIX) 75 mg tablet Take 1 tablet (75 mg total) by mouth once daily.    fluticasone propionate (FLONASE) 50 mcg/actuation nasal spray 1 spray (50 mcg  total) by Each Nostril route once daily.    levothyroxine (SYNTHROID) 75 MCG tablet Take 1 tablet (75 mcg total) by mouth before breakfast.    losartan-hydrochlorothiazide 100-25 mg (HYZAAR) 100-25 mg per tablet TAKE 1 TABLET BY MOUTH EVERY DAY    metoprolol tartrate (LOPRESSOR) 25 MG tablet Take 1 tablet (25 mg total) by mouth 2 (two) times daily.    rosuvastatin (CRESTOR) 10 MG tablet Take 1 tablet (10 mg total) by mouth once daily.     Current Facility-Administered Medications   Medication    0.9%  NaCl infusion (for blood administration)     Current Outpatient Medications on File Prior to Visit   Medication Sig    amiodarone (PACERONE) 200 MG Tab TAKE 1 TABLET BY MOUTH EVERY DAY    aspirin (ECOTRIN) 81 MG EC tablet Take 81 mg by mouth once daily.    clopidogreL (PLAVIX) 75 mg tablet Take 1 tablet (75 mg total) by mouth once daily.    fluticasone propionate (FLONASE) 50 mcg/actuation nasal spray 1 spray (50 mcg total) by Each Nostril route once daily.    levothyroxine (SYNTHROID) 75 MCG tablet Take 1 tablet (75 mcg total) by mouth before breakfast.    losartan-hydrochlorothiazide 100-25 mg (HYZAAR) 100-25 mg per tablet TAKE 1 TABLET BY MOUTH EVERY DAY    metoprolol tartrate (LOPRESSOR) 25 MG tablet Take 1 tablet (25 mg total) by mouth 2 (two) times daily.    rosuvastatin (CRESTOR) 10 MG tablet Take 1 tablet (10 mg total) by mouth once daily.     Current Facility-Administered Medications on File Prior to Visit   Medication    0.9%  NaCl infusion (for blood administration)       Review of Systems   Constitutional: Negative for malaise/fatigue.   Eyes:  Negative for blurred vision.   Cardiovascular:  Negative for chest pain, claudication, cyanosis, dyspnea on exertion, irregular heartbeat, leg swelling, near-syncope, orthopnea, palpitations and paroxysmal nocturnal dyspnea.   Respiratory:  Negative for cough, hemoptysis and shortness of breath.    Hematologic/Lymphatic: Negative for bleeding problem. Does not  bruise/bleed easily.   Skin:  Negative for dry skin and itching.   Musculoskeletal:  Negative for falls, muscle weakness and myalgias.   Gastrointestinal:  Negative for abdominal pain, diarrhea, heartburn, hematemesis, hematochezia and melena.   Genitourinary:  Negative for flank pain and hematuria.   Neurological:  Negative for dizziness, focal weakness, headaches, light-headedness, numbness, paresthesias, seizures and weakness.   Psychiatric/Behavioral:  Negative for altered mental status and memory loss. The patient is not nervous/anxious.    Allergic/Immunologic: Negative for hives.       Objective:   Physical Exam  Vitals and nursing note reviewed.   Constitutional:       General: He is not in acute distress.     Appearance: He is well-developed. He is obese. He is not diaphoretic.   HENT:      Head: Normocephalic and atraumatic.   Eyes:      General:         Right eye: No discharge.         Left eye: No discharge.      Pupils: Pupils are equal, round, and reactive to light.   Neck:      Thyroid: No thyromegaly.      Vascular: No JVD.   Cardiovascular:      Rate and Rhythm: Normal rate and regular rhythm.      Pulses: Normal pulses and intact distal pulses.      Heart sounds: Normal heart sounds. No murmur heard.     No friction rub. No gallop.   Pulmonary:      Effort: Pulmonary effort is normal. No respiratory distress.      Breath sounds: Normal breath sounds. No wheezing or rales.      Comments: Scar well healed.  Chest:      Chest wall: No tenderness.   Abdominal:      General: Bowel sounds are normal. There is no distension.      Palpations: Abdomen is soft.      Tenderness: There is no abdominal tenderness.   Musculoskeletal:         General: Normal range of motion.      Cervical back: Neck supple.      Right lower leg: No edema.      Left lower leg: No edema.   Skin:     General: Skin is warm and dry.      Findings: No erythema or rash.   Neurological:      General: No focal deficit present.      Mental  "Status: He is alert and oriented to person, place, and time.      Cranial Nerves: No cranial nerve deficit.   Psychiatric:         Mood and Affect: Mood normal.         Behavior: Behavior normal.       Vitals:    03/17/25 1613 03/17/25 1615   BP: 130/78 132/82   BP Location: Right arm Left arm   Patient Position: Sitting Sitting   Pulse: 72    SpO2:  99%   Weight: 122.3 kg (269 lb 8.2 oz)    Height: 6' 2" (1.88 m)      Lab Results   Component Value Date    CHOL 122 03/05/2025    CHOL 117 (L) 01/03/2025    CHOL 117 (L) 06/20/2024      Body mass index is 34.6 kg/m².   Lab Results   Component Value Date    HGBA1C 6.1 (H) 03/05/2025      BMP  Lab Results   Component Value Date     03/05/2025    K 4.1 03/05/2025     03/05/2025    CO2 23 03/05/2025    BUN 30 (H) 03/05/2025    CREATININE 1.7 (H) 03/05/2025    CALCIUM 9.2 03/05/2025    ANIONGAP 9 03/05/2025    EGFRNORACEVR 43.6 (A) 03/05/2025      Lab Results   Component Value Date    HDL 33 (L) 03/05/2025    HDL 33 (L) 01/03/2025    HDL 36 (L) 06/20/2024     Lab Results   Component Value Date    LDLCALC 75.2 03/05/2025    LDLCALC 70.4 01/03/2025    LDLCALC 66.6 06/20/2024     Lab Results   Component Value Date    TRIG 69 03/05/2025    TRIG 68 01/03/2025    TRIG 72 06/20/2024     Lab Results   Component Value Date    CHOLHDL 27.0 03/05/2025    CHOLHDL 28.2 01/03/2025    CHOLHDL 30.8 06/20/2024       Chemistry        Component Value Date/Time     03/05/2025 0721    K 4.1 03/05/2025 0721     03/05/2025 0721    CO2 23 03/05/2025 0721    BUN 30 (H) 03/05/2025 0721    CREATININE 1.7 (H) 03/05/2025 0721     03/05/2025 0721        Component Value Date/Time    CALCIUM 9.2 03/05/2025 0721    ALKPHOS 49 03/05/2025 0721    AST 30 03/05/2025 0721    ALT 31 03/05/2025 0721    BILITOT 0.4 03/05/2025 0721    ESTGFRAFRICA 48.2 (A) 06/15/2022 0749    EGFRNONAA 41.7 (A) 06/15/2022 0749          Lab Results   Component Value Date    TSH 8.295 (H) 01/03/2025 "     Lab Results   Component Value Date    INR 1.0 09/25/2024    INR 1.1 08/24/2023    INR 1.1 08/23/2023     Lab Results   Component Value Date    WBC 3.81 (L) 01/03/2025    HGB 14.7 01/03/2025    HCT 47.3 01/03/2025     (H) 01/03/2025     01/03/2025     BMP  Sodium   Date Value Ref Range Status   03/05/2025 140 136 - 145 mmol/L Final     Potassium   Date Value Ref Range Status   03/05/2025 4.1 3.5 - 5.1 mmol/L Final     Chloride   Date Value Ref Range Status   03/05/2025 108 95 - 110 mmol/L Final     CO2   Date Value Ref Range Status   03/05/2025 23 23 - 29 mmol/L Final     BUN   Date Value Ref Range Status   03/05/2025 30 (H) 8 - 23 mg/dL Final     Creatinine   Date Value Ref Range Status   03/05/2025 1.7 (H) 0.5 - 1.4 mg/dL Final     Calcium   Date Value Ref Range Status   03/05/2025 9.2 8.7 - 10.5 mg/dL Final     Anion Gap   Date Value Ref Range Status   03/05/2025 9 8 - 16 mmol/L Final     eGFR if    Date Value Ref Range Status   06/15/2022 48.2 (A) >60 mL/min/1.73 m^2 Final     eGFR if non    Date Value Ref Range Status   06/15/2022 41.7 (A) >60 mL/min/1.73 m^2 Final     Comment:     Calculation used to obtain the estimated glomerular filtration  rate (eGFR) is the CKD-EPI equation.        CrCl cannot be calculated (Patient's most recent lab result is older than the maximum 7 days allowed.).    Assessment:     1. Benign hypertension    2. Stage 3a chronic kidney disease    3. Mixed hyperlipidemia    4. Prediabetes    5. Diffusion capacity of lung (dl), decreased    6. Coronary artery disease of native artery of native heart with stable angina pectoris    7. Snoring    8. Class 1 obesity due to excess calories with serious comorbidity and body mass index (BMI) of 34.0 to 34.9 in adult      Htn controlled low salt diet emphasized continue the same.   Hlp needs better compliance with diet exercise continue same meds. Counseled.  Cad s/p cabg asymptomatic discussed  exercise weight loss he is adequately revascularized,.  Thyroid disorder will recheck tsh was elevated may be related to amiodarone therapy will decide on supplement dose.  Danie will get pulmonary eval he was advised weight loss  Prediabetes A1c increased he was counseled about diet compliance exercise and weight loss.  Decreased dlco on amiodarone will repeat pft and get pulmonary eval   Pulmonary htn need to treat sleep apnea lose weight will get pulmonary to see.  Class one obesity discussed weight loss  Ckd stage 3 discussed rf modification diabetes and htn control.    Plan:   Continue current therapy  Cardiac low salt diet.  Risk factor modification and excercise program./weight loss  F/u in 6 months with lipid cmp A1c   Pulmonary referral   pft              [1]   Social History  Tobacco Use    Smoking status: Never    Smokeless tobacco: Never   Substance Use Topics    Alcohol use: No     Comment: rarely    Drug use: No

## 2025-03-21 ENCOUNTER — OFFICE VISIT (OUTPATIENT)
Dept: PULMONOLOGY | Facility: CLINIC | Age: 70
End: 2025-03-21
Payer: COMMERCIAL

## 2025-03-21 VITALS
OXYGEN SATURATION: 98 % | SYSTOLIC BLOOD PRESSURE: 120 MMHG | HEART RATE: 74 BPM | WEIGHT: 267.31 LBS | BODY MASS INDEX: 34.31 KG/M2 | DIASTOLIC BLOOD PRESSURE: 80 MMHG | HEIGHT: 74 IN | RESPIRATION RATE: 20 BRPM

## 2025-03-21 DIAGNOSIS — R94.2 DIFFUSION CAPACITY OF LUNG (DL), DECREASED: ICD-10-CM

## 2025-03-21 DIAGNOSIS — G47.33 OSA (OBSTRUCTIVE SLEEP APNEA): Primary | ICD-10-CM

## 2025-03-21 DIAGNOSIS — I27.20 PULMONARY HTN: ICD-10-CM

## 2025-03-21 DIAGNOSIS — G47.33 OBSTRUCTIVE SLEEP APNEA SYNDROME: ICD-10-CM

## 2025-03-21 PROCEDURE — 99999 PR PBB SHADOW E&M-EST. PATIENT-LVL IV: CPT | Mod: PBBFAC,,, | Performed by: INTERNAL MEDICINE

## 2025-03-21 NOTE — PROGRESS NOTES
Subjective:     Patient ID: Abdirahman Rodriguez is a 69 y.o. male.    Chief Complaint:  Referred for polysomnogram demonstrating severe Obstructive Sleep Apnea - patient is asymptomatic and does not want to pursue Continuous Positive Airway Pressure at this time    HPI 69 y.o. with Obstructive Sleep Apnea - asymptomatic. Denies that there any problem with sleep.  PMHx of benign HTN & CKD Stage III  and obesity  Sleep Apnea  He presents for a sleep evaluation. He complains of no complaints related to sleep .   Previous evaluation and treatment has included  polysomnogram   that demonstrated severe Obstructive Sleep Apnea with transient hypoxemia. Patient does not want treatment.  Consequences of no treatment of Obstructive Sleep Apnea discussed     Past Medical History:   Diagnosis Date    Benign hypertension     Class 1 obesity in adult 05/25/2023    Coronary artery disease     History of colon polyps     Hypergammaglobulinemia     Hypertensive kidney disease with chronic kidney disease stage III     Hypothyroidism, unspecified     Snoring 09/05/2024     Past Surgical History:   Procedure Laterality Date    ARTERIOGRAPHY OF SUBCLAVIAN ARTERY Left 8/14/2023    Procedure: ARTERIOGRAM, SUBCLAVIAN;  Surgeon: Vahid Juarez MD;  Location: [facility] CATH LAB;  Service: Cardiology;  Laterality: Left;    CATHETERIZATION OF BOTH LEFT AND RIGHT HEART N/A 10/1/2024    Procedure: CATHETERIZATION, HEART, BOTH LEFT AND RIGHT;  Surgeon: Vahid Juarez MD;  Location: [facility] CATH LAB;  Service: Cardiology;  Laterality: N/A;  8-830 admit for IV Hydration    COLONOSCOPY N/A 12/6/2021    Procedure: COLONOSCOPY;  Surgeon: Doris Altman MD;  Location: [facility] ENDO;  Service: Endoscopy;  Laterality: N/A;    CORONARY ARTERY BYPASS GRAFT (CABG) N/A 8/23/2023    Procedure: CORONARY ARTERY BYPASS GRAFT (CABG);  Surgeon: Emma Arzola MD;  Location: [facility] OR;  Service: Cardiothoracic;  Laterality: N/A;  2-VESSEL    CORONARY BYPASS GRAFT  ANGIOGRAPHY  10/1/2024    Procedure: Bypass graft study;  Surgeon: Vahid Juarez MD;  Location: [facility] CATH LAB;  Service: Cardiology;;    ECHOCARDIOGRAM,TRANSESOPHAGEAL N/A 8/23/2023    Procedure: ECHOCARDIOGRAM,TRANSESOPHAGEAL;  Surgeon: Emma Arzola MD;  Location: [facility] OR;  Service: Cardiothoracic;  Laterality: N/A;    ENDOSCOPIC HARVEST OF VEIN Left 8/23/2023    Procedure: SURGICAL PROCUREMENT, VEIN, ENDOSCOPIC;  Surgeon: Emma Arzola MD;  Location: [facility] OR;  Service: Cardiothoracic;  Laterality: Left;    INJECTION OF ANESTHETIC AGENT AROUND MULTIPLE INTERCOSTAL NERVES N/A 8/23/2023    Procedure: BLOCK, NERVE, INTERCOSTAL, 2 OR MORE;  Surgeon: Emma Arzola MD;  Location: [facility] OR;  Service: Cardiothoracic;  Laterality: N/A;    LEFT HEART CATHETERIZATION Left 8/14/2023    Procedure: Left heart cath;  Surgeon: Vahid Juarez MD;  Location: [facility] CATH LAB;  Service: Cardiology;  Laterality: Left;  pt instructed 930-10am start/    None       Review of patient's allergies indicates:  No Known Allergies  Medications Ordered Prior to Encounter[1]  Social History[2]  Family History   Problem Relation Name Age of Onset    Heart disease Mother      Hypertension Mother      Heart disease Father      Hypertension Father         Review of Systems   Constitutional:  Negative for fever and fatigue.   HENT:  Negative for postnasal drip and rhinorrhea.    Eyes:  Negative for redness and itching.   Respiratory:  Negative for cough, shortness of breath, wheezing, dyspnea on extertion and Paroxysmal Nocturnal Dyspnea.    Cardiovascular:  Negative for chest pain.   Genitourinary:  Negative for difficulty urinating and hematuria.   Endocrine:  Negative for polyphagia, cold intolerance and heat intolerance.    Musculoskeletal:  Negative for arthralgias.   Skin:  Negative for rash.   Gastrointestinal:  Negative for nausea, vomiting, abdominal pain and abdominal distention.   Neurological:  Negative for dizziness and  "headaches.   Hematological:  Negative for adenopathy. Does not bruise/bleed easily and no excessive bruising.   Psychiatric/Behavioral:  The patient is not nervous/anxious.        Objective:      /80 (Patient Position: Sitting)   Pulse 74   Resp 20   Ht 6' 2" (1.88 m)   Wt 121.2 kg (267 lb 4.9 oz)   SpO2 98%   BMI 34.32 kg/m²   Physical Exam  Vitals and nursing note reviewed.   Constitutional:       Appearance: He is well-developed.   HENT:      Head: Normocephalic and atraumatic.      Nose: Nose normal.   Eyes:      Conjunctiva/sclera: Conjunctivae normal.      Pupils: Pupils are equal, round, and reactive to light.   Neck:      Thyroid: No thyromegaly.      Vascular: No JVD.      Trachea: No tracheal deviation.   Cardiovascular:      Rate and Rhythm: Normal rate and regular rhythm.      Heart sounds: Normal heart sounds.   Pulmonary:      Effort: Pulmonary effort is normal.      Breath sounds: Normal breath sounds.   Abdominal:      Palpations: Abdomen is soft.   Musculoskeletal:         General: Normal range of motion.      Cervical back: Neck supple.   Lymphadenopathy:      Cervical: No cervical adenopathy.   Skin:     General: Skin is warm and dry.   Neurological:      Mental Status: He is alert and oriented to person, place, and time.   Psychiatric:         Mood and Affect: Mood normal.         Behavior: Behavior normal.       Personal Diagnostic Review  Polysomnogram reviewed         3/21/2025    11:08 AM   Pulmonary Studies Review   SpO2 98 %   Height 6' 2" (1.88 m)   Weight 121.2 kg (267 lb 4.9 oz)   BMI (Calculated) 34.3   Predicted Distance 329.6   Predicted Distance Meters (Calculated) 564.42 meters       Cardiac catheterization    The Mid LAD lesion was 100% stenosed.    The 1st Diag lesion was 100% stenosed.    The pre-procedure left ventricular end diastolic pressure was 13.    The estimated blood loss was none.    There was two vessel coronary artery disease.    The filling pressures on " "the right were moderately elevated. Pulmonary   hypertension was moderate.    Normal lvedp    The procedure log was documented by Documenter: Spring Meng RN and   verified by Augustin Juarez MD.    Date: 10/1/2024  Time: 1:42 PM      Office Spirometry Results:         3/21/2025    11:08 AM 3/17/2025     4:15 PM 3/17/2025     4:13 PM 1/10/2025     7:30 AM 11/5/2024     8:53 AM 10/16/2024     8:43 AM 10/1/2024     6:00 PM   Pulmonary Function Tests   SpO2 98 % 99 %  96 %  95 % 91 %   Height 6' 2" (1.88 m)  6' 2" (1.88 m) 6' 2" (1.88 m) 6' 2" (1.88 m) 6' 2" (1.88 m)    Weight 121.2 kg (267 lb 4.9 oz)  122.3 kg (269 lb 8.2 oz) 123.3 kg (271 lb 13.2 oz) 123.4 kg (272 lb 0.8 oz) 120.7 kg (265 lb 15.8 oz)    BMI (Calculated) 34.3  34.6 34.9 34.9 34.1          3/21/2025    11:08 AM   Pulmonary Studies Review   SpO2 98 %   Height 6' 2" (1.88 m)   Weight 121.2 kg (267 lb 4.9 oz)   BMI (Calculated) 34.3   Predicted Distance 329.6   Predicted Distance Meters (Calculated) 564.42 meters         PHYSICIAN INTERPRETATION AND COMMENTS: Findings are consistent with very severe, positional obstructive sleep  apnea(ADAL). Indications of cardiac dysrhythmia are recorded. InLAB CPAP titration prefered, please refer to sleep disorders  CLINICAL HISTORY: 68 year old male. BMI: 34.28 kg/mB2. . Snoring. Fatigue.  SLEEP STUDY FINDINGS: Patient underwent a 1 night Home Sleep Test and by behavioral criteria, slept for approximately  6.52 hours, with a sleep latency of 18 minutes and a sleep efficiency of 97%. Very Severe sleep disordered breathing  (AHI=62) is noted based on a 4% hypopnea desaturation criteria. The patient slept supine 23.9% of the night based on valid  recording time of 6.58 hours and is 1.7 times as likely to have apneas/hypopneas when supine. The apneas/hypopneas are  accompanied by severe oxygen desaturation (percent time below 90% SpO2: 97%, Min SpO2: 74.1%). The average  desaturation across all sleep " disordered breathing events is 5%. The mean pulse rate is 63.8 BPM, with infrequent pulse  rate variability (29 events with >= 6 BPM increase/decrease per hour), with an absence of expected pulse rate variability,  suggesting autonomic dysfunction.  TREATMENT CONSIDERATIONS: Based on the [employer] practice parameter of CPAP would be the  guideline recommendation of choice. Other therapies may include ENT procedures were appropriate, significant weight  loss. Inspire hypoglossal nerve stimulator ,mandibular advancement device may also be considered.Therapy with APAP at  6-20 cm WP using mask of choice with heated humidification is an option. Please refer to sleep disorders clinic / followup  with sleep practitioner Weight loss/management. with regular exercise per direction of physician. Avoid drowsy driving.  Follow up in sleep clinic to maximize adherence and ensure resolution of symptoms.  DISEASE MANAGEMENT CONSIDERATIONS: None.      Dear Vhaid Juarez MD  [address] [place] [id number] Noel MD         No results found for this or any previous visit (from the past 2 weeks).    Assessment:       ADAL (obstructive sleep apnea)  Dose not want to pursue Continuous Positive Airway Pressure     ADAL (obstructive sleep apnea)    Obstructive sleep apnea syndrome  -     Ambulatory referral/consult to Pulmonology    Diffusion capacity of lung (dl), decreased  -     Ambulatory referral/consult to Pulmonology    Pulmonary HTN  -     Ambulatory referral/consult to Pulmonology          Encounter Medications[3]  Plan:       Requested Prescriptions      No prescriptions requested or ordered in this encounter     Problem List Items Addressed This Visit       Diffusion capacity of lung (dl), decreased    ADAL (obstructive sleep apnea) - Primary    Current Assessment & Plan   Dose not want to pursue Continuous Positive Airway Pressure          Pulmonary HTN          Follow up  if symptoms worsen or fail to improve.    MEDICAL DECISION MAKING: Moderate to high complexity.  Overall, the multiple problems listed are of moderate to high severity that may impact quality of life and activities of daily living. Side effects of medications, treatment plan as well as options and alternatives reviewed and discussed with patient. There was counseling of patient concerning these issues.    Total time spent in counseling and coordination of care - 30  minutes of total time spent on the encounter, which includes face to face time and non-face to face time preparing to see the patient (eg, review of tests), Obtaining and/or reviewing separately obtained history, Documenting clinical information in the electronic or other health record, Independently interpreting results (not separately reported) and communicating results to the patient/family/caregiver, or Care coordination (not separately reported).    Time was used in discussion of prognosis, risks, benefits of treatment, instructions and compliance with regimen . Discussion with other physicians and/or health care providers - home health or for use of durable medical equipment (oxygen, nebulizers, CPAP, BiPAP) occurred.         [1]   Current Outpatient Medications on File Prior to Visit   Medication Sig Dispense Refill    amiodarone (PACERONE) 200 MG Tab TAKE 1 TABLET BY MOUTH EVERY DAY 90 tablet 3    aspirin (ECOTRIN) 81 MG EC tablet Take 81 mg by mouth once daily.      clopidogreL (PLAVIX) 75 mg tablet Take 1 tablet (75 mg total) by mouth once daily. 30 tablet 11    fluticasone propionate (FLONASE) 50 mcg/actuation nasal spray 1 spray (50 mcg total) by Each Nostril route once daily. 16 g 11    levothyroxine (SYNTHROID) 75 MCG tablet Take 1 tablet (75 mcg total) by mouth before breakfast. 90 tablet 3    losartan-hydrochlorothiazide 100-25 mg (HYZAAR) 100-25 mg per tablet TAKE 1 TABLET BY MOUTH EVERY DAY 90 tablet 3    metoprolol tartrate (LOPRESSOR)  25 MG tablet Take 1 tablet (25 mg total) by mouth 2 (two) times daily. 180 tablet 3    rosuvastatin (CRESTOR) 10 MG tablet Take 1 tablet (10 mg total) by mouth once daily. 90 tablet 3     Current Facility-Administered Medications on File Prior to Visit   Medication Dose Route Frequency Provider Last Rate Last Admin    0.9%  NaCl infusion (for blood administration)   Intravenous Q24H Emma Fox MD       [2]   Social History  Socioeconomic History    Marital status:    Tobacco Use    Smoking status: Never    Smokeless tobacco: Never   Substance and Sexual Activity    Alcohol use: No     Comment: rarely    Drug use: No   [3]   Outpatient Encounter Medications as of 3/21/2025   Medication Sig Dispense Refill    amiodarone (PACERONE) 200 MG Tab TAKE 1 TABLET BY MOUTH EVERY DAY 90 tablet 3    aspirin (ECOTRIN) 81 MG EC tablet Take 81 mg by mouth once daily.      clopidogreL (PLAVIX) 75 mg tablet Take 1 tablet (75 mg total) by mouth once daily. 30 tablet 11    fluticasone propionate (FLONASE) 50 mcg/actuation nasal spray 1 spray (50 mcg total) by Each Nostril route once daily. 16 g 11    levothyroxine (SYNTHROID) 75 MCG tablet Take 1 tablet (75 mcg total) by mouth before breakfast. 90 tablet 3    losartan-hydrochlorothiazide 100-25 mg (HYZAAR) 100-25 mg per tablet TAKE 1 TABLET BY MOUTH EVERY DAY 90 tablet 3    metoprolol tartrate (LOPRESSOR) 25 MG tablet Take 1 tablet (25 mg total) by mouth 2 (two) times daily. 180 tablet 3    rosuvastatin (CRESTOR) 10 MG tablet Take 1 tablet (10 mg total) by mouth once daily. 90 tablet 3     Facility-Administered Encounter Medications as of 3/21/2025   Medication Dose Route Frequency Provider Last Rate Last Admin    0.9%  NaCl infusion (for blood administration)   Intravenous Q24H Emma Fox MD

## 2025-06-26 ENCOUNTER — PATIENT OUTREACH (OUTPATIENT)
Dept: ADMINISTRATIVE | Facility: HOSPITAL | Age: 70
End: 2025-06-26
Payer: COMMERCIAL

## 2025-07-03 ENCOUNTER — LAB VISIT (OUTPATIENT)
Dept: LAB | Facility: HOSPITAL | Age: 70
End: 2025-07-03
Attending: INTERNAL MEDICINE
Payer: COMMERCIAL

## 2025-07-03 DIAGNOSIS — I10 BENIGN HYPERTENSION: ICD-10-CM

## 2025-07-03 DIAGNOSIS — R73.03 PREDIABETES: ICD-10-CM

## 2025-07-03 LAB
ALBUMIN SERPL BCP-MCNC: 3.2 G/DL (ref 3.5–5.2)
ALP SERPL-CCNC: 51 UNIT/L (ref 40–150)
ALT SERPL W/O P-5'-P-CCNC: 19 UNIT/L (ref 10–44)
ANION GAP (OHS): 12 MMOL/L (ref 8–16)
AST SERPL-CCNC: 22 UNIT/L (ref 11–45)
BILIRUB SERPL-MCNC: 0.4 MG/DL (ref 0.1–1)
BUN SERPL-MCNC: 24 MG/DL (ref 8–23)
CALCIUM SERPL-MCNC: 8.7 MG/DL (ref 8.7–10.5)
CHLORIDE SERPL-SCNC: 107 MMOL/L (ref 95–110)
CHOLEST SERPL-MCNC: 105 MG/DL (ref 120–199)
CHOLEST/HDLC SERPL: 3.5 {RATIO} (ref 2–5)
CO2 SERPL-SCNC: 19 MMOL/L (ref 23–29)
CREAT SERPL-MCNC: 1.9 MG/DL (ref 0.5–1.4)
EAG (OHS): 126 MG/DL (ref 68–131)
GFR SERPLBLD CREATININE-BSD FMLA CKD-EPI: 38 ML/MIN/1.73/M2
GLUCOSE SERPL-MCNC: 99 MG/DL (ref 70–110)
HBA1C MFR BLD: 6 % (ref 4–5.6)
HDLC SERPL-MCNC: 30 MG/DL (ref 40–75)
HDLC SERPL: 28.6 % (ref 20–50)
LDLC SERPL CALC-MCNC: 64 MG/DL (ref 63–159)
NONHDLC SERPL-MCNC: 75 MG/DL
POTASSIUM SERPL-SCNC: 4 MMOL/L (ref 3.5–5.1)
PROT SERPL-MCNC: 8.4 GM/DL (ref 6–8.4)
SODIUM SERPL-SCNC: 138 MMOL/L (ref 136–145)
TRIGL SERPL-MCNC: 55 MG/DL (ref 30–150)
TSH SERPL-ACNC: 3.09 UIU/ML (ref 0.4–4)

## 2025-07-03 PROCEDURE — 80053 COMPREHEN METABOLIC PANEL: CPT

## 2025-07-03 PROCEDURE — 36415 COLL VENOUS BLD VENIPUNCTURE: CPT | Mod: PO

## 2025-07-03 PROCEDURE — 80061 LIPID PANEL: CPT

## 2025-07-03 PROCEDURE — 83036 HEMOGLOBIN GLYCOSYLATED A1C: CPT

## 2025-07-03 PROCEDURE — 84443 ASSAY THYROID STIM HORMONE: CPT

## 2025-07-10 ENCOUNTER — OFFICE VISIT (OUTPATIENT)
Dept: INTERNAL MEDICINE | Facility: CLINIC | Age: 70
End: 2025-07-10
Payer: COMMERCIAL

## 2025-07-10 VITALS
DIASTOLIC BLOOD PRESSURE: 80 MMHG | WEIGHT: 265.19 LBS | HEART RATE: 62 BPM | SYSTOLIC BLOOD PRESSURE: 130 MMHG | BODY MASS INDEX: 34.03 KG/M2 | HEIGHT: 74 IN | TEMPERATURE: 98 F | OXYGEN SATURATION: 96 %

## 2025-07-10 DIAGNOSIS — N18.32 STAGE 3B CHRONIC KIDNEY DISEASE: ICD-10-CM

## 2025-07-10 DIAGNOSIS — I10 BENIGN HYPERTENSION: Primary | ICD-10-CM

## 2025-07-10 DIAGNOSIS — Z12.5 PROSTATE CANCER SCREENING: ICD-10-CM

## 2025-07-10 DIAGNOSIS — J98.4 RESTRICTIVE LUNG DISEASE: ICD-10-CM

## 2025-07-10 DIAGNOSIS — E03.9 ACQUIRED HYPOTHYROIDISM: ICD-10-CM

## 2025-07-10 DIAGNOSIS — E78.2 MIXED HYPERLIPIDEMIA: ICD-10-CM

## 2025-07-10 DIAGNOSIS — R73.03 PREDIABETES: ICD-10-CM

## 2025-07-10 DIAGNOSIS — Z79.899 OTHER LONG TERM (CURRENT) DRUG THERAPY: ICD-10-CM

## 2025-07-10 DIAGNOSIS — I25.10 CORONARY ARTERY DISEASE, UNSPECIFIED VESSEL OR LESION TYPE, UNSPECIFIED WHETHER ANGINA PRESENT, UNSPECIFIED WHETHER NATIVE OR TRANSPLANTED HEART: ICD-10-CM

## 2025-07-10 PROCEDURE — 99999 PR PBB SHADOW E&M-EST. PATIENT-LVL IV: CPT | Mod: PBBFAC,,,

## 2025-07-10 PROCEDURE — 3066F NEPHROPATHY DOC TX: CPT | Mod: CPTII,S$GLB,,

## 2025-07-10 PROCEDURE — 3075F SYST BP GE 130 - 139MM HG: CPT | Mod: CPTII,S$GLB,,

## 2025-07-10 PROCEDURE — 3061F NEG MICROALBUMINURIA REV: CPT | Mod: CPTII,S$GLB,,

## 2025-07-10 PROCEDURE — 1101F PT FALLS ASSESS-DOCD LE1/YR: CPT | Mod: CPTII,S$GLB,,

## 2025-07-10 PROCEDURE — 1126F AMNT PAIN NOTED NONE PRSNT: CPT | Mod: CPTII,S$GLB,,

## 2025-07-10 PROCEDURE — 3008F BODY MASS INDEX DOCD: CPT | Mod: CPTII,S$GLB,,

## 2025-07-10 PROCEDURE — 3288F FALL RISK ASSESSMENT DOCD: CPT | Mod: CPTII,S$GLB,,

## 2025-07-10 PROCEDURE — 1159F MED LIST DOCD IN RCRD: CPT | Mod: CPTII,S$GLB,,

## 2025-07-10 PROCEDURE — 3079F DIAST BP 80-89 MM HG: CPT | Mod: CPTII,S$GLB,,

## 2025-07-10 PROCEDURE — G2211 COMPLEX E/M VISIT ADD ON: HCPCS | Mod: S$GLB,,,

## 2025-07-10 PROCEDURE — 3044F HG A1C LEVEL LT 7.0%: CPT | Mod: CPTII,S$GLB,,

## 2025-07-10 PROCEDURE — 99214 OFFICE O/P EST MOD 30 MIN: CPT | Mod: S$GLB,,,

## 2025-07-10 RX ORDER — LEVOTHYROXINE SODIUM 25 UG/1
25 TABLET ORAL
Qty: 30 TABLET | Refills: 2 | Status: SHIPPED | OUTPATIENT
Start: 2025-07-10

## 2025-07-10 RX ORDER — AMLODIPINE BESYLATE 5 MG/1
5 TABLET ORAL DAILY
Qty: 90 TABLET | Refills: 3 | Status: SHIPPED | OUTPATIENT
Start: 2025-07-10 | End: 2026-07-10

## 2025-07-10 NOTE — PROGRESS NOTES
Patient ID: Abdirahman Rodriguez is a 69 y.o. male.    Chief Complaint: Establish Care    History of Present Illness    CHIEF COMPLAINT:  - Mr. Rodriguez presents for a follow-up visit with concerns about persistent cough and medication side effects, particularly dizziness associated with blood pressure medication.    HPI:  Mr. Rodriguez reports a persistent cough ongoing for approximately 2 years. The cough is constant throughout the day, worsening when he bathes and prepares for bed. He manages this symptom with OTC cough syrup, specifically Loratadine (an antihistamine), taken before bed, which he finds helpful in reducing the cough.    He complains of dizziness and fatigue, which he associates with his blood pressure medication. He reports feeling improved when not taking the medication. He is prescribed a blood pressure medication to be taken twice daily, which he confirms taking every day. This medication, along with his thyroid medication, exacerbates his dizziness. The dizziness is particularly noticeable when rising from bed or standing up rapidly from a seated position.    He is evaluated by multiple specialists for ongoing health issues. He follows up with a cardiologist, Dr. Tompkins, every 6 months for his heart condition, which includes a history of open-heart bypass surgery. He is also evaluated by a pulmonologist, Dr. Ratliff, for his cough and lung issues, including a history of restrictive lung disease following COVID-19. He is under the care of a nephrologist for chronic kidney disease.    He mentions receiving one dose of the shingles vaccine in the past.    He denies producing phlegm with his cough and any swelling in his feet.      ROS:  General: -fever, -chills, +fatigue, -weight gain, -weight loss  Eyes: -vision changes, -redness, -discharge  ENT: -ear pain, -nasal congestion, -sore throat  Cardiovascular: -chest pain, -palpitations, -lower extremity edema, +orthostatic symptoms  Respiratory: +cough, -shortness  of breath  Gastrointestinal: -abdominal pain, -nausea, -vomiting, -diarrhea, -constipation, -blood in stool  Genitourinary: -dysuria, -hematuria, -frequency  Musculoskeletal: -joint pain, -muscle pain  Skin: -rash, -lesion  Neurological: -headache, +dizziness, -numbness, -tingling  Psychiatric: -anxiety, -depression, -sleep difficulty         Pmh, Psh, Family Hx, Social Hx updated in Epic Tabs today.         7/10/2025     7:09 AM 1/10/2025     7:27 AM 1/10/2024     7:41 AM 7/10/2023     7:46 AM 7/10/2023     7:45 AM 1/4/2023     7:41 AM 3/14/2022     8:58 AM   Depression Patient Health Questionnaire   Over the last two weeks how often have you been bothered by little interest or pleasure in doing things Not at all Not at all Not at all Not at all Not at all Not at all  Not at all    Over the last two weeks how often have you been bothered by feeling down, depressed or hopeless Not at all Not at all Not at all Not at all Not at all Not at all Not at all   PHQ-2 Total Score 0 0 0 0 0 0 0       Data saved with a previous flowsheet row definition       Active Problem List with Overview Notes    Diagnosis Date Noted    Class 1 obesity due to excess calories with serious comorbidity and body mass index (BMI) of 34.0 to 34.9 in adult 03/17/2025    Pulmonary HTN 03/17/2025    ADAL (obstructive sleep apnea) 03/17/2025    S/P CABG (coronary artery bypass graft) 03/17/2025    Snoring 09/05/2024    Coronary artery disease     Hypothyroidism, unspecified 07/11/2023    Restrictive lung disease 02/27/2023    Diffusion capacity of lung (dl), decreased 02/27/2023    Hypergammaglobulinemia 01/04/2023    Stage 3b chronic kidney disease     Hypertensive kidney disease with chronic kidney disease stage III     History of colon polyps     Benign hypertension     Mixed hyperlipidemia 01/31/2018    Prediabetes 01/31/2018       Past Medical History:   Diagnosis Date    Benign hypertension     Class 1 obesity in adult 05/25/2023    Coronary  artery disease     History of colon polyps     Hypergammaglobulinemia     Hypertensive kidney disease with chronic kidney disease stage III     Hypothyroidism, unspecified     Snoring 09/05/2024       Past Surgical History:   Procedure Laterality Date    ARTERIOGRAPHY OF SUBCLAVIAN ARTERY Left 8/14/2023    Procedure: ARTERIOGRAM, SUBCLAVIAN;  Surgeon: Vahid Juarez MD;  Location: [facility] CATH LAB;  Service: Cardiology;  Laterality: Left;    CATHETERIZATION OF BOTH LEFT AND RIGHT HEART N/A 10/1/2024    Procedure: CATHETERIZATION, HEART, BOTH LEFT AND RIGHT;  Surgeon: Vahid Juarez MD;  Location: [facility] CATH LAB;  Service: Cardiology;  Laterality: N/A;  8-830 admit for IV Hydration    COLONOSCOPY N/A 12/6/2021    Procedure: COLONOSCOPY;  Surgeon: Doris Altman MD;  Location: [facility] ENDO;  Service: Endoscopy;  Laterality: N/A;    CORONARY ARTERY BYPASS GRAFT (CABG) N/A 8/23/2023    Procedure: CORONARY ARTERY BYPASS GRAFT (CABG);  Surgeon: Emma Arzola MD;  Location: [facility] OR;  Service: Cardiothoracic;  Laterality: N/A;  2-VESSEL    CORONARY BYPASS GRAFT ANGIOGRAPHY  10/1/2024    Procedure: Bypass graft study;  Surgeon: Vahid Juarez MD;  Location: [facility] CATH LAB;  Service: Cardiology;;    ECHOCARDIOGRAM,TRANSESOPHAGEAL N/A 8/23/2023    Procedure: ECHOCARDIOGRAM,TRANSESOPHAGEAL;  Surgeon: Emma Arzola MD;  Location: [facility] OR;  Service: Cardiothoracic;  Laterality: N/A;    ENDOSCOPIC HARVEST OF VEIN Left 8/23/2023    Procedure: SURGICAL PROCUREMENT, VEIN, ENDOSCOPIC;  Surgeon: Emma Arzola MD;  Location: [facility] OR;  Service: Cardiothoracic;  Laterality: Left;    INJECTION OF ANESTHETIC AGENT AROUND MULTIPLE INTERCOSTAL NERVES N/A 8/23/2023    Procedure: BLOCK, NERVE, INTERCOSTAL, 2 OR MORE;  Surgeon: Emma Arzola MD;  Location: [facility] OR;  Service: Cardiothoracic;  Laterality: N/A;    LEFT HEART CATHETERIZATION Left 8/14/2023    Procedure: Left heart cath;  Surgeon: Vahid Juarez MD;  Location:  [facility] CATH LAB;  Service: Cardiology;  Laterality: Left;  pt instructed 930-10am start/    None         Family History   Problem Relation Name Age of Onset    Heart disease Mother      Hypertension Mother      Heart disease Father      Hypertension Father         Social History     Socioeconomic History    Marital status:    Tobacco Use    Smoking status: Never    Smokeless tobacco: Never   Substance and Sexual Activity    Alcohol use: No     Comment: rarely    Drug use: No       Medications Ordered Prior to Encounter[1]    Review of patient's allergies indicates:  No Known Allergies    General - Well developed, alert and oriented in NAD  HEENT - normocephalic, no evidence of trauma, sclera white, EOMI  Neck - full range of motion  COR - regular rate and rhythm without murmurs or gallops  Lungs - Clear  Abdomen - soft, non-tender  Ext - no cyanosis     Assessment:     1. Benign hypertension    2. Mixed hyperlipidemia    3. Prediabetes    4. Restrictive lung disease    5. Acquired hypothyroidism    6. Coronary artery disease, unspecified vessel or lesion type, unspecified whether angina present, unspecified whether native or transplanted heart    7. Stage 3b chronic kidney disease    8. Other long term (current) drug therapy    9. Prostate cancer screening        Pertinent Labs:    Chemistry        Component Value Date/Time     07/03/2025 0738     03/05/2025 0721    K 4.0 07/03/2025 0738    K 4.1 03/05/2025 0721     07/03/2025 0738     03/05/2025 0721    CO2 19 (L) 07/03/2025 0738    CO2 23 03/05/2025 0721    BUN 24 (H) 07/03/2025 0738    CREATININE 1.9 (H) 07/03/2025 0738    GLU 99 07/03/2025 0738     03/05/2025 0721        Component Value Date/Time    CALCIUM 8.7 07/03/2025 0738    CALCIUM 9.2 03/05/2025 0721    ALKPHOS 51 07/03/2025 0738    ALKPHOS 49 03/05/2025 0721    AST 22 07/03/2025 0738    AST 30 03/05/2025 0721    ALT 19 07/03/2025 0738    ALT 31 03/05/2025 0721     "BILITOT 0.4 07/03/2025 0738    BILITOT 0.4 03/05/2025 0721    ESTGFRAFRICA 48.2 (A) 06/15/2022 0749    EGFRNONAA 41.7 (A) 06/15/2022 0749          Lab Results   Component Value Date    WBC 3.81 (L) 01/03/2025    HGB 14.7 01/03/2025    HCT 47.3 01/03/2025     (H) 01/03/2025    MCH 31.1 (H) 01/03/2025    MCHC 31.1 (L) 01/03/2025    RDW 14.4 01/03/2025     01/03/2025    MPV 11.5 01/03/2025       Lab Results   Component Value Date    HGBA1C 6.0 (H) 07/03/2025    HGBA1C 6.1 (H) 03/05/2025    HGBA1C 5.8 (H) 01/03/2025    GLU 99 07/03/2025     Lab Results   Component Value Date    LDLCALC 64.0 07/03/2025     Lab Results   Component Value Date    TSH 3.088 07/03/2025     Lab Results   Component Value Date    CHOL 105 (L) 07/03/2025    CHOL 122 03/05/2025    CHOL 117 (L) 01/03/2025     Lab Results   Component Value Date    TRIG 55 07/03/2025    TRIG 69 03/05/2025    TRIG 68 01/03/2025     Lab Results   Component Value Date    HDL 30 (L) 07/03/2025    HDL 33 (L) 03/05/2025    HDL 33 (L) 01/03/2025     Lab Results   Component Value Date    LDLCALC 64.0 07/03/2025    LDLCALC 75.2 03/05/2025    LDLCALC 70.4 01/03/2025     No results found for: "NONHDLC"  Lab Results   Component Value Date    CHOLHDL 28.6 07/03/2025    CHOLHDL 27.0 03/05/2025    CHOLHDL 28.2 01/03/2025       The ASCVD Risk score (Kiki DK, et al., 2019) failed to calculate for the following reasons:    The valid total cholesterol range is 130 to 320 mg/dL    Plan:     Assessment & Plan    Assessed reported dizziness and fatigue, potentially related to BP medication.  Evaluated thyroid function based on recent lab results and reported medication adherence.  Considered CKD in medication management decisions.  Assessed persistent cough, potentially related to restrictive lung disease post-COVID or medication side effect.  Considered shingles vaccination but respected preference to defer.    ESSENTIAL HYPERTENSION:  - Mr. Desai blood pressure has " improved after medication adjustment.  - Discontinued losartan/HCTZ combination due to reported dizziness.  - Prescribed amlodipine at a low dose for BP control, to be taken in the morning after eating.  - Also recommended losartan for kidney protection.  - Discussed potential side effects of new BP medication, including possible ankle swelling.  - Mr. Rodriguez should continue home blood pressure monitoring with a target of less than 140/90.    AORTOCORONARY BYPASS GRAFT:  - Noted history of open heart bypass surgery.  - Mr. Rodriguez to continue heart medications including aspirin, clopidogrel, and Imuran.    INTERSTITIAL PULMONARY DISEASE:  - Monitored history of restrictive lung disease post-COVID.  - Mr. Rodirguez experiences coughing, especially at night.  - Confirmed patient follows up with pulmonologist, Dr. Ratliff, for lung issues and cough.    CHRONIC KIDNEY DISEASE:  - Mr. Rodriguez follows with a nephrologist for chronic kidney disease management.    HYPOTHYROIDISM:  - Recent thyroid function tests are normal.  - Decreased levothyroxine from 75 mcg to 25 mcg daily, to be taken first thing in the morning on an empty stomach for optimal absorption.  - Mr. Rodriguez was previously taking thyroid medication 3 times per week with associated dizziness.    CARDIAC ARRHYTHMIA:  - Mr. Rodriguez continues Pacerone to regulate heart rhythm due to past arrhythmia.    CHRONIC COUGH:  - Mr. Rodriguez has experienced chronic cough for approximately 2 years, which worsens at night.  - Currently uses OTC cough syrup and loratadine for symptomatic relief.    ORTHOSTATIC HYPOTENSION:  - Mr. Rodriguez experiences dizziness when standing up quickly, especially when getting out of bed.    IMMUNIZATIONS:  - Mr. Rodriguez has received one shingles vaccination.  - Advised to complete the two-dose series to prevent painful complications.    FOLLOW-UP:  - Scheduled follow up in 3 months to assess blood pressure and medication side effects.         1. Benign  hypertension  - amLODIPine (NORVASC) 5 MG tablet; Take 1 tablet (5 mg total) by mouth once daily.  Dispense: 90 tablet; Refill: 3    2. Mixed hyperlipidemia    3. Prediabetes    4. Restrictive lung disease    5. Acquired hypothyroidism  - levothyroxine (SYNTHROID) 25 MCG tablet; Take 1 tablet (25 mcg total) by mouth before breakfast.  Dispense: 30 tablet; Refill: 2    6. Coronary artery disease, unspecified vessel or lesion type, unspecified whether angina present, unspecified whether native or transplanted heart    7. Stage 3b chronic kidney disease    8. Other long term (current) drug therapy  - CBC Auto Differential; Future  - Comprehensive Metabolic Panel; Future  - TSH; Future  - Lipid Panel; Future  - Hemoglobin A1C; Future    9. Prostate cancer screening  - PSA, SCREENING; Future    Hypertension: Amlodipine 5 mg daily, metoprolol tartrate 25 mg twice daily.  Patient Stopped taking losartan-hydrochlorothiazide (100-25) due to dizziness and cough.  Hyperlipidemia: Rosuvastatin 10 mg daily.    Hypothyroidism: Levothyroxine 25 mcg daily.    AFib, CAD s/p CABG:  Following cardiology.  On aspirin, Plavix, rosuvastatin, amiodarone 200 mg, metoprolol tartrate 25 twice daily.  Restrictive lung disease:  Following pulmonology.  CKD stage 3b:  Following Nephrology.  Patient has stopped losartan due to dizziness and cough.    Immunization History   Administered Date(s) Administered    COVID-19, MRNA, LN-S, PF (Pfizer) (Purple Cap) 04/16/2021    Influenza (FLUAD) - Quadrivalent - Adjuvanted - PF *Preferred* (65+) 01/04/2023    Influenza - Quadrivalent - PF *Preferred* (6 months and older) 10/22/2021    Influenza - Trivalent - Fluzone High Dose - PF (65 years and older) 11/14/2024    Pneumococcal Conjugate - 13 Valent 11/08/2021    Zoster Recombinant 12/15/2021       Orders Placed This Encounter   Procedures    CBC Auto Differential    Comprehensive Metabolic Panel    TSH    Lipid Panel    Hemoglobin A1C    PSA, SCREENING        Portions of this note were generated by [employer].    Each patient to whom medical services by telemedicine are provided:  (1) informed of the relationship between the physician and patient and the respective role of any other health care provider with respect to management of the patient; and (2) notified that he or she may decline to receive medical services by telemedicine and may withdraw from such care at any time.    I spent a total of 35 minutes face to face and non-face to face on the date of this visit.This includes time preparing to see the patient (eg, review of tests, notes), obtaining and/or reviewing additional history from an independent historian and/or outside medical records, documenting clinical information in the electronic health record, independently interpreting results and/or communicating results to the patient/family/caregiver, or care coordinator.  Visit today included increased complexity associated with the care of the episodic problem addressed and managing the longitudinal care of the patient due to the serious and/or complex managed problem(s).      This note was generated with the assistance of ambient listening technology. Verbal consent was obtained by the patient and accompanying visitor(s) for the recording of patient appointment to facilitate this note. I attest to having reviewed and edited the generated note for accuracy, though some syntax or spelling errors may persist. Please contact the author of this note for any clarification.      Jose Duque MD         [1]   Current Outpatient Medications on File Prior to Visit   Medication Sig Dispense Refill    amiodarone (PACERONE) 200 MG Tab TAKE 1 TABLET BY MOUTH EVERY DAY 90 tablet 3    aspirin (ECOTRIN) 81 MG EC tablet Take 81 mg by mouth once daily.      clopidogreL (PLAVIX) 75 mg tablet Take 1 tablet (75 mg total) by mouth once daily. 30 tablet 11    metoprolol tartrate (LOPRESSOR) 25 MG tablet Take 1 tablet (25 mg  total) by mouth 2 (two) times daily. 180 tablet 3    rosuvastatin (CRESTOR) 10 MG tablet Take 1 tablet (10 mg total) by mouth once daily. 90 tablet 3    [DISCONTINUED] losartan-hydrochlorothiazide 100-25 mg (HYZAAR) 100-25 mg per tablet TAKE 1 TABLET BY MOUTH EVERY DAY 90 tablet 3    fluticasone propionate (FLONASE) 50 mcg/actuation nasal spray 1 spray (50 mcg total) by Each Nostril route once daily. (Patient not taking: Reported on 7/10/2025) 16 g 11    [DISCONTINUED] levothyroxine (SYNTHROID) 75 MCG tablet Take 1 tablet (75 mcg total) by mouth before breakfast. (Patient not taking: Reported on 7/10/2025) 90 tablet 3     Current Facility-Administered Medications on File Prior to Visit   Medication Dose Route Frequency Provider Last Rate Last Admin    0.9%  NaCl infusion (for blood administration)   Intravenous Q24H Emma Fox MD

## (undated) DEVICE — KIT MANIFOLD LOW PRESS TUBING

## (undated) DEVICE — OMNIPAQUE 300MG 150ML VIAL

## (undated) DEVICE — Device

## (undated) DEVICE — SOL NORMAL USPCA 0.9%

## (undated) DEVICE — CANNULA VENOUS MC2X 29FR

## (undated) DEVICE — SYS VIRTUOSAPH PLUS EVM

## (undated) DEVICE — CATH IMPULSE IM CRV 100CM 5FR

## (undated) DEVICE — CANNULA AG CARDPLG 14G FLANGE

## (undated) DEVICE — BLADE KNIFE UNITOME 4.0MM

## (undated) DEVICE — TAPE SILK 3IN

## (undated) DEVICE — SUT PERMA HAND SILK BLK 0-0

## (undated) DEVICE — KIT SYR REUSABLE

## (undated) DEVICE — CATH JR4 5FR

## (undated) DEVICE — BLADE SCALP OPHTL BEVEL STR

## (undated) DEVICE — SUT PROLENE 6-0 C-1 30IN BL

## (undated) DEVICE — SUT PROLENE 4-0 RB-1 BL MO

## (undated) DEVICE — BANDAGE ACE DOUBLE STER 6IN

## (undated) DEVICE — DRESSING MEPORE ISLAND 31/2X4

## (undated) DEVICE — SUT 7/0 24IN PROLENE BL MO

## (undated) DEVICE — EVACUATOR WOUND BULB 100CC

## (undated) DEVICE — SPONGE LAP 18X18 PREWASHED

## (undated) DEVICE — KIT GLIDESHEATH SLEND 6FR 10CM

## (undated) DEVICE — DRAIN CHANNEL ROUND 19FR

## (undated) DEVICE — SOL ISOLYTE S PH 7.4 1000ML

## (undated) DEVICE — ORGNZR TUBING CLR W/CLIPS

## (undated) DEVICE — COVER OVERHEAD SURG LT BLUE

## (undated) DEVICE — SET DECANTER MEDICHOICE

## (undated) DEVICE — NDL SAFETY 22G X 1.5 ECLIPSE

## (undated) DEVICE — KIT PREVENA PLUS

## (undated) DEVICE — GUIDEWIRE WHOLEY HI TORQ 175CM

## (undated) DEVICE — GLOVE BIOGEL PI MICRO SZ 6

## (undated) DEVICE — ANGIOTOUCH KIT

## (undated) DEVICE — PUNCH AORTIC SHORT 4.4MM

## (undated) DEVICE — CANNULA IMA 1MM

## (undated) DEVICE — ELECTRODE BLADE E-Z CLEAN 4IN

## (undated) DEVICE — TUBING MEDI-VAC 20FT .25IN

## (undated) DEVICE — WIRE GUIDE TEFLON 3CM .035 145

## (undated) DEVICE — SUT VICRYL 1 OB 36 CTX

## (undated) DEVICE — GOWN NONREINF SET-IN SLV 2XL

## (undated) DEVICE — CATH PIG145 INFINITI 5X110CM

## (undated) DEVICE — SUT VICRYL 2-0 36 CT-1

## (undated) DEVICE — COUNTER BDL BLADEGUARD LI DBL

## (undated) DEVICE — CATH CV QD LUMN 6FRX110CM

## (undated) DEVICE — CATH JL4 5FR

## (undated) DEVICE — SENSORS CDI

## (undated) DEVICE — SOL IRRI STRL WATER 1000ML

## (undated) DEVICE — SYS VEIN HARVESTING

## (undated) DEVICE — CONNECTOR 3/8X3/8 STERILE

## (undated) DEVICE — PACK OPEN HEART BR

## (undated) DEVICE — TOWEL OR DISP STRL BLUE 4/PK

## (undated) DEVICE — SET SUCTION ANTICOAGULANT

## (undated) DEVICE — SET CARDIOPLEGIA DEL

## (undated) DEVICE — RETRACTOR OCTOBASE INSERT HOLD

## (undated) DEVICE — CONTAINER SPECIMEN OR STER 4OZ

## (undated) DEVICE — BOWL CELL SAVER 5 225ML

## (undated) DEVICE — DRAIN CHEST DRY SUCTION

## (undated) DEVICE — SUT SILK 2-0 SH 18IN BLACK

## (undated) DEVICE — CLIP STEALTH LATIS 1/4 FORCE 6

## (undated) DEVICE — TRAY CATH FOL SIL TEMP 10 16FR

## (undated) DEVICE — PACK HEART CATH BR

## (undated) DEVICE — GUIDEWIRE EMERALD .035IN 260CM

## (undated) DEVICE — CATH URETHRAL RED RUBBER 18FR

## (undated) DEVICE — COVER MAYO STND XL 30X57IN

## (undated) DEVICE — CANNULA VSL W/DUCKBILL

## (undated) DEVICE — SPONGE DERMACEA GAUZE 4X4

## (undated) DEVICE — SUT STEEL 7 MONO B&S18 CCS

## (undated) DEVICE — CATH AL 1.0 5/BX

## (undated) DEVICE — DRAIN CHAN RND HUBLS 8MM 24FR

## (undated) DEVICE — ELECTRODE REM PLYHSV RETURN 9

## (undated) DEVICE — SET PERFUSION

## (undated) DEVICE — BLANKET HYPOTHERMIA 25X64IN

## (undated) DEVICE — DRAPE SLUSH WARMER WITH DISC

## (undated) DEVICE — GOWN POLY REINF BRTH SLV XL

## (undated) DEVICE — SET PNEUMOCLEAR HEAT HUM SE HF

## (undated) DEVICE — SUT PLEDGET LG SOFT

## (undated) DEVICE — SUT SILK 1 BLK BRAID SA87G

## (undated) DEVICE — DRESSING MEPORE ADH 3.5X12

## (undated) DEVICE — DRAPE FULL SHEET 70X100IN

## (undated) DEVICE — SPONGE COTTON TRAY 4X4IN

## (undated) DEVICE — BAND TR COMP DEVICE REG 24CM

## (undated) DEVICE — APPLIER CLIP LIAGCLIP 9.375IN

## (undated) DEVICE — GOWN POLY REINF X-LONG XL

## (undated) DEVICE — COVER TABLE 77 X 96

## (undated) DEVICE — CELL SAVER 4/CASE

## (undated) DEVICE — CONNECTOR STRAIGHT 1/4X1/4IN

## (undated) DEVICE — DRAPE ANGIO BRACH 38X44IN

## (undated) DEVICE — SOL NACL IRR 1000ML BTL

## (undated) DEVICE — APPLIER LIGACLIP SM 9.38IN

## (undated) DEVICE — INSERT STEALTH SURGICAL CLAMP

## (undated) DEVICE — PERFUSION FX X-COATED

## (undated) DEVICE — DRAPE CARDIO FED 108X140

## (undated) DEVICE — SUT ETHBND EXCEL 2-0 RB-1 30IN

## (undated) DEVICE — SYR 30CC LUER LOCK